# Patient Record
Sex: FEMALE | Race: WHITE | NOT HISPANIC OR LATINO | Employment: OTHER | URBAN - METROPOLITAN AREA
[De-identification: names, ages, dates, MRNs, and addresses within clinical notes are randomized per-mention and may not be internally consistent; named-entity substitution may affect disease eponyms.]

---

## 2019-12-30 NOTE — TELEPHONE ENCOUNTER
This pt has not established with a PCP, current listed is not within [facility], can you please forward?

## 2020-01-03 RX ORDER — DESVENLAFAXINE SUCCINATE 100 MG/1
TABLET, EXTENDED RELEASE ORAL
Qty: 30 TAB | Refills: 2 | OUTPATIENT
Start: 2020-01-03

## 2021-06-09 ENCOUNTER — PRE-ADMISSION TESTING (OUTPATIENT)
Dept: ADMISSIONS | Facility: MEDICAL CENTER | Age: 61
End: 2021-06-09
Attending: OBSTETRICS & GYNECOLOGY
Payer: MEDICAID

## 2021-06-09 DIAGNOSIS — Z01.812 PRE-OPERATIVE LABORATORY EXAMINATION: ICD-10-CM

## 2021-06-09 LAB
APPEARANCE UR: CLEAR
BACTERIA #/AREA URNS HPF: NEGATIVE /HPF
BASOPHILS # BLD AUTO: 0.4 % (ref 0–1.8)
BASOPHILS # BLD: 0.03 K/UL (ref 0–0.12)
BILIRUB UR QL STRIP.AUTO: NEGATIVE
COLOR UR: YELLOW
EOSINOPHIL # BLD AUTO: 0.32 K/UL (ref 0–0.51)
EOSINOPHIL NFR BLD: 3.9 % (ref 0–6.9)
EPI CELLS #/AREA URNS HPF: NORMAL /HPF
ERYTHROCYTE [DISTWIDTH] IN BLOOD BY AUTOMATED COUNT: 42.8 FL (ref 35.9–50)
GLUCOSE UR STRIP.AUTO-MCNC: NEGATIVE MG/DL
HCT VFR BLD AUTO: 49 % (ref 37–47)
HGB BLD-MCNC: 16 G/DL (ref 12–16)
HYALINE CASTS #/AREA URNS LPF: NORMAL /LPF
IMM GRANULOCYTES # BLD AUTO: 0.04 K/UL (ref 0–0.11)
IMM GRANULOCYTES NFR BLD AUTO: 0.5 % (ref 0–0.9)
KETONES UR STRIP.AUTO-MCNC: NEGATIVE MG/DL
LEUKOCYTE ESTERASE UR QL STRIP.AUTO: ABNORMAL
LYMPHOCYTES # BLD AUTO: 3.12 K/UL (ref 1–4.8)
LYMPHOCYTES NFR BLD: 38.5 % (ref 22–41)
MCH RBC QN AUTO: 28.8 PG (ref 27–33)
MCHC RBC AUTO-ENTMCNC: 32.7 G/DL (ref 33.6–35)
MCV RBC AUTO: 88.3 FL (ref 81.4–97.8)
MICRO URNS: ABNORMAL
MONOCYTES # BLD AUTO: 0.68 K/UL (ref 0–0.85)
MONOCYTES NFR BLD AUTO: 8.4 % (ref 0–13.4)
NEUTROPHILS # BLD AUTO: 3.92 K/UL (ref 2–7.15)
NEUTROPHILS NFR BLD: 48.3 % (ref 44–72)
NITRITE UR QL STRIP.AUTO: NEGATIVE
NRBC # BLD AUTO: 0 K/UL
NRBC BLD-RTO: 0 /100 WBC
PH UR STRIP.AUTO: 6 [PH] (ref 5–8)
PLATELET # BLD AUTO: 298 K/UL (ref 164–446)
PMV BLD AUTO: 9.8 FL (ref 9–12.9)
PROT UR QL STRIP: NEGATIVE MG/DL
RBC # BLD AUTO: 5.55 M/UL (ref 4.2–5.4)
RBC # URNS HPF: NORMAL /HPF
RBC UR QL AUTO: NEGATIVE
SP GR UR STRIP.AUTO: 1.02
UROBILINOGEN UR STRIP.AUTO-MCNC: 0.2 MG/DL
WBC # BLD AUTO: 8.1 K/UL (ref 4.8–10.8)
WBC #/AREA URNS HPF: NORMAL /HPF

## 2021-06-09 PROCEDURE — 85025 COMPLETE CBC W/AUTO DIFF WBC: CPT

## 2021-06-09 PROCEDURE — 81001 URINALYSIS AUTO W/SCOPE: CPT

## 2021-06-09 PROCEDURE — 36415 COLL VENOUS BLD VENIPUNCTURE: CPT

## 2021-06-09 RX ORDER — M-VIT,TX,IRON,MINS/CALC/FOLIC 27MG-0.4MG
1 TABLET ORAL DAILY
Status: ON HOLD | COMMUNITY
End: 2022-01-06

## 2021-06-09 RX ORDER — SERTRALINE HYDROCHLORIDE 100 MG/1
150 TABLET, FILM COATED ORAL EVERY EVENING
COMMUNITY

## 2021-06-28 NOTE — OR NURSING
COVID-19 Pre-surgery screenin. Do you have an undiagnosed respiratory illness or symptoms such as coughing or sneezing? No (Yes/No)  a. Onset of Sx NA  b. Acute vs. chronic respiratory illness Balta    2. Do you have an unexplained fever greater than 100.4 degrees Fahrenheit or 38 degrees Celsius?     No (Yes/No)    3. Have you had direct exposure to a patient who tested positive for Covid-19?    No (Yes/No)    4. Have you had any loss of your sense of taste or smell? Have you had N/V or sore throat? No    Patient has been informed of visitor policy and asked to wear a mask upon entering the hospital   Yes (Yes/No)

## 2021-06-29 ENCOUNTER — ANESTHESIA (OUTPATIENT)
Dept: SURGERY | Facility: MEDICAL CENTER | Age: 61
End: 2021-06-29
Payer: MEDICAID

## 2021-06-29 ENCOUNTER — HOSPITAL ENCOUNTER (OUTPATIENT)
Facility: MEDICAL CENTER | Age: 61
End: 2021-06-29
Attending: OBSTETRICS & GYNECOLOGY | Admitting: OBSTETRICS & GYNECOLOGY
Payer: MEDICAID

## 2021-06-29 ENCOUNTER — ANESTHESIA EVENT (OUTPATIENT)
Dept: SURGERY | Facility: MEDICAL CENTER | Age: 61
End: 2021-06-29
Payer: MEDICAID

## 2021-06-29 VITALS
HEIGHT: 60 IN | DIASTOLIC BLOOD PRESSURE: 65 MMHG | HEART RATE: 76 BPM | RESPIRATION RATE: 18 BRPM | OXYGEN SATURATION: 95 % | SYSTOLIC BLOOD PRESSURE: 123 MMHG | WEIGHT: 173.5 LBS | TEMPERATURE: 97.4 F | BODY MASS INDEX: 34.06 KG/M2

## 2021-06-29 DIAGNOSIS — G89.18 POSTOPERATIVE PAIN: ICD-10-CM

## 2021-06-29 PROBLEM — I10 ESSENTIAL HYPERTENSION: Status: ACTIVE | Noted: 2021-06-29

## 2021-06-29 PROBLEM — T88.59XA DELAYED EMERGENCE FROM ANESTHESIA: Status: ACTIVE | Noted: 2021-06-29

## 2021-06-29 PROBLEM — R11.2 PONV (POSTOPERATIVE NAUSEA AND VOMITING): Status: ACTIVE | Noted: 2021-06-29

## 2021-06-29 PROBLEM — Z98.890 PONV (POSTOPERATIVE NAUSEA AND VOMITING): Status: ACTIVE | Noted: 2021-06-29

## 2021-06-29 PROBLEM — G47.33 OBSTRUCTIVE SLEEP APNEA SYNDROME: Status: ACTIVE | Noted: 2021-06-29

## 2021-06-29 LAB — GLUCOSE BLD-MCNC: 94 MG/DL (ref 65–99)

## 2021-06-29 PROCEDURE — 160036 HCHG PACU - EA ADDL 30 MINS PHASE I: Performed by: OBSTETRICS & GYNECOLOGY

## 2021-06-29 PROCEDURE — 700101 HCHG RX REV CODE 250: Performed by: OBSTETRICS & GYNECOLOGY

## 2021-06-29 PROCEDURE — A9270 NON-COVERED ITEM OR SERVICE: HCPCS | Performed by: ANESTHESIOLOGY

## 2021-06-29 PROCEDURE — 160029 HCHG SURGERY MINUTES - 1ST 30 MINS LEVEL 4: Performed by: OBSTETRICS & GYNECOLOGY

## 2021-06-29 PROCEDURE — 700111 HCHG RX REV CODE 636 W/ 250 OVERRIDE (IP): Performed by: OBSTETRICS & GYNECOLOGY

## 2021-06-29 PROCEDURE — 700102 HCHG RX REV CODE 250 W/ 637 OVERRIDE(OP): Performed by: ANESTHESIOLOGY

## 2021-06-29 PROCEDURE — 501838 HCHG SUTURE GENERAL: Performed by: OBSTETRICS & GYNECOLOGY

## 2021-06-29 PROCEDURE — 160041 HCHG SURGERY MINUTES - EA ADDL 1 MIN LEVEL 4: Performed by: OBSTETRICS & GYNECOLOGY

## 2021-06-29 PROCEDURE — 160046 HCHG PACU - 1ST 60 MINS PHASE II: Performed by: OBSTETRICS & GYNECOLOGY

## 2021-06-29 PROCEDURE — 160009 HCHG ANES TIME/MIN: Performed by: OBSTETRICS & GYNECOLOGY

## 2021-06-29 PROCEDURE — 700101 HCHG RX REV CODE 250

## 2021-06-29 PROCEDURE — 160048 HCHG OR STATISTICAL LEVEL 1-5: Performed by: OBSTETRICS & GYNECOLOGY

## 2021-06-29 PROCEDURE — 160002 HCHG RECOVERY MINUTES (STAT): Performed by: OBSTETRICS & GYNECOLOGY

## 2021-06-29 PROCEDURE — 700101 HCHG RX REV CODE 250: Performed by: ANESTHESIOLOGY

## 2021-06-29 PROCEDURE — 502000 HCHG MISC OR IMPLANTS RC 0278: Performed by: OBSTETRICS & GYNECOLOGY

## 2021-06-29 PROCEDURE — 110454 HCHG SHELL REV 250: Performed by: OBSTETRICS & GYNECOLOGY

## 2021-06-29 PROCEDURE — 700105 HCHG RX REV CODE 258: Performed by: OBSTETRICS & GYNECOLOGY

## 2021-06-29 PROCEDURE — A9270 NON-COVERED ITEM OR SERVICE: HCPCS | Performed by: OBSTETRICS & GYNECOLOGY

## 2021-06-29 PROCEDURE — C1771 REP DEV, URINARY, W/SLING: HCPCS | Performed by: OBSTETRICS & GYNECOLOGY

## 2021-06-29 PROCEDURE — 700111 HCHG RX REV CODE 636 W/ 250 OVERRIDE (IP): Performed by: ANESTHESIOLOGY

## 2021-06-29 PROCEDURE — 160035 HCHG PACU - 1ST 60 MINS PHASE I: Performed by: OBSTETRICS & GYNECOLOGY

## 2021-06-29 PROCEDURE — 82962 GLUCOSE BLOOD TEST: CPT

## 2021-06-29 PROCEDURE — 160025 RECOVERY II MINUTES (STATS): Performed by: OBSTETRICS & GYNECOLOGY

## 2021-06-29 PROCEDURE — 500892 HCHG PACK, PERI-GYN: Performed by: OBSTETRICS & GYNECOLOGY

## 2021-06-29 PROCEDURE — 501330 HCHG SET, CYSTO IRRIG TUBING: Performed by: OBSTETRICS & GYNECOLOGY

## 2021-06-29 DEVICE — SYSTEM SLING SOLYX SIS BLUE (1/EA): Type: IMPLANTABLE DEVICE | Site: URETHRA | Status: FUNCTIONAL

## 2021-06-29 RX ORDER — SODIUM CHLORIDE, SODIUM LACTATE, POTASSIUM CHLORIDE, CALCIUM CHLORIDE 600; 310; 30; 20 MG/100ML; MG/100ML; MG/100ML; MG/100ML
INJECTION, SOLUTION INTRAVENOUS CONTINUOUS
Status: ACTIVE | OUTPATIENT
Start: 2021-06-29 | End: 2021-06-29

## 2021-06-29 RX ORDER — ONDANSETRON 2 MG/ML
4 INJECTION INTRAMUSCULAR; INTRAVENOUS
Status: DISCONTINUED | OUTPATIENT
Start: 2021-06-29 | End: 2021-06-29 | Stop reason: HOSPADM

## 2021-06-29 RX ORDER — IBUPROFEN 800 MG/1
800 TABLET ORAL EVERY 8 HOURS PRN
Qty: 30 TABLET | Refills: 1 | Status: ON HOLD | OUTPATIENT
Start: 2021-06-29 | End: 2022-01-06

## 2021-06-29 RX ORDER — OXYCODONE HCL 5 MG/5 ML
10 SOLUTION, ORAL ORAL
Status: DISCONTINUED | OUTPATIENT
Start: 2021-06-29 | End: 2021-06-29 | Stop reason: HOSPADM

## 2021-06-29 RX ORDER — PROMETHAZINE HYDROCHLORIDE 25 MG/1
25 SUPPOSITORY RECTAL EVERY 4 HOURS PRN
Status: DISCONTINUED | OUTPATIENT
Start: 2021-06-29 | End: 2021-06-29 | Stop reason: HOSPADM

## 2021-06-29 RX ORDER — LIDOCAINE HYDROCHLORIDE 20 MG/ML
INJECTION, SOLUTION EPIDURAL; INFILTRATION; INTRACAUDAL; PERINEURAL PRN
Status: DISCONTINUED | OUTPATIENT
Start: 2021-06-29 | End: 2021-06-29 | Stop reason: SURG

## 2021-06-29 RX ORDER — DEXAMETHASONE SODIUM PHOSPHATE 4 MG/ML
INJECTION, SOLUTION INTRA-ARTICULAR; INTRALESIONAL; INTRAMUSCULAR; INTRAVENOUS; SOFT TISSUE PRN
Status: DISCONTINUED | OUTPATIENT
Start: 2021-06-29 | End: 2021-06-29 | Stop reason: SURG

## 2021-06-29 RX ORDER — DOCUSATE SODIUM 100 MG/1
100 CAPSULE, LIQUID FILLED ORAL 2 TIMES DAILY
Qty: 60 CAPSULE | Refills: 1 | Status: ON HOLD | OUTPATIENT
Start: 2021-06-29 | End: 2022-01-06

## 2021-06-29 RX ORDER — ONDANSETRON 2 MG/ML
INJECTION INTRAMUSCULAR; INTRAVENOUS PRN
Status: DISCONTINUED | OUTPATIENT
Start: 2021-06-29 | End: 2021-06-29 | Stop reason: SURG

## 2021-06-29 RX ORDER — CELECOXIB 200 MG/1
400 CAPSULE ORAL ONCE
Status: COMPLETED | OUTPATIENT
Start: 2021-06-29 | End: 2021-06-29

## 2021-06-29 RX ORDER — ACETAMINOPHEN 500 MG
1000 TABLET ORAL ONCE
Status: COMPLETED | OUTPATIENT
Start: 2021-06-29 | End: 2021-06-29

## 2021-06-29 RX ORDER — SCOLOPAMINE TRANSDERMAL SYSTEM 1 MG/1
1 PATCH, EXTENDED RELEASE TRANSDERMAL
Status: DISCONTINUED | OUTPATIENT
Start: 2021-06-29 | End: 2021-06-29 | Stop reason: HOSPADM

## 2021-06-29 RX ORDER — OXYCODONE HCL 5 MG/5 ML
5 SOLUTION, ORAL ORAL
Status: DISCONTINUED | OUTPATIENT
Start: 2021-06-29 | End: 2021-06-29 | Stop reason: HOSPADM

## 2021-06-29 RX ORDER — MEPERIDINE HYDROCHLORIDE 25 MG/ML
12.5 INJECTION INTRAMUSCULAR; INTRAVENOUS; SUBCUTANEOUS
Status: DISCONTINUED | OUTPATIENT
Start: 2021-06-29 | End: 2021-06-29 | Stop reason: HOSPADM

## 2021-06-29 RX ORDER — CEFOTETAN DISODIUM 2 G/20ML
INJECTION, POWDER, FOR SOLUTION INTRAMUSCULAR; INTRAVENOUS PRN
Status: DISCONTINUED | OUTPATIENT
Start: 2021-06-29 | End: 2021-06-29 | Stop reason: SURG

## 2021-06-29 RX ORDER — METOCLOPRAMIDE HYDROCHLORIDE 5 MG/ML
INJECTION INTRAMUSCULAR; INTRAVENOUS PRN
Status: DISCONTINUED | OUTPATIENT
Start: 2021-06-29 | End: 2021-06-29 | Stop reason: SURG

## 2021-06-29 RX ORDER — SODIUM CHLORIDE, SODIUM LACTATE, POTASSIUM CHLORIDE, CALCIUM CHLORIDE 600; 310; 30; 20 MG/100ML; MG/100ML; MG/100ML; MG/100ML
INJECTION, SOLUTION INTRAVENOUS CONTINUOUS
Status: DISCONTINUED | OUTPATIENT
Start: 2021-06-29 | End: 2021-06-29 | Stop reason: HOSPADM

## 2021-06-29 RX ORDER — GLYCOPYRROLATE 0.2 MG/ML
INJECTION INTRAMUSCULAR; INTRAVENOUS PRN
Status: DISCONTINUED | OUTPATIENT
Start: 2021-06-29 | End: 2021-06-29 | Stop reason: SURG

## 2021-06-29 RX ORDER — SIMETHICONE 80 MG
80 TABLET,CHEWABLE ORAL EVERY 8 HOURS PRN
Status: DISCONTINUED | OUTPATIENT
Start: 2021-06-29 | End: 2021-06-29 | Stop reason: HOSPADM

## 2021-06-29 RX ORDER — BUPIVACAINE HYDROCHLORIDE AND EPINEPHRINE 2.5; 5 MG/ML; UG/ML
INJECTION, SOLUTION INFILTRATION; PERINEURAL
Status: DISCONTINUED | OUTPATIENT
Start: 2021-06-29 | End: 2021-06-29 | Stop reason: HOSPADM

## 2021-06-29 RX ORDER — OXYCODONE HYDROCHLORIDE AND ACETAMINOPHEN 5; 325 MG/1; MG/1
1 TABLET ORAL EVERY 6 HOURS PRN
Qty: 15 TABLET | Refills: 0 | Status: SHIPPED | OUTPATIENT
Start: 2021-06-29 | End: 2021-07-04

## 2021-06-29 RX ORDER — HALOPERIDOL 5 MG/ML
1 INJECTION INTRAMUSCULAR
Status: DISCONTINUED | OUTPATIENT
Start: 2021-06-29 | End: 2021-06-29 | Stop reason: HOSPADM

## 2021-06-29 RX ORDER — PHENYLEPHRINE HCL IN 0.9% NACL 0.5 MG/5ML
SYRINGE (ML) INTRAVENOUS PRN
Status: DISCONTINUED | OUTPATIENT
Start: 2021-06-29 | End: 2021-06-29 | Stop reason: SURG

## 2021-06-29 RX ADMIN — EPHEDRINE SULFATE 5 MG: 50 INJECTION, SOLUTION INTRAVENOUS at 11:35

## 2021-06-29 RX ADMIN — FENTANYL CITRATE 50 MCG: 50 INJECTION, SOLUTION INTRAMUSCULAR; INTRAVENOUS at 11:06

## 2021-06-29 RX ADMIN — DEXAMETHASONE SODIUM PHOSPHATE 8 MG: 4 INJECTION, SOLUTION INTRA-ARTICULAR; INTRALESIONAL; INTRAMUSCULAR; INTRAVENOUS; SOFT TISSUE at 11:15

## 2021-06-29 RX ADMIN — SCOPALAMINE 1 PATCH: 1 PATCH, EXTENDED RELEASE TRANSDERMAL at 08:55

## 2021-06-29 RX ADMIN — LIDOCAINE HYDROCHLORIDE 80 MG: 20 INJECTION, SOLUTION EPIDURAL; INFILTRATION; INTRACAUDAL at 11:06

## 2021-06-29 RX ADMIN — CELECOXIB 400 MG: 200 CAPSULE ORAL at 08:55

## 2021-06-29 RX ADMIN — EPHEDRINE SULFATE 10 MG: 50 INJECTION, SOLUTION INTRAVENOUS at 11:22

## 2021-06-29 RX ADMIN — CEFOTETAN DISODIUM 2 G: 2 INJECTION, POWDER, FOR SOLUTION INTRAMUSCULAR; INTRAVENOUS at 11:06

## 2021-06-29 RX ADMIN — Medication 200 MCG: at 11:32

## 2021-06-29 RX ADMIN — ONDANSETRON 4 MG: 2 INJECTION INTRAMUSCULAR; INTRAVENOUS at 11:33

## 2021-06-29 RX ADMIN — METOCLOPRAMIDE 10 MG: 5 INJECTION, SOLUTION INTRAMUSCULAR; INTRAVENOUS at 11:24

## 2021-06-29 RX ADMIN — POVIDONE IODINE 15 ML: 100 SOLUTION TOPICAL at 09:09

## 2021-06-29 RX ADMIN — Medication 100 MCG: at 11:30

## 2021-06-29 RX ADMIN — Medication 100 MCG: at 11:14

## 2021-06-29 RX ADMIN — Medication 100 MCG: at 11:35

## 2021-06-29 RX ADMIN — EPHEDRINE SULFATE 5 MG: 50 INJECTION, SOLUTION INTRAVENOUS at 11:32

## 2021-06-29 RX ADMIN — Medication 100 MCG: at 11:38

## 2021-06-29 RX ADMIN — Medication 200 MCG: at 11:12

## 2021-06-29 RX ADMIN — Medication 100 MCG: at 11:22

## 2021-06-29 RX ADMIN — ACETAMINOPHEN 1000 MG: 500 TABLET ORAL at 08:55

## 2021-06-29 RX ADMIN — SODIUM CHLORIDE, POTASSIUM CHLORIDE, SODIUM LACTATE AND CALCIUM CHLORIDE: 600; 310; 30; 20 INJECTION, SOLUTION INTRAVENOUS at 09:09

## 2021-06-29 RX ADMIN — EPHEDRINE SULFATE 10 MG: 50 INJECTION, SOLUTION INTRAVENOUS at 11:28

## 2021-06-29 RX ADMIN — SODIUM CHLORIDE, POTASSIUM CHLORIDE, SODIUM LACTATE AND CALCIUM CHLORIDE: 600; 310; 30; 20 INJECTION, SOLUTION INTRAVENOUS at 11:39

## 2021-06-29 RX ADMIN — MIDAZOLAM 1 MG: 1 INJECTION INTRAMUSCULAR; INTRAVENOUS at 11:00

## 2021-06-29 RX ADMIN — EPHEDRINE SULFATE 10 MG: 50 INJECTION, SOLUTION INTRAVENOUS at 11:19

## 2021-06-29 RX ADMIN — PROPOFOL 140 MG: 10 INJECTION, EMULSION INTRAVENOUS at 11:06

## 2021-06-29 RX ADMIN — GLYCOPYRROLATE 0.2 MG: 0.2 INJECTION INTRAMUSCULAR; INTRAVENOUS at 11:28

## 2021-06-29 ASSESSMENT — PAIN DESCRIPTION - PAIN TYPE
TYPE: SURGICAL PAIN
TYPE: SURGICAL PAIN

## 2021-06-29 ASSESSMENT — PAIN SCALES - GENERAL: PAIN_LEVEL: 0

## 2021-06-29 NOTE — DISCHARGE INSTRUCTIONS
ACTIVITY: Rest and take it easy for the first 24 hours.  A responsible adult is recommended to remain with you during that time.  It is normal to feel sleepy.  We encourage you to not do anything that requires balance, judgment or coordination.    MILD FLU-LIKE SYMPTOMS ARE NORMAL. YOU MAY EXPERIENCE GENERALIZED MUSCLE ACHES, THROAT IRRITATION, HEADACHE AND/OR SOME NAUSEA.    FOR 24 HOURS DO NOT:  Drive, operate machinery or run household appliances.  Drink beer or alcoholic beverages.   Make important decisions or sign legal documents.    SPECIAL INSTRUCTIONS:   Pelvic rest x 6 week  Follow Up in 2 weeks  Call office for any problems      DIET: To avoid nausea, slowly advance diet as tolerated, avoiding spicy or greasy foods for the first day.  Add more substantial food to your diet according to your physician's instructions.  Babies can be fed formula or breast milk as soon as they are hungry.  INCREASE FLUIDS AND FIBER TO AVOID CONSTIPATION.    SURGICAL DRESSING/BATHING: May shower starting tomorrow. No submerging under water, NO hot tubs, swimming pools, baths until cleared by physician     FOLLOW-UP APPOINTMENT:  A follow-up appointment should be arranged with your doctor in 2 weeks; call to schedule.    You should CALL YOUR PHYSICIAN if you develop:  Fever greater than 101 degrees F.  Pain not relieved by medication, or persistent nausea or vomiting.  Excessive bleeding (blood soaking through dressing) or unexpected drainage from the wound.  Extreme redness or swelling around the incision site, drainage of pus or foul smelling drainage.  Inability to urinate or empty your bladder within 8 hours.  Problems with breathing or chest pain.    You should call 911 if you develop problems with breathing or chest pain.  If you are unable to contact your doctor or surgical center, you should go to the nearest emergency room or urgent care center.  Physician's telephone #: [phone] Dr. Dewey    If any questions  arise, call your doctor.  If your doctor is not available, please feel free to call the Surgical Center at [phone]. The Contact Center is open Monday through Friday 7AM to 5PM and may speak to a nurse at [phone], or toll free at [phone].     A registered nurse may call you a few days after your surgery to see how you are doing after your procedure.    MEDICATIONS: Resume taking daily medication.  Take prescribed pain medication with food.  If no medication is prescribed, you may take non-aspirin pain medication if needed.  PAIN MEDICATION CAN BE VERY CONSTIPATING.  Take a stool softener or laxative such as senokot, pericolace, or milk of magnesia if needed.    Prescription given for .  Last pain medication given at 1000mg of tylenol given at 8:55am.     If your physician has prescribed pain medication that includes Acetaminophen (Tylenol), do not take additional Acetaminophen (Tylenol) while taking the prescribed medication.    Depression / Suicide Risk    As you are discharged from this [facility] facility, it is important to learn how to keep safe from harming yourself.    Recognize the warning signs:  · Abrupt changes in personality, positive or negative- including increase in energy   · Giving away possessions  · Change in eating patterns- significant weight changes-  positive or negative  · Change in sleeping patterns- unable to sleep or sleeping all the time   · Unwillingness or inability to communicate  · Depression  · Unusual sadness, discouragement and loneliness  · Talk of wanting to die  · Neglect of personal appearance   · Rebelliousness- reckless behavior  · Withdrawal from people/activities they love  · Confusion- inability to concentrate     If you or a loved one observes any of these behaviors or has concerns about self-harm, here's what you can do:  · Talk about it- your feelings and reasons for harming yourself  · Remove any means that you might use to hurt yourself (examples:  pills, rope, extension cords, firearm)  · Get professional help from the community (Mental Health, Substance Abuse, psychological counseling)  · Do not be alone:Call your Safe Contact- someone whom you trust who will be there for you.  · Call your local CRISIS HOTLINE 709-0462 or 629-489-5575  · Call your local Children's Mobile Crisis Response Team [place] (783) 410-3594 or www.[employer]  · Call the toll free National Suicide Prevention Hotlines   · National Suicide Prevention Lifeline 058-312-ZLSA (1643)  · National Hope Line Network 800-SUICIDE (366-3535)

## 2021-06-29 NOTE — OP REPORT
DATE OF OPERATION: 6/29/21    PreOp Diagnosis:   1. WILBERT    PostOp Diagnosis:   1. WILBERT    Procedure(s) (LRB):  BLADDER SLING FEMALE - Solyx  CYSTOSCOPY    Surgeon(s):  Rocío Dewey M.D.    Anesthesiologist/Type of Anesthesia:  Associated Anesthesia: Dayanara Awan MD    Specimen: None    Estimated Blood Loss: 5 CC    Findings: CYSTOSCOPY SHOWED NO INJURY, FOREIGN   MATERIAL OR DISEASE PROCESS IN THE BLADDER OR URETHRA.   URETERAL JETS SEEN BILATERALLY.    Complications: NONE     PROCEDURE IN DETAIL: After informed consent was obtained, the patient was   taken to the operating room where general endotracheal anesthesia was   administered without difficulty. She was then prepped and draped in the usual   sterile fashion in the lithotomy position with Russell stirrups. A formal time   out was called prior to the procedure and the preoperative antibiotics were   given. Examination under anesthesia was performed and a Burch catheter was   placed under sterile technique.    10 cc of lidocaine was injected into the vagina wall at the mid-urethra. A 1.0 cm incision   is then made and dissected  bilaterally to the interior portion ofthe inferior pubic ramus   at a 45? angle offthe midline creating a pathway for delivery of the device placement.  The mesh is placed on the delivery device and inserted into the dissection pathway   targeting placement of the carrier at a 45? angle of the midline towards the obturator   foramen just lateral to the inferior pubic ramus until the midline fausto on the delivery   device is approximately at the midline position under the urethra. The mesh is deployed   and the same procedure is performed on the contralateral side. The mesh placement   is examined and noted to be the correct tension and placement prior to deploying the   device. Surgiflow is placed into the dissection tracts and the vaginal tissue is   re-approximated with 3-0 vicryl.     Cystoscopy was then started to ensure there is no  injury to the bladder or urethra or   foreign material A 70-degree cystoscope was then placed into the   bladder under direct visualization. The bladder was noted to be intact with no injury,   foreign body or disease process. Ureteral jets were seen coming from both   ureteral orifices. The urethra was also noted to have no foreign material or injury   from the sling placement.The cystoscopy was completed and removed under direct   visualization. Sponge, needle, instrument, and lap counts were correct x2.   Patient tolerated the procedure well and went to recovery room in stable   condition.       ____________________________________   HANS ORR MD

## 2021-06-29 NOTE — ANESTHESIA PROCEDURE NOTES
Airway    Date/Time: 6/29/2021 11:08 AM  Performed by: Dayanara Awan M.D.  Authorized by: Dayanara Awan M.D.     Location:  OR  Urgency:  Elective  Indications for Airway Management:  Anesthesia      Spontaneous Ventilation: absent    Sedation Level:  Deep  Preoxygenated: Yes    Mask Difficulty Assessment:  0 - not attempted  Final Airway Type:  Supraglottic airway  Final Supraglottic Airway:  Standard LMA    SGA Size:  4  Number of Attempts at Approach:  1

## 2021-06-29 NOTE — OR NURSING
Patient received from OR at 1150, bedside report received from anesthesia/OR RN, 2 patient identifiers confirmed . Patient connected to monitors, assessed for general head to toe and pain/nausea. Ordered reviewed and checked. No family to be updated via telephone on patient status per patient, call friend when ready for .  At this time patient meets criteria for D/C to phase II/room. VSS, awake and appropriate, pain minimal or at a tolerable level per patient. Report called to Bienvenido PHILLIPS and patient taken to phase II.

## 2021-06-29 NOTE — ANESTHESIA TIME REPORT
Anesthesia Start and Stop Event Times     Date Time Event    6/29/2021 1026 Ready for Procedure     1100 Anesthesia Start     1148 Anesthesia Stop        Responsible Staff  06/29/21    Name Role Begin End    Dayanara Awan M.D. Anesth 1100 1148        Preop Diagnosis (Free Text):  Pre-op Diagnosis     STRESS URINARY INCONTINENCE, MIXED INCONTINENCE        Preop Diagnosis (Codes):    Post op Diagnosis  Urinary, incontinence, stress female      Premium Reason  Non-Premium    Comments:

## 2021-06-29 NOTE — ANESTHESIA PREPROCEDURE EVALUATION
59 yo w/stress urinary incontinence    Relevant Problems   ANESTHESIA   (positive) Delayed emergence from anesthesia   (positive) Obstructive sleep apnea syndrome   (positive) PONV (postoperative nausea and vomiting)      CARDIAC   (positive) Essential hypertension     (+) covid 11/2020- required oxygen for 3months  hypoglycemia    Denies CAD, CP/SOB, DM, LUNG/LIVER/KIDNEY DZ, GERD, URI    Physical Exam    Airway   Mallampati: II  TM distance: >3 FB  Neck ROM: full       Cardiovascular   Rhythm: regular  Rate: normal  (-) murmur     Dental   (+) upper dentures           Pulmonary   Breath sounds clear to auscultation     Abdominal    Neurological - normal exam                 Anesthesia Plan    ASA 2       Plan - general       Airway plan will be LMA              Postoperative Plan: Postoperative administration of opioids is intended.    Pertinent diagnostic labs and testing reviewed    Informed Consent:    Anesthetic plan and risks discussed with patient.

## 2021-06-29 NOTE — ANESTHESIA POSTPROCEDURE EVALUATION
Patient: Yolanda Reed    Procedure Summary     Date: 06/29/21 Room / Location: [place] [address] / SURGERY [facility]    Anesthesia Start: 1100 Anesthesia Stop: 1148    Procedure: BLADDER SLING, FEMALE - SOLYX. (N/A Urethra) Diagnosis: (STRESS URINARY INCONTINENCE, MIXED INCONTINENCE)    Surgeons: Rocío Dewey M.D. Responsible Provider: Dayanara Awan M.D.    Anesthesia Type: general ASA Status: 2          Final Anesthesia Type: general  Last vitals  BP   Blood Pressure: (!) 95/57    Temp   36.3 °C (97.4 °F)    Pulse   82   Resp   17    SpO2   96 %      Anesthesia Post Evaluation    Patient location during evaluation: PACU  Patient participation: complete - patient participated  Level of consciousness: awake  Pain score: 0    Airway patency: patent  Anesthetic complications: no  Cardiovascular status: adequate  Respiratory status: acceptable  Hydration status: acceptable    PONV: none          No complications documented.     Nurse Pain Score: 0 (NPRS)

## 2021-06-29 NOTE — OR SURGEON
Immediate Post OP Note    PreOp Diagnosis:   1. WILBERT    PostOp Diagnosis:   1. WILBERT    Procedure(s):  BLADDER SLING, FEMALE - SOLYX. - Wound Class: Clean Contaminated  Cystoscopy    Surgeon(s):  Rocío Dewey M.D.    Anesthesiologist/Type of Anesthesia:  Anesthesiologist: Dayanara Awan M.D./General    Surgical Staff:  Circulator: Justine Levine R.N.  Scrub Person: Rakel Acosta; Isabel Bhardwaj    Specimens removed if any:  * No specimens in log *    Estimated Blood Loss: 5 cc    Findings: Cystoscopy shows no injury to the bladder or urethra. Ureteral jets seen bilaterally.    Complications: None        6/29/2021 11:49 AM Rocío Dewey M.D.

## 2021-06-29 NOTE — OR NURSING
1302 - Received pt from Recovery. No complaints of pain or N/V at this time. VSS.    1320 - Pt ambulated to bathroom and voided adequately.     1337 - Friend at bedside. Discharge orders received. IV taken out. All belongings returned to pt. Pt changed into clothing with assistance. Pt up and ambulated to BR and voided adequately. Discharge instructions given and discussed as well as pain management handout. Pt verbalizes understandings and all questions answered at this time. Pt states they are ready to be D/C home. Prescriptions sent to pharmacy and verbalizes understanding of medications. Pt D/C via wheelchair with all belongings with RN

## 2022-01-05 ENCOUNTER — PRE-ADMISSION TESTING (OUTPATIENT)
Dept: ADMISSIONS | Facility: MEDICAL CENTER | Age: 62
DRG: 472 | End: 2022-01-05
Attending: ORTHOPAEDIC SURGERY
Payer: MEDICAID

## 2022-01-05 ENCOUNTER — HOSPITAL ENCOUNTER (OUTPATIENT)
Dept: RADIOLOGY | Facility: MEDICAL CENTER | Age: 62
DRG: 472 | End: 2022-01-05
Attending: SURGERY | Admitting: ORTHOPAEDIC SURGERY
Payer: MEDICAID

## 2022-01-05 DIAGNOSIS — Z01.811 PRE-OPERATIVE RESPIRATORY EXAMINATION: ICD-10-CM

## 2022-01-05 DIAGNOSIS — Z01.812 PRE-OPERATIVE LABORATORY EXAMINATION: ICD-10-CM

## 2022-01-05 DIAGNOSIS — Z01.810 PRE-OPERATIVE CARDIOVASCULAR EXAMINATION: ICD-10-CM

## 2022-01-05 LAB
APTT PPP: 31.6 SEC (ref 24.7–36)
EKG IMPRESSION: NORMAL
EST. AVERAGE GLUCOSE BLD GHB EST-MCNC: 126 MG/DL
HBA1C MFR BLD: 6 % (ref 4–5.6)
INR PPP: 0.87 (ref 0.87–1.13)
PROTHROMBIN TIME: 11.5 SEC (ref 12–14.6)
SARS-COV+SARS-COV-2 AG RESP QL IA.RAPID: NOTDETECTED
SPECIMEN SOURCE: NORMAL

## 2022-01-05 PROCEDURE — 83036 HEMOGLOBIN GLYCOSYLATED A1C: CPT

## 2022-01-05 PROCEDURE — 85730 THROMBOPLASTIN TIME PARTIAL: CPT

## 2022-01-05 PROCEDURE — 93005 ELECTROCARDIOGRAM TRACING: CPT

## 2022-01-05 PROCEDURE — 87426 SARSCOV CORONAVIRUS AG IA: CPT

## 2022-01-05 PROCEDURE — 93010 ELECTROCARDIOGRAM REPORT: CPT | Performed by: INTERNAL MEDICINE

## 2022-01-05 PROCEDURE — 85610 PROTHROMBIN TIME: CPT

## 2022-01-05 PROCEDURE — 71046 X-RAY EXAM CHEST 2 VIEWS: CPT

## 2022-01-05 PROCEDURE — 36415 COLL VENOUS BLD VENIPUNCTURE: CPT

## 2022-01-06 ENCOUNTER — APPOINTMENT (OUTPATIENT)
Dept: RADIOLOGY | Facility: MEDICAL CENTER | Age: 62
DRG: 472 | End: 2022-01-06
Attending: ORTHOPAEDIC SURGERY
Payer: MEDICAID

## 2022-01-06 ENCOUNTER — ANESTHESIA EVENT (OUTPATIENT)
Dept: SURGERY | Facility: MEDICAL CENTER | Age: 62
DRG: 472 | End: 2022-01-06
Payer: MEDICAID

## 2022-01-06 ENCOUNTER — ANESTHESIA (OUTPATIENT)
Dept: SURGERY | Facility: MEDICAL CENTER | Age: 62
DRG: 472 | End: 2022-01-06
Payer: MEDICAID

## 2022-01-06 ENCOUNTER — HOSPITAL ENCOUNTER (INPATIENT)
Facility: MEDICAL CENTER | Age: 62
LOS: 2 days | DRG: 472 | End: 2022-01-08
Attending: ORTHOPAEDIC SURGERY | Admitting: ORTHOPAEDIC SURGERY
Payer: MEDICAID

## 2022-01-06 DIAGNOSIS — F41.1 ANXIETY AS ACUTE REACTION TO EXCEPTIONAL STRESS: ICD-10-CM

## 2022-01-06 DIAGNOSIS — F43.0 ANXIETY AS ACUTE REACTION TO EXCEPTIONAL STRESS: ICD-10-CM

## 2022-01-06 DIAGNOSIS — G95.9 CERVICAL MYELOPATHY WITH CERVICAL RADICULOPATHY (HCC): ICD-10-CM

## 2022-01-06 DIAGNOSIS — G89.18 POSTOPERATIVE PAIN: ICD-10-CM

## 2022-01-06 DIAGNOSIS — M62.838 MUSCLE SPASM: ICD-10-CM

## 2022-01-06 DIAGNOSIS — M54.12 CERVICAL MYELOPATHY WITH CERVICAL RADICULOPATHY (HCC): ICD-10-CM

## 2022-01-06 LAB
ANION GAP SERPL CALC-SCNC: 13 MMOL/L (ref 7–16)
BUN SERPL-MCNC: 13 MG/DL (ref 8–22)
CALCIUM SERPL-MCNC: 9.2 MG/DL (ref 8.5–10.5)
CHLORIDE SERPL-SCNC: 104 MMOL/L (ref 96–112)
CO2 SERPL-SCNC: 20 MMOL/L (ref 20–33)
CREAT SERPL-MCNC: 0.54 MG/DL (ref 0.5–1.4)
ERYTHROCYTE [DISTWIDTH] IN BLOOD BY AUTOMATED COUNT: 40.4 FL (ref 35.9–50)
GLUCOSE BLD-MCNC: 111 MG/DL (ref 65–99)
GLUCOSE SERPL-MCNC: 141 MG/DL (ref 65–99)
HCT VFR BLD AUTO: 43.4 % (ref 37–47)
HGB BLD-MCNC: 14.4 G/DL (ref 12–16)
MCH RBC QN AUTO: 28.5 PG (ref 27–33)
MCHC RBC AUTO-ENTMCNC: 33.2 G/DL (ref 33.6–35)
MCV RBC AUTO: 85.8 FL (ref 81.4–97.8)
PLATELET # BLD AUTO: 279 K/UL (ref 164–446)
PMV BLD AUTO: 9.5 FL (ref 9–12.9)
POTASSIUM SERPL-SCNC: 4.5 MMOL/L (ref 3.6–5.5)
RBC # BLD AUTO: 5.06 M/UL (ref 4.2–5.4)
SODIUM SERPL-SCNC: 137 MMOL/L (ref 135–145)
WBC # BLD AUTO: 11.4 K/UL (ref 4.8–10.8)

## 2022-01-06 PROCEDURE — 160041 HCHG SURGERY MINUTES - EA ADDL 1 MIN LEVEL 4: Performed by: ORTHOPAEDIC SURGERY

## 2022-01-06 PROCEDURE — 95955 EEG DURING SURGERY: CPT | Performed by: ORTHOPAEDIC SURGERY

## 2022-01-06 PROCEDURE — 700111 HCHG RX REV CODE 636 W/ 250 OVERRIDE (IP): Performed by: ANESTHESIOLOGY

## 2022-01-06 PROCEDURE — 95938 SOMATOSENSORY TESTING: CPT | Performed by: ORTHOPAEDIC SURGERY

## 2022-01-06 PROCEDURE — A9270 NON-COVERED ITEM OR SERVICE: HCPCS | Performed by: ANESTHESIOLOGY

## 2022-01-06 PROCEDURE — 500367 HCHG DRAIN KIT, HEMOVAC: Performed by: ORTHOPAEDIC SURGERY

## 2022-01-06 PROCEDURE — 95937 NEUROMUSCULAR JUNCTION TEST: CPT | Performed by: ORTHOPAEDIC SURGERY

## 2022-01-06 PROCEDURE — 95939 C MOTOR EVOKED UPR&LWR LIMBS: CPT | Performed by: ORTHOPAEDIC SURGERY

## 2022-01-06 PROCEDURE — 95868 NDL EMG CRANIAL NRV MUSC BI: CPT | Performed by: ORTHOPAEDIC SURGERY

## 2022-01-06 PROCEDURE — C1713 ANCHOR/SCREW BN/BN,TIS/BN: HCPCS | Performed by: ORTHOPAEDIC SURGERY

## 2022-01-06 PROCEDURE — 500864 HCHG NEEDLE, SPINAL 18G: Performed by: ORTHOPAEDIC SURGERY

## 2022-01-06 PROCEDURE — A9270 NON-COVERED ITEM OR SERVICE: HCPCS | Performed by: PHYSICIAN ASSISTANT

## 2022-01-06 PROCEDURE — 95940 IONM IN OPERATNG ROOM 15 MIN: CPT | Performed by: ORTHOPAEDIC SURGERY

## 2022-01-06 PROCEDURE — 700111 HCHG RX REV CODE 636 W/ 250 OVERRIDE (IP): Performed by: ORTHOPAEDIC SURGERY

## 2022-01-06 PROCEDURE — 85027 COMPLETE CBC AUTOMATED: CPT

## 2022-01-06 PROCEDURE — 700101 HCHG RX REV CODE 250: Performed by: ANESTHESIOLOGY

## 2022-01-06 PROCEDURE — 110454 HCHG SHELL REV 250: Performed by: ORTHOPAEDIC SURGERY

## 2022-01-06 PROCEDURE — 700101 HCHG RX REV CODE 250: Performed by: ORTHOPAEDIC SURGERY

## 2022-01-06 PROCEDURE — 0RT30ZZ RESECTION OF CERVICAL VERTEBRAL DISC, OPEN APPROACH: ICD-10-PCS | Performed by: ORTHOPAEDIC SURGERY

## 2022-01-06 PROCEDURE — 160009 HCHG ANES TIME/MIN: Performed by: ORTHOPAEDIC SURGERY

## 2022-01-06 PROCEDURE — 700105 HCHG RX REV CODE 258: Performed by: ORTHOPAEDIC SURGERY

## 2022-01-06 PROCEDURE — 502000 HCHG MISC OR IMPLANTS RC 0278: Performed by: ORTHOPAEDIC SURGERY

## 2022-01-06 PROCEDURE — 80048 BASIC METABOLIC PNL TOTAL CA: CPT

## 2022-01-06 PROCEDURE — 160002 HCHG RECOVERY MINUTES (STAT): Performed by: ORTHOPAEDIC SURGERY

## 2022-01-06 PROCEDURE — 160036 HCHG PACU - EA ADDL 30 MINS PHASE I: Performed by: ORTHOPAEDIC SURGERY

## 2022-01-06 PROCEDURE — 160029 HCHG SURGERY MINUTES - 1ST 30 MINS LEVEL 4: Performed by: ORTHOPAEDIC SURGERY

## 2022-01-06 PROCEDURE — 770001 HCHG ROOM/CARE - MED/SURG/GYN PRIV*

## 2022-01-06 PROCEDURE — 82962 GLUCOSE BLOOD TEST: CPT

## 2022-01-06 PROCEDURE — 160048 HCHG OR STATISTICAL LEVEL 1-5: Performed by: ORTHOPAEDIC SURGERY

## 2022-01-06 PROCEDURE — 700102 HCHG RX REV CODE 250 W/ 637 OVERRIDE(OP): Performed by: ANESTHESIOLOGY

## 2022-01-06 PROCEDURE — 0RG40A0 FUSION OF CERVICOTHORACIC VERTEBRAL JOINT WITH INTERBODY FUSION DEVICE, ANTERIOR APPROACH, ANTERIOR COLUMN, OPEN APPROACH: ICD-10-PCS | Performed by: ORTHOPAEDIC SURGERY

## 2022-01-06 PROCEDURE — 4A11X4G MONITORING OF PERIPHERAL NERVOUS ELECTRICAL ACTIVITY, INTRAOPERATIVE, EXTERNAL APPROACH: ICD-10-PCS | Performed by: ORTHOPAEDIC SURGERY

## 2022-01-06 PROCEDURE — 36415 COLL VENOUS BLD VENIPUNCTURE: CPT

## 2022-01-06 PROCEDURE — 501838 HCHG SUTURE GENERAL: Performed by: ORTHOPAEDIC SURGERY

## 2022-01-06 PROCEDURE — 72040 X-RAY EXAM NECK SPINE 2-3 VW: CPT

## 2022-01-06 PROCEDURE — 503051 HCHG CLOSURE PRINEO SKIN: Performed by: ORTHOPAEDIC SURGERY

## 2022-01-06 PROCEDURE — 0RG20A0 FUSION OF 2 OR MORE CERVICAL VERTEBRAL JOINTS WITH INTERBODY FUSION DEVICE, ANTERIOR APPROACH, ANTERIOR COLUMN, OPEN APPROACH: ICD-10-PCS | Performed by: ORTHOPAEDIC SURGERY

## 2022-01-06 PROCEDURE — 700102 HCHG RX REV CODE 250 W/ 637 OVERRIDE(OP): Performed by: PHYSICIAN ASSISTANT

## 2022-01-06 PROCEDURE — 700105 HCHG RX REV CODE 258: Performed by: ANESTHESIOLOGY

## 2022-01-06 PROCEDURE — 95865 NEEDLE EMG LARYNX: CPT | Performed by: ORTHOPAEDIC SURGERY

## 2022-01-06 PROCEDURE — 160035 HCHG PACU - 1ST 60 MINS PHASE I: Performed by: ORTHOPAEDIC SURGERY

## 2022-01-06 PROCEDURE — 95861 NEEDLE EMG 2 EXTREMITIES: CPT | Performed by: ORTHOPAEDIC SURGERY

## 2022-01-06 DEVICE — IMPLANTABLE DEVICE: Type: IMPLANTABLE DEVICE | Status: FUNCTIONAL

## 2022-01-06 RX ORDER — PROCHLORPERAZINE EDISYLATE 5 MG/ML
5-10 INJECTION INTRAMUSCULAR; INTRAVENOUS EVERY 4 HOURS PRN
Status: DISCONTINUED | OUTPATIENT
Start: 2022-01-06 | End: 2022-01-08 | Stop reason: HOSPADM

## 2022-01-06 RX ORDER — MIDAZOLAM HYDROCHLORIDE 1 MG/ML
1 INJECTION INTRAMUSCULAR; INTRAVENOUS
Status: DISCONTINUED | OUTPATIENT
Start: 2022-01-06 | End: 2022-01-06 | Stop reason: HOSPADM

## 2022-01-06 RX ORDER — PROPRANOLOL HYDROCHLORIDE 40 MG/1
80 TABLET ORAL
Status: DISCONTINUED | OUTPATIENT
Start: 2022-01-06 | End: 2022-01-08 | Stop reason: HOSPADM

## 2022-01-06 RX ORDER — HYDROMORPHONE HYDROCHLORIDE 2 MG/ML
INJECTION, SOLUTION INTRAMUSCULAR; INTRAVENOUS; SUBCUTANEOUS PRN
Status: DISCONTINUED | OUTPATIENT
Start: 2022-01-06 | End: 2022-01-06 | Stop reason: SURG

## 2022-01-06 RX ORDER — HYDRALAZINE HYDROCHLORIDE 20 MG/ML
10 INJECTION INTRAMUSCULAR; INTRAVENOUS
Status: DISCONTINUED | OUTPATIENT
Start: 2022-01-06 | End: 2022-01-08 | Stop reason: HOSPADM

## 2022-01-06 RX ORDER — QUETIAPINE FUMARATE 50 MG/1
50 TABLET, FILM COATED ORAL EVERY EVENING
Status: DISCONTINUED | OUTPATIENT
Start: 2022-01-06 | End: 2022-01-08 | Stop reason: HOSPADM

## 2022-01-06 RX ORDER — HYDROMORPHONE HYDROCHLORIDE 1 MG/ML
0.5 INJECTION, SOLUTION INTRAMUSCULAR; INTRAVENOUS; SUBCUTANEOUS
Status: DISCONTINUED | OUTPATIENT
Start: 2022-01-06 | End: 2022-01-08 | Stop reason: HOSPADM

## 2022-01-06 RX ORDER — OXYCODONE HYDROCHLORIDE 5 MG/1
5 TABLET ORAL
Status: DISCONTINUED | OUTPATIENT
Start: 2022-01-06 | End: 2022-01-07

## 2022-01-06 RX ORDER — HALOPERIDOL 5 MG/ML
1 INJECTION INTRAMUSCULAR
Status: DISCONTINUED | OUTPATIENT
Start: 2022-01-06 | End: 2022-01-06 | Stop reason: HOSPADM

## 2022-01-06 RX ORDER — ROCURONIUM BROMIDE 10 MG/ML
INJECTION, SOLUTION INTRAVENOUS PRN
Status: DISCONTINUED | OUTPATIENT
Start: 2022-01-06 | End: 2022-01-06 | Stop reason: SURG

## 2022-01-06 RX ORDER — LORAZEPAM 0.5 MG/1
0.5 TABLET ORAL EVERY 6 HOURS PRN
Status: DISCONTINUED | OUTPATIENT
Start: 2022-01-06 | End: 2022-01-08 | Stop reason: HOSPADM

## 2022-01-06 RX ORDER — ONDANSETRON 4 MG/1
4 TABLET, ORALLY DISINTEGRATING ORAL EVERY 4 HOURS PRN
Status: DISCONTINUED | OUTPATIENT
Start: 2022-01-06 | End: 2022-01-08 | Stop reason: HOSPADM

## 2022-01-06 RX ORDER — GABAPENTIN 300 MG/1
300 CAPSULE ORAL ONCE
Status: COMPLETED | OUTPATIENT
Start: 2022-01-06 | End: 2022-01-06

## 2022-01-06 RX ORDER — ACETAMINOPHEN 500 MG
1000 TABLET ORAL ONCE
Status: COMPLETED | OUTPATIENT
Start: 2022-01-06 | End: 2022-01-06

## 2022-01-06 RX ORDER — CEFAZOLIN SODIUM 2 G/100ML
2 INJECTION, SOLUTION INTRAVENOUS ONCE
Status: DISCONTINUED | OUTPATIENT
Start: 2022-01-06 | End: 2022-01-06 | Stop reason: HOSPADM

## 2022-01-06 RX ORDER — ONDANSETRON 2 MG/ML
INJECTION INTRAMUSCULAR; INTRAVENOUS PRN
Status: DISCONTINUED | OUTPATIENT
Start: 2022-01-06 | End: 2022-01-06 | Stop reason: HOSPADM

## 2022-01-06 RX ORDER — OXYCODONE HYDROCHLORIDE 10 MG/1
10 TABLET ORAL
Status: DISCONTINUED | OUTPATIENT
Start: 2022-01-06 | End: 2022-01-07

## 2022-01-06 RX ORDER — OXYCODONE HCL 5 MG/5 ML
5 SOLUTION, ORAL ORAL
Status: COMPLETED | OUTPATIENT
Start: 2022-01-06 | End: 2022-01-06

## 2022-01-06 RX ORDER — KETOROLAC TROMETHAMINE 30 MG/ML
30 INJECTION, SOLUTION INTRAMUSCULAR; INTRAVENOUS EVERY 6 HOURS
Status: DISCONTINUED | OUTPATIENT
Start: 2022-01-06 | End: 2022-01-08 | Stop reason: HOSPADM

## 2022-01-06 RX ORDER — MIDAZOLAM HYDROCHLORIDE 1 MG/ML
INJECTION INTRAMUSCULAR; INTRAVENOUS PRN
Status: DISCONTINUED | OUTPATIENT
Start: 2022-01-06 | End: 2022-01-06 | Stop reason: SURG

## 2022-01-06 RX ORDER — SODIUM CHLORIDE, SODIUM LACTATE, POTASSIUM CHLORIDE, CALCIUM CHLORIDE 600; 310; 30; 20 MG/100ML; MG/100ML; MG/100ML; MG/100ML
INJECTION, SOLUTION INTRAVENOUS CONTINUOUS
Status: ACTIVE | OUTPATIENT
Start: 2022-01-06 | End: 2022-01-06

## 2022-01-06 RX ORDER — SODIUM CHLORIDE, SODIUM LACTATE, POTASSIUM CHLORIDE, CALCIUM CHLORIDE 600; 310; 30; 20 MG/100ML; MG/100ML; MG/100ML; MG/100ML
INJECTION, SOLUTION INTRAVENOUS CONTINUOUS
Status: DISCONTINUED | OUTPATIENT
Start: 2022-01-06 | End: 2022-01-06 | Stop reason: HOSPADM

## 2022-01-06 RX ORDER — ATORVASTATIN CALCIUM 10 MG/1
10 TABLET, FILM COATED ORAL NIGHTLY
Status: DISCONTINUED | OUTPATIENT
Start: 2022-01-06 | End: 2022-01-08 | Stop reason: HOSPADM

## 2022-01-06 RX ORDER — ACETAMINOPHEN 500 MG
1000 TABLET ORAL EVERY 6 HOURS
Status: DISCONTINUED | OUTPATIENT
Start: 2022-01-06 | End: 2022-01-07

## 2022-01-06 RX ORDER — DOCUSATE SODIUM 100 MG/1
100 CAPSULE, LIQUID FILLED ORAL 2 TIMES DAILY
Status: DISCONTINUED | OUTPATIENT
Start: 2022-01-06 | End: 2022-01-08 | Stop reason: HOSPADM

## 2022-01-06 RX ORDER — ENEMA 19; 7 G/133ML; G/133ML
1 ENEMA RECTAL
Status: DISCONTINUED | OUTPATIENT
Start: 2022-01-06 | End: 2022-01-08 | Stop reason: HOSPADM

## 2022-01-06 RX ORDER — TRANEXAMIC ACID 100 MG/ML
INJECTION, SOLUTION INTRAVENOUS PRN
Status: DISCONTINUED | OUTPATIENT
Start: 2022-01-06 | End: 2022-01-06 | Stop reason: SURG

## 2022-01-06 RX ORDER — LABETALOL HYDROCHLORIDE 5 MG/ML
10 INJECTION, SOLUTION INTRAVENOUS
Status: DISCONTINUED | OUTPATIENT
Start: 2022-01-06 | End: 2022-01-08 | Stop reason: HOSPADM

## 2022-01-06 RX ORDER — DEXAMETHASONE SODIUM PHOSPHATE 4 MG/ML
INJECTION, SOLUTION INTRA-ARTICULAR; INTRALESIONAL; INTRAMUSCULAR; INTRAVENOUS; SOFT TISSUE PRN
Status: DISCONTINUED | OUTPATIENT
Start: 2022-01-06 | End: 2022-01-06 | Stop reason: SURG

## 2022-01-06 RX ORDER — BUPIVACAINE HYDROCHLORIDE AND EPINEPHRINE 5; 5 MG/ML; UG/ML
INJECTION, SOLUTION EPIDURAL; INTRACAUDAL; PERINEURAL
Status: DISCONTINUED | OUTPATIENT
Start: 2022-01-06 | End: 2022-01-06 | Stop reason: HOSPADM

## 2022-01-06 RX ORDER — PROMETHAZINE HYDROCHLORIDE 25 MG/1
12.5-25 TABLET ORAL EVERY 4 HOURS PRN
Status: DISCONTINUED | OUTPATIENT
Start: 2022-01-06 | End: 2022-01-08 | Stop reason: HOSPADM

## 2022-01-06 RX ORDER — LIDOCAINE HYDROCHLORIDE 20 MG/ML
INJECTION, SOLUTION EPIDURAL; INFILTRATION; INTRACAUDAL; PERINEURAL PRN
Status: DISCONTINUED | OUTPATIENT
Start: 2022-01-06 | End: 2022-01-06 | Stop reason: SURG

## 2022-01-06 RX ORDER — DIPHENHYDRAMINE HYDROCHLORIDE 50 MG/ML
12.5 INJECTION INTRAMUSCULAR; INTRAVENOUS
Status: DISCONTINUED | OUTPATIENT
Start: 2022-01-06 | End: 2022-01-06 | Stop reason: HOSPADM

## 2022-01-06 RX ORDER — ACETAMINOPHEN 500 MG
1000 TABLET ORAL EVERY 6 HOURS PRN
Status: DISCONTINUED | OUTPATIENT
Start: 2022-01-11 | End: 2022-01-07

## 2022-01-06 RX ORDER — IBUPROFEN 800 MG/1
800 TABLET ORAL 3 TIMES DAILY PRN
Status: DISCONTINUED | OUTPATIENT
Start: 2022-01-09 | End: 2022-01-08 | Stop reason: HOSPADM

## 2022-01-06 RX ORDER — CEFAZOLIN SODIUM 1 G/3ML
INJECTION, POWDER, FOR SOLUTION INTRAMUSCULAR; INTRAVENOUS
Status: DISCONTINUED | OUTPATIENT
Start: 2022-01-06 | End: 2022-01-06 | Stop reason: HOSPADM

## 2022-01-06 RX ORDER — CLONAZEPAM 0.5 MG/1
0.5 TABLET ORAL
Status: DISCONTINUED | OUTPATIENT
Start: 2022-01-06 | End: 2022-01-08 | Stop reason: HOSPADM

## 2022-01-06 RX ORDER — SCOLOPAMINE TRANSDERMAL SYSTEM 1 MG/1
1 PATCH, EXTENDED RELEASE TRANSDERMAL
Status: DISCONTINUED | OUTPATIENT
Start: 2022-01-06 | End: 2022-01-06 | Stop reason: HOSPADM

## 2022-01-06 RX ORDER — SODIUM CHLORIDE 9 MG/ML
INJECTION, SOLUTION INTRAVENOUS CONTINUOUS
Status: DISCONTINUED | OUTPATIENT
Start: 2022-01-06 | End: 2022-01-08 | Stop reason: HOSPADM

## 2022-01-06 RX ORDER — METHYLPREDNISOLONE ACETATE 40 MG/ML
INJECTION, SUSPENSION INTRA-ARTICULAR; INTRALESIONAL; INTRAMUSCULAR; SOFT TISSUE
Status: DISCONTINUED | OUTPATIENT
Start: 2022-01-06 | End: 2022-01-06 | Stop reason: HOSPADM

## 2022-01-06 RX ORDER — REMIFENTANIL HYDROCHLORIDE 1 MG/ML
INJECTION, POWDER, LYOPHILIZED, FOR SOLUTION INTRAVENOUS
Status: DISCONTINUED | OUTPATIENT
Start: 2022-01-06 | End: 2022-01-06 | Stop reason: SURG

## 2022-01-06 RX ORDER — ONDANSETRON 2 MG/ML
4 INJECTION INTRAMUSCULAR; INTRAVENOUS EVERY 4 HOURS PRN
Status: DISCONTINUED | OUTPATIENT
Start: 2022-01-06 | End: 2022-01-08 | Stop reason: HOSPADM

## 2022-01-06 RX ORDER — CALCIUM CARBONATE 500 MG/1
500 TABLET, CHEWABLE ORAL 2 TIMES DAILY
Status: DISCONTINUED | OUTPATIENT
Start: 2022-01-06 | End: 2022-01-08 | Stop reason: HOSPADM

## 2022-01-06 RX ORDER — AMOXICILLIN 250 MG
1 CAPSULE ORAL
Status: DISCONTINUED | OUTPATIENT
Start: 2022-01-06 | End: 2022-01-08 | Stop reason: HOSPADM

## 2022-01-06 RX ORDER — DIPHENHYDRAMINE HCL 25 MG
25 TABLET ORAL EVERY 6 HOURS PRN
Status: DISCONTINUED | OUTPATIENT
Start: 2022-01-06 | End: 2022-01-08 | Stop reason: HOSPADM

## 2022-01-06 RX ORDER — LORAZEPAM 2 MG/ML
0.5 INJECTION INTRAMUSCULAR EVERY 6 HOURS PRN
Status: DISCONTINUED | OUTPATIENT
Start: 2022-01-06 | End: 2022-01-08 | Stop reason: HOSPADM

## 2022-01-06 RX ORDER — POLYETHYLENE GLYCOL 3350 17 G/17G
1 POWDER, FOR SOLUTION ORAL 2 TIMES DAILY PRN
Status: DISCONTINUED | OUTPATIENT
Start: 2022-01-06 | End: 2022-01-08 | Stop reason: HOSPADM

## 2022-01-06 RX ORDER — CEFAZOLIN SODIUM 2 G/100ML
2 INJECTION, SOLUTION INTRAVENOUS EVERY 8 HOURS
Status: COMPLETED | OUTPATIENT
Start: 2022-01-06 | End: 2022-01-07

## 2022-01-06 RX ORDER — PROMETHAZINE HYDROCHLORIDE 25 MG/1
12.5-25 SUPPOSITORY RECTAL EVERY 4 HOURS PRN
Status: DISCONTINUED | OUTPATIENT
Start: 2022-01-06 | End: 2022-01-08 | Stop reason: HOSPADM

## 2022-01-06 RX ORDER — CELECOXIB 200 MG/1
400 CAPSULE ORAL ONCE
Status: COMPLETED | OUTPATIENT
Start: 2022-01-06 | End: 2022-01-06

## 2022-01-06 RX ORDER — OXYCODONE HCL 5 MG/5 ML
10 SOLUTION, ORAL ORAL
Status: COMPLETED | OUTPATIENT
Start: 2022-01-06 | End: 2022-01-06

## 2022-01-06 RX ORDER — DEXAMETHASONE SODIUM PHOSPHATE 4 MG/ML
6 INJECTION, SOLUTION INTRA-ARTICULAR; INTRALESIONAL; INTRAMUSCULAR; INTRAVENOUS; SOFT TISSUE EVERY 6 HOURS PRN
Status: DISCONTINUED | OUTPATIENT
Start: 2022-01-06 | End: 2022-01-07

## 2022-01-06 RX ORDER — KETAMINE HYDROCHLORIDE 50 MG/ML
INJECTION, SOLUTION INTRAMUSCULAR; INTRAVENOUS PRN
Status: DISCONTINUED | OUTPATIENT
Start: 2022-01-06 | End: 2022-01-06 | Stop reason: SURG

## 2022-01-06 RX ORDER — BISACODYL 10 MG
10 SUPPOSITORY, RECTAL RECTAL
Status: DISCONTINUED | OUTPATIENT
Start: 2022-01-06 | End: 2022-01-08 | Stop reason: HOSPADM

## 2022-01-06 RX ORDER — DIPHENHYDRAMINE HYDROCHLORIDE 50 MG/ML
25 INJECTION INTRAMUSCULAR; INTRAVENOUS EVERY 6 HOURS PRN
Status: DISCONTINUED | OUTPATIENT
Start: 2022-01-06 | End: 2022-01-08 | Stop reason: HOSPADM

## 2022-01-06 RX ORDER — OXYCODONE HCL 10 MG/1
10 TABLET, FILM COATED, EXTENDED RELEASE ORAL ONCE
Status: COMPLETED | OUTPATIENT
Start: 2022-01-06 | End: 2022-01-06

## 2022-01-06 RX ORDER — VITAMIN B COMPLEX
1000 TABLET ORAL EVERY EVENING
Status: DISCONTINUED | OUTPATIENT
Start: 2022-01-06 | End: 2022-01-08 | Stop reason: HOSPADM

## 2022-01-06 RX ORDER — AMOXICILLIN 250 MG
1 CAPSULE ORAL NIGHTLY
Status: DISCONTINUED | OUTPATIENT
Start: 2022-01-06 | End: 2022-01-08 | Stop reason: HOSPADM

## 2022-01-06 RX ORDER — CEFAZOLIN SODIUM 1 G/3ML
INJECTION, POWDER, FOR SOLUTION INTRAMUSCULAR; INTRAVENOUS PRN
Status: DISCONTINUED | OUTPATIENT
Start: 2022-01-06 | End: 2022-01-06 | Stop reason: SURG

## 2022-01-06 RX ORDER — SERTRALINE HYDROCHLORIDE 100 MG/1
100 TABLET, FILM COATED ORAL EVERY EVENING
Status: DISCONTINUED | OUTPATIENT
Start: 2022-01-06 | End: 2022-01-08 | Stop reason: HOSPADM

## 2022-01-06 RX ORDER — DIAZEPAM 5 MG/1
5 TABLET ORAL EVERY 6 HOURS PRN
Status: DISCONTINUED | OUTPATIENT
Start: 2022-01-06 | End: 2022-01-07

## 2022-01-06 RX ADMIN — HYDROMORPHONE HYDROCHLORIDE 0.5 MG: 2 INJECTION, SOLUTION INTRAMUSCULAR; INTRAVENOUS; SUBCUTANEOUS at 17:23

## 2022-01-06 RX ADMIN — QUETIAPINE FUMARATE 50 MG: 50 TABLET ORAL at 20:47

## 2022-01-06 RX ADMIN — ONDANSETRON 4 MG: 2 INJECTION INTRAMUSCULAR; INTRAVENOUS at 13:34

## 2022-01-06 RX ADMIN — ACETAMINOPHEN 1000 MG: 500 TABLET ORAL at 17:45

## 2022-01-06 RX ADMIN — REMIFENTANIL HYDROCHLORIDE 0.1 MCG/KG/MIN: 1 INJECTION, POWDER, LYOPHILIZED, FOR SOLUTION INTRAVENOUS at 13:34

## 2022-01-06 RX ADMIN — TRANEXAMIC ACID 1000 MG: 100 INJECTION, SOLUTION INTRAVENOUS at 16:09

## 2022-01-06 RX ADMIN — KETAMINE HYDROCHLORIDE 15 MG: 50 INJECTION INTRAMUSCULAR; INTRAVENOUS at 15:44

## 2022-01-06 RX ADMIN — TRANEXAMIC ACID 1000 MG: 100 INJECTION, SOLUTION INTRAVENOUS at 14:10

## 2022-01-06 RX ADMIN — KETAMINE HYDROCHLORIDE 15 MG: 50 INJECTION INTRAMUSCULAR; INTRAVENOUS at 15:00

## 2022-01-06 RX ADMIN — CALCIUM CARBONATE 500 MG: 500 TABLET, CHEWABLE ORAL at 22:36

## 2022-01-06 RX ADMIN — HYDROMORPHONE HYDROCHLORIDE 0.5 MG: 2 INJECTION, SOLUTION INTRAMUSCULAR; INTRAVENOUS; SUBCUTANEOUS at 17:07

## 2022-01-06 RX ADMIN — GABAPENTIN 300 MG: 300 CAPSULE ORAL at 10:35

## 2022-01-06 RX ADMIN — SCOPALAMINE 1 PATCH: 1 PATCH, EXTENDED RELEASE TRANSDERMAL at 10:35

## 2022-01-06 RX ADMIN — SODIUM CHLORIDE, POTASSIUM CHLORIDE, SODIUM LACTATE AND CALCIUM CHLORIDE 700 ML: 600; 310; 30; 20 INJECTION, SOLUTION INTRAVENOUS at 17:42

## 2022-01-06 RX ADMIN — ACETAMINOPHEN 1000 MG: 500 TABLET ORAL at 22:30

## 2022-01-06 RX ADMIN — PROPOFOL 100 MCG/KG/MIN: 10 INJECTION, EMULSION INTRAVENOUS at 13:34

## 2022-01-06 RX ADMIN — MIDAZOLAM HYDROCHLORIDE 2 MG: 1 INJECTION, SOLUTION INTRAMUSCULAR; INTRAVENOUS at 13:34

## 2022-01-06 RX ADMIN — LIDOCAINE HYDROCHLORIDE 100 MG: 20 INJECTION, SOLUTION EPIDURAL; INFILTRATION; INTRACAUDAL at 13:34

## 2022-01-06 RX ADMIN — OXYCODONE HYDROCHLORIDE 10 MG: 10 TABLET, FILM COATED, EXTENDED RELEASE ORAL at 10:35

## 2022-01-06 RX ADMIN — VANCOMYCIN HYDROCHLORIDE 1000 MG: 1 INJECTION, POWDER, LYOPHILIZED, FOR SOLUTION INTRAVENOUS at 11:03

## 2022-01-06 RX ADMIN — FENTANYL CITRATE 50 MCG: 50 INJECTION INTRAMUSCULAR; INTRAVENOUS at 18:05

## 2022-01-06 RX ADMIN — SODIUM CHLORIDE, POTASSIUM CHLORIDE, SODIUM LACTATE AND CALCIUM CHLORIDE: 600; 310; 30; 20 INJECTION, SOLUTION INTRAVENOUS at 17:00

## 2022-01-06 RX ADMIN — ATORVASTATIN CALCIUM 10 MG: 10 TABLET, FILM COATED ORAL at 22:29

## 2022-01-06 RX ADMIN — OXYCODONE HYDROCHLORIDE 10 MG: 5 SOLUTION ORAL at 17:45

## 2022-01-06 RX ADMIN — KETAMINE HYDROCHLORIDE 15 MG: 50 INJECTION INTRAMUSCULAR; INTRAVENOUS at 14:16

## 2022-01-06 RX ADMIN — ACETAMINOPHEN 1000 MG: 500 TABLET ORAL at 10:35

## 2022-01-06 RX ADMIN — DOCUSATE SODIUM 100 MG: 100 CAPSULE ORAL at 22:30

## 2022-01-06 RX ADMIN — HYDROMORPHONE HYDROCHLORIDE 0.5 MG: 2 INJECTION, SOLUTION INTRAMUSCULAR; INTRAVENOUS; SUBCUTANEOUS at 17:18

## 2022-01-06 RX ADMIN — LIDOCAINE HYDROCHLORIDE 50 MG: 20 INJECTION, SOLUTION EPIDURAL; INFILTRATION; INTRACAUDAL at 15:49

## 2022-01-06 RX ADMIN — CEFAZOLIN 2 G: 330 INJECTION, POWDER, FOR SOLUTION INTRAMUSCULAR; INTRAVENOUS at 13:34

## 2022-01-06 RX ADMIN — FENTANYL CITRATE 50 MCG: 50 INJECTION INTRAMUSCULAR; INTRAVENOUS at 17:46

## 2022-01-06 RX ADMIN — ROCURONIUM BROMIDE 30 MG: 10 INJECTION, SOLUTION INTRAVENOUS at 13:34

## 2022-01-06 RX ADMIN — KETAMINE HYDROCHLORIDE 25 MG: 50 INJECTION INTRAMUSCULAR; INTRAVENOUS at 13:34

## 2022-01-06 RX ADMIN — CELECOXIB 400 MG: 200 CAPSULE ORAL at 10:35

## 2022-01-06 RX ADMIN — DEXAMETHASONE SODIUM PHOSPHATE 8 MG: 4 INJECTION, SOLUTION INTRA-ARTICULAR; INTRALESIONAL; INTRAMUSCULAR; INTRAVENOUS; SOFT TISSUE at 13:34

## 2022-01-06 RX ADMIN — SODIUM CHLORIDE, POTASSIUM CHLORIDE, SODIUM LACTATE AND CALCIUM CHLORIDE: 600; 310; 30; 20 INJECTION, SOLUTION INTRAVENOUS at 11:03

## 2022-01-06 ASSESSMENT — PAIN DESCRIPTION - PAIN TYPE
TYPE: CHRONIC PAIN;SURGICAL PAIN
TYPE: SURGICAL PAIN
TYPE: SURGICAL PAIN;CHRONIC PAIN
TYPE: CHRONIC PAIN;SURGICAL PAIN
TYPE: SURGICAL PAIN;CHRONIC PAIN

## 2022-01-06 NOTE — ANESTHESIA PROCEDURE NOTES
Airway    Date/Time: 1/6/2022 1:39 PM  Performed by: Jluis Polo M.D.  Authorized by: Jluis Polo M.D.     Location:  OR  Urgency:  Elective  Indications for Airway Management:  Anesthesia      Spontaneous Ventilation: absent    Sedation Level:  Deep  Preoxygenated: Yes    Patient Position:  Sniffing  Mask Difficulty Assessment:  1 - vent by mask  Final Airway Type:  Endotracheal airway  Final Endotracheal Airway:  ETT and NIM tube  Cuffed: Yes    Technique Used for Successful ETT Placement:  Direct laryngoscopy    Insertion Site:  Oral  Blade Type:  Bailey  Laryngoscope Blade/Videolaryngoscope Blade Size:  2  ETT Size (mm):  7.0  Measured from:  Lips  ETT to Lips (cm):  22  Placement Verified by: auscultation and capnometry    Cormack-Lehane Classification:  Grade I - full view of glottis  Number of Attempts at Approach:  1

## 2022-01-06 NOTE — ANESTHESIA PREPROCEDURE EVALUATION
Case: [id number] Date/Time: 01/06/22 1115    Procedures:       DISCECTOMY, SPINE, CERVICAL, ANTERIOR APPROACH, WITH FUSION - C5-T1      CORPECTOMY, SPINE - PARTIAL    Pre-op diagnosis: C5-T1 HERNIATED NUCLEUS PULPOSUS, MYELOPATHY, STENOSIS WITH RADICULOPATHY    Location: [facility] OR 06 / SURGERY [facility]    Surgeons: Jama Werner M.D.        BRIGITTE - CPAP    Relevant Problems   ANESTHESIA   (positive) Delayed emergence from anesthesia   (positive) Obstructive sleep apnea syndrome   (positive) PONV (postoperative nausea and vomiting)      CARDIAC   (positive) Essential hypertension       Physical Exam    Airway   Mallampati: II  TM distance: >3 FB  Neck ROM: full       Cardiovascular - normal exam  Rhythm: regular  Rate: normal  (-) murmur     Dental - normal exam           Pulmonary - normal exam  Breath sounds clear to auscultation     Abdominal    Neurological - normal exam                 Anesthesia Plan    ASA 2       Plan - general       Airway plan will be ETT          Induction: intravenous      Pertinent diagnostic labs and testing reviewed    Informed Consent:    Anesthetic plan and risks discussed with patient.

## 2022-01-06 NOTE — ANESTHESIA PROCEDURE NOTES
Arterial Line  Performed by: Jluis Polo M.D.  Authorized by: Jluis Polo M.D.     Localization: surface landmarks    Patient Location:  OR  Indication: continuous blood pressure monitoring        Catheter Size:  20 G  Seldinger Technique?: Yes    Laterality:  Right  Site:  Radial artery  Line Secured:  Antimicrobial disc, tape and transparent dressing  Events: patient tolerated procedure well with no complications           170.18

## 2022-01-07 LAB
ERYTHROCYTE [DISTWIDTH] IN BLOOD BY AUTOMATED COUNT: 41 FL (ref 35.9–50)
HCT VFR BLD AUTO: 41.1 % (ref 37–47)
HGB BLD-MCNC: 13.7 G/DL (ref 12–16)
MCH RBC QN AUTO: 29 PG (ref 27–33)
MCHC RBC AUTO-ENTMCNC: 33.3 G/DL (ref 33.6–35)
MCV RBC AUTO: 87.1 FL (ref 81.4–97.8)
PLATELET # BLD AUTO: 249 K/UL (ref 164–446)
PMV BLD AUTO: 9.5 FL (ref 9–12.9)
RBC # BLD AUTO: 4.72 M/UL (ref 4.2–5.4)
WBC # BLD AUTO: 9.7 K/UL (ref 4.8–10.8)

## 2022-01-07 PROCEDURE — 36415 COLL VENOUS BLD VENIPUNCTURE: CPT

## 2022-01-07 PROCEDURE — 700105 HCHG RX REV CODE 258: Performed by: PHYSICIAN ASSISTANT

## 2022-01-07 PROCEDURE — 85027 COMPLETE CBC AUTOMATED: CPT

## 2022-01-07 PROCEDURE — 700102 HCHG RX REV CODE 250 W/ 637 OVERRIDE(OP): Performed by: PHYSICIAN ASSISTANT

## 2022-01-07 PROCEDURE — 97162 PT EVAL MOD COMPLEX 30 MIN: CPT

## 2022-01-07 PROCEDURE — 700111 HCHG RX REV CODE 636 W/ 250 OVERRIDE (IP): Performed by: PHYSICIAN ASSISTANT

## 2022-01-07 PROCEDURE — A9270 NON-COVERED ITEM OR SERVICE: HCPCS | Performed by: PHYSICIAN ASSISTANT

## 2022-01-07 PROCEDURE — 770001 HCHG ROOM/CARE - MED/SURG/GYN PRIV*

## 2022-01-07 PROCEDURE — 97165 OT EVAL LOW COMPLEX 30 MIN: CPT

## 2022-01-07 PROCEDURE — 97535 SELF CARE MNGMENT TRAINING: CPT

## 2022-01-07 RX ORDER — HYDROCODONE BITARTRATE AND ACETAMINOPHEN 5; 325 MG/1; MG/1
1-2 TABLET ORAL EVERY 6 HOURS PRN
Status: DISCONTINUED | OUTPATIENT
Start: 2022-01-07 | End: 2022-01-08 | Stop reason: HOSPADM

## 2022-01-07 RX ORDER — DIAZEPAM 5 MG/1
10 TABLET ORAL EVERY 6 HOURS PRN
Status: DISCONTINUED | OUTPATIENT
Start: 2022-01-07 | End: 2022-01-08 | Stop reason: HOSPADM

## 2022-01-07 RX ADMIN — QUETIAPINE FUMARATE 50 MG: 50 TABLET ORAL at 21:31

## 2022-01-07 RX ADMIN — OXYCODONE 5 MG: 5 TABLET ORAL at 15:11

## 2022-01-07 RX ADMIN — SERTRALINE 100 MG: 100 TABLET, FILM COATED ORAL at 17:47

## 2022-01-07 RX ADMIN — KETOROLAC TROMETHAMINE 30 MG: 30 INJECTION, SOLUTION INTRAMUSCULAR at 11:16

## 2022-01-07 RX ADMIN — PROPRANOLOL HYDROCHLORIDE 80 MG: 40 TABLET ORAL at 20:13

## 2022-01-07 RX ADMIN — CALCIUM CARBONATE 500 MG: 500 TABLET, CHEWABLE ORAL at 17:47

## 2022-01-07 RX ADMIN — CHOLECALCIFEROL TAB 125 MCG (5000 UNIT) 5000 UNITS: 125 TAB at 05:56

## 2022-01-07 RX ADMIN — ACETAMINOPHEN 1000 MG: 500 TABLET ORAL at 05:56

## 2022-01-07 RX ADMIN — ACETAMINOPHEN 1000 MG: 500 TABLET ORAL at 11:16

## 2022-01-07 RX ADMIN — KETOROLAC TROMETHAMINE 30 MG: 30 INJECTION, SOLUTION INTRAMUSCULAR at 05:56

## 2022-01-07 RX ADMIN — METHOCARBAMOL 1000 MG: 1000 INJECTION, SOLUTION INTRAMUSCULAR; INTRAVENOUS at 21:39

## 2022-01-07 RX ADMIN — DOCUSATE SODIUM 100 MG: 100 CAPSULE ORAL at 05:56

## 2022-01-07 RX ADMIN — METHOCARBAMOL 1000 MG: 1000 INJECTION, SOLUTION INTRAMUSCULAR; INTRAVENOUS at 11:15

## 2022-01-07 RX ADMIN — ATORVASTATIN CALCIUM 10 MG: 10 TABLET, FILM COATED ORAL at 20:12

## 2022-01-07 RX ADMIN — CALCIUM CARBONATE 500 MG: 500 TABLET, CHEWABLE ORAL at 05:56

## 2022-01-07 RX ADMIN — CEFAZOLIN SODIUM 2 G: 2 INJECTION, SOLUTION INTRAVENOUS at 08:57

## 2022-01-07 RX ADMIN — CEFAZOLIN SODIUM 2 G: 2 INJECTION, SOLUTION INTRAVENOUS at 00:47

## 2022-01-07 RX ADMIN — METHOCARBAMOL 1000 MG: 1000 INJECTION, SOLUTION INTRAMUSCULAR; INTRAVENOUS at 15:00

## 2022-01-07 RX ADMIN — METHOCARBAMOL 1000 MG: 1000 INJECTION, SOLUTION INTRAMUSCULAR; INTRAVENOUS at 01:42

## 2022-01-07 RX ADMIN — KETOROLAC TROMETHAMINE 30 MG: 30 INJECTION, SOLUTION INTRAMUSCULAR at 17:48

## 2022-01-07 RX ADMIN — SODIUM CHLORIDE: 9 INJECTION, SOLUTION INTRAVENOUS at 00:48

## 2022-01-07 RX ADMIN — DOCUSATE SODIUM 100 MG: 100 CAPSULE ORAL at 17:47

## 2022-01-07 RX ADMIN — Medication 1000 UNITS: at 17:47

## 2022-01-07 RX ADMIN — DIAZEPAM 5 MG: 5 TABLET ORAL at 11:16

## 2022-01-07 RX ADMIN — KETOROLAC TROMETHAMINE 30 MG: 30 INJECTION, SOLUTION INTRAMUSCULAR at 01:38

## 2022-01-07 RX ADMIN — OXYCODONE HYDROCHLORIDE 10 MG: 10 TABLET ORAL at 08:57

## 2022-01-07 RX ADMIN — SENNOSIDES AND DOCUSATE SODIUM 1 TABLET: 50; 8.6 TABLET ORAL at 20:12

## 2022-01-07 RX ADMIN — LORAZEPAM 0.5 MG: 0.5 TABLET ORAL at 08:56

## 2022-01-07 RX ADMIN — LORAZEPAM 0.5 MG: 0.5 TABLET ORAL at 15:11

## 2022-01-07 ASSESSMENT — COGNITIVE AND FUNCTIONAL STATUS - GENERAL
SUGGESTED CMS G CODE MODIFIER DAILY ACTIVITY: CJ
TURNING FROM BACK TO SIDE WHILE IN FLAT BAD: A LITTLE
CLIMB 3 TO 5 STEPS WITH RAILING: A LITTLE
MOVING FROM LYING ON BACK TO SITTING ON SIDE OF FLAT BED: A LITTLE
SUGGESTED CMS G CODE MODIFIER MOBILITY: CK
TOILETING: A LITTLE
MOVING TO AND FROM BED TO CHAIR: A LITTLE
MOBILITY SCORE: 18
DAILY ACTIVITIY SCORE: 20
HELP NEEDED FOR BATHING: A LITTLE
STANDING UP FROM CHAIR USING ARMS: A LITTLE
DRESSING REGULAR UPPER BODY CLOTHING: A LITTLE
WALKING IN HOSPITAL ROOM: A LITTLE
DRESSING REGULAR LOWER BODY CLOTHING: A LITTLE

## 2022-01-07 ASSESSMENT — PAIN DESCRIPTION - PAIN TYPE
TYPE: SURGICAL PAIN
TYPE: SURGICAL PAIN

## 2022-01-07 ASSESSMENT — GAIT ASSESSMENTS
GAIT LEVEL OF ASSIST: MINIMAL ASSIST
DISTANCE (FEET): 30
DISTANCE (FEET): 100
DEVIATION: INCREASED BASE OF SUPPORT;DECREASED HEEL STRIKE;DECREASED TOE OFF
ASSISTIVE DEVICE: FRONT WHEEL WALKER

## 2022-01-07 ASSESSMENT — ACTIVITIES OF DAILY LIVING (ADL): TOILETING: INDEPENDENT

## 2022-01-07 NOTE — THERAPY
Physical Therapy   Initial Evaluation     Patient Name: Yolanda Reed  Age:  61 y.o., Sex:  female  Medical Record #: [id number]  Today's Date: 1/7/2022     Precautions  Precautions: Fall Risk;Spinal / Back Precautions ;Cervical Collar      Assessment  Patient is 61 y.o. female POD #1 C5-T1 ACDF.  Pre-op patient with herniated nucleus pulposus, cervical myelopathy, cervical stenosis, and cervical radiculopathy.  Today patient was able to perform bed mobility with SPV, no use of bed features.  Patient was able to ambulate ~100 ft x 2 in hallway with FWW and min A, taking a few standing rest breaks.  Patient with wide SHARITA, decreased control of RLE, and general instability of gait.  Patient was able to negotiate 2 steps with L rail and CGA, descending sideways.  Patient would benefit from continued acute PT and home health PT to address deficits in gait, balance, and coordination.    Plan    Recommend Physical Therapy 4 times per week until therapy goals are met for the following treatments:  Bed Mobility, Equipment, Gait Training, Neuro Re-Education / Balance, Self Care/Home Evaluation, Stair Training, Therapeutic Activities and Therapeutic Exercises    DC Equipment Recommendations: Front-Wheel Walker  Discharge Recommendations: Recommend home health for continued physical therapy services       Objective     01/07/22 1537   Prior Living Situation   Prior Services None   Housing / Facility 1 Story Apartment / Condo (converted garage apartment)   Steps Into Home 0   Steps In Home (1-4 between rooms)   Rail Right Rail (Steps into Home)   Equipment Owned Single Point Cane   Lives with - Patient's Self Care Capacity Significant Other   Comments Pt reported her significant other works graveyard shift and one of her friends will probably stay with her when he is not home   Prior Level of Functional Mobility   Bed Mobility Independent   Transfer Status Independent   Ambulation Independent   Distance Ambulation (Feet)  (limited community with difficulty)   Assistive Devices Used Single Point Cane   Stairs Independent   Comments Pt reported IND with all ADLs and IADLs with difficulty   History of Falls   History of Falls Yes   Cognition    Cognition / Consciousness X   Level of Consciousness Alert   Safety Awareness Impaired   Attention Impaired   Comments Pleasant & cooperative, became very anxious with mobility   Active ROM Lower Body    Active ROM Lower Body  WDL   Strength Lower Body   Lower Body Strength  X   Comments B LE grossly 4-5/5 except R hip flexion 3+/5   Sensation Lower Body   Lower Extremity Sensation   X   Comments Reported B thigh numbness, trunk/torso numbness   Balance Assessment   Sitting Balance (Static) Good   Sitting Balance (Dynamic) Good   Standing Balance (Static) Fair   Standing Balance (Dynamic) Fair -   Weight Shift Sitting Good   Weight Shift Standing Fair   Comments w/ FWW   Gait Analysis   Gait Level Of Assist Minimal Assist (min A -CGA)   Assistive Device Front Wheel Walker   Distance (Feet) 100   # of Times Distance was Traveled 2   Deviation Increased Base Of Support;Decreased Heel Strike;Decreased Toe Off (quick pace, R LE decreased coordination/control)   # of Stairs Climbed 2   Level of Assist with Stairs Contact Guard Assist   Weight Bearing Status No restrictions   Vision Deficits Impacting Mobility wears glasses   Comments Anxious with gait, quick pace despite cues.  Decreased coordination of R LE with gait.  Frequent adjustments of hands on FWW due to R hand numbness & weakness   Bed Mobility    Supine to Sit Supervised   Sit to Supine Supervised   Scooting Supervised (seated)   Comments HOB flat   Functional Mobility   Sit to Stand Supervised   Bed, Chair, Wheelchair Transfer Supervised   Toilet Transfers Supervised   Transfer Method Stand Step   Mobility ambulation, transfers, stairs   Activity Tolerance   Sitting in Chair 10+ min (post session)   Sitting Edge of Bed 5 min   Standing 10  min total   Comments limited by anxiety   Short Term Goals    Short Term Goal # 1 Pt will ambulate 100 ft with FWW and SPV within 6 visits in order to progress toward PLOF   Short Term Goal # 2 Pt will negotiate 4 steps with single rail and SPV within 6 visits in order to access home environment   Session Information   Date / Session Number  1/7 - 1 (1/4, 1/13)

## 2022-01-07 NOTE — OR SURGEON
Immediate Post OP Note    PreOp Diagnosis: c5-t1 hnp myelopathy radiculopathy stenosis      PostOp Diagnosis: same      Procedure(s):  DISCECTOMY, SPINE, CERVICAL, ANTERIOR APPROACH, WITH FUSION - C5-T1 - Wound Class: Clean  CORPECTOMY, SPINE - PARTIAL C5,6,7,T1 - Wound Class: Clean    Surgeon(s):  Jama Werner M.D.    Anesthesiologist/Type of Anesthesia:  Anesthesiologist: Jlusi Polo M.D./General    Surgical Staff:  Assistant: EVELIN Boss  Circulator: Uche Tsai R.N.  Relief Scrub: Aurora Jerome  Scrub Person: Steve Clayton  Radiology Technologist: Liza Clark    Specimens removed if any:  NONE    Estimated Blood Loss: 70cc    Findings: none    Complications: none        1/6/2022 4:55 PM Jama Werner M.D.   Patient denies CP, Breathing comfortably ROS (-)    ALBUTerol/ipratropium for Nebulization 3 milliLiter(s) Nebulizer every 6 hours  heparin  Injectable 5000 Unit(s) SubCutaneous every 8 hours  imipenem/cilastatin  IVPB 1000 milliGRAM(s) IV Intermittent every 8 hours  lactobacillus acidophilus 1 Tablet(s) Oral three times a day with meals  losartan 50 milliGRAM(s) Oral daily  multivitamin 1 Tablet(s) Oral daily  oxyCODONE    IR 10 milliGRAM(s) Oral every 3 hours PRN  sodium chloride 0.9%. 1000 milliLiter(s) IV Continuous <Continuous>                            10.7   5.29  )-----------( 284      ( 13 Mar 2018 07:36 )             32.6       Hemoglobin: 10.7 g/dL (03-13 @ 07:36)  Hemoglobin: 9.8 g/dL (03-12 @ 09:09)  Hemoglobin: 10.2 g/dL (03-11 @ 09:37)  Hemoglobin: 10.0 g/dL (03-10 @ 08:31)  Hemoglobin: 9.9 g/dL (03-09 @ 08:09)      03-13    136  |  98  |  17  ----------------------------<  99  4.3   |  27  |  0.77    Ca    9.2      13 Mar 2018 07:00  Phos  4.1     03-13  Mg     2.0     03-13    TPro  7.9  /  Alb  2.6<L>  /  TBili  0.3  /  DBili  x   /  AST  35  /  ALT  61<H>  /  AlkPhos  89  03-13    Creatinine Trend: 0.77<--, 0.79<--, 0.82<--, 0.79<--, 0.73<--, 0.81<--    COAGS:           T(C): 36.8 (03-13-18 @ 11:29), Max: 36.9 (03-13-18 @ 04:30)  HR: 97 (03-13-18 @ 11:29) (76 - 97)  BP: 121/83 (03-13-18 @ 11:29) (102/67 - 123/79)  RR: 18 (03-13-18 @ 11:29) (17 - 18)  SpO2: 95% (03-13-18 @ 11:29) (95% - 100%)  Wt(kg): --    I&O's Summary    12 Mar 2018 07:01  -  13 Mar 2018 07:00  --------------------------------------------------------  IN: 2010 mL / OUT: 4200 mL / NET: -2190 mL    13 Mar 2018 07:01  -  13 Mar 2018 16:40  --------------------------------------------------------  IN: 770 mL / OUT: 1950 mL / NET: -1180 mL        Gen: Appears well in NAD  HEENT:  (-)icterus (-)pallor  CV: N S1 S2 1/6 KITTY (+)2 Pulses B/l  Resp:  Clear to ausculatation B/L, normal effort  GI: (+) BS Soft, NT, ND  Lymph:  (-)Edema, (-)obvious lymphadenopathy  Skin: Warm to touch, Normal turgor  Psych: Appropriate mood and affect        TELEMETRY: SR	        ASSESSMENT/PLAN:  59yMale with etoh abuse, HTN admitted with DVT/septic emboli, necrotizing fascitis who is being seen for chest pain/sob.     - KELVIN with no vegetations  - no clinical heart failure  - No further PAT  - heme f/u regarding DVTs   - Abx per ID    Blair Benjamin MD, FACC  [facility], [facility]  [address] [place] [address]  PHONE:  [phone]  BEEPER : [phone]

## 2022-01-07 NOTE — OR NURSING
1726: received to PACU via bed. Awake. Restless. Respirations even and unlabored. Horizontal lower anterior neck incision with clear dressing CDI.. Hemovac intact and compressed with serosanguinous drainage. Hemovac doesn't stay compressed. Nimesh MANTILLA aware. With verbal instruction to keep compressing hemovac. Soft Cervical collar placed by Nimesh MANTILLA.    1745: c/o neck pain. Pain scale 8/10. Medicated. See MAR. Water given. Tolerated well.    1805: pain scale 7/10. Medicated. See MAR.    1855: Norman J collar placed by Ortho traction tech.    1900: Hemovac connected to wall suction.    1925: report called to Angie PHILLIPS.    1937: transported via bed to [facility] by transport with O2 at 4 L / Oxymask. Stable. Patient wearing face mask. Belonging bag, CPAP and cane on the bed with patient.

## 2022-01-07 NOTE — OP REPORT
DATE OF SERVICE:  01/06/2022     SERVICE:  Orthopedic Spine Surgery.     PREOPERATIVE DIAGNOSES:  C5-C6, C6-C7 and C7-T1 herniated nucleus pulposus,   cervical myelopathy, cervical stenosis, and cervical radiculopathy.     POSTOPERATIVE DIAGNOSES:  C5-C6, C6-C7 and C7-T1 herniated nucleus pulposus,   cervical myelopathy, cervical stenosis, and cervical radiculopathy.     PROCEDURES:  1.  C5-C6, C6-C7 and C7-T1 anterior cervical diskectomy and instrumented   fusion with Denali spine titanium plate and screw construct and partial   corpectomies involving approximately 33% of C5 and T1 and approximately 40% of   C6 and C7.  2.  Placement of interbody PEEK bone cages at C5-C6, C6-C7 and C7-T1.  3.  Use of local autograft bone.  4.  Use of allograft bone.  5.  Use of NMA spinal cord monitoring with running electromyography,   somatosensory evoked potentials, motor evoked potentials.  6.  Use of endotracheal tube with recurrent laryngeal nerve monitoring.  7.  Greater than 1 hour use of operating room fluoroscopy.  8.  Use of titanium plate and screw construct from [employer].  9.  Use of operating room Zeiss microscope.     SURGEON:  Jama Werner MD     ASSISTANT SURGEON:  Nimesh Lamar PA-C, [facility]     ANESTHESIA:  General.     ANESTHESIOLOGIST:  Jluis Polo MD     COMPLICATIONS:  None.     BLOOD LOSS:  Approximately 70 mL.     NMA SPINAL CORD MONITORING:  No significant deterioration during the case.     MEASURES USED TO DECREASE THE PROBABILITY OF INFECTION:  1.  Twenty four-hour Hibiclens body wash.  2.  Five-day mupirocin nasal ointment.  3.  Preincisional IV vancomycin and Ancef.  4.  I personally washed and scrubbed the patient's entire cervical spine area   4 times with isopropyl alcohol myself before the routine scrub.  5.  Use of Prineo Dermabond mesh glue closure, which has been shown to   decrease the probability of infection.  6.  We irrigated the wound with copious amounts of  pulsatile lavage at least 4   times during the course of the operation.     DESCRIPTION OF PROCEDURE:  After informed consent was obtained, the patient   was brought to the operating room and given general endotracheal anesthetic.    NMA spinal cord monitoring needles were placed along with a Burch catheter and   the patient was placed in the supine position with a bolster under her   shoulders.  Her shoulders were also taped down to allow visualization.  NMA   spinal cord monitoring beginning signals showed some decrease in the left   upper extremity before the operation started.  This did not change throughout   the course of the operation.  After the field was draped, a time-out was   called correctly identifying the patient and the procedure to be done.  All   surgical team members agreed on the parameters of the operation and eye   contact was maintained with the circulating nurse throughout the entire   timeout to ensure safety of the patient.  We then injected the area of the   incision with 10 mL of 0.5% Marcaine with epinephrine.  We then carried out a   left-sided anterior cervical Cisse-Ibrahim approach.  A curvilinear incision   was made at approximately C6-C7.  We then carefully dissected down through   subcutaneous tissue through the platysmal layer.  We then found the interval   between the sternocleidomastoid laterally and the strap muscles medially.  We   then arrived at the anterior aspect of the cervical spine, carefully cleaned   off the longus colli musculotendinous fibers.  [place] retractor pins were then   placed in C5 and C7 using fluoroscopic guidance to confirm our location.  The   [place]-Line retractors were then placed.  We then first approached   C5-C6.  The microscope was used.  We incised the disk, evacuated the disk,   removed cartilage from the endplates, but maintained the subcortical bony   structure.  The posterior longitudinal ligament was then completely taken   down.   The dura mater was visualized.  We then did microscopic foraminotomies   on both the left and the right side.  We did a partial corpectomy of C5,   removing approximately 33% of C5 and 20% of C6 from the superior aspect.    Another 20% was removed inferiorly later.  When we completed our   decompression, the entire central canal, the lateral aspects and the foramina   were widely patent.     We then did the same sequence of events at C6-C7.  Again, the disk was   incised, evacuated, the endplates decorticated, but the subcortical bony   structure was maintained.  The posterior longitudinal ligament was then   completely removed.  We did a partial corpectomy of C6.  Approximately 20% was   removed inferiorly and 20% of C7 was removed superiorly.  When we completed   our decompression, the entire central canal, lateral gutters and the foramina   were widely patent.     We then removed the [place] retractor pin from C5 down to T1.  We then did the   same sequence of events at C7-T1.  Again, the disk was incised, evacuated.    The cartilage was removed from the endplates and the subcortical structure was   maintained.  The posterior longitudinal ligament was completely taken down.    A 20% was taken off the body of C7 and approximately 33% of T1 was removed.    Corpectomy was performed.  Microscopic foraminotomies were done on both the   left and the right side.  When we completed our decompression, we had removed   a total of 33% of C5 and T1 and 40% of C6 and C7.     We then sized each disk space.  We then placed the appropriate size of cage   filled with local autograft bone and allograft bone.  A cage was placed   spanning from C5 to T1.  Two screws were placed at each level.  A 16 mm screws   were placed in T1 and 14 mm screws at the other levels.  The backout   prevention device was engaged and checked twice at each level with both   screws.  All screws achieved good purchase.  The position of the hardware was    checked using C-arm fluoroscopy and noted to be excellent.  We then irrigated   the wound with copious amounts of irrigation, which was done at least 4 times   during the course of the operation.  Every square millimeter of the wound was   then examined looking for any potential bleeders.  There was no significant   bleeding in the entire wound.  Deep Hemovac drain was placed and sewn in at   the skin.  We then placed a piece of Gelfoam, which had been soaked in 40 mg   of Depo-Medrol on the anterior aspect of the plate.  This technique has been   shown to decrease postoperative dysphagia and swallowing difficulties.  The   wound was then closed in layers.  The platysmal layer was closed with 2-0   Vicryl and skin closure was completed with a 4-0 Monocryl, then Prineo   Dermabond mesh glue closure.  We injected approximately 15 mL of 0.5% Marcaine   with epinephrine into the wound, which was then dressed.  The procedure was   terminated.  No complications were experienced.  Blood loss was approximately   70 mL and NMA spinal cord monitoring signals were stable throughout the entire   operation.        ______________________________  MD DREA SHAH/ELISE    DD:  01/06/2022 17:05  DT:  01/06/2022 17:39    Job#:  [id number]

## 2022-01-07 NOTE — ANESTHESIA POSTPROCEDURE EVALUATION
Patient: Yolanda Reed    Procedure Summary     Date: 01/06/22 Room / Location: [facility] 06 / SURGERY [facility]    Anesthesia Start: 1330 Anesthesia Stop: 1730    Procedures:       DISCECTOMY, SPINE, CERVICAL, ANTERIOR APPROACH, WITH FUSION - C5-T1 (N/A Spine Cervical)      CORPECTOMY, SPINE - PARTIAL (N/A Spine Cervical) Diagnosis: (C5-T1 HERNIATED NUCLEUS PULPOSUS, MYELOPATHY, STENOSIS WITH RADICULOPATHY)    Surgeons: Jama Werner M.D. Responsible Provider: Jluis Polo M.D.    Anesthesia Type: general ASA Status: 2          Final Anesthesia Type: general  Last vitals  BP   Blood Pressure: 143/62, Arterial BP: 141/63    Temp   36.2 °C (97.2 °F)    Pulse   73   Resp   17    SpO2   99 %      Anesthesia Post Evaluation    Patient location during evaluation: PACU  Patient participation: complete - patient participated  Level of consciousness: awake and alert    Airway patency: patent  Anesthetic complications: no  Cardiovascular status: hemodynamically stable  Respiratory status: acceptable  Hydration status: euvolemic    PONV: none          No complications documented.     Nurse Pain Score: 4 (NPRS)

## 2022-01-07 NOTE — CARE PLAN
Problem: Pain - Standard  Goal: Alleviation of pain or a reduction in pain to the patient’s comfort goal  Outcome: Progressing     Problem: Fall Risk  Goal: Patient will remain free from falls  Outcome: Progressing     Problem: Knowledge Deficit - Standard  Goal: Patient and family/care givers will demonstrate understanding of plan of care, disease process/condition, diagnostic tests and medications  Outcome: Progressing   The patient is Stable - Low risk of patient condition declining or worsening    Shift Goals  Clinical Goals: safety; Airway; pain management  Patient Goals: rest; comfort    Progress made toward(s) clinical / shift goals:  Pt airway intact, no complaints of difficulty swallowing or breathing.  Pt calls appropriately.  Pt pain controlled with scheduled and PRN medications.  Pt educated on mobility and necessity to call when need to be out of bed.      Patient is not progressing towards the following goals:

## 2022-01-07 NOTE — PROGRESS NOTES
4 Eyes Skin Assessment Completed by ALAN Spring and ALAN Sánchez.    Head WDL  Ears WDL  Nose WDL  Mouth WDL  Neck Incision  Breast/Chest Redness  Shoulder Blades WDL  Spine WDL  (R) Arm/Elbow/Hand Bruising  (L) Arm/Elbow/Hand WDL  Abdomen WDL  Groin WDL  Scrotum/Coccyx/Buttocks WDL  (R) Leg Abrasion  (L) Leg WDL  (R) Heel/Foot/Toe WDL  (L) Heel/Foot/Toe WDL          Devices In Places Pulse Ox, Burch and Nasal Cannula      Interventions In Place Pressure Redistribution Mattress    Possible Skin Injury No    Pictures Uploaded Into Epic N/A  Wound Consult Placed N/A  RN Wound Prevention Protocol Ordered No

## 2022-01-07 NOTE — PROGRESS NOTES
Pt is extremely, aggressive and agitated  Cursing and yelling at staff  Complaining about the staff cleaning her room,  I stated with covid regulation means we will be cleaning  Rooms constantly. I have never had patient complain about  Rooms being cleaned. If anything, patients will complain about   Staff not cleaning there rooms.  Pt complains about her symptoms in right hand are worst than   Before the operation. Her operation was extremely difficult and we  Luckily successful at all levels we stated in the consent form  I may do psych consult.   I will spend time discussing pt swelling, inflammation and post op  PT/OT in the hospital and outpatient PT.  It will be extremely difficult to get a positive result if the patient's attitude   Towards medical staff doesn't change.  PE:  cecilia LE motors sensory reflexes intact  Wound dressing cdi  Will remove drain, cover with 2x2 and tegaderm  Pt hasn't seen PT/OT yet  Pt has been yelling and cursing at staff all day  Pt may be aggressive and agitated due to high corticosteroids and   Narcotics, will dc decadron and advised nursing to keep narcotics at a minimal   And dose more benzodiazapine because of hx of anxiety   meaures

## 2022-01-07 NOTE — ANESTHESIA TIME REPORT
Anesthesia Start and Stop Event Times     Date Time Event    1/6/2022 1242 Ready for Procedure     1330 Anesthesia Start     1730 Anesthesia Stop        Responsible Staff  01/06/22    Name Role Begin End    Jluis Polo M.D. Anesth 1330 1730        Preop Diagnosis (Free Text):  Pre-op Diagnosis     C5-T1 HERNIATED NUCLEUS PULPOSUS, MYELOPATHY, STENOSIS WITH RADICULOPATHY        Preop Diagnosis (Codes):    Premium Reason  A. 3PM - 7AM    Comments:

## 2022-01-08 ENCOUNTER — PHARMACY VISIT (OUTPATIENT)
Dept: PHARMACY | Facility: MEDICAL CENTER | Age: 62
End: 2022-01-08
Payer: COMMERCIAL

## 2022-01-08 VITALS
WEIGHT: 165.57 LBS | SYSTOLIC BLOOD PRESSURE: 128 MMHG | TEMPERATURE: 98.1 F | HEART RATE: 69 BPM | OXYGEN SATURATION: 91 % | RESPIRATION RATE: 19 BRPM | HEIGHT: 60 IN | BODY MASS INDEX: 32.51 KG/M2 | DIASTOLIC BLOOD PRESSURE: 69 MMHG

## 2022-01-08 LAB
ERYTHROCYTE [DISTWIDTH] IN BLOOD BY AUTOMATED COUNT: 43.1 FL (ref 35.9–50)
HCT VFR BLD AUTO: 42.5 % (ref 37–47)
HGB BLD-MCNC: 13.6 G/DL (ref 12–16)
MCH RBC QN AUTO: 28.7 PG (ref 27–33)
MCHC RBC AUTO-ENTMCNC: 32 G/DL (ref 33.6–35)
MCV RBC AUTO: 89.7 FL (ref 81.4–97.8)
PLATELET # BLD AUTO: 290 K/UL (ref 164–446)
PMV BLD AUTO: 9.9 FL (ref 9–12.9)
RBC # BLD AUTO: 4.74 M/UL (ref 4.2–5.4)
WBC # BLD AUTO: 9.9 K/UL (ref 4.8–10.8)

## 2022-01-08 PROCEDURE — 700111 HCHG RX REV CODE 636 W/ 250 OVERRIDE (IP): Performed by: PHYSICIAN ASSISTANT

## 2022-01-08 PROCEDURE — RXMED WILLOW AMBULATORY MEDICATION CHARGE: Performed by: ORTHOPAEDIC SURGERY

## 2022-01-08 PROCEDURE — 36415 COLL VENOUS BLD VENIPUNCTURE: CPT

## 2022-01-08 PROCEDURE — 85027 COMPLETE CBC AUTOMATED: CPT

## 2022-01-08 PROCEDURE — 97535 SELF CARE MNGMENT TRAINING: CPT

## 2022-01-08 PROCEDURE — 97116 GAIT TRAINING THERAPY: CPT

## 2022-01-08 PROCEDURE — 700102 HCHG RX REV CODE 250 W/ 637 OVERRIDE(OP): Performed by: PHYSICIAN ASSISTANT

## 2022-01-08 PROCEDURE — A9270 NON-COVERED ITEM OR SERVICE: HCPCS | Performed by: PHYSICIAN ASSISTANT

## 2022-01-08 RX ORDER — DIAZEPAM 10 MG/1
10 TABLET ORAL EVERY 6 HOURS PRN
Qty: 60 TABLET | Refills: 0 | Status: SHIPPED | OUTPATIENT
Start: 2022-01-08 | End: 2022-01-28

## 2022-01-08 RX ORDER — IBUPROFEN 800 MG/1
800 TABLET ORAL 3 TIMES DAILY PRN
Qty: 90 TABLET | Refills: 1 | Status: SHIPPED | OUTPATIENT
Start: 2022-01-09 | End: 2022-01-08

## 2022-01-08 RX ORDER — DIAZEPAM 10 MG/1
10 TABLET ORAL EVERY 8 HOURS PRN
Qty: 60 TABLET | Refills: 0 | Status: SHIPPED | OUTPATIENT
Start: 2022-01-08 | End: 2022-01-08

## 2022-01-08 RX ORDER — IBUPROFEN 800 MG/1
800 TABLET ORAL 3 TIMES DAILY PRN
Qty: 90 TABLET | Refills: 1 | Status: ON HOLD | OUTPATIENT
Start: 2022-01-09 | End: 2022-02-22

## 2022-01-08 RX ORDER — OXYCODONE HYDROCHLORIDE 5 MG/1
10 TABLET ORAL EVERY 6 HOURS PRN
Qty: 56 TABLET | Refills: 0 | Status: SHIPPED | OUTPATIENT
Start: 2022-01-08 | End: 2022-01-15

## 2022-01-08 RX ORDER — OXYCODONE HYDROCHLORIDE 5 MG/1
10 TABLET ORAL EVERY 6 HOURS PRN
Qty: 56 TABLET | Refills: 0 | Status: SHIPPED | OUTPATIENT
Start: 2022-01-08 | End: 2022-01-08

## 2022-01-08 RX ADMIN — DOCUSATE SODIUM 100 MG: 100 CAPSULE ORAL at 04:33

## 2022-01-08 RX ADMIN — LORAZEPAM 0.5 MG: 0.5 TABLET ORAL at 04:33

## 2022-01-08 RX ADMIN — CALCIUM CARBONATE 500 MG: 500 TABLET, CHEWABLE ORAL at 04:33

## 2022-01-08 RX ADMIN — CHOLECALCIFEROL TAB 125 MCG (5000 UNIT) 5000 UNITS: 125 TAB at 04:33

## 2022-01-08 RX ADMIN — KETOROLAC TROMETHAMINE 30 MG: 30 INJECTION, SOLUTION INTRAMUSCULAR at 01:02

## 2022-01-08 ASSESSMENT — COGNITIVE AND FUNCTIONAL STATUS - GENERAL
WALKING IN HOSPITAL ROOM: A LITTLE
TURNING FROM BACK TO SIDE WHILE IN FLAT BAD: A LITTLE
MOVING TO AND FROM BED TO CHAIR: A LITTLE
STANDING UP FROM CHAIR USING ARMS: A LITTLE
MOVING FROM LYING ON BACK TO SITTING ON SIDE OF FLAT BED: A LITTLE
MOBILITY SCORE: 18
CLIMB 3 TO 5 STEPS WITH RAILING: A LITTLE
SUGGESTED CMS G CODE MODIFIER MOBILITY: CK

## 2022-01-08 ASSESSMENT — GAIT ASSESSMENTS
DEVIATION: ATAXIC;INCREASED BASE OF SUPPORT;DECREASED HEEL STRIKE;DECREASED TOE OFF
GAIT LEVEL OF ASSIST: MINIMAL ASSIST
DISTANCE (FEET): 300
ASSISTIVE DEVICE: FRONT WHEEL WALKER

## 2022-01-08 NOTE — PROGRESS NOTES
Progress Note               Author: Jama Werner M.D. Date & Time created: 2022  10:13 AM     Interval History:  Pt feels better today. Wants to go home    Review of Systems:  ROS    Physical Exam:  Physical Exam  Neurological:      Comments: Dressing dry  Sensation at baseline with preop function  Decreased sensation b c6,7,8  Motor decreased  3plus triceps 4         Labs:          Recent Labs     22   SODIUM 137   POTASSIUM 4.5   CHLORIDE 104   CO2 20   BUN 13   CREATININE 0.54   CALCIUM 9.2     Recent Labs     22   GLUCOSE 141*     Recent Labs     22  1422 22  0638 22  0625   RBC  --  5.06 4.72 4.74   HEMOGLOBIN  --  14.4 13.7 13.6   HEMATOCRIT  --  43.4 41.1 42.5   PLATELETCT  --  279 249 290   PROTHROMBTM 11.5*  --   --   --    APTT 31.6  --   --   --    INR 0.87  --   --   --      Recent Labs     22  0638 22  0625   WBC 11.4* 9.7 9.9     Hemodynamics:  Temp (24hrs), Av.6 °C (97.8 °F), Min:36.3 °C (97.4 °F), Max:36.9 °C (98.4 °F)  Temperature: 36.7 °C (98.1 °F)  Pulse  Av.9  Min: 62  Max: 118   Blood Pressure: 128/69     Respiratory:    Respiration: 19, Pulse Oximetry: 91 %           Fluids:    Intake/Output Summary (Last 24 hours) at 2022 1013  Last data filed at 2022  Gross per 24 hour   Intake 100 ml   Output 5 ml   Net 95 ml        GI/Nutrition:  Orders Placed This Encounter   Procedures   • Diet Order Diet: Regular     Standing Status:   Standing     Number of Occurrences:   1     Order Specific Question:   Diet:     Answer:   Regular [1]     Medical Decision Making, by Problem:  Active Hospital Problems    Diagnosis    • *Cervical myelopathy with cervical radiculopathy (HCC) [G95.9, M54.12]        Plan:  Pt doing much better than yesterday  Wants to go home  Will dc home on oxycodone 10 and po robaxin  Pt to wear collar at all times. Hard collar was not available so she is in soft  collar  Labs and vitals are stable  Discussed with her nurse who thinks pt in ready to go home    Quality-Core Measures

## 2022-01-08 NOTE — PROGRESS NOTES
AA&Ox4.    SpO2 90% on RA. Denies SOB.    Reporting 6/10 pain. Medicated per MAR.    SKIN Lap anterior neck incision, approximated with dermabond. CDI, drainage from Hemovac site, dressing changed, PRISCILLA.     Tolerating Regular diet. Denies N/V.    + void.    + BM / LBM PTA.    Pt ambulates/up with 1 person assist and FFW.    All needs met at this time. Call light within reach. Pt calls appropriately.

## 2022-01-08 NOTE — THERAPY
"Physical Therapy   Daily Treatment     Patient Name: Yolanda Reed  Age:  61 y.o., Sex:  female  Medical Record #: [id number]  Today's Date: 1/8/2022     Precautions  Precautions: Fall Risk;Spinal / Back Precautions ;Cervical Collar    Comments: may remove C-collar for shower and meals    Assessment    Pt with significantly impaired safety awareness, attention, command following, sequencing requiring Max verbal/tactile cues for safety, walker navigation, maintaining cervical precautions, impulsivity and balance. Pt with multiple LOB and veering into objects despite cues with ambulation in mendoza with FWW with noted Ataxia and very rapid carine; pt repeatedly picking up walker, rotating neck/trunk during turns. Pt at very high risk for falls and re-admission given current impairments in safety awareness and balance. Pt stating her  works nights but she will have a friend over nights to assist, however, unsure if someone will be home to help during the days as  will be sleeping. Recommend home with home health w/ FWW once pt demonstrates improved safety awareness; pt likely unsafe at home prior to admission. Will continue to follow.       Plan    Continue current treatment plan.    DC Equipment Recommendations: Front-Wheel Walker  Discharge Recommendations: Recommend home health for continued physical therapy services (Once safety awareness improves - suspect 1-2 more sessions)      Subjective    \"It's because I just woke up.\" when this PT stating to pt she needs to slow down and focus on safety     Objective     01/08/22 1125   Total Time Spent   Total Time Spent (Mins) 23   Charge Group   Charges  Yes   PT Gait Training 1   PT Self Care / Home Evaluation  1   Precautions   Precautions Fall Risk;Spinal / Back Precautions ;Cervical Collar     Comments may remove C-collar for shower and meals   Vitals   O2 Delivery Device Nasal Cannula;None - Room Air   Vitals Comments Pt recevied on RA with no c/o SOB "   Pain 0 - 10 Group   Therapist Pain Assessment Post Activity Pain Same as Prior to Activity;Nurse Notified;0   Cognition    Cognition / Consciousness X   Level of Consciousness Alert   Ability To Follow Commands 1 Step  (75% of time, no follow through w/ previous commands)   Safety Awareness Impaired;Impulsive   Attention Impaired   Sequencing Impaired   Comments Pt pleasant and cooperative. However, zero safety awareness. Needed Max VC/TC for safety, command following, sequencing. Pt repeatedly bumping into walls/objects with no regard despite Max cues and redirection. Not following cervical precautions , again, despite max cues. No evidence of carryover from previous session. Pt with multiple LOB and at very high risk for falls given ataxia and no safety awareness. Very impulsive.   Sensation Lower Body   Lower Extremity Sensation   X   Comments c/o RUE numbness, however, improved since surgery   Neuro-Muscular Treatments   Neuro-Muscular Treatments Weight Shift Left;Weight Shift Right;Verbal Cuing;Tactile Cuing;Sequencing   Vision   Vision Comments Required glasses for mobility   Neurological Concerns   Neurological Concerns Yes   Comments Ataxia with gait   Balance   Sitting Balance (Static) Fair   Sitting Balance (Dynamic) Poor +   Standing Balance (Static) Poor -   Standing Balance (Dynamic) Poor -   Weight Shift Sitting Poor   Weight Shift Standing Fair   Skilled Intervention Verbal Cuing;Tactile Cuing;Sequencing;Postural Facilitation;Facilitation;Compensatory Strategies   Comments w/ FWW. Multiple LOB in sitting and standing. Repeatedly bumping into objects despite max cues. Ataxic gait. Zero safety awareness.    Gait Analysis   Gait Level Of Assist Minimal Assist  (to CGA)   Assistive Device Front Wheel Walker  (requires pediatric)   Distance (Feet) 300   # of Times Distance was Traveled 1   Deviation Ataxic;Increased Base Of Support;Decreased Heel Strike;Decreased Toe Off  (inc carine, dec BLE  coordination/control, ant trunk lean)   # of Stairs Climbed 4   Level of Assist with Stairs Contact Guard Assist   Weight Bearing Status No restrictions   Vision Deficits Impacting Mobility wears glasses   Skilled Intervention Verbal Cuing;Tactile Cuing;Sequencing;Facilitation;Compensatory Strategies   Comments Max cues for walker navigation/safety. Repeatedly lifting FWW despite cues. Cues to maintin cerv precautions, difficulty following   Bed Mobility    Supine to Sit Supervised   Sit to Supine Supervised   Scooting Supervised   Rolling Supervised   Skilled Intervention Verbal Cuing;Sequencing;Compensatory Strategies   Comments HOB raised. Pt not following log roll. Repeatedly rotating neck and trunk to grab for things despite cues   Functional Mobility   Sit to Stand Minimal Assist   Bed, Chair, Wheelchair Transfer Minimal Assist   Toilet Transfers Minimal Assist   Transfer Method Stand Step   Mobility w/ FWW   Skilled Intervention Verbal Cuing;Tactile Cuing;Sequencing;Facilitation;Compensatory Strategies   Comments Ofelia for safety. Multiple LOB   How much difficulty does the patient currently have...   Turning over in bed (including adjusting bedclothes, sheets and blankets)? 3   Sitting down on and standing up from a chair with arms (e.g., wheelchair, bedside commode, etc.) 3   Moving from lying on back to sitting on the side of the bed? 3   How much help from another person does the patient currently need...   Moving to and from a bed to a chair (including a wheelchair)? 3   Need to walk in a hospital room? 3   Climbing 3-5 steps with a railing? 3   6 clicks Mobility Score 18   Activity Tolerance   Sitting in Chair pt deferred   Sitting Edge of Bed 2 min   Standing 12 min   Comments limited by fatigue upon return to room   Short Term Goals    Short Term Goal # 1 Pt will ambulate 100 ft with FWW and SPV within 6 visits in order to progress toward PLOF   Goal Outcome # 1 goal not met   Short Term Goal # 2 Pt  will negotiate 4 steps with single rail and SPV within 6 visits in order to access home environment   Goal Outcome # 2 Goal not met   Education Group   Education Provided Role of Physical Therapist;Cervical Precautions;Brace Wear and Care;Use of Assistive Device;Gait Training;Stair Training   Spine Precautions Patient Response Patient;Acceptance;Explanation;Verbal Demonstration;Reinforcement Needed   Cervical Precautions Patient Response Patient;Acceptance;Explanation;Reinforcement Needed   Role of Physical Therapist Patient Response Patient;Acceptance;Explanation;Verbal Demonstration   Gait Training Patient Response Patient;Acceptance;Explanation;Reinforcement Needed   Stair Training Patient Response Patient;Acceptance;Explanation;Action Demonstration   Use of Assistive Device Patient Response Patient;Acceptance;Explanation;Reinforcement Needed   Brace Wear & Care Patient Response Patient;Acceptance;Explanation;Reinforcement Needed   Additional Comments Pt receptive to edu, however, with little evidence of learning/carryover and requires Max Reinforcement   Anticipated Discharge Equipment and Recommendations   DC Equipment Recommendations Front-Wheel Walker   Discharge Recommendations Recommend home health for continued physical therapy services  (Once safety awareness improves - suspect 1-2 more sessions)   Interdisciplinary Plan of Care Collaboration   IDT Collaboration with  Nursing   Patient Position at End of Therapy In Bed;Call Light within Reach;Tray Table within Reach;Phone within Reach   Collaboration Comments RN updated   Session Information   Date / Session Number  1/8 2 (2/4, 1/13)   Priority 4

## 2022-01-08 NOTE — DISCHARGE SUMMARY
DATE OF ADMISSION:  01/06/2022   DATE OF DISCHARGE:  01/08/2022     DISCHARGE DIAGNOSES:  Status post C5-C6, C6-C7 and C7-T1 anterior cervical   diskectomy and partial corpectomies and instrumented fusion for cervical   herniated nucleus pulposus C5-T1 radiculopathy, myelopathy and stenosis.     DISCHARGE MEDICATIONS:  Include oxycodone 10 one to two p.o. q.6-8 hours   p.r.n. pain and Valium 10 mg 1 p.o. q.8 hours p.r.n. anxiety, muscle spasm.     FOLLOWUP:  She is to follow up with me, Dr. Jama Werner, [phone], area code   775 in approximately 2 weeks.     HISTORY OF PRESENT ILLNESS AND HOSPITAL COURSE:  The patient is an   approximately 61-year-old female with chief complaint of gait imbalance,   difficulty walking and severe neck pain with radiation of symptoms to   bilateral upper extremities.  The patient was evaluated in the clinic and   noted to have a very severe myelopathy, which was progressive in nature given   her history.  Because of her alarming findings, surgery was done urgently as   soon as possible.  The surgery went well without any significant complications   without any changes in spinal cord monitoring function.  Postoperatively, she   did experience extreme anxiety and aggressiveness, which may have been due to   the steroids administered to her.  It also may be related to her previous   psychiatric history as well.  We were able to calm her down and her symptoms   improved.  Given the length and severity of her spinal stenosis and   myelopathy, her symptoms will likely take several months to resolve and   improve.     She has had no postoperative complications.  We will see her back in our   clinic in 2 weeks.        ______________________________  MD DREA SHAH/NBA/JAYLAN    DD:  01/08/2022 10:36  DT:  01/08/2022 11:13    Job#:  [id number]

## 2022-01-08 NOTE — THERAPY
"Occupational Therapy   Initial Evaluation     Patient Name: Yolanda Reed  Age:  61 y.o., Sex:  female  Medical Record #: [id number]  Today's Date: 1/7/2022       Precautions: Spinal / Back Precautions ,Fall Risk,Cervical Collar    Comments: May remove C-collar for showers and meals.    Assessment    Patient is 61 y.o. female C5-T1 discectomy, fusion, partial corpectomy. Seen now for OT eval. Pt pleasant & cooperative this session, very receptive to education, but expressed anxiety and moved with some impulsivity. Pt educated on: neutral spine, safe body mechanics, lifting restriction, compensatory ADL. Also educated on donning/doffing c-collar, importance of not moving head when collar off (i.e.: showering). Recommended shower chair use with supv for transfer. Pt agreed to obtain shower chair from [facility]. Also recommended reacher for object retrieval. Pt able to mobilize in room without AD; cues to move cautiously. Pt demos RUE mild weakness & sensory changes as well as mild-moderate dexterity impairment. Provided HEP for hand coordination and Slo-Foam for hand strengthening. Pt reports good support from SO and friend. No further acute OT needs, but would benefit from HH on DC.     Plan    Recommend Occupational Therapy for Evaluation only    DC Equipment Recommendations: Front-Wheel Walker,Tub / Shower Seat (Pt agrees to obtain shower chair from [facility].)  Discharge Recommendations: Recommend home health for continued occupational therapy services (OP hand therapy if indicated following HH.)     Subjective     \"My hands didn't work very well before surgery, but it's worse now.\"      Objective       01/07/22 1618   Prior Living Situation   Housing / Facility 1 Story House   Steps In Home 4  (Down to kitchen & living room)   Rail Right Rail  (Steps in Home)  (Going down)   Bathroom Set up Bathtub / Shower Combination   Equipment Owned Single Point Cane   Lives with - Patient's Self Care Capacity Significant " Other   Comments SO works [employer]; plans to have friend stay with her to provide assistance.   Prior Level of ADL Function   Bathing Independent  (Preferred baths due to concern for standing in shower. )   Comments Pt IND with BADLs PTA.   Prior Level of IADL Function   Laundry Requires Assist   Kitchen Mobility Independent   Home Management Independent   Shopping Requires Assist   Prior Level Of Mobility Independent With Device in Community;Independent With Device in Home  (Single point cane)   Driving / Transportation Driving Independent   Occupation (Pre-Hospital Vocational) Retired Due To Disability   Cognition    Level of Consciousness Alert   Safety Awareness Impaired;Impulsive   Attention Impaired   Comments Pleasant, cooperative, and oriented; reports baseline anxiety.    Active ROM Upper Body   Dominant Hand Right   Comments R shoulder flex 160, pt reports londstanding limitation; R hand unable to oppose to 5th digit.   Strength Upper Body   Comments LUE WNL; RUE mild weakness (4-/5 to 4/5)   Sensation Upper Body   Comments L hand slight numbness; R hand mod tingling; difficulty localizing median distribution of right hand.   Coordination Upper Body   Fine Motor Coordination Mild to mod dexterity impairment R hand.   Balance Assessment   Sitting Balance (Static) Good   Sitting Balance (Dynamic) Good   Standing Balance (Static) Fair   Standing Balance (Dynamic) Fair -   Weight Shift Sitting Good   Weight Shift Standing Fair   Comments FWW   Bed Mobility    Supine to Sit Supervised   Sit to Supine Supervised   Scooting Supervised   Rolling Supervised   ADL Assessment   Grooming Supervision  (Oral care)   Lower Body Dressing Supervision  (Don underwear & socks)   Toileting Supervision  (Urination on toilet )   Functional Mobility   Sit to Stand Supervised   Bed, Chair, Wheelchair Transfer Supervised   Toilet Transfers Supervised   Transfer Method Stand Step   Mobility supine <>EOB, short gait to bathroom,  transfers

## 2022-01-08 NOTE — CARE PLAN
Problem: Pain - Standard  Goal: Alleviation of pain or a reduction in pain to the patient’s comfort goal  Outcome: Progressing     Problem: Fall Risk  Goal: Patient will remain free from falls  Outcome: Progressing   The patient is Stable - Low risk of patient condition declining or worsening    Shift Goals  Clinical Goals: pain control, mobility, safety,   Patient Goals: pain control, rest, R hand numbness     Progress made toward(s) clinical / shift goals:  administer pain medications as needed, assist with mobility, bed alarm for safety, consolidate cares to allow for periods of rest.     Patient is not progressing towards the following goals:

## 2022-01-08 NOTE — DISCHARGE PLANNING
Meds-to-Beds: Discharge prescription orders listed below delivered to patient's bedside. RN notified. Patient declined counseling.      Medications  diazepam (VALIUM) 10 MG tablet  Take 1 Tablet by mouth every 6 hours as needed for Anxiety (muscle spasm) for up to 20 days., Disp-60 Tablet, R-0, Normal    ibuprofen (MOTRIN) 800 MG Tab  Take 1 Tablet by mouth 3 times a day as needed with food and water. Max 3 tablets in 24 hours., Disp-90 Tablet, R-1, Normal    oxyCODONE immediate-release (ROXICODONE) 5 MG Tab  Take 2 Tablets by mouth every 6 hours as needed for Severe Pain for up to 7 days., Disp-56 Tablet, R-0, Normal      Jordana Gerardo, Pharmacy Intern

## 2022-01-08 NOTE — DISCHARGE INSTRUCTIONS
Discharge Instructions    Discharged to home by car with relative. Discharged via wheelchair, hospital escort: Yes.  Special equipment needed: Not Applicable    Be sure to schedule a follow-up appointment with your primary care doctor or any specialists as instructed.     Discharge Plan:   Diet Plan: Discussed  Activity Level: Discussed  Confirmed Follow up Appointment: Patient to Call and Schedule Appointment  Confirmed Symptoms Management: Discussed  Medication Reconciliation Updated: Yes  Influenza Vaccine Indication: Not indicated: Previously immunized this influenza season and > 8 years of age    I understand that a diet low in cholesterol, fat, and sodium is recommended for good health. Unless I have been given specific instructions below for another diet, I accept this instruction as my diet prescription.   Other diet: Regular    Special Instructions: None    · Is patient discharged on Warfarin / Coumadin?   No     Depression / Suicide Risk    As you are discharged from this [facility] facility, it is important to learn how to keep safe from harming yourself.    Recognize the warning signs:  · Abrupt changes in personality, positive or negative- including increase in energy   · Giving away possessions  · Change in eating patterns- significant weight changes-  positive or negative  · Change in sleeping patterns- unable to sleep or sleeping all the time   · Unwillingness or inability to communicate  · Depression  · Unusual sadness, discouragement and loneliness  · Talk of wanting to die  · Neglect of personal appearance   · Rebelliousness- reckless behavior  · Withdrawal from people/activities they love  · Confusion- inability to concentrate     If you or a loved one observes any of these behaviors or has concerns about self-harm, here's what you can do:  · Talk about it- your feelings and reasons for harming yourself  · Remove any means that you might use to hurt yourself (examples: pills, rope, extension  cords, firearm)  · Get professional help from the community (Mental Health, Substance Abuse, psychological counseling)  · Do not be alone:Call your Safe Contact- someone whom you trust who will be there for you.  · Call your local CRISIS HOTLINE 062-1308 or 872-233-7751  · Call your local Children's Mobile Crisis Response Team [place] (319) 197-0065 or www.[employer].com  · Call the toll free National Suicide Prevention Hotlines   · National Suicide Prevention Lifeline 381-112-NVGW (0086)  · [employer] Hope Line Network 800-SUICIDE (143-4850)            Anterior Cervical Diskectomy and Fusion, Care After  This sheet gives you information about how to care for yourself after your procedure. Your health care provider may also give you more specific instructions. If you have problems or questions, contact your health care provider.  What can I expect after the procedure?  After the procedure, it is common to have:  · Neck pain.  · Discomfort when swallowing.  · Slight hoarseness.  Follow these instructions at home:  If you have a neck brace:  · Wear it as told by your health care provider. Remove it only as told by your health care provider.  · Keep the brace clean and dry.  · Ask your health care provider if you should remove the brace to bathe or shower.  Incision care    · Follow instructions from your health care provider about how to take care of your incision. Make sure you:  ? Wash your hands with soap and water before and after you change your bandage (dressing). If soap and water are not available, use hand .  ? Change your dressing as told by your health care provider.  ? Leave stitches (sutures), skin glue, or adhesive strips in place. These skin closures may need to stay in place for 2 weeks or longer. If adhesive strip edges start to loosen and curl up, you may trim the loose edges. Do not remove adhesive strips completely unless your health care provider tells you to do that.  · Check your  incision area every day for signs of infection. Check for:  ? Redness, swelling, or pain.  ? Fluid or blood.  ? Warmth.  ? Pus or a bad smell.  Managing pain, stiffness, and swelling    · Take over-the-counter and prescription medicines only as told by your health care provider.  · If directed, put ice on the injured area.  ? If you have a removable brace, remove it as told by your health care provider.  ? Put ice in a plastic bag.  ? Place a towel between your skin and the bag.  ? Leave the ice on for 20 minutes, 2-3 times a day.  Activity    · Return to your normal activities as told by your health care provider. Ask your health care provider what activities are safe for you.  · Do exercises as told by your health care provider.  · Do not take baths, swim, or use a hot tub until your health care provider approves.  · Do not lift anything that is heavier than 10 lb (4.5 kg), or the limit that you are told, until your health care provider says that it is safe.  General instructions  · Ask your health care provider if the medicine prescribed to you:  ? Requires you to avoid driving or using heavy machinery.  ? Can cause constipation. You may need to take actions to prevent or treat constipation, such as:  § Drink enough fluid to keep your urine pale yellow.  § Take over-the-counter or prescription medicines.  § Eat foods that are high in fiber, such as beans, whole grains, and fresh fruits and vegetables.  § Limit foods that are high in fat and processed sugars, such as fried and sweet foods.  · Do not use any products that contain nicotine or tobacco, such as cigarettes, e-cigarettes, and chewing tobacco. These can delay healing. If you need help quitting, ask your health care provider.  · Keep all follow-up visits and physical therapy appointments as told by your health care provider. This is important.  Contact a health care provider if you have:  · A fever.  · Redness, swelling, or pain around your  incision.  · Fluid or blood coming from your incision.  · Pus or a bad smell coming from your incision.  · Pain that is not controlled by your pain medicine.  · Increasing hoarseness or trouble swallowing.  Get help right away if you have:  · Severe pain.  · Sudden numbness or weakness in your arms.  · Warmth, tenderness, or swelling in your calf.  · Chest pain.  · Difficulty breathing.  Summary  · After the procedure, it is common to have neck pain, discomfort when swallowing, and slight hoarseness.  · Follow instructions from your health care provider about how to take care of your incision.  · Check your incision area every day for signs of infection.  · Return to your normal activities as told by your health care provider. Ask your health care provider what activities are safe for you.  · Contact a health care provider if you have signs of infection at your incision.  This information is not intended to replace advice given to you by your health care provider. Make sure you discuss any questions you have with your health care provider.  Document Released: 01/13/2017 Document Revised: 09/12/2019 Document Reviewed: 09/12/2019  [employer] Patient Education © 2020 [employer]

## 2022-01-08 NOTE — CARE PLAN
The patient is Stable - Low risk of patient condition declining or worsening    Shift Goals  Clinical Goals: pain management  Patient Goals: rest    Progress made toward(s) clinical / shift goals:      Problem: Fall Risk  Goal: Patient will remain free from falls  Outcome: Progressing   Pt demonstrates appropriate use of call light to alert staff to needs.      Problem: Knowledge Deficit - Standard  Goal: Patient and family/care givers will demonstrate understanding of plan of care, disease process/condition, diagnostic tests and medications  Outcome: Progressing   Pt was educated on POC, MAR, shift routine and nursing education R/T Dx. Questions were encouraged and answered. Pt verbalized understanding of all teaching.        Patient is not progressing towards the following goals:

## 2022-01-08 NOTE — CARE PLAN
Problem: Pain - Standard  Goal: Alleviation of pain or a reduction in pain to the patient’s comfort goal  Outcome: Progressing     Problem: Fall Risk  Goal: Patient will remain free from falls  Outcome: Progressing     Problem: Knowledge Deficit - Standard  Goal: Patient and family/care givers will demonstrate understanding of plan of care, disease process/condition, diagnostic tests and medications  Outcome: Progressing   The patient is Stable - Low risk of patient condition declining or worsening    Shift Goals  Clinical Goals: safety; mobility; pain control  Patient Goals: rest; comfort    Progress made toward(s) clinical / shift goals:  Pt pain well controlled with PRN and scheduled medications. Pt calls appropriately. Pt mobilizes frequently.

## 2022-01-15 ENCOUNTER — HOSPITAL ENCOUNTER (EMERGENCY)
Facility: MEDICAL CENTER | Age: 62
End: 2022-01-16
Attending: EMERGENCY MEDICINE
Payer: MEDICAID

## 2022-01-15 DIAGNOSIS — Z98.1 S/P CERVICAL SPINAL FUSION: ICD-10-CM

## 2022-01-15 DIAGNOSIS — M54.2 NECK PAIN: ICD-10-CM

## 2022-01-15 PROCEDURE — 99284 EMERGENCY DEPT VISIT MOD MDM: CPT

## 2022-01-15 PROCEDURE — 700111 HCHG RX REV CODE 636 W/ 250 OVERRIDE (IP): Performed by: EMERGENCY MEDICINE

## 2022-01-15 PROCEDURE — 96372 THER/PROPH/DIAG INJ SC/IM: CPT

## 2022-01-15 RX ORDER — HYDROMORPHONE HYDROCHLORIDE 1 MG/ML
1 INJECTION, SOLUTION INTRAMUSCULAR; INTRAVENOUS; SUBCUTANEOUS ONCE
Status: COMPLETED | OUTPATIENT
Start: 2022-01-15 | End: 2022-01-15

## 2022-01-15 RX ADMIN — HYDROMORPHONE HYDROCHLORIDE 1 MG: 1 INJECTION, SOLUTION INTRAMUSCULAR; INTRAVENOUS; SUBCUTANEOUS at 23:00

## 2022-01-16 VITALS
HEIGHT: 60 IN | HEART RATE: 104 BPM | BODY MASS INDEX: 33.18 KG/M2 | SYSTOLIC BLOOD PRESSURE: 115 MMHG | WEIGHT: 169 LBS | OXYGEN SATURATION: 97 % | RESPIRATION RATE: 18 BRPM | DIASTOLIC BLOOD PRESSURE: 65 MMHG | TEMPERATURE: 96.8 F

## 2022-01-16 NOTE — ED NOTES
Rounded on Pt.    Pt states her pain is a 6 of 10 after intervention.    Updated Pt on plan of care. Pt verbalized understanding.  No acute distress at this time.  Will continue to monitor.

## 2022-01-16 NOTE — ED TRIAGE NOTES
Chief Complaint   Patient presents with   • Neck Pain     Spinal fusion four days ago at this facility; pain uncontrolled with perscribed medications.   Pt having neck pain which Pt describes as severe, stabbing; onset with surgery; exacerbation this afternoon. Movement provokes is; not relieved with home pain medications.

## 2022-01-16 NOTE — ED PROVIDER NOTES
ED Provider Note    CHIEF COMPLAINT  Chief Complaint   Patient presents with   • Neck Pain     Spinal fusion four days ago at this facility; pain uncontrolled with perscribed medications.        HPI    Primary care provider: MAGDY Galicia   History obtained from: Patient  History limited by: None     Yolanda Reed is a 61 y.o. female who presents to the ED by EMS complaining of increasing posterior neck pain after waking up this morning.  Patient had cervical discectomy with fusion using anterior approach with Dr. Werner on January 6, 2022 and was discharged in January 8, 2022.  She reports that she was doing okay with the pain medicine until this morning when the pain became worse.  She denies injury or trauma.  Pain is worse with movement.  She continues to have weakness in her arms worse on the right.  She denies fever/chills/difficulty swallowing/difficulty breathing/shortness of breath/cough/nausea/vomiting/diarrhea/dysuria/rash.    REVIEW OF SYSTEMS  Please see HPI for pertinent positives/negatives.  All other systems reviewed and are negative.     PAST MEDICAL HISTORY  Past Medical History:   Diagnosis Date   • Hypertension 01/05/2022    pt states well controlled on meds   • Anesthesia 2006    slow to wake up and PONV - no problems since per pt   • Anxiety    • Arthritis     shoulders   • Dental disorder     upper dentures   • High cholesterol    • Infectious disease     COVID Nov 2020 and hx of MRSA   • Low blood sugar    • Pneumonia     Nov 2020 + COVID   • PONV (postoperative nausea and vomiting)    • Psychiatric problem     depression   • PTSD (post-traumatic stress disorder)    • Sleep apnea     cpap   • Snoring    • Urinary bladder disorder     bladder sling put in 6/29/21        SURGICAL HISTORY  Past Surgical History:   Procedure Laterality Date   • CORPECTOMY N/A 1/6/2022    Procedure: CORPECTOMY, SPINE - PARTIAL;  Surgeon: Jama Werner M.D.;  Location: SURGERY [facility];  Service:  "Orthopedics   • CERVICAL DISK AND FUSION ANTERIOR N/A 2022    Procedure: DISCECTOMY, SPINE, CERVICAL, ANTERIOR APPROACH, WITH FUSION - C5-T1;  Surgeon: Jama Werner M.D.;  Location: SURGERY [facility];  Service: Orthopedics   • BLADDER SLING FEMALE N/A 2021    Procedure: BLADDER SLING, FEMALE - SOLYX.;  Surgeon: Rocío Dewey M.D.;  Location: SURGERY [facility];  Service: Gynecology   • PB KNEE SCOPE,AID ANT CRUCIATE REPAIR Right 2019    Procedure: RT KNEE ARTHROSCOPY WITH ALLOGRAFT ACL RECONSTRUCTION AND PARTIAL MENISCECTOMY;  Surgeon: Onur Arriaza M.D.;  Location: SURGERY [facility];  Service: Orthopedics   • BREAST BIOPSY  2010    Performed by ROCÍO BARON at SURGERY SAME DAY [facility]   • GYN SURGERY         • OTHER ORTHOPEDIC SURGERY      acl left knee   • OTHER ORTHOPEDIC SURGERY      right shoulder   • TONSILLECTOMY          SOCIAL HISTORY  Social History     Tobacco Use   • Smoking status: Former Smoker     Packs/day: 0.50     Years: 30.00     Pack years: 15.00     Types: Cigarettes     Quit date: 2013     Years since quittin.1   • Smokeless tobacco: Never Used   • Tobacco comment: .5 ppd, 30 yrs   Vaping Use   • Vaping Use: Every day   • Substances: Nicotine, THC   • Devices: Pre-filled or refillable cartridge   Substance and Sexual Activity   • Alcohol use: No   • Drug use: Yes     Types: Inhaled     Comment: \"clean since \",  THC last used 1/3/22   • Sexual activity: Not on file        FAMILY HISTORY  Family History   Problem Relation Age of Onset   • Alcohol/Drug Mother    • Anesthesia Mother    • Anxiety disorder Mother    • Bipolar disorder Mother    • Cancer Mother    • Lung Cancer Mother    • Osteoporosis Mother    • Allergies Father    • Kidney stones Father    • Lung Cancer Father    • DVT Sister    • Lung Cancer Sister         CURRENT MEDICATIONS  Home Medications    **Home medications have not yet been reviewed for this " encounter**          ALLERGIES  Allergies   Allergen Reactions   • Morphine Anaphylaxis     Throat swelling        PHYSICAL EXAM  VITAL SIGNS: /65   Pulse (!) 104   Temp 36 °C (96.8 °F)   Resp 18   Ht 1.524 m (5')   Wt 76.7 kg (169 lb)   LMP 08/01/2010   SpO2 97%   BMI 33.01 kg/m²  @CARMEL[[id number]::@     Pulse ox interpretation: 94% I interpret this pulse ox as normal     Constitutional: Well developed, well nourished, alert in no apparent distress, nontoxic appearance    HENT: No external signs of trauma, normocephalic, oropharynx moist and clear, nose normal    Eyes: PERRL, conjunctiva without erythema, no discharge, no icterus    Neck: Soft and supple, anterior neck surgical site clean/dry/intact without apparent signs of infection, trachea midline, no stridor, diffuse tenderness posteriorly, no LAD, no JVD, limited range of motion due to pain  Cardiovascular: Regular rate and tachycardic, no murmurs/rubs/gallops, strong distal pulses and good perfusion    Thorax & Lungs: No respiratory distress, CTAB   Abdomen: Soft, nontender, nondistended, no guarding, no rebound, normal BS    Back: No CVAT    Extremities: No cyanosis, no edema, no gross deformity, good ROM, no tenderness, intact distal pulses with brisk cap refill    Skin: Warm, dry, no pallor/cyanosis, no rash noted    Lymphatic: No lymphadenopathy noted    Neuro: A/O times 3, 5/5 strength bilateral upper extremity slightly weaker on the right compared to the left, equal strength bilateral lower extremities, sensation intact to touch throughout  Psychiatric: Cooperative, anxious patient    DIAGNOSTIC STUDIES / PROCEDURES        LABS  All labs reviewed by me.     Results for orders placed or performed during the hospital encounter of 01/06/22   Basic Metabolic Panel   Result Value Ref Range    Sodium 137 135 - 145 mmol/L    Potassium 4.5 3.6 - 5.5 mmol/L    Chloride 104 96 - 112 mmol/L    Co2 20 20 - 33 mmol/L    Glucose 141 (H) 65 - 99 mg/dL    Bun  13 8 - 22 mg/dL    Creatinine 0.54 0.50 - 1.40 mg/dL    Calcium 9.2 8.5 - 10.5 mg/dL    Anion Gap 13.0 7.0 - 16.0   CBC Without Differential   Result Value Ref Range    WBC 11.4 (H) 4.8 - 10.8 K/uL    RBC 5.06 4.20 - 5.40 M/uL    Hemoglobin 14.4 12.0 - 16.0 g/dL    Hematocrit 43.4 37.0 - 47.0 %    MCV 85.8 81.4 - 97.8 fL    MCH 28.5 27.0 - 33.0 pg    MCHC 33.2 (L) 33.6 - 35.0 g/dL    RDW 40.4 35.9 - 50.0 fL    Platelet Count 279 164 - 446 K/uL    MPV 9.5 9.0 - 12.9 fL   ESTIMATED GFR   Result Value Ref Range    GFR If African American >60 >60 mL/min/1.73 m 2    GFR If Non African American >60 >60 mL/min/1.73 m 2   CBC without Differential   Result Value Ref Range    WBC 9.7 4.8 - 10.8 K/uL    RBC 4.72 4.20 - 5.40 M/uL    Hemoglobin 13.7 12.0 - 16.0 g/dL    Hematocrit 41.1 37.0 - 47.0 %    MCV 87.1 81.4 - 97.8 fL    MCH 29.0 27.0 - 33.0 pg    MCHC 33.3 (L) 33.6 - 35.0 g/dL    RDW 41.0 35.9 - 50.0 fL    Platelet Count 249 164 - 446 K/uL    MPV 9.5 9.0 - 12.9 fL   CBC without Differential   Result Value Ref Range    WBC 9.9 4.8 - 10.8 K/uL    RBC 4.74 4.20 - 5.40 M/uL    Hemoglobin 13.6 12.0 - 16.0 g/dL    Hematocrit 42.5 37.0 - 47.0 %    MCV 89.7 81.4 - 97.8 fL    MCH 28.7 27.0 - 33.0 pg    MCHC 32.0 (L) 33.6 - 35.0 g/dL    RDW 43.1 35.9 - 50.0 fL    Platelet Count 290 164 - 446 K/uL    MPV 9.9 9.0 - 12.9 fL   POCT glucose device results   Result Value Ref Range    Glucose - Accu-Ck 111 (H) 65 - 99 mg/dL        RADIOLOGY  The radiologist's interpretation of all radiological studies have been reviewed by me.     No orders to display          COURSE & MEDICAL DECISION MAKING  Nursing notes, VS, PMSFHx reviewed in chart.     Review of past medical records shows the patient was admitted to this hospital January 6, 2022 for C5-6, C6-7 and C7-T1 cervical discectomy and partial corpectomies and fusion using anterior approach and discharged on January 8, 2022.      Differential diagnoses considered include but are not limited  to: Postop pain, strain/sprain, DDD, herniated disc, cellulitis, abscess, osteomyelitis, spinal hematoma      History and physical exam as above.  Patient presents complaining of increasing neck pain today.  She had cervical discectomy and fusion on January 6.  The surgical site appears to be healing well without apparent signs of infection and patient without fevers or chills or other significant findings to suggest infection.  Patient without new weakness or sensory change to suggest spinal cord compromise.  She was given 1 mg of Dilaudid IM in the ED.  On recheck, she is sleeping comfortably and easily arousable and reports that her pain is improved.  She is noted to be in no acute distress and nontoxic in appearance.  She was advised on close follow-up with Dr. Werner and given return to ED precautions.  She verbalized understanding and agreed to plan of care with no further questions or concerns.      The patient is referred to a primary physician for blood pressure management, diabetic screening, and for all other preventative health concerns.       FINAL IMPRESSION  1. Neck pain Acute   2. S/P cervical spinal fusion Active          DISPOSITION  Patient will be discharged home in stable condition.       FOLLOW UP  Jama Werner M.D.  [address] [address]  [phone]    Call in 1 day      [facility], Emergency Dept  [address] [facility] [address] [phone]    If symptoms worsen         OUTPATIENT MEDICATIONS  Discharge Medication List as of 1/16/2022  1:00 AM             Electronically signed by: Jf Flores D.O., 1/15/2022 10:45 PM      Portions of this record were made with voice recognition software.  Despite my review, spelling/grammar/context errors may still remain.  Interpretation of this chart should be taken in this context.

## 2022-01-16 NOTE — ED NOTES
"Patient screaming in room. \"I need some fucking help! I can't believe you're not helping me!\" Empathetic listening, educated patient she is being discharged and will need to follow up with ortho. Patient verbalized understanding but continued to scream at staff. \"Fuck you! I can't believe you're not admitting me! You're all useless! You bitch!\" Patient refused to sign or take her copy of discharge paperwork. Patient ambulated independently with a steady gait to lobby with           "

## 2022-01-16 NOTE — ED NOTES
Medicated Pt according to MAR.  Rounded on Pt.    Pt rates her pain at a 10 of 10.    Updated Pt on plan of care. Pt verbalized understanding.  No acute distress at this time.  Will continue to monitor.

## 2022-01-17 ENCOUNTER — HOSPITAL ENCOUNTER (OUTPATIENT)
Facility: MEDICAL CENTER | Age: 62
End: 2022-01-19
Attending: EMERGENCY MEDICINE | Admitting: STUDENT IN AN ORGANIZED HEALTH CARE EDUCATION/TRAINING PROGRAM
Payer: MEDICAID

## 2022-01-17 ENCOUNTER — APPOINTMENT (OUTPATIENT)
Dept: RADIOLOGY | Facility: MEDICAL CENTER | Age: 62
End: 2022-01-17
Attending: STUDENT IN AN ORGANIZED HEALTH CARE EDUCATION/TRAINING PROGRAM
Payer: MEDICAID

## 2022-01-17 ENCOUNTER — APPOINTMENT (OUTPATIENT)
Dept: RADIOLOGY | Facility: MEDICAL CENTER | Age: 62
End: 2022-01-17
Attending: EMERGENCY MEDICINE
Payer: MEDICAID

## 2022-01-17 DIAGNOSIS — M54.2 NECK PAIN: ICD-10-CM

## 2022-01-17 DIAGNOSIS — D72.829 LEUKOCYTOSIS, UNSPECIFIED TYPE: ICD-10-CM

## 2022-01-17 DIAGNOSIS — R07.9 CHEST PAIN, UNSPECIFIED TYPE: ICD-10-CM

## 2022-01-17 DIAGNOSIS — R53.81 DEBILITY: ICD-10-CM

## 2022-01-17 PROBLEM — F41.9 ANXIETY: Status: ACTIVE | Noted: 2022-01-17

## 2022-01-17 PROBLEM — E66.01 MORBID OBESITY (HCC): Status: ACTIVE | Noted: 2022-01-17

## 2022-01-17 PROBLEM — E78.5 HYPERLIPIDEMIA: Status: ACTIVE | Noted: 2022-01-17

## 2022-01-17 LAB
ALBUMIN SERPL BCP-MCNC: 3.3 G/DL (ref 3.2–4.9)
ALBUMIN/GLOB SERPL: 1.1 G/DL
ALP SERPL-CCNC: 87 U/L (ref 30–99)
ALT SERPL-CCNC: 13 U/L (ref 2–50)
ANION GAP SERPL CALC-SCNC: 13 MMOL/L (ref 7–16)
AST SERPL-CCNC: 15 U/L (ref 12–45)
BASOPHILS # BLD AUTO: 0.3 % (ref 0–1.8)
BASOPHILS # BLD: 0.04 K/UL (ref 0–0.12)
BILIRUB SERPL-MCNC: 0.4 MG/DL (ref 0.1–1.5)
BUN SERPL-MCNC: 6 MG/DL (ref 8–22)
CALCIUM SERPL-MCNC: 9.3 MG/DL (ref 8.5–10.5)
CHLORIDE SERPL-SCNC: 94 MMOL/L (ref 96–112)
CO2 SERPL-SCNC: 24 MMOL/L (ref 20–33)
CREAT SERPL-MCNC: 0.37 MG/DL (ref 0.5–1.4)
D DIMER PPP IA.FEU-MCNC: 3.49 UG/ML (FEU) (ref 0–0.5)
EKG IMPRESSION: NORMAL
EOSINOPHIL # BLD AUTO: 0.03 K/UL (ref 0–0.51)
EOSINOPHIL NFR BLD: 0.2 % (ref 0–6.9)
ERYTHROCYTE [DISTWIDTH] IN BLOOD BY AUTOMATED COUNT: 40.8 FL (ref 35.9–50)
GLOBULIN SER CALC-MCNC: 3 G/DL (ref 1.9–3.5)
GLUCOSE SERPL-MCNC: 111 MG/DL (ref 65–99)
HCT VFR BLD AUTO: 42 % (ref 37–47)
HGB BLD-MCNC: 14 G/DL (ref 12–16)
IMM GRANULOCYTES # BLD AUTO: 0.12 K/UL (ref 0–0.11)
IMM GRANULOCYTES NFR BLD AUTO: 0.8 % (ref 0–0.9)
LACTATE BLD-SCNC: 1.6 MMOL/L (ref 0.5–2)
LYMPHOCYTES # BLD AUTO: 1.23 K/UL (ref 1–4.8)
LYMPHOCYTES NFR BLD: 8.6 % (ref 22–41)
MCH RBC QN AUTO: 28.7 PG (ref 27–33)
MCHC RBC AUTO-ENTMCNC: 33.3 G/DL (ref 33.6–35)
MCV RBC AUTO: 86.1 FL (ref 81.4–97.8)
MONOCYTES # BLD AUTO: 0.98 K/UL (ref 0–0.85)
MONOCYTES NFR BLD AUTO: 6.9 % (ref 0–13.4)
NEUTROPHILS # BLD AUTO: 11.89 K/UL (ref 2–7.15)
NEUTROPHILS NFR BLD: 83.2 % (ref 44–72)
NRBC # BLD AUTO: 0 K/UL
NRBC BLD-RTO: 0 /100 WBC
PLATELET # BLD AUTO: 228 K/UL (ref 164–446)
PMV BLD AUTO: 9.3 FL (ref 9–12.9)
POTASSIUM SERPL-SCNC: 3.6 MMOL/L (ref 3.6–5.5)
PROCALCITONIN SERPL-MCNC: 0.19 NG/ML
PROT SERPL-MCNC: 6.3 G/DL (ref 6–8.2)
RBC # BLD AUTO: 4.88 M/UL (ref 4.2–5.4)
SODIUM SERPL-SCNC: 131 MMOL/L (ref 135–145)
TROPONIN T SERPL-MCNC: <6 NG/L (ref 6–19)
TROPONIN T SERPL-MCNC: <6 NG/L (ref 6–19)
WBC # BLD AUTO: 14.3 K/UL (ref 4.8–10.8)

## 2022-01-17 PROCEDURE — 96372 THER/PROPH/DIAG INJ SC/IM: CPT | Mod: XU

## 2022-01-17 PROCEDURE — 83605 ASSAY OF LACTIC ACID: CPT

## 2022-01-17 PROCEDURE — A9270 NON-COVERED ITEM OR SERVICE: HCPCS | Performed by: EMERGENCY MEDICINE

## 2022-01-17 PROCEDURE — 71045 X-RAY EXAM CHEST 1 VIEW: CPT

## 2022-01-17 PROCEDURE — 84145 PROCALCITONIN (PCT): CPT

## 2022-01-17 PROCEDURE — G0378 HOSPITAL OBSERVATION PER HR: HCPCS

## 2022-01-17 PROCEDURE — 84484 ASSAY OF TROPONIN QUANT: CPT

## 2022-01-17 PROCEDURE — 700117 HCHG RX CONTRAST REV CODE 255: Performed by: STUDENT IN AN ORGANIZED HEALTH CARE EDUCATION/TRAINING PROGRAM

## 2022-01-17 PROCEDURE — 700102 HCHG RX REV CODE 250 W/ 637 OVERRIDE(OP): Performed by: STUDENT IN AN ORGANIZED HEALTH CARE EDUCATION/TRAINING PROGRAM

## 2022-01-17 PROCEDURE — 99220 PR INITIAL OBSERVATION CARE,LEVL III: CPT | Performed by: STUDENT IN AN ORGANIZED HEALTH CARE EDUCATION/TRAINING PROGRAM

## 2022-01-17 PROCEDURE — 700111 HCHG RX REV CODE 636 W/ 250 OVERRIDE (IP): Performed by: STUDENT IN AN ORGANIZED HEALTH CARE EDUCATION/TRAINING PROGRAM

## 2022-01-17 PROCEDURE — A9270 NON-COVERED ITEM OR SERVICE: HCPCS | Performed by: STUDENT IN AN ORGANIZED HEALTH CARE EDUCATION/TRAINING PROGRAM

## 2022-01-17 PROCEDURE — 93005 ELECTROCARDIOGRAM TRACING: CPT | Performed by: EMERGENCY MEDICINE

## 2022-01-17 PROCEDURE — 71275 CT ANGIOGRAPHY CHEST: CPT

## 2022-01-17 PROCEDURE — 85379 FIBRIN DEGRADATION QUANT: CPT

## 2022-01-17 PROCEDURE — 85025 COMPLETE CBC W/AUTO DIFF WBC: CPT

## 2022-01-17 PROCEDURE — 700102 HCHG RX REV CODE 250 W/ 637 OVERRIDE(OP): Performed by: EMERGENCY MEDICINE

## 2022-01-17 PROCEDURE — 87040 BLOOD CULTURE FOR BACTERIA: CPT | Mod: 91

## 2022-01-17 PROCEDURE — 80053 COMPREHEN METABOLIC PANEL: CPT

## 2022-01-17 PROCEDURE — 99285 EMERGENCY DEPT VISIT HI MDM: CPT

## 2022-01-17 RX ORDER — SERTRALINE HYDROCHLORIDE 100 MG/1
100 TABLET, FILM COATED ORAL EVERY EVENING
Status: DISCONTINUED | OUTPATIENT
Start: 2022-01-17 | End: 2022-01-19 | Stop reason: HOSPADM

## 2022-01-17 RX ORDER — HYDROMORPHONE HYDROCHLORIDE 1 MG/ML
0.5 INJECTION, SOLUTION INTRAMUSCULAR; INTRAVENOUS; SUBCUTANEOUS ONCE
Status: ACTIVE | OUTPATIENT
Start: 2022-01-17 | End: 2022-01-18

## 2022-01-17 RX ORDER — ASPIRIN 325 MG
325 TABLET ORAL ONCE
Status: COMPLETED | OUTPATIENT
Start: 2022-01-17 | End: 2022-01-17

## 2022-01-17 RX ORDER — ATORVASTATIN CALCIUM 10 MG/1
10 TABLET, FILM COATED ORAL NIGHTLY
Status: DISCONTINUED | OUTPATIENT
Start: 2022-01-17 | End: 2022-01-19 | Stop reason: HOSPADM

## 2022-01-17 RX ORDER — PROPRANOLOL HYDROCHLORIDE 20 MG/1
80 TABLET ORAL
Status: DISCONTINUED | OUTPATIENT
Start: 2022-01-17 | End: 2022-01-19 | Stop reason: HOSPADM

## 2022-01-17 RX ORDER — OXYCODONE HYDROCHLORIDE 5 MG/1
5-10 TABLET ORAL EVERY 6 HOURS PRN
COMMUNITY
Start: 2022-01-08 | End: 2022-02-22

## 2022-01-17 RX ORDER — DIAZEPAM 5 MG/1
10 TABLET ORAL EVERY 6 HOURS PRN
Status: DISCONTINUED | OUTPATIENT
Start: 2022-01-17 | End: 2022-01-19 | Stop reason: HOSPADM

## 2022-01-17 RX ORDER — OXYCODONE HYDROCHLORIDE 5 MG/1
5-10 TABLET ORAL EVERY 6 HOURS PRN
Status: DISCONTINUED | OUTPATIENT
Start: 2022-01-17 | End: 2022-01-19 | Stop reason: HOSPADM

## 2022-01-17 RX ORDER — HEPARIN SODIUM 5000 [USP'U]/ML
5000 INJECTION, SOLUTION INTRAVENOUS; SUBCUTANEOUS EVERY 8 HOURS
Status: DISCONTINUED | OUTPATIENT
Start: 2022-01-17 | End: 2022-01-19 | Stop reason: HOSPADM

## 2022-01-17 RX ORDER — ONDANSETRON 2 MG/ML
4 INJECTION INTRAMUSCULAR; INTRAVENOUS ONCE
Status: ACTIVE | OUTPATIENT
Start: 2022-01-17 | End: 2022-01-18

## 2022-01-17 RX ORDER — QUETIAPINE FUMARATE 25 MG/1
50 TABLET, FILM COATED ORAL EVERY EVENING
Status: DISCONTINUED | OUTPATIENT
Start: 2022-01-17 | End: 2022-01-19 | Stop reason: HOSPADM

## 2022-01-17 RX ORDER — OXYCODONE HYDROCHLORIDE AND ACETAMINOPHEN 5; 325 MG/1; MG/1
1 TABLET ORAL ONCE
Status: COMPLETED | OUTPATIENT
Start: 2022-01-17 | End: 2022-01-17

## 2022-01-17 RX ADMIN — QUETIAPINE FUMARATE 50 MG: 25 TABLET ORAL at 18:57

## 2022-01-17 RX ADMIN — HEPARIN SODIUM 5000 UNITS: 5000 INJECTION, SOLUTION INTRAVENOUS; SUBCUTANEOUS at 21:29

## 2022-01-17 RX ADMIN — ATORVASTATIN CALCIUM 10 MG: 10 TABLET, FILM COATED ORAL at 21:29

## 2022-01-17 RX ADMIN — ASPIRIN 325 MG ORAL TABLET 325 MG: 325 PILL ORAL at 18:47

## 2022-01-17 RX ADMIN — OXYCODONE HYDROCHLORIDE AND ACETAMINOPHEN 1 TABLET: 5; 325 TABLET ORAL at 18:47

## 2022-01-17 RX ADMIN — SERTRALINE HYDROCHLORIDE 100 MG: 100 TABLET ORAL at 18:47

## 2022-01-17 RX ADMIN — IOHEXOL 40 ML: 350 INJECTION, SOLUTION INTRAVENOUS at 11:45

## 2022-01-17 ASSESSMENT — PAIN DESCRIPTION - PAIN TYPE
TYPE: CHRONIC PAIN
TYPE: CHRONIC PAIN

## 2022-01-17 ASSESSMENT — ENCOUNTER SYMPTOMS
CARDIOVASCULAR NEGATIVE: 1
PSYCHIATRIC NEGATIVE: 1
RESPIRATORY NEGATIVE: 1
GASTROINTESTINAL NEGATIVE: 1
EYES NEGATIVE: 1
MUSCULOSKELETAL NEGATIVE: 1
NEUROLOGICAL NEGATIVE: 1

## 2022-01-17 ASSESSMENT — PATIENT HEALTH QUESTIONNAIRE - PHQ9
SUM OF ALL RESPONSES TO PHQ9 QUESTIONS 1 AND 2: 0
1. LITTLE INTEREST OR PLEASURE IN DOING THINGS: NOT AT ALL
2. FEELING DOWN, DEPRESSED, IRRITABLE, OR HOPELESS: NOT AT ALL

## 2022-01-17 ASSESSMENT — LIFESTYLE VARIABLES: DO YOU DRINK ALCOHOL: NO

## 2022-01-17 ASSESSMENT — FIBROSIS 4 INDEX: FIB4 SCORE: 1.09

## 2022-01-17 NOTE — ED TRIAGE NOTES
Chief Complaint   Patient presents with   • Neck Pain     post surgery   • Other     unable to care for self at home     Pt bib ems from home, pt had neck surgery a week ago, she said she was doing ok until 3days ago. She was seen here 2days ago, pt reports unable to care for herself due to severe neck pain.

## 2022-01-17 NOTE — ED PROVIDER NOTES
"ED Provider Note    Scribed for Drea Bveerly M.D. by Wilver López. 1/17/2022  1:24 PM    Primary care provider: MAGDY Galicia  Means of arrival: EMS  History obtained from: Patient  History limited by: None    CHIEF COMPLAINT  Chief Complaint   Patient presents with   • Neck Pain     post surgery   • Other     unable to care for self at home     HPI  Yolanda Reed is a 61 y.o. female who presents via EMS for acute, moderate neck pain onset three days ago. Per patient, she had neck surgery a week ago and has been taking ibuprofen and oxycodone. She felt normal for some time but three days ago she woke up with \"exruciating\" pain in her neck and chest. Patient notes her neck pain is \"way more severe\" than what she experienced before her surgery. Her last episode of chest pain was earlier today which she says only happens when she cries. Denies shortness of breath, dizziness, lightheadedness, sweats, vomiting, arm pain, or leg pain. She says that she is unable to walk because she is generally weak but is not experiencing any pain. No alleviating or exacerbating factors reported. She does not live at home alone but says that she is unable to care for herself.    REVIEW OF SYSTEMS  Pertinent positives include neck pain and chest pain.   Pertinent negatives include no shortness of breath, dizziness, lightheadedness, sweats, vomiting, arm pain, or leg pain.   See HPI for further details. All other systems are negative.    PAST MEDICAL HISTORY  Past Medical History:   Diagnosis Date   • Anesthesia 2006    slow to wake up and PONV - no problems since per pt   • Anxiety    • Arthritis     shoulders   • Dental disorder     upper dentures   • High cholesterol    • Hypertension 01/05/2022    pt states well controlled on meds   • Infectious disease     COVID Nov 2020 and hx of MRSA   • Low blood sugar    • Pneumonia     Nov 2020 + COVID   • PONV (postoperative nausea and vomiting)    • Psychiatric problem     " depression   • PTSD (post-traumatic stress disorder)    • Sleep apnea     cpap   • Snoring    • Urinary bladder disorder     bladder sling put in 22       FAMILY HISTORY  Family History   Problem Relation Age of Onset   • Alcohol/Drug Mother    • Anesthesia Mother    • Anxiety disorder Mother    • Bipolar disorder Mother    • Cancer Mother    • Lung Cancer Mother    • Osteoporosis Mother    • Allergies Father    • Kidney stones Father    • Lung Cancer Father    • DVT Sister    • Lung Cancer Sister        SOCIAL HISTORY  Social History     Tobacco Use   • Smoking status: Former Smoker     Packs/day: 0.50     Years: 30.00     Pack years: 15.00     Types: Cigarettes     Quit date: 2013     Years since quittin.1   • Smokeless tobacco: Never Used   • Tobacco comment: .5 ppd, 30 yrs   Substance Use Topics   • Alcohol use: No     SURGICAL HISTORY  Past Surgical History:   Procedure Laterality Date   • CORPECTOMY N/A 2022    Procedure: CORPECTOMY, SPINE - PARTIAL;  Surgeon: Jama Werner M.D.;  Location: [facility];  Service: Orthopedics   • CERVICAL DISK AND FUSION ANTERIOR N/A 2022    Procedure: DISCECTOMY, SPINE, CERVICAL, ANTERIOR APPROACH, WITH FUSION - C5-T1;  Surgeon: Jama Werner M.D.;  Location: [facility];  Service: Orthopedics   • BLADDER SLING FEMALE N/A 2021    Procedure: BLADDER SLING, FEMALE - SOLYX.;  Surgeon: Rocío Dewey M.D.;  Location: [facility];  Service: Gynecology   • PB KNEE SCOPE,AID ANT CRUCIATE REPAIR Right 2019    Procedure: RT KNEE ARTHROSCOPY WITH ALLOGRAFT ACL RECONSTRUCTION AND PARTIAL MENISCECTOMY;  Surgeon: Onur Arriaza M.D.;  Location: [facility];  Service: Orthopedics   • BREAST BIOPSY  2010    Performed by ROCÍO BARON at SURGERY SAME DAY [facility]   • GYN SURGERY         • OTHER ORTHOPEDIC SURGERY      acl left knee   • OTHER ORTHOPEDIC SURGERY      right shoulder   •  "TONSILLECTOMY         CURRENT MEDICATIONS  Current Outpatient Medications   Medication Instructions   • atorvastatin (LIPITOR) 10 mg, Oral, NIGHTLY   • Clonazepam 0.5 mg, Oral, 1 TIME DAILY PRN   • diazepam (VALIUM) 10 mg, Oral, EVERY 6 HOURS PRN   • ibuprofen (MOTRIN) 800 MG Tab Take 1 Tablet by mouth 3 times a day as needed with food and water. Max 3 tablets in 24 hours.   • propranolol (INDERAL) 80 mg, Oral, EVERY BEDTIME   • QUEtiapine (SEROQUEL) 50 mg, Oral, EVERY EVENING   • sertraline (ZOLOFT) 100 mg, Oral, EVERY EVENING   • vitamin D (CHOLECALCIFEROL) 1,000 Units, Oral, EVERY EVENING     ALLERGIES  Allergies   Allergen Reactions   • Morphine Anaphylaxis     Throat swelling       PHYSICAL EXAM  VITAL SIGNS: /91   Pulse 98   Temp 36.6 °C (97.8 °F) (Temporal)   Resp 18   Ht 1.575 m (5' 2\")   Wt 76.7 kg (169 lb)   LMP 08/01/2010   SpO2 95%   BMI 30.91 kg/m²      Constitutional: Well developed, well nourished; No acute distress; Non-toxic appearance.   HENT: Normocephalic, atraumatic; Bilateral external ears normal; Oropharynx with moist mucous membranes; No erythema or exudates in the posterior oropharynx.   Eyes: PERRL, EOMI, Conjunctiva normal. No discharge.   Neck:  Supple, nontender midline; No stridor; No nuchal rigidity.   Lymphatic: No cervical lymphadenopathy noted.   Cardiovascular: Regular rate and rhythm without murmurs, rubs, or gallop.   Thorax & Lungs: No respiratory distress, breath sounds clear to auscultation bilaterally without wheezing, rales or rhonchi. Nontender chest wall. No crepitus or subcutaneous air  Abdomen: Soft, nontender, bowel sounds normal. No obvious masses; No pulsatile masses; no rebound, guarding, or peritoneal signs.   Skin: Good color; warm and dry without rash or petechia.  Back: Nontender, No CVA tenderness.   Extremities: Distal radial, dorsalis pedis, posterior tibial pulses are equal bilaterally; No edema; Nontender calves or saphenous, No cyanosis, No " clubbing.   Musculoskeletal: Good range of motion in all major joints. No tenderness to palpation or major deformities noted.   Neurologic: Alert & oriented x 4, clear speech    EKG  12 Lead EKG interpreted by me as shown below.    LABS/RADIOLOGY/PROCEDURES  Results for orders placed or performed during the hospital encounter of 01/17/22   CBC WITH DIFFERENTIAL   Result Value Ref Range    WBC 14.3 (H) 4.8 - 10.8 K/uL    RBC 4.88 4.20 - 5.40 M/uL    Hemoglobin 14.0 12.0 - 16.0 g/dL    Hematocrit 42.0 37.0 - 47.0 %    MCV 86.1 81.4 - 97.8 fL    MCH 28.7 27.0 - 33.0 pg    MCHC 33.3 (L) 33.6 - 35.0 g/dL    RDW 40.8 35.9 - 50.0 fL    Platelet Count 228 164 - 446 K/uL    MPV 9.3 9.0 - 12.9 fL    Neutrophils-Polys 83.20 (H) 44.00 - 72.00 %    Lymphocytes 8.60 (L) 22.00 - 41.00 %    Monocytes 6.90 0.00 - 13.40 %    Eosinophils 0.20 0.00 - 6.90 %    Basophils 0.30 0.00 - 1.80 %    Immature Granulocytes 0.80 0.00 - 0.90 %    Nucleated RBC 0.00 /100 WBC    Neutrophils (Absolute) 11.89 (H) 2.00 - 7.15 K/uL    Lymphs (Absolute) 1.23 1.00 - 4.80 K/uL    Monos (Absolute) 0.98 (H) 0.00 - 0.85 K/uL    Eos (Absolute) 0.03 0.00 - 0.51 K/uL    Baso (Absolute) 0.04 0.00 - 0.12 K/uL    Immature Granulocytes (abs) 0.12 (H) 0.00 - 0.11 K/uL    NRBC (Absolute) 0.00 K/uL   LACTIC ACID   Result Value Ref Range    Lactic Acid 1.6 0.5 - 2.0 mmol/L   Comp Metabolic Panel   Result Value Ref Range    Sodium 131 (L) 135 - 145 mmol/L    Potassium 3.6 3.6 - 5.5 mmol/L    Chloride 94 (L) 96 - 112 mmol/L    Co2 24 20 - 33 mmol/L    Anion Gap 13.0 7.0 - 16.0    Glucose 111 (H) 65 - 99 mg/dL    Bun 6 (L) 8 - 22 mg/dL    Creatinine 0.37 (L) 0.50 - 1.40 mg/dL    Calcium 9.3 8.5 - 10.5 mg/dL    AST(SGOT) 15 12 - 45 U/L    ALT(SGPT) 13 2 - 50 U/L    Alkaline Phosphatase 87 30 - 99 U/L    Total Bilirubin 0.4 0.1 - 1.5 mg/dL    Albumin 3.3 3.2 - 4.9 g/dL    Total Protein 6.3 6.0 - 8.2 g/dL    Globulin 3.0 1.9 - 3.5 g/dL    A-G Ratio 1.1 g/dL   TROPONIN    Result Value Ref Range    Troponin T <6 6 - 19 ng/L   ESTIMATED GFR   Result Value Ref Range    GFR If African American >60 >60 mL/min/1.73 m 2    GFR If Non African American >60 >60 mL/min/1.73 m 2   PROCALCITONIN   Result Value Ref Range    Procalcitonin 0.19 <0.25 ng/mL   D-DIMER   Result Value Ref Range    D-Dimer Screen 3.49 (H) 0.00 - 0.50 ug/mL (FEU)   EKG (NOW)   Result Value Ref Range    Report       [facility]    Test Date:  2022  Pt Name:    CIPRIANO ARCE                  Department: ER  MRN:        [id number]                      Room:        13  Gender:     Female                       Technician: [id number]  :        1960                   Requested By:SUKHJINDER BEVERLY  Order #:    [id number]                    Reading MD: Sukhjinder Beverly    Measurements  Intervals                                Axis  Rate:       91                           P:          16  UT:         160                          QRS:        62  QRSD:       78                           T:          43  QT:         340  QTc:        419    Interpretive Statements  SINUS RHYTHM rate 91  Normal axis  Normal intervals  T waves inverted V2  No ST elevation or depression  Compared to ECG 2022 14:45:01  No significant changes  Electronically Signed On 2022 17:04:23 PST by Sukhjinder Beverly         DX-CHEST-PORTABLE (1 VIEW)   Final Result      No acute cardiopulmonary process is seen.      EC-ECHOCARDIOGRAM COMPLETE W/O CONT    (Results Pending)   NM-CARDIAC STRESS TEST    (Results Pending)   CT-CTA CHEST PULMONARY ARTERY W/ RECONS    (Results Pending)     COURSE & MEDICAL DECISION MAKING  Pertinent Labs & Imaging studies reviewed. (See chart for details)    Reviewed patient's old medical records which showed she had neck surgery done by Dr. Baxter. She had a C5-C6, C6-C7, and C7-T1 diskectomy and partial corpectomy involving C5 and T1. Patient was admitted on the  and discharged on the . She was  back on the 15th complaining of neck pain and got labs which were unremarkable. She was given pain medications and discharged with post-operative pain.    1:24 PM - Patient seen and examined at bedside. Discussed plan of care, including labs and radiology due to her esymptoms. Patient agrees to the plan of care. The patient will be medicated with Dilaudid 0.5 mg injection and Zofran 4 mg injection. Ordered for DX-Chest, CBC w/ Diff, CMP, Troponin, Lactic Acid, Blood Culture, UA, and EKG to evaluate her symptoms.    4:22 PM - Paged Dr. Werner    1800: Dr. Werner came down to the ER.  He is evaluated the patient's surgical site.  He does not think there is any evidence of wound infection.  He does not think the patient needs any imaging at this time of her neck.  He agrees that she probably will need rehab.  He says that she had unsteady and weak gait before the surgery.  She also had quite a lot of upper extremity weakness before the surgery.  Although the patient says that her right arm got more weak after surgery, he said that it was not the case that she had that right upper extremity weakness before the surgery.  He is aware that her white count is 14.6.  Since it is not too much higher than it was on the day of surgery, he is not too concerned at this time.  He agrees with admission to the hospital service for rehab and for evaluation of her complaints of intermittent chest pain over the last few days.     I spoke with Dr. Martinez, hospitalist on-call, he will kindly evaluate the patient for hospitalization.      Patient presents to the ER complaining of worsening neck pain over the last 3 days.  She says she was doing well after her neck surgery up until 3 days ago when the pain started getting much worse despite being on her pain medicine.  Her surgery was on January 6.  Her surgical wound is clean dry and intact.  There is no significant erythema.  It does have a slight serous ooze coming from it but it is not  purulent.  She has not had fevers.  Her white count is a little high at 14.3.  I spoke with Dr. Werner, patient's surgeon.  He came down to the ER to evaluate the patient.  He does not think she has any evidence of wound infection.  He does not think she needs any imaging of her neck at this time.  He does not think she has neck infection or anything concerning from his standpoint at this time.  Patient complains of intermittent chest pain over the last couple days.  She describes it as a pressure.  No associated shortness of breath, dizziness, diaphoresis, nausea or vomiting.  She has been without chest pain throughout her entire ED stay.  EKG is nonacute and unchanged from previous.  Troponin is normal.  She has a slight resting tachycardia with heart rates in the high 90s to 100 range.  No hemoptysis.  No pain or swelling in her legs.  She is chest pain-free here in the ER.  The pain is not pleuritic.  I did not think that she had PE, but after discussion with the hospitalist, we decided to get a D-dimer just to rule it out since she is recently postop.  Hospitalist will follow-up on the D-dimer and order CT scan of the chest if necessary.  Patient says that she is not able to care for herself at home.  She says that she is toogenerally weak to get around on her own.  Her significant other does not want to care for her anymore.  The patient also has some mental health issues which likely is contributing to her inability to care for self.  She will need PT/OT evaluation.  Case management has kindly offered to put the order in for PT OT evaluation in the morning.  She will need to be hospitalized.  I spoke with Dr. Martinez, hospitalist on-call, and he will kindly evaluate her for hospitalization for cardiac rule out/chest pain evaluation, and for possible placement into rehab facility.    DISPOSITION:  Patient will be hospitalized by Dr. Martinez in guarded condition.    FINAL IMPRESSION  1. Debility    2. Neck pain  Acute   3. Chest pain, unspecified type Acute   4. Leukocytosis, unspecified type Acute        Wilver AGUILA (Scribe), am scribing for, and in the presence of, Drea Beverly M.D..    Electronically signed by: Wilver López (Scribe), 1/17/2022    Drea AGUILA M.D. personally performed the services described in this documentation, as scribed by Wilver López in my presence, and it is both accurate and complete.    This dictation has been created using voice recognition software. The accuracy of the dictation is limited by the abilities of the software. I expect there may be some errors of grammar and possibly content. I made every attempt to manually correct the errors within my dictation. However, errors related to voice recognition software may still exist and should be interpreted within the appropriate context.    The note accurately reflects work and decisions made by me.  Drea Beverly M.D.  1/17/2022  7:37 PM

## 2022-01-18 ENCOUNTER — APPOINTMENT (OUTPATIENT)
Dept: CARDIOLOGY | Facility: MEDICAL CENTER | Age: 62
End: 2022-01-18
Attending: STUDENT IN AN ORGANIZED HEALTH CARE EDUCATION/TRAINING PROGRAM
Payer: MEDICAID

## 2022-01-18 ENCOUNTER — HOSPITAL ENCOUNTER (INPATIENT)
Facility: REHABILITATION | Age: 62
End: 2022-01-18
Attending: PHYSICAL MEDICINE & REHABILITATION | Admitting: PHYSICAL MEDICINE & REHABILITATION
Payer: MEDICAID

## 2022-01-18 ENCOUNTER — APPOINTMENT (OUTPATIENT)
Dept: RADIOLOGY | Facility: MEDICAL CENTER | Age: 62
End: 2022-01-18
Attending: STUDENT IN AN ORGANIZED HEALTH CARE EDUCATION/TRAINING PROGRAM
Payer: MEDICAID

## 2022-01-18 LAB
LV EJECT FRACT  99904: 60
LV EJECT FRACT MOD 2C 99903: 58.55
LV EJECT FRACT MOD 4C 99902: 69.95
LV EJECT FRACT MOD BP 99901: 64.84

## 2022-01-18 PROCEDURE — A9270 NON-COVERED ITEM OR SERVICE: HCPCS | Performed by: STUDENT IN AN ORGANIZED HEALTH CARE EDUCATION/TRAINING PROGRAM

## 2022-01-18 PROCEDURE — 93306 TTE W/DOPPLER COMPLETE: CPT | Mod: 26 | Performed by: INTERNAL MEDICINE

## 2022-01-18 PROCEDURE — 93306 TTE W/DOPPLER COMPLETE: CPT

## 2022-01-18 PROCEDURE — G0378 HOSPITAL OBSERVATION PER HR: HCPCS

## 2022-01-18 PROCEDURE — 700111 HCHG RX REV CODE 636 W/ 250 OVERRIDE (IP): Performed by: STUDENT IN AN ORGANIZED HEALTH CARE EDUCATION/TRAINING PROGRAM

## 2022-01-18 PROCEDURE — 96372 THER/PROPH/DIAG INJ SC/IM: CPT

## 2022-01-18 PROCEDURE — 700102 HCHG RX REV CODE 250 W/ 637 OVERRIDE(OP): Performed by: STUDENT IN AN ORGANIZED HEALTH CARE EDUCATION/TRAINING PROGRAM

## 2022-01-18 RX ADMIN — PROPRANOLOL HYDROCHLORIDE 80 MG: 20 TABLET ORAL at 21:12

## 2022-01-18 RX ADMIN — HEPARIN SODIUM 5000 UNITS: 5000 INJECTION, SOLUTION INTRAVENOUS; SUBCUTANEOUS at 14:48

## 2022-01-18 RX ADMIN — HEPARIN SODIUM 5000 UNITS: 5000 INJECTION, SOLUTION INTRAVENOUS; SUBCUTANEOUS at 04:31

## 2022-01-18 RX ADMIN — OXYCODONE 10 MG: 5 TABLET ORAL at 10:35

## 2022-01-18 RX ADMIN — QUETIAPINE FUMARATE 50 MG: 25 TABLET ORAL at 17:33

## 2022-01-18 RX ADMIN — HEPARIN SODIUM 5000 UNITS: 5000 INJECTION, SOLUTION INTRAVENOUS; SUBCUTANEOUS at 21:12

## 2022-01-18 RX ADMIN — SERTRALINE HYDROCHLORIDE 100 MG: 100 TABLET ORAL at 17:33

## 2022-01-18 RX ADMIN — OXYCODONE 10 MG: 5 TABLET ORAL at 16:32

## 2022-01-18 RX ADMIN — ATORVASTATIN CALCIUM 10 MG: 10 TABLET, FILM COATED ORAL at 21:12

## 2022-01-18 RX ADMIN — OXYCODONE 10 MG: 5 TABLET ORAL at 04:31

## 2022-01-18 ASSESSMENT — PAIN DESCRIPTION - PAIN TYPE
TYPE: ACUTE PAIN;SURGICAL PAIN
TYPE: CHRONIC PAIN
TYPE: ACUTE PAIN
TYPE: ACUTE PAIN

## 2022-01-18 ASSESSMENT — LIFESTYLE VARIABLES
EVER HAD A DRINK FIRST THING IN THE MORNING TO STEADY YOUR NERVES TO GET RID OF A HANGOVER: NO
CONSUMPTION TOTAL: NEGATIVE
AVERAGE NUMBER OF DAYS PER WEEK YOU HAVE A DRINK CONTAINING ALCOHOL: 0
HOW MANY TIMES IN THE PAST YEAR HAVE YOU HAD 5 OR MORE DRINKS IN A DAY: 0
EVER FELT BAD OR GUILTY ABOUT YOUR DRINKING: NO
ALCOHOL_USE: NO
TOTAL SCORE: 0
TOTAL SCORE: 0
ON A TYPICAL DAY WHEN YOU DRINK ALCOHOL HOW MANY DRINKS DO YOU HAVE: 0
TOTAL SCORE: 0
DOES PATIENT WANT TO STOP DRINKING: NO
HAVE YOU EVER FELT YOU SHOULD CUT DOWN ON YOUR DRINKING: NO
HAVE PEOPLE ANNOYED YOU BY CRITICIZING YOUR DRINKING: NO

## 2022-01-18 ASSESSMENT — COGNITIVE AND FUNCTIONAL STATUS - GENERAL
SUGGESTED CMS G CODE MODIFIER DAILY ACTIVITY: CK
MOVING TO AND FROM BED TO CHAIR: A LITTLE
CLIMB 3 TO 5 STEPS WITH RAILING: A LITTLE
MOBILITY SCORE: 19
HELP NEEDED FOR BATHING: A LITTLE
TOILETING: A LITTLE
MOVING FROM LYING ON BACK TO SITTING ON SIDE OF FLAT BED: A LITTLE
SUGGESTED CMS G CODE MODIFIER MOBILITY: CK
EATING MEALS: A LITTLE
STANDING UP FROM CHAIR USING ARMS: A LITTLE
WALKING IN HOSPITAL ROOM: A LITTLE
DRESSING REGULAR UPPER BODY CLOTHING: A LITTLE
PERSONAL GROOMING: A LITTLE
DRESSING REGULAR LOWER BODY CLOTHING: A LITTLE
DAILY ACTIVITIY SCORE: 18

## 2022-01-18 ASSESSMENT — FIBROSIS 4 INDEX: FIB4 SCORE: 1.09

## 2022-01-18 NOTE — CARE PLAN
Problem: Knowledge Deficit - Standard  Goal: Patient and family/care givers will demonstrate understanding of plan of care, disease process/condition, diagnostic tests and medications  Outcome: Progressing   Pt updated on POC. Pt verbalizes understanding and has no further questions at this time. Pt educated on calling for any more questions.      Problem: Fall Risk  Goal: Patient will remain free from falls  Outcome: Progressing  Fall precautions in place according to fall risk assessment. Ambulated with patient patient is a x1 with hand held assistance       The patient is Stable - Low risk of patient condition declining or worsening    Shift Goals  Clinical Goals: stress test, discharge  Patient Goals: stress test, eat  Family Goals: GA. No family present.     Progress made toward(s) clinical / shift goals:  stress test at 0930, discharge possibly today after test and PT/OT eval as well    Patient is not progressing towards the following goals:

## 2022-01-18 NOTE — ED NOTES
MD Werner at bedside to assess. MD Baxter peaking with MD Beverly states gait is normally unsteady and pt can get aggressive. Had profound upper extremity weakness before surgery.

## 2022-01-18 NOTE — H&P
Hospital Medicine History & Physical Note    Date of Service  1/17/2022    Primary Care Physician  MAGDY Galicia    Consultants   general surgery    Code Status  Full Code    Chief Complaint  Chief Complaint   Patient presents with   • Neck Pain     post surgery   • Other     unable to care for self at home       History of Presenting Illness  Yolanda Reed is a 61 y.o. female who presented 1/17/2022 with difficulties ambulating and chest pain.    On January 8, patient had anterior cervical discectomy and partial corpectomies and instrumented fusion for cervical herniated nucleus pulposus from C5-T1 radiculopathy.  She had tolerated the surgery well without any immediate complications.  Since patient was discharged and sent home, for the first few days she was initially well,, however for the last few days, she is reported generalized weakness and difficulties ambulating.  She denies fever chills , pain at the surgical site nausea vomiting.  This morning, she was walking and she reported difficulties with ambulation and had a mechanical fall.  Denies loss of consciousness or head strike.  In addition, she states that she has been crying due to the dizziness, and reports sharp-like chest pain when she is crying.  Due to these symptoms, decision was made to come to the ED for evaluation.    In the ED, patient found to have tachycardia, other vital signs within normal limits.  Remarkable labs include neutrophil leukocytosis, hyponatremia, hypochloremia.  Chest x-ray shows no acute cardiopulmonary abnormality.  EKG showed normal sinus rhythm without any T wave changes to suggest ischemia.  Surgery was contacted by ERP, recommended medical management for now.    Patient seen at bedside, currently asymptomatic and asking for water.  Is currently comfortable.    I discussed the plan of care with patient.    Review of Systems  Review of Systems   Constitutional: Positive for malaise/fatigue.   HENT: Negative.     Eyes: Negative.    Respiratory: Negative.    Cardiovascular: Negative.    Gastrointestinal: Negative.    Genitourinary: Negative.    Musculoskeletal: Negative.    Skin: Negative.    Neurological: Negative.    Endo/Heme/Allergies: Negative.    Psychiatric/Behavioral: Negative.        Past Medical History   has a past medical history of Anesthesia (2006), Anxiety, Arthritis, Dental disorder, High cholesterol, Hypertension (01/05/2022), Infectious disease, Low blood sugar, Pneumonia, PONV (postoperative nausea and vomiting), Psychiatric problem, PTSD (post-traumatic stress disorder), Sleep apnea, Snoring, and Urinary bladder disorder.    Surgical History   has a past surgical history that includes tonsillectomy; breast biopsy (8/20/2010); pr knee scope,aid ant cruciate repair (Right, 11/25/2019); bladder sling female (N/A, 6/29/2021); gyn surgery (1981,1988); other orthopedic surgery; other orthopedic surgery; corpectomy (N/A, 1/6/2022); and cervical disk and fusion anterior (N/A, 1/6/2022).     Family History  family history includes Alcohol/Drug in her mother; Allergies in her father; Anesthesia in her mother; Anxiety disorder in her mother; Bipolar disorder in her mother; Cancer in her mother; DVT in her sister; Kidney stones in her father; Lung Cancer in her father, mother, and sister; Osteoporosis in her mother.   Family history reviewed with patient. There is no family history that is pertinent to the chief complaint.     Social History   reports that she quit smoking about 8 years ago. Her smoking use included cigarettes. She has a 15.00 pack-year smoking history. She has never used smokeless tobacco. She reports current drug use. Drug: Inhaled. She reports that she does not drink alcohol.    Allergies  Allergies   Allergen Reactions   • Morphine Anaphylaxis     Throat swelling       Medications  Prior to Admission Medications   Prescriptions Last Dose Informant Patient Reported? Taking?   atorvastatin  (LIPITOR) 10 MG Tab 1/15/2022 at PM Patient Yes No   Sig: Take 10 mg by mouth every evening.   diazepam (VALIUM) 10 MG tablet PRN at PRN Patient No No   Sig: Take 1 Tablet by mouth every 6 hours as needed for Anxiety (muscle spasm) for up to 20 days.   ibuprofen (MOTRIN) 800 MG Tab 1/17/2022 at AM Patient No No   Sig: Take 1 Tablet by mouth 3 times a day as needed with food and water. Max 3 tablets in 24 hours.   oxyCODONE immediate-release (ROXICODONE) 5 MG Tab 1/17/2022 at AM Patient Yes Yes   Sig: Take 5-10 mg by mouth every 6 hours as needed for Severe Pain. 7 day supply   propranolol (INDERAL) 40 MG Tab 1/15/2022 at PM Patient Yes No   Sig: Take 80 mg by mouth at bedtime.   quetiapine (SEROQUEL) 50 MG tablet 1/16/2022 at PM Patient Yes No   Sig: Take 50 mg by mouth every evening.   sertraline (ZOLOFT) 100 MG Tab 1/15/2022 at PM Patient Yes No   Sig: Take 100 mg by mouth every evening.   vitamin D (CHOLECALCIFEROL) 1000 UNIT Tab 1/15/2022 at PM Patient Yes No   Sig: Take 1,000 Units by mouth every evening.      Facility-Administered Medications: None       Physical Exam  Temp:  [36.6 °C (97.8 °F)] 36.6 °C (97.8 °F)  Pulse:  [] 100  Resp:  [14-20] 14  BP: (116-138)/(62-91) 116/62  SpO2:  [95 %-97 %] 96 %  Blood Pressure: 116/62   Temperature: 36.6 °C (97.8 °F)   Pulse: 100   Respiration: 14   Pulse Oximetry: 96 %       Physical Exam  Constitutional:       Appearance: Normal appearance. She is obese.   HENT:      Head: Normocephalic.      Nose: Nose normal.      Mouth/Throat:      Mouth: Mucous membranes are moist.   Eyes:      Pupils: Pupils are equal, round, and reactive to light.   Neck:      Comments: Horizontal incision on anterior neck, appears to be healed well  Cardiovascular:      Rate and Rhythm: Regular rhythm. Tachycardia present.   Pulmonary:      Effort: Pulmonary effort is normal.      Breath sounds: Normal breath sounds.   Abdominal:      General: Abdomen is flat. Bowel sounds are normal.       Palpations: Abdomen is soft.   Musculoskeletal:         General: Normal range of motion.   Skin:     General: Skin is warm.   Neurological:      General: No focal deficit present.      Mental Status: She is alert and oriented to person, place, and time. Mental status is at baseline.   Psychiatric:         Mood and Affect: Mood normal.         Behavior: Behavior normal.         Thought Content: Thought content normal.         Judgment: Judgment normal.         Laboratory:  Recent Labs     01/17/22  1405   WBC 14.3*   RBC 4.88   HEMOGLOBIN 14.0   HEMATOCRIT 42.0   MCV 86.1   MCH 28.7   MCHC 33.3*   RDW 40.8   PLATELETCT 228   MPV 9.3     Recent Labs     01/17/22  1548   SODIUM 131*   POTASSIUM 3.6   CHLORIDE 94*   CO2 24   GLUCOSE 111*   BUN 6*   CREATININE 0.37*   CALCIUM 9.3     Recent Labs     01/17/22  1548   ALTSGPT 13   ASTSGOT 15   ALKPHOSPHAT 87   TBILIRUBIN 0.4   GLUCOSE 111*         No results for input(s): NTPROBNP in the last 72 hours.      Recent Labs     01/17/22  1548   TROPONINT <6       Imaging:  DX-CHEST-PORTABLE (1 VIEW)   Final Result      No acute cardiopulmonary process is seen.          EKG:  I have personally reviewed the images and compared with prior images.    Assessment/Plan:  I anticipate this patient is appropriate for observation status at this time.    * Pain in the chest  Assessment & Plan  Admit patient to telemetry  Loaded with aspirin in ED  Initial troponin without any EKG changes, trend troponin  TTE  Nuclear stress test  Sent D-dimer, CTPA if positive      Anxiety  Assessment & Plan  Continue propanolol    Morbid obesity (HCC)  Assessment & Plan  15 minutes spent counseling patient on weight loss, nutrition, and healthy lifestyle      Hyperlipidemia  Assessment & Plan  Continue lipitor     Cervical myelopathy with cervical radiculopathy (HCC)- (present on admission)  Assessment & Plan  Continue Valium and Roxicodone  Physical therapy Occupational Therapy      VTE prophylaxis:  heparin ppx

## 2022-01-18 NOTE — DISCHARGE PLANNING
[facility] Transitional [facility] Coordination    Referral from:  Dr. Drea Beverly  Insurance Provider on Facesheet: Medicaid FFS  Potential Rehab Diagnosis: Other Ortho/Other Neuro/Cardiac    Chart review indicates patient may have on going medical management and may have therapy needs to possibly meet inpatient rehab facility criteria with the goal of returning to community.    D/C support: To be determined     Physiatry to consult.  Would welcome PT/OT as clinically appropriate.      Last Covid test:  1/05/2022 not detected     Thank you for the referral.

## 2022-01-18 NOTE — DISCHARGE INSTRUCTIONS
Discharge Instructions    Discharged to home by car with self. Discharged via wheelchair, hospital escort: Yes.  Special equipment needed: C-Collar     Be sure to schedule a follow-up appointment with your primary care doctor or any specialists as instructed.     Discharge Plan:   Influenza Vaccine Indication: Indicated: 9 to 64 years of age    I understand that a diet low in cholesterol, fat, and sodium is recommended for good health. Unless I have been given specific instructions below for another diet, I accept this instruction as my diet prescription.       Special Instructions: None    · Is patient discharged on Warfarin / Coumadin?   No     Depression / Suicide Risk    As you are discharged from this [facility] facility, it is important to learn how to keep safe from harming yourself.    Recognize the warning signs:  · Abrupt changes in personality, positive or negative- including increase in energy   · Giving away possessions  · Change in eating patterns- significant weight changes-  positive or negative  · Change in sleeping patterns- unable to sleep or sleeping all the time   · Unwillingness or inability to communicate  · Depression  · Unusual sadness, discouragement and loneliness  · Talk of wanting to die  · Neglect of personal appearance   · Rebelliousness- reckless behavior  · Withdrawal from people/activities they love  · Confusion- inability to concentrate     If you or a loved one observes any of these behaviors or has concerns about self-harm, here's what you can do:  · Talk about it- your feelings and reasons for harming yourself  · Remove any means that you might use to hurt yourself (examples: pills, rope, extension cords, firearm)  · Get professional help from the community (Mental Health, Substance Abuse, psychological counseling)  · Do not be alone:Call your Safe Contact- someone whom you trust who will be there for you.  · Call your local CRISIS HOTLINE 387-9004 or 119-564-6679  · Call  your local Children's Mobile Crisis Response Team [place] (658) 246-8604 or www.[employer]  · Call the toll free National Suicide Prevention Hotlines   · National Suicide Prevention Lifeline 007-528-JQRY (2911)  · National Hope Line Network 800-SUICIDE (307-5409)    Anterior Cervical Diskectomy and Fusion, Care After  This sheet gives you information about how to care for yourself after your procedure. Your health care provider may also give you more specific instructions. If you have problems or questions, contact your health care provider.  What can I expect after the procedure?  After the procedure, it is common to have:  · Neck pain.  · Discomfort when swallowing.  · Slight hoarseness.  Follow these instructions at home:  If you have a neck brace:  · Wear it as told by your health care provider. Remove it only as told by your health care provider.  · Keep the brace clean and dry.  · Ask your health care provider if you should remove the brace to bathe or shower.  Incision care    · Follow instructions from your health care provider about how to take care of your incision. Make sure you:  ? Wash your hands with soap and water before and after you change your bandage (dressing). If soap and water are not available, use hand .  ? Change your dressing as told by your health care provider.  ? Leave stitches (sutures), skin glue, or adhesive strips in place. These skin closures may need to stay in place for 2 weeks or longer. If adhesive strip edges start to loosen and curl up, you may trim the loose edges. Do not remove adhesive strips completely unless your health care provider tells you to do that.  · Check your incision area every day for signs of infection. Check for:  ? Redness, swelling, or pain.  ? Fluid or blood.  ? Warmth.  ? Pus or a bad smell.  Managing pain, stiffness, and swelling    · Take over-the-counter and prescription medicines only as told by your health care provider.  · If  directed, put ice on the injured area.  ? If you have a removable brace, remove it as told by your health care provider.  ? Put ice in a plastic bag.  ? Place a towel between your skin and the bag.  ? Leave the ice on for 20 minutes, 2-3 times a day.  Activity    · Return to your normal activities as told by your health care provider. Ask your health care provider what activities are safe for you.  · Do exercises as told by your health care provider.  · Do not take baths, swim, or use a hot tub until your health care provider approves.  · Do not lift anything that is heavier than 10 lb (4.5 kg), or the limit that you are told, until your health care provider says that it is safe.  General instructions  · Ask your health care provider if the medicine prescribed to you:  ? Requires you to avoid driving or using heavy machinery.  ? Can cause constipation. You may need to take actions to prevent or treat constipation, such as:  § Drink enough fluid to keep your urine pale yellow.  § Take over-the-counter or prescription medicines.  § Eat foods that are high in fiber, such as beans, whole grains, and fresh fruits and vegetables.  § Limit foods that are high in fat and processed sugars, such as fried and sweet foods.  · Do not use any products that contain nicotine or tobacco, such as cigarettes, e-cigarettes, and chewing tobacco. These can delay healing. If you need help quitting, ask your health care provider.  · Keep all follow-up visits and physical therapy appointments as told by your health care provider. This is important.  Contact a health care provider if you have:  · A fever.  · Redness, swelling, or pain around your incision.  · Fluid or blood coming from your incision.  · Pus or a bad smell coming from your incision.  · Pain that is not controlled by your pain medicine.  · Increasing hoarseness or trouble swallowing.  Get help right away if you have:  · Severe pain.  · Sudden numbness or weakness in your  arms.  · Warmth, tenderness, or swelling in your calf.  · Chest pain.  · Difficulty breathing.  Summary  · After the procedure, it is common to have neck pain, discomfort when swallowing, and slight hoarseness.  · Follow instructions from your health care provider about how to take care of your incision.  · Check your incision area every day for signs of infection.  · Return to your normal activities as told by your health care provider. Ask your health care provider what activities are safe for you.  · Contact a health care provider if you have signs of infection at your incision.  This information is not intended to replace advice given to you by your health care provider. Make sure you discuss any questions you have with your health care provider.  Document Released: 01/13/2017 Document Revised: 09/12/2019 Document Reviewed: 09/12/2019  [employer] Patient Education © 2020 [employer]      Chronic Pain, Adult  Chronic pain is a type of pain that lasts or keeps coming back (recurs) for at least six months. You may have chronic headaches, abdominal pain, or body pain. Chronic pain may be related to an illness, such as fibromyalgia or complex regional pain syndrome. Sometimes the cause of chronic pain is not known.  Chronic pain can make it hard for you to do daily activities. If not treated, chronic pain can lead to other health problems, including anxiety and depression. Treatment depends on the cause and severity of your pain. You may need to work with a pain specialist to come up with a treatment plan. The plan may include medicine, counseling, and physical therapy. Many people benefit from a combination of two or more types of treatment to control their pain.  Follow these instructions at home:  Lifestyle  · Consider keeping a pain diary to share with your health care providers.  · Consider talking with a mental health care provider (psychologist) about how to cope with chronic pain.  · Consider joining a  chronic pain support group.  · Try to control or lower your stress levels. Talk to your health care provider about strategies to do this.  General instructions    · Take over-the-counter and prescription medicines only as told by your health care provider.  · Follow your treatment plan as told by your health care provider. This may include:  ? Gentle, regular exercise.  ? Eating a healthy diet that includes foods such as vegetables, fruits, fish, and lean meats.  ? Cognitive or behavioral therapy.  ? Working with a physical therapist.  ? Meditation or yoga.  ? Acupuncture or massage therapy.  ? Aroma, color, light, or sound therapy.  ? Local electrical stimulation.  ? Shots (injections) of numbing or pain-relieving medicines into the spine or the area of pain.  · Check your pain level as told by your health care provider. Ask your health care provider if you should use a pain scale.  · Learn as much as you can about how to manage your chronic pain. Ask your health care provider if an intensive pain rehabilitation program or a chronic pain specialist would be helpful.  · Keep all follow-up visits as told by your health care provider. This is important.  Contact a health care provider if:  · Your pain gets worse.  · You have new pain.  · You have trouble sleeping.  · You have trouble doing your normal activities.  · Your pain is not controlled with treatment.  · Your have side effects from pain medicine.  · You feel weak.  Get help right away if:  · You lose feeling or have numbness in your body.  · You lose control of bowel or bladder function.  · Your pain suddenly gets much worse.  · You develop shaking or chills.  · You develop confusion.  · You develop chest pain.  · You have trouble breathing or shortness of breath.  · You pass out.  · You have thoughts about hurting yourself or others.  This information is not intended to replace advice given to you by your health care provider. Make sure you discuss any  questions you have with your health care provider.  Document Released: 09/09/2003 Document Revised: 11/30/2018 Document Reviewed: 06/06/2017  [employer] Patient Education © 2020 [employer]

## 2022-01-18 NOTE — CARE PLAN
The patient is Watcher - Medium risk of patient condition declining or worsening    Shift Goals  Clinical Goals: Complete diagnostic studies, monitor labs, obtain IV access  Patient Goals: Rest  Family Goals: GA. No family present.     Progress made toward(s) clinical / shift goals:      Problem: Knowledge Deficit - Standard  Goal: Patient and family/care givers will demonstrate understanding of plan of care, disease process/condition, diagnostic tests and medications  Outcome: Progressing     Problem: Pain - Standard  Goal: Alleviation of pain or a reduction in pain to the patient’s comfort goal  Outcome: Progressing     Problem: Skin Integrity  Goal: Skin integrity is maintained or improved  Outcome: Progressing       Patient is not progressing towards the following goals: NA

## 2022-01-18 NOTE — PROGRESS NOTES
4 Eyes Skin Assessment Completed by ALAN Neal and ALAN Adams.    Head WDL  Ears WDL  Nose WDL  Mouth WDL  Neck Redness, Blanching, Scar and Discoloration. Soft foam C-collar in place.   Breast/Chest Redness and Blanching  Shoulder Blades Redness and Blanching  Spine Redness and Blanching  (R) Arm/Elbow/Hand Redness, Blanching, Bruising and Scar  (L) Arm/Elbow/Hand Redness, Blanching, Bruising and Scar  Abdomen WDL. Obese, round.   Groin WDL  Scrotum/Coccyx/Buttocks Redness and Blanching  (R) Leg Redness, Blanching, Scar and Bruising  (L) Leg Redness, Blanching, Scar and Bruising  (R) Heel/Foot/Toe WDL  (L) Heel/Foot/Toe WDL      Devices In Places Tele Box, Blood Pressure Cuff, Pulse Ox and SCD's. Soft foam C-collar in place.       Interventions In Place Pillows and Pressure Redistribution Mattress    Possible Skin Injury No    Pictures Uploaded Into Epic N/A  Wound Consult Placed N/A  RN Wound Prevention Protocol Ordered No

## 2022-01-18 NOTE — PROGRESS NOTES
Report received and patient care assumed. Pt is resting in bed, A&Ox4, with complaints of back pain 5/10, and is on RA. Tele box on. Monitor room notified, RR confirmed. Box linked. All fall precautions are in place, belongings at bedside table.  Pt was updated on POC, no questions or concerns. Pt educated on use of call light for assistance.

## 2022-01-18 NOTE — ASSESSMENT & PLAN NOTE
Admit patient to telemetry  Loaded with aspirin in ED  Initial troponin without any EKG changes, trend troponin  TTE  Nuclear stress test  Sent D-dimer, CTPA if positive

## 2022-01-18 NOTE — PROGRESS NOTES
Pt brought over from emergency before heading upstairs. Iv had draw back, started to do exam, injected contrast and pt complained of pain in arm, stopped contrast as fast as I could. Told the pt how to care for area as well as informed new nurse taking pt. Placed a hot pad inside of pillow case and wrapped arm. Also told nurse it was there and told them to watch arm. Nurse will call down when new iv is placed to complete exam.

## 2022-01-18 NOTE — DISCHARGE PLANNING
Anticipated Discharge Disposition: SNF vs IRF vs C    Action:   · Completed chart review  · Discussed pt with Dr Beverly  · Pt had cervical spinal surgery on 1/8/2022.   · Admission for chest pain.     Barriers to Discharge:   · Pending PT/OT recommendations  · Medical clearance   · Pending nuclear stress test  and TTE    Plan:   · Continue to collaborate with the pt, pt's family, and health care team to provide social and discharge support as needed.

## 2022-01-18 NOTE — DISCHARGE SUMMARY
Discharge Summary    CHIEF COMPLAINT ON ADMISSION  Chief Complaint   Patient presents with   • Neck Pain     post surgery   • Other     unable to care for self at home       Reason for Admission  EMS     Admission Date  1/17/2022    CODE STATUS  Full Code    HPI & HOSPITAL COURSE  Yolanda Reed is a 61 y.o. female who presented 1/17/2022 with difficulties ambulating and chest pain.     On January 8, patient had anterior cervical discectomy and partial corpectomies and instrumented fusion for cervical herniated nucleus pulposus from C5-T1 radiculopathy.  She had tolerated the surgery well without any immediate complications.  Since patient was discharged and sent home, for the first few days she was initially well,, however for the last few days, she is reported generalized weakness and difficulties ambulating.  She denies fever chills , pain at the surgical site nausea vomiting.  This morning, she was walking and she reported difficulties with ambulation and had a mechanical fall.  Denies loss of consciousness or head strike.  In addition, she states that she has been crying due to the dizziness, and reports sharp-like chest pain when she is crying.  Due to these symptoms, decision was made to come to the ED for evaluation.     In the ED, patient found to have tachycardia, other vital signs within normal limits.  Remarkable labs include neutrophil leukocytosis, hyponatremia, hypochloremia.  Chest x-ray shows no acute cardiopulmonary abnormality.  EKG showed normal sinus rhythm without any T wave changes to suggest ischemia.  Surgery was contacted by ERP, recommended medical management for now.    The patient was planned to have stress test today.  Later on she remove all her line and stated that she needs to go home.  I did not get a chance to see the patient this morning.  She left AGAINST MEDICAL ADVICE from the hospital.    Please call [phone] to schedule PCP appointment for patient.    Required specialty  appointments include:     As below  Therefore, she is discharged in fair and stable condition to home with close outpatient follow-up.        Discharge Date  01/18/22      FOLLOW UP ITEMS POST DISCHARGE      DISCHARGE DIAGNOSES  Principal Problem:    Pain in the chest POA: Unknown  Active Problems:    Cervical myelopathy with cervical radiculopathy (HCC) POA: Yes    Hyperlipidemia POA: Unknown    Morbid obesity (HCC) POA: Unknown    Anxiety POA: Unknown    Chest pain, rule out acute myocardial infarction POA: Yes  Resolved Problems:    * No resolved hospital problems. *      FOLLOW UP  No future appointments.  MAGDY Galicia  [address] [facility] [address]  [phone]    In 1 week        MEDICATIONS ON DISCHARGE     Medication List      CONTINUE taking these medications      Instructions   atorvastatin 10 MG Tabs  Commonly known as: LIPITOR   Take 10 mg by mouth every evening.  Dose: 10 mg     diazepam 10 MG tablet  Commonly known as: VALIUM   Take 1 Tablet by mouth every 6 hours as needed for Anxiety (muscle spasm) for up to 20 days.  Dose: 10 mg     ibuprofen 800 MG Tabs  Commonly known as: MOTRIN   Take 1 Tablet by mouth 3 times a day as needed with food and water. Max 3 tablets in 24 hours.  Dose: 800 mg     oxyCODONE immediate-release 5 MG Tabs  Commonly known as: ROXICODONE   Take 5-10 mg by mouth every 6 hours as needed for Severe Pain. 7 day supply  Dose: 5-10 mg     propranolol 40 MG Tabs  Commonly known as: INDERAL   Take 80 mg by mouth at bedtime.  Dose: 80 mg     SEROquel 50 MG tablet  Generic drug: QUEtiapine   Take 50 mg by mouth every evening.  Dose: 50 mg     sertraline 100 MG Tabs  Commonly known as: Zoloft   Take 100 mg by mouth every evening.  Dose: 100 mg     vitamin D 1000 Unit (25 mcg) Tabs  Commonly known as: cholecalciferol   Take 1,000 Units by mouth every evening.  Dose: 1,000 Units            Allergies  Allergies   Allergen Reactions   • Morphine Anaphylaxis     Throat  swelling       DIET  Orders Placed This Encounter   Procedures   • Diet NPO Restrict to: Sips with Medications     Standing Status:   Standing     Number of Occurrences:   1     Order Specific Question:   Diet NPO Restrict to:     Answer:   Sips with Medications [3]       ACTIVITY  As tolerated.  Weight bearing as tolerated    CONSULTATIONS      PROCEDURES      LABORATORY  Lab Results   Component Value Date    SODIUM 131 (L) 01/17/2022    POTASSIUM 3.6 01/17/2022    CHLORIDE 94 (L) 01/17/2022    CO2 24 01/17/2022    GLUCOSE 111 (H) 01/17/2022    BUN 6 (L) 01/17/2022    CREATININE 0.37 (L) 01/17/2022        Lab Results   Component Value Date    WBC 14.3 (H) 01/17/2022    HEMOGLOBIN 14.0 01/17/2022    HEMATOCRIT 42.0 01/17/2022    PLATELETCT 228 01/17/2022        Total time of the discharge process exceeds 38 minutes.

## 2022-01-18 NOTE — PROGRESS NOTES
Do not discharge per MD Saavedra, patient to repeat stress test tomorrow.  Patient back from nuclear med, techs notified, rhythm visualized.

## 2022-01-18 NOTE — PROGRESS NOTES
IV access initiated. 20g PIV to left upper arm. CT notified.     Awaiting call back from CT. Per CT they are currently busy w/ a trauma pt.

## 2022-01-18 NOTE — ED NOTES
"Pt A/Ox3 unaboured breathing. Declines dilaudid \"it makes me feel sick.\" Pure wick in place. On cardiac monitor and NC.   "

## 2022-01-19 VITALS
HEIGHT: 60 IN | TEMPERATURE: 97.2 F | WEIGHT: 169.53 LBS | DIASTOLIC BLOOD PRESSURE: 61 MMHG | RESPIRATION RATE: 16 BRPM | HEART RATE: 74 BPM | OXYGEN SATURATION: 93 % | BODY MASS INDEX: 33.28 KG/M2 | SYSTOLIC BLOOD PRESSURE: 109 MMHG

## 2022-01-19 PROCEDURE — 96372 THER/PROPH/DIAG INJ SC/IM: CPT

## 2022-01-19 PROCEDURE — A9270 NON-COVERED ITEM OR SERVICE: HCPCS | Performed by: STUDENT IN AN ORGANIZED HEALTH CARE EDUCATION/TRAINING PROGRAM

## 2022-01-19 PROCEDURE — 700102 HCHG RX REV CODE 250 W/ 637 OVERRIDE(OP): Performed by: STUDENT IN AN ORGANIZED HEALTH CARE EDUCATION/TRAINING PROGRAM

## 2022-01-19 PROCEDURE — 99217 PR OBSERVATION CARE DISCHARGE: CPT | Performed by: INTERNAL MEDICINE

## 2022-01-19 PROCEDURE — G0378 HOSPITAL OBSERVATION PER HR: HCPCS

## 2022-01-19 PROCEDURE — 700111 HCHG RX REV CODE 636 W/ 250 OVERRIDE (IP): Performed by: STUDENT IN AN ORGANIZED HEALTH CARE EDUCATION/TRAINING PROGRAM

## 2022-01-19 RX ADMIN — OXYCODONE 10 MG: 5 TABLET ORAL at 00:58

## 2022-01-19 RX ADMIN — DIAZEPAM 10 MG: 5 TABLET ORAL at 00:58

## 2022-01-19 RX ADMIN — HEPARIN SODIUM 5000 UNITS: 5000 INJECTION, SOLUTION INTRAVENOUS; SUBCUTANEOUS at 04:44

## 2022-01-19 RX ADMIN — OXYCODONE 10 MG: 5 TABLET ORAL at 07:34

## 2022-01-19 ASSESSMENT — PAIN DESCRIPTION - PAIN TYPE
TYPE: ACUTE PAIN

## 2022-01-19 NOTE — PROGRESS NOTES
Pt removed IV and states she wants to refuse stress test today. Pt refusing IV placement.     Charge RN called to bedside to discuss risk and benefits with pt. Pt education provided. Pt A&O x4.

## 2022-01-19 NOTE — PROGRESS NOTES
Patient stated she wanted to leave hospital, patient aa&ox4, patient educated. Tele removed, patient walked down to [place] ground floor.

## 2022-01-19 NOTE — CARE PLAN
The patient is Stable - Low risk of patient condition declining or worsening    Shift Goals  Clinical Goals: Re-attempt stress test and complete diagnostic results, POC for discharge and post hospital care  Patient Goals: Stay here. I dont want to go home.   Family Goals: GA. No family present.     Progress made toward(s) clinical / shift goals:      Problem: Knowledge Deficit - Standard  Goal: Patient and family/care givers will demonstrate understanding of plan of care, disease process/condition, diagnostic tests and medications  Outcome: Progressing     Problem: Pain - Standard  Goal: Alleviation of pain or a reduction in pain to the patient’s comfort goal  Outcome: Progressing     Problem: Skin Integrity  Goal: Skin integrity is maintained or improved  Outcome: Progressing     Problem: Fall Risk  Goal: Patient will remain free from falls  Outcome: Progressing       Patient is not progressing towards the following goals: NA

## 2022-01-19 NOTE — PROGRESS NOTES
Bedside report received and patient care assumed. Pt is resting in bed, A&Ox4, with no complaints of pain, and is on RA. Tele box on. All fall precautions are in place, belongings at bedside table.  Pt was updated on POC, no questions or concerns. Pt educated on use of call light for assistance.

## 2022-01-22 LAB
BACTERIA BLD CULT: NORMAL
BACTERIA BLD CULT: NORMAL
SIGNIFICANT IND 70042: NORMAL
SIGNIFICANT IND 70042: NORMAL
SITE SITE: NORMAL
SITE SITE: NORMAL
SOURCE SOURCE: NORMAL
SOURCE SOURCE: NORMAL

## 2022-02-08 ENCOUNTER — HOSPITAL ENCOUNTER (OUTPATIENT)
Facility: MEDICAL CENTER | Age: 62
End: 2022-02-08
Attending: NURSE PRACTITIONER
Payer: MEDICAID

## 2022-02-08 LAB
ALBUMIN SERPL BCP-MCNC: 3 G/DL (ref 3.2–4.9)
ALBUMIN/GLOB SERPL: 1 G/DL
ALP SERPL-CCNC: 98 U/L (ref 30–99)
ALT SERPL-CCNC: 45 U/L (ref 2–50)
ANION GAP SERPL CALC-SCNC: 15 MMOL/L (ref 7–16)
AST SERPL-CCNC: 31 U/L (ref 12–45)
BASOPHILS # BLD AUTO: 0.3 % (ref 0–1.8)
BASOPHILS # BLD: 0.02 K/UL (ref 0–0.12)
BILIRUB SERPL-MCNC: 0.2 MG/DL (ref 0.1–1.5)
BUN SERPL-MCNC: 9 MG/DL (ref 8–22)
CALCIUM SERPL-MCNC: 8.2 MG/DL (ref 8.5–10.5)
CHLORIDE SERPL-SCNC: 100 MMOL/L (ref 96–112)
CO2 SERPL-SCNC: 23 MMOL/L (ref 20–33)
CREAT SERPL-MCNC: 0.9 MG/DL (ref 0.5–1.4)
CRP SERPL HS-MCNC: 3.68 MG/DL (ref 0–0.75)
EOSINOPHIL # BLD AUTO: 0.14 K/UL (ref 0–0.51)
EOSINOPHIL NFR BLD: 2.1 % (ref 0–6.9)
ERYTHROCYTE [DISTWIDTH] IN BLOOD BY AUTOMATED COUNT: 45.1 FL (ref 35.9–50)
ERYTHROCYTE [SEDIMENTATION RATE] IN BLOOD BY WESTERGREN METHOD: 67 MM/HOUR (ref 0–25)
GLOBULIN SER CALC-MCNC: 3 G/DL (ref 1.9–3.5)
GLUCOSE SERPL-MCNC: 71 MG/DL (ref 65–99)
HCT VFR BLD AUTO: 28.8 % (ref 37–47)
HGB BLD-MCNC: 9.3 G/DL (ref 12–16)
IMM GRANULOCYTES # BLD AUTO: 0.06 K/UL (ref 0–0.11)
IMM GRANULOCYTES NFR BLD AUTO: 0.9 % (ref 0–0.9)
LYMPHOCYTES # BLD AUTO: 1.98 K/UL (ref 1–4.8)
LYMPHOCYTES NFR BLD: 30.3 % (ref 22–41)
MCH RBC QN AUTO: 28.6 PG (ref 27–33)
MCHC RBC AUTO-ENTMCNC: 32.3 G/DL (ref 33.6–35)
MCV RBC AUTO: 88.6 FL (ref 81.4–97.8)
MONOCYTES # BLD AUTO: 0.89 K/UL (ref 0–0.85)
MONOCYTES NFR BLD AUTO: 13.6 % (ref 0–13.4)
NEUTROPHILS # BLD AUTO: 3.45 K/UL (ref 2–7.15)
NEUTROPHILS NFR BLD: 52.8 % (ref 44–72)
NRBC # BLD AUTO: 0 K/UL
NRBC BLD-RTO: 0 /100 WBC
PLATELET # BLD AUTO: 363 K/UL (ref 164–446)
PMV BLD AUTO: 10.2 FL (ref 9–12.9)
POTASSIUM SERPL-SCNC: 3.6 MMOL/L (ref 3.6–5.5)
PROT SERPL-MCNC: 6 G/DL (ref 6–8.2)
RBC # BLD AUTO: 3.25 M/UL (ref 4.2–5.4)
SODIUM SERPL-SCNC: 138 MMOL/L (ref 135–145)
WBC # BLD AUTO: 6.5 K/UL (ref 4.8–10.8)

## 2022-02-08 PROCEDURE — 86140 C-REACTIVE PROTEIN: CPT

## 2022-02-08 PROCEDURE — 85025 COMPLETE CBC W/AUTO DIFF WBC: CPT

## 2022-02-08 PROCEDURE — 80053 COMPREHEN METABOLIC PANEL: CPT

## 2022-02-08 PROCEDURE — 85652 RBC SED RATE AUTOMATED: CPT

## 2022-02-15 ENCOUNTER — HOSPITAL ENCOUNTER (OUTPATIENT)
Facility: MEDICAL CENTER | Age: 62
End: 2022-02-15
Attending: NURSE PRACTITIONER
Payer: MEDICAID

## 2022-02-15 LAB
ALBUMIN SERPL BCP-MCNC: 3.1 G/DL (ref 3.2–4.9)
ALBUMIN/GLOB SERPL: 1 G/DL
ALP SERPL-CCNC: 115 U/L (ref 30–99)
ALT SERPL-CCNC: 14 U/L (ref 2–50)
ANION GAP SERPL CALC-SCNC: 13 MMOL/L (ref 7–16)
AST SERPL-CCNC: 18 U/L (ref 12–45)
BASOPHILS # BLD AUTO: 0.3 % (ref 0–1.8)
BASOPHILS # BLD: 0.03 K/UL (ref 0–0.12)
BILIRUB SERPL-MCNC: 0.3 MG/DL (ref 0.1–1.5)
BUN SERPL-MCNC: 4 MG/DL (ref 8–22)
CALCIUM SERPL-MCNC: 8.6 MG/DL (ref 8.4–10.2)
CHLORIDE SERPL-SCNC: 95 MMOL/L (ref 96–112)
CO2 SERPL-SCNC: 28 MMOL/L (ref 20–33)
CREAT SERPL-MCNC: 0.59 MG/DL (ref 0.5–1.4)
CRP SERPL HS-MCNC: 1.13 MG/DL (ref 0–0.75)
EOSINOPHIL # BLD AUTO: 0.15 K/UL (ref 0–0.51)
EOSINOPHIL NFR BLD: 1.7 % (ref 0–6.9)
ERYTHROCYTE [DISTWIDTH] IN BLOOD BY AUTOMATED COUNT: 45.3 FL (ref 35.9–50)
ERYTHROCYTE [SEDIMENTATION RATE] IN BLOOD BY WESTERGREN METHOD: 42 MM/HOUR (ref 0–25)
GLOBULIN SER CALC-MCNC: 3 G/DL (ref 1.9–3.5)
GLUCOSE SERPL-MCNC: 103 MG/DL (ref 65–99)
HCT VFR BLD AUTO: 34.1 % (ref 37–47)
HGB BLD-MCNC: 11.1 G/DL (ref 12–16)
IMM GRANULOCYTES # BLD AUTO: 0.13 K/UL (ref 0–0.11)
IMM GRANULOCYTES NFR BLD AUTO: 1.4 % (ref 0–0.9)
LYMPHOCYTES # BLD AUTO: 2.26 K/UL (ref 1–4.8)
LYMPHOCYTES NFR BLD: 25.1 % (ref 22–41)
MCH RBC QN AUTO: 28.8 PG (ref 27–33)
MCHC RBC AUTO-ENTMCNC: 32.6 G/DL (ref 33.6–35)
MCV RBC AUTO: 88.3 FL (ref 81.4–97.8)
MONOCYTES # BLD AUTO: 1.07 K/UL (ref 0–0.85)
MONOCYTES NFR BLD AUTO: 11.9 % (ref 0–13.4)
NEUTROPHILS # BLD AUTO: 5.37 K/UL (ref 2–7.15)
NEUTROPHILS NFR BLD: 59.6 % (ref 44–72)
NRBC # BLD AUTO: 0 K/UL
NRBC BLD-RTO: 0 /100 WBC
PLATELET # BLD AUTO: 466 K/UL (ref 164–446)
PMV BLD AUTO: 9.4 FL (ref 9–12.9)
POTASSIUM SERPL-SCNC: 3.2 MMOL/L (ref 3.6–5.5)
PROT SERPL-MCNC: 6.1 G/DL (ref 6–8.2)
RBC # BLD AUTO: 3.86 M/UL (ref 4.2–5.4)
SODIUM SERPL-SCNC: 136 MMOL/L (ref 135–145)
WBC # BLD AUTO: 9 K/UL (ref 4.8–10.8)

## 2022-02-15 PROCEDURE — 85025 COMPLETE CBC W/AUTO DIFF WBC: CPT

## 2022-02-15 PROCEDURE — 80053 COMPREHEN METABOLIC PANEL: CPT

## 2022-02-15 PROCEDURE — 86140 C-REACTIVE PROTEIN: CPT

## 2022-02-15 PROCEDURE — 85652 RBC SED RATE AUTOMATED: CPT

## 2022-02-16 ENCOUNTER — APPOINTMENT (OUTPATIENT)
Dept: RADIOLOGY | Facility: MEDICAL CENTER | Age: 62
End: 2022-02-16
Attending: EMERGENCY MEDICINE
Payer: MEDICAID

## 2022-02-16 ENCOUNTER — HOSPITAL ENCOUNTER (OUTPATIENT)
Facility: MEDICAL CENTER | Age: 62
End: 2022-02-22
Attending: EMERGENCY MEDICINE | Admitting: FAMILY MEDICINE
Payer: MEDICAID

## 2022-02-16 DIAGNOSIS — T81.49XA WOUND INFECTION AFTER SURGERY: ICD-10-CM

## 2022-02-16 DIAGNOSIS — M54.12 CERVICAL MYELOPATHY WITH CERVICAL RADICULOPATHY (HCC): ICD-10-CM

## 2022-02-16 DIAGNOSIS — G89.18 POST-OP PAIN: ICD-10-CM
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
## 2022-02-16 DIAGNOSIS — E83.42 HYPOMAGNESEMIA: ICD-10-CM

## 2022-02-16 DIAGNOSIS — G95.9 CERVICAL MYELOPATHY WITH CERVICAL RADICULOPATHY (HCC): ICD-10-CM

## 2022-02-16 DIAGNOSIS — R29.6 FALLS FREQUENTLY: ICD-10-CM

## 2022-02-16 DIAGNOSIS — E87.6 HYPOKALEMIA: ICD-10-CM

## 2022-02-16 DIAGNOSIS — R29.6 FREQUENT FALLS: ICD-10-CM

## 2022-02-16 DIAGNOSIS — R11.0 NAUSEA: ICD-10-CM

## 2022-02-16 PROBLEM — E87.8 ELECTROLYTE IMBALANCE: Status: ACTIVE | Noted: 2022-02-16

## 2022-02-16 LAB
ALBUMIN SERPL BCP-MCNC: 3.2 G/DL (ref 3.2–4.9)
ALBUMIN/GLOB SERPL: 1.1 G/DL
ALP SERPL-CCNC: 113 U/L (ref 30–99)
ALT SERPL-CCNC: 11 U/L (ref 2–50)
ANION GAP SERPL CALC-SCNC: 13 MMOL/L (ref 7–16)
APPEARANCE UR: CLEAR
AST SERPL-CCNC: 18 U/L (ref 12–45)
BACTERIA #/AREA URNS HPF: NEGATIVE /HPF
BASOPHILS # BLD AUTO: 0.3 % (ref 0–1.8)
BASOPHILS # BLD: 0.03 K/UL (ref 0–0.12)
BILIRUB SERPL-MCNC: 0.4 MG/DL (ref 0.1–1.5)
BILIRUB UR QL STRIP.AUTO: NEGATIVE
BUN SERPL-MCNC: 3 MG/DL (ref 8–22)
CALCIUM SERPL-MCNC: 8.6 MG/DL (ref 8.5–10.5)
CHLORIDE SERPL-SCNC: 97 MMOL/L (ref 96–112)
CO2 SERPL-SCNC: 28 MMOL/L (ref 20–33)
COLOR UR: YELLOW
CREAT SERPL-MCNC: 0.49 MG/DL (ref 0.5–1.4)
EKG IMPRESSION: NORMAL
EOSINOPHIL # BLD AUTO: 0.16 K/UL (ref 0–0.51)
EOSINOPHIL NFR BLD: 1.8 % (ref 0–6.9)
EPI CELLS #/AREA URNS HPF: ABNORMAL /HPF
ERYTHROCYTE [DISTWIDTH] IN BLOOD BY AUTOMATED COUNT: 45.2 FL (ref 35.9–50)
GLOBULIN SER CALC-MCNC: 2.8 G/DL (ref 1.9–3.5)
GLUCOSE SERPL-MCNC: 100 MG/DL (ref 65–99)
GLUCOSE UR STRIP.AUTO-MCNC: NEGATIVE MG/DL
HCT VFR BLD AUTO: 34.3 % (ref 37–47)
HGB BLD-MCNC: 11.5 G/DL (ref 12–16)
HYALINE CASTS #/AREA URNS LPF: ABNORMAL /LPF
IMM GRANULOCYTES # BLD AUTO: 0.14 K/UL (ref 0–0.11)
IMM GRANULOCYTES NFR BLD AUTO: 1.6 % (ref 0–0.9)
KETONES UR STRIP.AUTO-MCNC: NEGATIVE MG/DL
LEUKOCYTE ESTERASE UR QL STRIP.AUTO: ABNORMAL
LYMPHOCYTES # BLD AUTO: 1.87 K/UL (ref 1–4.8)
LYMPHOCYTES NFR BLD: 21 % (ref 22–41)
MAGNESIUM SERPL-MCNC: 1 MG/DL (ref 1.5–2.5)
MCH RBC QN AUTO: 29 PG (ref 27–33)
MCHC RBC AUTO-ENTMCNC: 33.5 G/DL (ref 33.6–35)
MCV RBC AUTO: 86.6 FL (ref 81.4–97.8)
MICRO URNS: ABNORMAL
MONOCYTES # BLD AUTO: 1.09 K/UL (ref 0–0.85)
MONOCYTES NFR BLD AUTO: 12.2 % (ref 0–13.4)
NEUTROPHILS # BLD AUTO: 5.63 K/UL (ref 2–7.15)
NEUTROPHILS NFR BLD: 63.1 % (ref 44–72)
NITRITE UR QL STRIP.AUTO: NEGATIVE
NRBC # BLD AUTO: 0 K/UL
NRBC BLD-RTO: 0 /100 WBC
PH UR STRIP.AUTO: 7.5 [PH] (ref 5–8)
PHOSPHATE SERPL-MCNC: 3.8 MG/DL (ref 2.5–4.5)
PLATELET # BLD AUTO: 389 K/UL (ref 164–446)
PMV BLD AUTO: 8.9 FL (ref 9–12.9)
POTASSIUM SERPL-SCNC: 2.9 MMOL/L (ref 3.6–5.5)
PROT SERPL-MCNC: 6 G/DL (ref 6–8.2)
PROT UR QL STRIP: NEGATIVE MG/DL
RBC # BLD AUTO: 3.96 M/UL (ref 4.2–5.4)
RBC # URNS HPF: ABNORMAL /HPF
RBC UR QL AUTO: NEGATIVE
SODIUM SERPL-SCNC: 138 MMOL/L (ref 135–145)
SP GR UR STRIP.AUTO: 1.01
TROPONIN T SERPL-MCNC: 16 NG/L (ref 6–19)
UROBILINOGEN UR STRIP.AUTO-MCNC: 0.2 MG/DL
WBC # BLD AUTO: 8.9 K/UL (ref 4.8–10.8)
WBC #/AREA URNS HPF: ABNORMAL /HPF

## 2022-02-16 PROCEDURE — 93005 ELECTROCARDIOGRAM TRACING: CPT | Performed by: EMERGENCY MEDICINE

## 2022-02-16 PROCEDURE — 96375 TX/PRO/DX INJ NEW DRUG ADDON: CPT

## 2022-02-16 PROCEDURE — 700111 HCHG RX REV CODE 636 W/ 250 OVERRIDE (IP): Performed by: FAMILY MEDICINE

## 2022-02-16 PROCEDURE — 99220 PR INITIAL OBSERVATION CARE,LEVL III: CPT | Performed by: FAMILY MEDICINE

## 2022-02-16 PROCEDURE — 700102 HCHG RX REV CODE 250 W/ 637 OVERRIDE(OP): Performed by: EMERGENCY MEDICINE

## 2022-02-16 PROCEDURE — 96366 THER/PROPH/DIAG IV INF ADDON: CPT

## 2022-02-16 PROCEDURE — G0378 HOSPITAL OBSERVATION PER HR: HCPCS

## 2022-02-16 PROCEDURE — 700101 HCHG RX REV CODE 250: Performed by: FAMILY MEDICINE

## 2022-02-16 PROCEDURE — 96365 THER/PROPH/DIAG IV INF INIT: CPT

## 2022-02-16 PROCEDURE — 84484 ASSAY OF TROPONIN QUANT: CPT

## 2022-02-16 PROCEDURE — 700111 HCHG RX REV CODE 636 W/ 250 OVERRIDE (IP)

## 2022-02-16 PROCEDURE — A9270 NON-COVERED ITEM OR SERVICE: HCPCS | Performed by: EMERGENCY MEDICINE

## 2022-02-16 PROCEDURE — A9270 NON-COVERED ITEM OR SERVICE: HCPCS | Performed by: FAMILY MEDICINE

## 2022-02-16 PROCEDURE — 99285 EMERGENCY DEPT VISIT HI MDM: CPT

## 2022-02-16 PROCEDURE — 700102 HCHG RX REV CODE 250 W/ 637 OVERRIDE(OP): Performed by: FAMILY MEDICINE

## 2022-02-16 PROCEDURE — 81001 URINALYSIS AUTO W/SCOPE: CPT

## 2022-02-16 PROCEDURE — 83735 ASSAY OF MAGNESIUM: CPT

## 2022-02-16 PROCEDURE — 96372 THER/PROPH/DIAG INJ SC/IM: CPT | Mod: XU

## 2022-02-16 PROCEDURE — 71045 X-RAY EXAM CHEST 1 VIEW: CPT

## 2022-02-16 PROCEDURE — 85025 COMPLETE CBC W/AUTO DIFF WBC: CPT

## 2022-02-16 PROCEDURE — 80053 COMPREHEN METABOLIC PANEL: CPT

## 2022-02-16 PROCEDURE — 700111 HCHG RX REV CODE 636 W/ 250 OVERRIDE (IP): Performed by: EMERGENCY MEDICINE

## 2022-02-16 PROCEDURE — 84100 ASSAY OF PHOSPHORUS: CPT

## 2022-02-16 PROCEDURE — 96368 THER/DIAG CONCURRENT INF: CPT

## 2022-02-16 RX ORDER — MAGNESIUM SULFATE HEPTAHYDRATE 40 MG/ML
2 INJECTION, SOLUTION INTRAVENOUS ONCE
Status: COMPLETED | OUTPATIENT
Start: 2022-02-16 | End: 2022-02-16

## 2022-02-16 RX ORDER — ONDANSETRON 4 MG/1
4 TABLET, ORALLY DISINTEGRATING ORAL ONCE
Status: COMPLETED | OUTPATIENT
Start: 2022-02-16 | End: 2022-02-16

## 2022-02-16 RX ORDER — ATORVASTATIN CALCIUM 10 MG/1
10 TABLET, FILM COATED ORAL NIGHTLY
Status: DISCONTINUED | OUTPATIENT
Start: 2022-02-16 | End: 2022-02-22 | Stop reason: HOSPADM

## 2022-02-16 RX ORDER — POTASSIUM CHLORIDE 7.45 MG/ML
10 INJECTION INTRAVENOUS ONCE
Status: COMPLETED | OUTPATIENT
Start: 2022-02-16 | End: 2022-02-16

## 2022-02-16 RX ORDER — SERTRALINE HYDROCHLORIDE 100 MG/1
100 TABLET, FILM COATED ORAL EVERY EVENING
Status: DISCONTINUED | OUTPATIENT
Start: 2022-02-16 | End: 2022-02-22 | Stop reason: HOSPADM

## 2022-02-16 RX ORDER — BISACODYL 10 MG
10 SUPPOSITORY, RECTAL RECTAL
Status: DISCONTINUED | OUTPATIENT
Start: 2022-02-16 | End: 2022-02-22 | Stop reason: HOSPADM

## 2022-02-16 RX ORDER — LABETALOL HYDROCHLORIDE 5 MG/ML
10 INJECTION, SOLUTION INTRAVENOUS EVERY 4 HOURS PRN
Status: DISCONTINUED | OUTPATIENT
Start: 2022-02-16 | End: 2022-02-22 | Stop reason: HOSPADM

## 2022-02-16 RX ORDER — MAGNESIUM SULFATE HEPTAHYDRATE 40 MG/ML
4 INJECTION, SOLUTION INTRAVENOUS ONCE
Status: COMPLETED | OUTPATIENT
Start: 2022-02-16 | End: 2022-02-16

## 2022-02-16 RX ORDER — SODIUM CHLORIDE AND POTASSIUM CHLORIDE 150; 900 MG/100ML; MG/100ML
INJECTION, SOLUTION INTRAVENOUS CONTINUOUS
Status: DISCONTINUED | OUTPATIENT
Start: 2022-02-16 | End: 2022-02-17

## 2022-02-16 RX ORDER — QUETIAPINE FUMARATE 25 MG/1
50 TABLET, FILM COATED ORAL EVERY EVENING
Status: DISCONTINUED | OUTPATIENT
Start: 2022-02-16 | End: 2022-02-22 | Stop reason: HOSPADM

## 2022-02-16 RX ORDER — GABAPENTIN 100 MG/1
200 CAPSULE ORAL 3 TIMES DAILY
Status: DISCONTINUED | OUTPATIENT
Start: 2022-02-16 | End: 2022-02-17

## 2022-02-16 RX ORDER — PROPRANOLOL HYDROCHLORIDE 40 MG/1
80 TABLET ORAL
Status: DISCONTINUED | OUTPATIENT
Start: 2022-02-16 | End: 2022-02-22 | Stop reason: HOSPADM

## 2022-02-16 RX ORDER — ONDANSETRON 4 MG/1
4 TABLET, ORALLY DISINTEGRATING ORAL EVERY 4 HOURS PRN
Status: DISCONTINUED | OUTPATIENT
Start: 2022-02-16 | End: 2022-02-22 | Stop reason: HOSPADM

## 2022-02-16 RX ORDER — AMOXICILLIN 250 MG
2 CAPSULE ORAL 2 TIMES DAILY
Status: DISCONTINUED | OUTPATIENT
Start: 2022-02-17 | End: 2022-02-22 | Stop reason: HOSPADM

## 2022-02-16 RX ORDER — ONDANSETRON 2 MG/ML
4 INJECTION INTRAMUSCULAR; INTRAVENOUS EVERY 4 HOURS PRN
Status: DISCONTINUED | OUTPATIENT
Start: 2022-02-16 | End: 2022-02-22 | Stop reason: HOSPADM

## 2022-02-16 RX ORDER — HEPARIN SODIUM (PORCINE) LOCK FLUSH IV SOLN 100 UNIT/ML 100 UNIT/ML
300-500 SOLUTION INTRAVENOUS PRN
Status: DISCONTINUED | OUTPATIENT
Start: 2022-02-16 | End: 2022-02-16

## 2022-02-16 RX ORDER — DOXYCYCLINE 100 MG/1
100 TABLET ORAL 2 TIMES DAILY
Status: DISCONTINUED | OUTPATIENT
Start: 2022-02-16 | End: 2022-02-20

## 2022-02-16 RX ORDER — ACETAMINOPHEN 325 MG/1
650 TABLET ORAL EVERY 6 HOURS PRN
Status: DISCONTINUED | OUTPATIENT
Start: 2022-02-16 | End: 2022-02-22 | Stop reason: HOSPADM

## 2022-02-16 RX ORDER — ONDANSETRON 2 MG/ML
4 INJECTION INTRAMUSCULAR; INTRAVENOUS ONCE
Status: COMPLETED | OUTPATIENT
Start: 2022-02-16 | End: 2022-02-16

## 2022-02-16 RX ORDER — POTASSIUM CHLORIDE 20 MEQ/1
20 TABLET, EXTENDED RELEASE ORAL ONCE
Status: COMPLETED | OUTPATIENT
Start: 2022-02-16 | End: 2022-02-16

## 2022-02-16 RX ORDER — DOXYCYCLINE 100 MG/1
100 CAPSULE ORAL 2 TIMES DAILY
Status: ON HOLD | COMMUNITY
Start: 2022-02-14 | End: 2022-02-22

## 2022-02-16 RX ORDER — PROMETHAZINE HYDROCHLORIDE 25 MG/1
12.5-25 SUPPOSITORY RECTAL EVERY 4 HOURS PRN
Status: DISCONTINUED | OUTPATIENT
Start: 2022-02-16 | End: 2022-02-22 | Stop reason: HOSPADM

## 2022-02-16 RX ORDER — PROCHLORPERAZINE EDISYLATE 5 MG/ML
5-10 INJECTION INTRAMUSCULAR; INTRAVENOUS EVERY 4 HOURS PRN
Status: DISCONTINUED | OUTPATIENT
Start: 2022-02-16 | End: 2022-02-22 | Stop reason: HOSPADM

## 2022-02-16 RX ORDER — POLYETHYLENE GLYCOL 3350 17 G/17G
1 POWDER, FOR SOLUTION ORAL
Status: DISCONTINUED | OUTPATIENT
Start: 2022-02-16 | End: 2022-02-22 | Stop reason: HOSPADM

## 2022-02-16 RX ORDER — POTASSIUM CHLORIDE 20 MEQ/1
40 TABLET, EXTENDED RELEASE ORAL 2 TIMES DAILY
Status: COMPLETED | OUTPATIENT
Start: 2022-02-16 | End: 2022-02-17

## 2022-02-16 RX ORDER — PROMETHAZINE HYDROCHLORIDE 25 MG/1
12.5-25 TABLET ORAL EVERY 4 HOURS PRN
Status: DISCONTINUED | OUTPATIENT
Start: 2022-02-16 | End: 2022-02-22 | Stop reason: HOSPADM

## 2022-02-16 RX ORDER — KETOROLAC TROMETHAMINE 30 MG/ML
15 INJECTION, SOLUTION INTRAMUSCULAR; INTRAVENOUS EVERY 6 HOURS PRN
Status: DISPENSED | OUTPATIENT
Start: 2022-02-16 | End: 2022-02-17

## 2022-02-16 RX ORDER — POTASSIUM CHLORIDE 750 MG/1
10 TABLET, FILM COATED, EXTENDED RELEASE ORAL 2 TIMES DAILY
Status: ON HOLD | COMMUNITY
Start: 2022-02-06 | End: 2022-02-22

## 2022-02-16 RX ORDER — CEFTRIAXONE 2 G/1
2 INJECTION, POWDER, FOR SOLUTION INTRAMUSCULAR; INTRAVENOUS ONCE
Status: COMPLETED | OUTPATIENT
Start: 2022-02-16 | End: 2022-02-16

## 2022-02-16 RX ADMIN — KETOROLAC TROMETHAMINE 15 MG: 30 INJECTION, SOLUTION INTRAMUSCULAR at 16:20

## 2022-02-16 RX ADMIN — DOXYCYCLINE 100 MG: 100 TABLET, FILM COATED ORAL at 17:33

## 2022-02-16 RX ADMIN — POTASSIUM CHLORIDE 10 MEQ: 7.46 INJECTION, SOLUTION INTRAVENOUS at 13:18

## 2022-02-16 RX ADMIN — QUETIAPINE FUMARATE 50 MG: 100 TABLET ORAL at 17:33

## 2022-02-16 RX ADMIN — ENOXAPARIN SODIUM 40 MG: 40 INJECTION SUBCUTANEOUS at 17:33

## 2022-02-16 RX ADMIN — MAGNESIUM SULFATE HEPTAHYDRATE 2 G: 40 INJECTION, SOLUTION INTRAVENOUS at 13:15

## 2022-02-16 RX ADMIN — ONDANSETRON 4 MG: 4 TABLET, ORALLY DISINTEGRATING ORAL at 10:40

## 2022-02-16 RX ADMIN — MAGNESIUM SULFATE HEPTAHYDRATE 4 G: 40 INJECTION, SOLUTION INTRAVENOUS at 16:20

## 2022-02-16 RX ADMIN — ONDANSETRON 4 MG: 2 INJECTION INTRAMUSCULAR; INTRAVENOUS at 13:18

## 2022-02-16 RX ADMIN — POTASSIUM CHLORIDE AND SODIUM CHLORIDE: 900; 150 INJECTION, SOLUTION INTRAVENOUS at 16:27

## 2022-02-16 RX ADMIN — POTASSIUM CHLORIDE 20 MEQ: 1500 TABLET, EXTENDED RELEASE ORAL at 13:18

## 2022-02-16 RX ADMIN — HEPARIN 400 UNITS: 100 SYRINGE at 12:50

## 2022-02-16 RX ADMIN — GABAPENTIN 200 MG: 100 CAPSULE ORAL at 22:05

## 2022-02-16 RX ADMIN — SERTRALINE HYDROCHLORIDE 100 MG: 100 TABLET ORAL at 22:05

## 2022-02-16 RX ADMIN — PROPRANOLOL HYDROCHLORIDE 80 MG: 40 TABLET ORAL at 22:05

## 2022-02-16 RX ADMIN — ATORVASTATIN CALCIUM 10 MG: 10 TABLET, FILM COATED ORAL at 22:05

## 2022-02-16 RX ADMIN — POTASSIUM CHLORIDE 40 MEQ: 1500 TABLET, EXTENDED RELEASE ORAL at 17:33

## 2022-02-16 RX ADMIN — CEFTRIAXONE SODIUM 2 G: 2 INJECTION, POWDER, FOR SOLUTION INTRAMUSCULAR; INTRAVENOUS at 12:46

## 2022-02-16 ASSESSMENT — ENCOUNTER SYMPTOMS
HEARTBURN: 0
NECK PAIN: 1
PALPITATIONS: 0
MYALGIAS: 1
NAUSEA: 0
DIZZINESS: 0
FEVER: 0
CHILLS: 0
WEAKNESS: 1
DOUBLE VISION: 0
HEMOPTYSIS: 0
HEADACHES: 0
COUGH: 0
BLURRED VISION: 0

## 2022-02-16 ASSESSMENT — FIBROSIS 4 INDEX: FIB4 SCORE: 0.62

## 2022-02-16 ASSESSMENT — PAIN DESCRIPTION - PAIN TYPE: TYPE: ACUTE PAIN

## 2022-02-16 NOTE — DISCHARGE PLANNING
Anticipated Discharge Disposition: IRF vs SNF     Action:   · Discussed pt's discharge plan with Dr. Beth Starr and completed a chart review    Barriers to Discharge:   · PT/OT eval and recommendations    Plan:   · CM to follow up with PT/OT recommendations. Continue to collaborate with the pt, pt's family, and health care team to provide social and discharge support as needed.          Addendum:    1430:     Discussed pt's discharge plan with Dr. Allen. Discussed evaluation with Krysten RODRIGUEZ PT.     RN YAHIR spoke with the patient at bedside to obtain the information used in this assessment. Patient verified the accuracy of the demographic face sheet. Pt states she lives in a ground level apartment with her SO, Reese. Pt states her apartment has on step to the bathroom, 4 steps into the kitchen, and ground level entrance into the apartment. Pt states Reese MARTINEZ, works at night and is unable to provide the level of care and rehabilitation she requires surgery. Pt had an anterior cervical discectomy and partial corpectomies and instrumented fusion for cervical herniated nucleus pulposus from C5-T1 radiculopathy on January 8.     Pt states that prior to this surgery she was independent with all ADLS/IADLS. Pt denies any DME at home. Pt states an income of $850 per month from SSI. Pt denies financial concerns. Pt states she has medical and pharmacy coverage. Pt denies history of substance abuse and states a history of PTSD.     Pt is established with EPHRAIM Fisher with [facility].     Care Transition Team Assessment    Information Source  Orientation Level: Oriented X4  Information Given By: Patient  Who is responsible for making decisions for patient? : Patient    Readmission Evaluation  Is this a readmission?: Yes - unplanned readmission  Why do you think you were readmitted?: Patient was discharged without post acute placement or physical therapy evel and recommendations.  Was an appointment arranged for you  prior to discharge?: No  Were there new prescriptions you were supposed to fill after you were discharged?: Yes, prescriptions filled  Did you understand your discharge instructions?: Yes  Did you have enough support after your last discharge?: No  Please explain: Pt was discharged without post acute placement or PT/OT eval or recommendations.    Elopement Risk  Legal Hold: No    Interdisciplinary Discharge Planning  Does Admitting Nurse Feel This Could be a Complex Discharge?: No  Primary Care Physician: Nuvia GIFFORD  Lives with - Patient's Self Care Capacity: Significant Other  Patient or legal guardian wants to designate a caregiver: No  Housing / Facility: 1 Story Apartment / Condo  Do You Take your Prescribed Medications Regularly: Yes  Able to Return to Previous ADL's: Future Time w/Therapy  Prior Services: Intermittent Physical Support for ADL Per Service  Patient Prefers to be Discharged to:: IRF vs SNF  Assistance Needed: Yes  Durable Medical Equipment: Not Applicable    Discharge Preparedness  What is your plan after discharge?: Uncertain - pending medical team collaboration  What are your discharge supports?: Spouse  Prior Functional Level: Ambulatory,Drives Self,Independent with Activities of Daily Living,Independent with Medication Management  Difficulity with ADLs: Toileting,Walking,Bathing  Difficulity with IADLs: Cooking,Driving    Functional Assesment  Prior Functional Level: Ambulatory,Drives Self,Independent with Activities of Daily Living,Independent with Medication Management    Finances  Financial Barriers to Discharge: No  Prescription Coverage: Yes                   Domestic Abuse  Have you ever been the victim of abuse or violence?: No    Psychological Assessment  History of Substance Abuse: None  History of Psychiatric Problems: Yes (PTSD)  Non-compliant with Treatment: No  Newly Diagnosed Illness: No    Discharge Risks or Barriers  Discharge risks or barriers?: Post-acute placement /  services,Complex medical needs  Patient risk factors: Readmission    Anticipated Discharge Information  Discharge Disposition: Disch to IP rehab facility or distinct part unit (62)  Discharge Address: [facility], [place] [address]  Discharge Contact Phone Number: [phone]

## 2022-02-16 NOTE — ED PROVIDER NOTES
"ED Provider Note    CHIEF COMPLAINT  Chief Complaint   Patient presents with   • Post-Op Complications     Recent neck surgery. Pt got septic and hospitalized for and just released a week ago. Patient feeling very nauseated from the pain medication, as well as can't eat at home well, weakness to the point of not being able to walk and now falling at home because of the weakness.        HPI  Yolanda Reed is a 61 y.o. female who presents complaining of nausea, pain, and frequent falls.    Patient states she has pain/soreness in her bilateral upper arms after falling onto her elbows at home.  She does not feel that she needs x-ray imaging.  She states \"it feels like a muscle.\"  She recently underwent cervical fusion with discectomy whose postoperative course was complicated by a wound infection which required 2 washouts.  She is currently receiving ceftriaxone through a PICC line.  She has not had any physical therapy. She reports nausea, mainly from the pain medications she has been taking.  She finished her narcotics this morning and requests Toradol for pain.  She states she may be fine for several days without falls but then has a string of them.  She currently lives with her boyfriend who works and is unable to care for her.  She denies any recent head trauma and denies increasing neck pain.  She also denies fevers, chills, cough, sputum production, leg swelling, calf pain.      ALLERGIES  Allergies   Allergen Reactions   • Morphine Anaphylaxis     Throat swelling       CURRENT MEDICATIONS  Home Medications     Reviewed by Sejal Woodard R.N. (Registered Nurse) on 02/16/22 at 1036  Med List Status: Complete   Medication Last Dose Status   atorvastatin (LIPITOR) 10 MG Tab  Active   ibuprofen (MOTRIN) 800 MG Tab  Active   oxyCODONE immediate-release (ROXICODONE) 5 MG Tab  Active   propranolol (INDERAL) 40 MG Tab  Active   quetiapine (SEROQUEL) 50 MG tablet  Active   sertraline (ZOLOFT) 100 MG Tab  Active " "  vitamin D (CHOLECALCIFEROL) 1000 UNIT Tab  Active                PAST MEDICAL HISTORY   has a past medical history of Anesthesia (), Anxiety, Arthritis, Dental disorder, High cholesterol, Hypertension (2022), Infectious disease, Low blood sugar, Pneumonia, PONV (postoperative nausea and vomiting), Psychiatric problem, PTSD (post-traumatic stress disorder), Sleep apnea, Snoring, and Urinary bladder disorder.    SURGICAL HISTORY   has a past surgical history that includes tonsillectomy; breast biopsy (2010); knee scope,aid ant cruciate repair (Right, 2019); bladder sling female (N/A, 2021); gyn surgery (,); other orthopedic surgery; other orthopedic surgery; corpectomy (N/A, 2022); and cervical disk and fusion anterior (N/A, 2022).    SOCIAL HISTORY  Social History     Tobacco Use   • Smoking status: Former Smoker     Packs/day: 0.50     Years: 30.00     Pack years: 15.00     Types: Cigarettes     Quit date: 2013     Years since quittin.2   • Smokeless tobacco: Never Used   • Tobacco comment: .5 ppd, 30 yrs   Vaping Use   • Vaping Use: Every day   • Substances: Nicotine, THC   • Devices: Pre-filled or refillable cartridge   Substance and Sexual Activity   • Alcohol use: No   • Drug use: Yes     Types: Inhaled     Comment: \"clean since \",  THC last used 1/3/22   • Sexual activity: Not on file     Lives at home with her SO    Family Hx:  HTN      REVIEW OF SYSTEMS  See HPI for further details.  All other systems are negative except as above in HPI.      PHYSICAL EXAM  VITAL SIGNS: /87   Pulse 84   Temp 37 °C (98.6 °F) (Temporal)   Resp 16   Ht 1.524 m (5')   Wt 70.3 kg (155 lb)   LMP 2010   SpO2 99%   BMI 30.27 kg/m²    General:  WDWN female, nontoxic appearing in NAD; A+Ox3; V/S as above; afebrile  Skin: warm and dry; good color; no rash  HEENT: NCAT; EOMs intact; PERRL; no scleral icterus   Neck: in soft collar; anterior neck wound intact " without drainage or edema  Cardiovascular: Regular heart rate and rhythm.  No murmurs, rubs, or gallops; pulses 2+ bilaterally radially and DP areas  Lungs: Clear to auscultation with good air movement bilaterally.  No wheezes, rhonchi, or rales.   Abdomen: BS present; soft; NTND; no rebound, guarding, or rigidity.  No organomegaly or pulsatile mass  Extremities: FLORES x 4; no bony point tenderness or deformities; no e/o trauma; no pedal edema; neg Ernesto's  Neurologic: CNs III-XII grossly intact; speech clear; distal sensation intact; strength 5/5 UE/LEs  Psychiatric: Appropriate affect, normal mood    LABS  Results for orders placed or performed during the hospital encounter of 02/16/22   CBC WITH DIFFERENTIAL   Result Value Ref Range    WBC 8.9 4.8 - 10.8 K/uL    RBC 3.96 (L) 4.20 - 5.40 M/uL    Hemoglobin 11.5 (L) 12.0 - 16.0 g/dL    Hematocrit 34.3 (L) 37.0 - 47.0 %    MCV 86.6 81.4 - 97.8 fL    MCH 29.0 27.0 - 33.0 pg    MCHC 33.5 (L) 33.6 - 35.0 g/dL    RDW 45.2 35.9 - 50.0 fL    Platelet Count 389 164 - 446 K/uL    MPV 8.9 (L) 9.0 - 12.9 fL    Neutrophils-Polys 63.10 44.00 - 72.00 %    Lymphocytes 21.00 (L) 22.00 - 41.00 %    Monocytes 12.20 0.00 - 13.40 %    Eosinophils 1.80 0.00 - 6.90 %    Basophils 0.30 0.00 - 1.80 %    Immature Granulocytes 1.60 (H) 0.00 - 0.90 %    Nucleated RBC 0.00 /100 WBC    Neutrophils (Absolute) 5.63 2.00 - 7.15 K/uL    Lymphs (Absolute) 1.87 1.00 - 4.80 K/uL    Monos (Absolute) 1.09 (H) 0.00 - 0.85 K/uL    Eos (Absolute) 0.16 0.00 - 0.51 K/uL    Baso (Absolute) 0.03 0.00 - 0.12 K/uL    Immature Granulocytes (abs) 0.14 (H) 0.00 - 0.11 K/uL    NRBC (Absolute) 0.00 K/uL   CMP   Result Value Ref Range    Sodium 138 135 - 145 mmol/L    Potassium 2.9 (L) 3.6 - 5.5 mmol/L    Chloride 97 96 - 112 mmol/L    Co2 28 20 - 33 mmol/L    Anion Gap 13.0 7.0 - 16.0    Glucose 100 (H) 65 - 99 mg/dL    Bun 3 (L) 8 - 22 mg/dL    Creatinine 0.49 (L) 0.50 - 1.40 mg/dL    Calcium 8.6 8.5 - 10.5 mg/dL     AST(SGOT) 18 12 - 45 U/L    ALT(SGPT) 11 2 - 50 U/L    Alkaline Phosphatase 113 (H) 30 - 99 U/L    Total Bilirubin 0.4 0.1 - 1.5 mg/dL    Albumin 3.2 3.2 - 4.9 g/dL    Total Protein 6.0 6.0 - 8.2 g/dL    Globulin 2.8 1.9 - 3.5 g/dL    A-G Ratio 1.1 g/dL   PHOSPHORUS   Result Value Ref Range    Phosphorus 3.8 2.5 - 4.5 mg/dL   TROPONIN   Result Value Ref Range    Troponin T 16 6 - 19 ng/L   MAGNESIUM   Result Value Ref Range    Magnesium 1.0 (L) 1.5 - 2.5 mg/dL   ESTIMATED GFR   Result Value Ref Range    GFR If African American >60 >60 mL/min/1.73 m 2    GFR If Non African American >60 >60 mL/min/1.73 m 2   EKG   Result Value Ref Range    Report       [facility] Emergency Dept.    Test Date:  2022  Pt Name:    CIPRIANO ARCE                  Department: ER  MRN:        [id number]                      Room:       [facility]  Gender:     Female                       Technician: [id number]  :        1960                   Requested By:DALE ROMAN  Order #:    [id number]                    Reading MD: DALE ROMAN MD    Measurements  Intervals                                Axis  Rate:       74                           P:          33  OH:         173                          QRS:        35  QRSD:       91                           T:          -1  QT:         396  QTc:        440    Interpretive Statements  Sinus rhythm  Compared to ECG 2022 14:17:27  ST (T wave) deviation no longer present  Electronically Signed On 2022 12:05:42 PST by DALE ROMAN MD         IMAGING  DX-CHEST-PORTABLE (1 VIEW)   Final Result         No acute cardiac or pulmonary abnormality is identified.            MEDICAL RECORD  I have reviewed patient's medical record and pertinent results are listed below.    ED note from 1/15/2022 states the patient's presented at that time with neck pain 4 days status post cervical discectomy with fusion/anterior approach on 2022.      ED note from 2022  reviewed.  Patient presented to the ER for neck pain and unable to care for self    Discharge summary from 1/18/2022 reviewed.  Patient was admitted for generalized weakness, difficulty ambulating, and falls.  She also reported dizziness and chest pain while crying.  Patient was tachycardic with a leukocytosis, hyponatremia, and hypochloremia.  CT PE study was negative for PE but did note a left lower lobe pneumonia.  Patient signed out AGAINST MEDICAL ADVICE on 1/18/2022.    DC Summary from 2/4/22 reviewed.  Patient developed sepsis, anterior cervical wound infection, pyelonephritis, acute liver failure, acute renal failure.  OP report from 1/26/22 noted.  Pt had irrigation and debridement of cervical wound.  Taken back to OR on 2/1/22 for washout.  ID consulted--Serratia marcescens in cxs.  Discharged with daily Ceftriaxone via PICC.    COURSE & MEDICAL DECISION MAKING  I have reviewed any medical record information, laboratory studies and radiographic results as noted.    Yolanda Reed is a 61 y.o. female who presents complaining of nausea, decreased p.o. intake, frequent falls, and generalized weakness following cervical fusion surgery complicated by wound infection now with PICC line for Ceftriaxone tx.    Appropriate PPE was worn at all times while interacting with the patient.    Case management consulted.    Labs demonstrated hypokalemia of 2.9, magnesium 1.0, mild anemia of 11.5.  Immature granulocytes are elevated today and yesterday.  However, she has no leukocytosis, neutrophilia, fever, or chills.      12:58 PM  Potassium and magnesium ordered.  PT/OT consult and PMR referral placed. [facility].     1:28 PM  Discussed with Tiffany who agrees to see patient      FINAL IMPRESSION  1. Post-op pain     2. Fall from ground level     3. Frequent falls     4. Hypokalemia     5. Hypomagnesemia     6. Nausea       Electronically signed by: Beth Starr M.D., 2/16/2022 11:20 AM

## 2022-02-16 NOTE — H&P
Hospital Medicine History & Physical Note    Date of Service  2/16/2022    Primary Care Physician  MAGDY Galicia    Consultants  none    Specialist Names: none    Code Status  Full Code    Chief Complaint  Chief Complaint   Patient presents with   • Post-Op Complications     Recent neck surgery. Pt got septic and hospitalized for and just released a week ago. Patient feeling very nauseated from the pain medication, as well as can't eat at home well, weakness to the point of not being able to walk and now falling at home because of the weakness.        History of Presenting Illness  Yolanda Reed is a 61 y.o. female who presented 2/16/2022 with nausea, vomiting, generalized weakness, and falls.  Patient recently had a surgery of the cervical spine complicated by wound infection and has been receiving outpatient IV antibiotics therapy through PICC line who has been having nausea which she attributed to her narcotic medications resulted in intractable vomiting and generalized weakness.  She also stated that she has been  falling at home.  Denies any fever chills.  Denies any loss of consciousness.  In ER noted to be hemodynamically stable.  Afebrile.  Lab work was significant for hypokalemia and hypomagnesemia.  UA negative for UTI.  Chest x-ray negative for any acute cardiopulmonary process.    I discussed the plan of care with patient and erp.    Review of Systems  Review of Systems   Constitutional: Positive for malaise/fatigue. Negative for chills and fever.   HENT: Negative for hearing loss and tinnitus.    Eyes: Negative for blurred vision and double vision.   Respiratory: Negative for cough and hemoptysis.    Cardiovascular: Negative for chest pain and palpitations.   Gastrointestinal: Negative for heartburn and nausea.   Genitourinary: Negative for dysuria and urgency.   Musculoskeletal: Positive for myalgias and neck pain.   Skin: Negative for rash.   Neurological: Positive for weakness. Negative  for dizziness and headaches.       Past Medical History   has a past medical history of Anesthesia (2006), Anxiety, Arthritis, Dental disorder, High cholesterol, Hypertension (01/05/2022), Infectious disease, Low blood sugar, Pneumonia, PONV (postoperative nausea and vomiting), Psychiatric problem, PTSD (post-traumatic stress disorder), Sleep apnea, Snoring, and Urinary bladder disorder.    Surgical History   has a past surgical history that includes tonsillectomy; breast biopsy (8/20/2010); pr knee scope,aid ant cruciate repair (Right, 11/25/2019); bladder sling female (N/A, 6/29/2021); gyn surgery (1981,1988); other orthopedic surgery; other orthopedic surgery; corpectomy (N/A, 1/6/2022); and cervical disk and fusion anterior (N/A, 1/6/2022).     Family History  family history includes Alcohol/Drug in her mother; Allergies in her father; Anesthesia in her mother; Anxiety disorder in her mother; Bipolar disorder in her mother; Cancer in her mother; DVT in her sister; Kidney stones in her father; Lung Cancer in her father, mother, and sister; Osteoporosis in her mother.   Family history reviewed with patient. There is no family history that is pertinent to the chief complaint.     Social History   reports that she quit smoking about 8 years ago. Her smoking use included cigarettes. She has a 15.00 pack-year smoking history. She has never used smokeless tobacco. She reports current drug use. Drug: Inhaled. She reports that she does not drink alcohol.    Allergies  Allergies   Allergen Reactions   • Morphine Anaphylaxis     Throat swelling       Medications  Prior to Admission Medications   Prescriptions Last Dose Informant Patient Reported? Taking?   QUEtiapine (SEROQUEL) 50 MG tablet 2/15/2022 at Unknown time Patient Yes No   Sig: Take 50 mg by mouth every evening.   atorvastatin (LIPITOR) 10 MG Tab 2/15/2022 at Unknown time Patient Yes No   Sig: Take 10 mg by mouth every evening.   doxycycline (MONODOX) 100 MG  capsule 2/15/2022 at unknown  Yes No   Sig: Take 100 mg by mouth 2 times a day. For 10 days   ibuprofen (MOTRIN) 800 MG Tab 2/15/2022 at Unknown time Patient No No   Sig: Take 1 Tablet by mouth 3 times a day as needed with food and water. Max 3 tablets in 24 hours.   oxyCODONE immediate-release (ROXICODONE) 5 MG Tab 2/15/2022 at Unknown time Patient Yes No   Sig: Take 5-10 mg by mouth every 6 hours as needed for Severe Pain. 7 day supply   potassium chloride ER (KLOR-CON) 10 MEQ tablet 2/15/2022 at Unknown time  Yes Yes   Sig: Take 10 mEq by mouth 2 times a day.   propranolol (INDERAL) 80 MG Tab 2/15/2022 at Unknown time Patient Yes No   Sig: Take 80 mg by mouth at bedtime.   sertraline (ZOLOFT) 100 MG Tab 2/15/2022 at Unknown time Patient Yes No   Sig: Take 100 mg by mouth every evening.   vitamin D (CHOLECALCIFEROL) 1000 UNIT Tab 2/15/2022 at Unknown time Patient Yes No   Sig: Take 1,000 Units by mouth every evening.      Facility-Administered Medications: None       Physical Exam  Temp:  [37 °C (98.6 °F)] 37 °C (98.6 °F)  Pulse:  [75-90] 84  Resp:  [15-18] 18  BP: (120-150)/(65-87) 130/65  SpO2:  [85 %-99 %] 97 %  Blood Pressure: 130/65   Temperature: 37 °C (98.6 °F)   Pulse: 84   Respiration: 18   Pulse Oximetry: 97 %       Physical Exam  Constitutional:       General: She is not in acute distress.     Appearance: She is obese.   HENT:      Head: Normocephalic and atraumatic.   Eyes:      Extraocular Movements: Extraocular movements intact.      Pupils: Pupils are equal, round, and reactive to light.   Neck:      Comments: Neck collar   Cardiovascular:      Rate and Rhythm: Normal rate and regular rhythm.      Heart sounds:     No friction rub. No gallop.   Pulmonary:      Effort: Pulmonary effort is normal. No respiratory distress.   Abdominal:      General: There is no distension.      Palpations: Abdomen is soft.   Musculoskeletal:      Right lower leg: No edema.      Left lower leg: No edema.   Neurological:       General: No focal deficit present.      Mental Status: She is alert and oriented to person, place, and time.   Psychiatric:         Mood and Affect: Mood normal.         Laboratory:  Recent Labs     02/15/22  1425 02/16/22  1200   WBC 9.0 8.9   RBC 3.86* 3.96*   HEMOGLOBIN 11.1* 11.5*   HEMATOCRIT 34.1* 34.3*   MCV 88.3 86.6   MCH 28.8 29.0   MCHC 32.6* 33.5*   RDW 45.3 45.2   PLATELETCT 466* 389   MPV 9.4 8.9*     Recent Labs     02/15/22  1425 02/16/22  1200   SODIUM 136 138   POTASSIUM 3.2* 2.9*   CHLORIDE 95* 97   CO2 28 28   GLUCOSE 103* 100*   BUN 4* 3*   CREATININE 0.59 0.49*   CALCIUM 8.6 8.6     Recent Labs     02/15/22  1425 02/16/22  1200   ALTSGPT 14 11   ASTSGOT 18 18   ALKPHOSPHAT 115* 113*   TBILIRUBIN 0.3 0.4   GLUCOSE 103* 100*         No results for input(s): NTPROBNP in the last 72 hours.      Recent Labs     02/16/22  1200   TROPONINT 16       Imaging:  DX-CHEST-PORTABLE (1 VIEW)   Final Result         No acute cardiac or pulmonary abnormality is identified.          no X-Ray or EKG requiring interpretation    Assessment/Plan:  I anticipate this patient is appropriate for observation status at this time.    * Falls frequently- (present on admission)  Assessment & Plan  For being feeling weak with recent neck surgery.  PT/OT eval pending  PMR eval pending    Wound infection after surgery- (present on admission)  Assessment & Plan  Recently had surgery of the cervical spine complicated with wound infection and has been on antibiotics as an outpatient per  Not clear what her regimen and pharmacy will look into it  Received a dose of Rocephin by ER physician.  We will continue doxycycline reported on her home medications.    Electrolyte imbalance- (present on admission)  Assessment & Plan  Including hypokalemia and hypomagnesemia likely due to intractable nausea and vomiting.  Start on replacement therapy per  Monitor and replete as needed.    Nausea and vomiting  Assessment & Plan  As adverse  effect of narcotics as she stated.  Patient wishes not to have any narcotics for her pain control.  Start on IV fluids and as needed antiemetic medications  Toradol as needed for pain for now.  Gabapentin.      VTE prophylaxis: enoxaparin ppx

## 2022-02-16 NOTE — ASSESSMENT & PLAN NOTE
Seems to be tolerating Toradol, adding tramadol as needed  Continue gabapentin    Trial of narcotics with antiemetics started

## 2022-02-16 NOTE — DISCHARGE PLANNING
[facility]    Referral from:  Dr Allen  Insurance Provider on Facesheet: Medicaid  Potential Rehab Diagnosis: Ortho/Debility    Chart review indicates patient may need on going medical management and may have therapy needs to possibly meet inpatient rehab facility criteria with the goal of returning to community.    D/C support: Boyfriend - will need to verify dc support.     Physiatry consultation forwarded per protocol.     Last Covid test:  N/A    S/p recent cervical fusion with discectomy complicated by infection - Physiatry to consult, TCC will follow.     Thank you for the referral.

## 2022-02-16 NOTE — ED NOTES
Pt placed on 2 liters NC.  Pt desated to 84% on RA, pt reports h/o sleep apnea, last took pain medication this morning.

## 2022-02-16 NOTE — ASSESSMENT & PLAN NOTE
Including hypokalemia and hypomagnesemia likely due to intractable nausea and vomiting.  Start on replacement therapy per  Monitor and replete as needed.

## 2022-02-16 NOTE — ED TRIAGE NOTES
Chief Complaint   Patient presents with   • Post-Op Complications     Recent neck surgery. Pt got septic and hospitalized for and just released a week ago. Patient feeling very nauseated from the pain medication, as well as can't eat at home well, weakness to the point of not being able to walk and now falling at home because of the weakness.      /83   Pulse 90   Temp 37 °C (98.6 °F) (Temporal)   Resp 16   Ht 1.524 m (5')   Wt 70.3 kg (155 lb)   LMP 08/01/2010   SpO2 96%   BMI 30.27 kg/m²   Pt placed back in lobby, educated on triage process, and told to inform staff of any change in condition.

## 2022-02-17 LAB
ALBUMIN SERPL BCP-MCNC: 2.9 G/DL (ref 3.2–4.9)
ALBUMIN/GLOB SERPL: 1.1 G/DL
ALP SERPL-CCNC: 105 U/L (ref 30–99)
ALT SERPL-CCNC: 10 U/L (ref 2–50)
ANION GAP SERPL CALC-SCNC: 9 MMOL/L (ref 7–16)
AST SERPL-CCNC: 19 U/L (ref 12–45)
BASOPHILS # BLD AUTO: 0.4 % (ref 0–1.8)
BASOPHILS # BLD: 0.02 K/UL (ref 0–0.12)
BILIRUB SERPL-MCNC: 0.2 MG/DL (ref 0.1–1.5)
BUN SERPL-MCNC: 5 MG/DL (ref 8–22)
CALCIUM SERPL-MCNC: 8.3 MG/DL (ref 8.5–10.5)
CHLORIDE SERPL-SCNC: 102 MMOL/L (ref 96–112)
CO2 SERPL-SCNC: 29 MMOL/L (ref 20–33)
CREAT SERPL-MCNC: 0.61 MG/DL (ref 0.5–1.4)
EOSINOPHIL # BLD AUTO: 0.1 K/UL (ref 0–0.51)
EOSINOPHIL NFR BLD: 2.1 % (ref 0–6.9)
ERYTHROCYTE [DISTWIDTH] IN BLOOD BY AUTOMATED COUNT: 48 FL (ref 35.9–50)
GLOBULIN SER CALC-MCNC: 2.7 G/DL (ref 1.9–3.5)
GLUCOSE SERPL-MCNC: 92 MG/DL (ref 65–99)
HCT VFR BLD AUTO: 43.5 % (ref 37–47)
HGB BLD-MCNC: 13.8 G/DL (ref 12–16)
IMM GRANULOCYTES # BLD AUTO: 0.05 K/UL (ref 0–0.11)
IMM GRANULOCYTES NFR BLD AUTO: 1 % (ref 0–0.9)
LYMPHOCYTES # BLD AUTO: 1.19 K/UL (ref 1–4.8)
LYMPHOCYTES NFR BLD: 24.7 % (ref 22–41)
MAGNESIUM SERPL-MCNC: 2.6 MG/DL (ref 1.5–2.5)
MCH RBC QN AUTO: 28 PG (ref 27–33)
MCHC RBC AUTO-ENTMCNC: 31.7 G/DL (ref 33.6–35)
MCV RBC AUTO: 88.4 FL (ref 81.4–97.8)
MONOCYTES # BLD AUTO: 0.58 K/UL (ref 0–0.85)
MONOCYTES NFR BLD AUTO: 12.1 % (ref 0–13.4)
NEUTROPHILS # BLD AUTO: 2.87 K/UL (ref 2–7.15)
NEUTROPHILS NFR BLD: 59.7 % (ref 44–72)
NRBC # BLD AUTO: 0 K/UL
NRBC BLD-RTO: 0 /100 WBC
PLATELET # BLD AUTO: 290 K/UL (ref 164–446)
PMV BLD AUTO: 9.2 FL (ref 9–12.9)
POTASSIUM SERPL-SCNC: 4.4 MMOL/L (ref 3.6–5.5)
PROT SERPL-MCNC: 5.6 G/DL (ref 6–8.2)
RBC # BLD AUTO: 4.92 M/UL (ref 4.2–5.4)
SODIUM SERPL-SCNC: 140 MMOL/L (ref 135–145)
WBC # BLD AUTO: 4.8 K/UL (ref 4.8–10.8)

## 2022-02-17 PROCEDURE — 700111 HCHG RX REV CODE 636 W/ 250 OVERRIDE (IP): Performed by: FAMILY MEDICINE

## 2022-02-17 PROCEDURE — A9270 NON-COVERED ITEM OR SERVICE: HCPCS | Performed by: HOSPITALIST

## 2022-02-17 PROCEDURE — 99244 OFF/OP CNSLTJ NEW/EST MOD 40: CPT | Performed by: PHYSICAL MEDICINE & REHABILITATION

## 2022-02-17 PROCEDURE — 700102 HCHG RX REV CODE 250 W/ 637 OVERRIDE(OP): Performed by: PHYSICAL MEDICINE & REHABILITATION

## 2022-02-17 PROCEDURE — 97162 PT EVAL MOD COMPLEX 30 MIN: CPT

## 2022-02-17 PROCEDURE — 85025 COMPLETE CBC W/AUTO DIFF WBC: CPT

## 2022-02-17 PROCEDURE — 80053 COMPREHEN METABOLIC PANEL: CPT

## 2022-02-17 PROCEDURE — 96372 THER/PROPH/DIAG INJ SC/IM: CPT

## 2022-02-17 PROCEDURE — 97166 OT EVAL MOD COMPLEX 45 MIN: CPT

## 2022-02-17 PROCEDURE — 96375 TX/PRO/DX INJ NEW DRUG ADDON: CPT

## 2022-02-17 PROCEDURE — A9270 NON-COVERED ITEM OR SERVICE: HCPCS | Performed by: PHYSICAL MEDICINE & REHABILITATION

## 2022-02-17 PROCEDURE — 99226 PR SUBSEQUENT OBSERVATION CARE,LEVEL III: CPT | Performed by: HOSPITALIST

## 2022-02-17 PROCEDURE — A9270 NON-COVERED ITEM OR SERVICE: HCPCS | Performed by: FAMILY MEDICINE

## 2022-02-17 PROCEDURE — 96376 TX/PRO/DX INJ SAME DRUG ADON: CPT

## 2022-02-17 PROCEDURE — 700102 HCHG RX REV CODE 250 W/ 637 OVERRIDE(OP): Performed by: FAMILY MEDICINE

## 2022-02-17 PROCEDURE — 97535 SELF CARE MNGMENT TRAINING: CPT

## 2022-02-17 PROCEDURE — 83735 ASSAY OF MAGNESIUM: CPT

## 2022-02-17 PROCEDURE — G0378 HOSPITAL OBSERVATION PER HR: HCPCS

## 2022-02-17 PROCEDURE — 700102 HCHG RX REV CODE 250 W/ 637 OVERRIDE(OP): Performed by: HOSPITALIST

## 2022-02-17 RX ORDER — TRAMADOL HYDROCHLORIDE 50 MG/1
50 TABLET ORAL EVERY 6 HOURS PRN
Status: DISCONTINUED | OUTPATIENT
Start: 2022-02-17 | End: 2022-02-22 | Stop reason: HOSPADM

## 2022-02-17 RX ORDER — KETOROLAC TROMETHAMINE 30 MG/ML
30 INJECTION, SOLUTION INTRAMUSCULAR; INTRAVENOUS ONCE
Status: COMPLETED | OUTPATIENT
Start: 2022-02-18 | End: 2022-02-18

## 2022-02-17 RX ORDER — GABAPENTIN 400 MG/1
400 CAPSULE ORAL 3 TIMES DAILY
Status: DISCONTINUED | OUTPATIENT
Start: 2022-02-17 | End: 2022-02-22 | Stop reason: HOSPADM

## 2022-02-17 RX ADMIN — CEFTRIAXONE SODIUM 2 G: 10 INJECTION, POWDER, FOR SOLUTION INTRAVENOUS at 05:50

## 2022-02-17 RX ADMIN — ACETAMINOPHEN 650 MG: 325 TABLET, FILM COATED ORAL at 21:20

## 2022-02-17 RX ADMIN — KETOROLAC TROMETHAMINE 15 MG: 30 INJECTION, SOLUTION INTRAMUSCULAR at 02:48

## 2022-02-17 RX ADMIN — QUETIAPINE FUMARATE 50 MG: 100 TABLET ORAL at 17:44

## 2022-02-17 RX ADMIN — ATORVASTATIN CALCIUM 10 MG: 10 TABLET, FILM COATED ORAL at 21:20

## 2022-02-17 RX ADMIN — DOXYCYCLINE 100 MG: 100 TABLET, FILM COATED ORAL at 17:44

## 2022-02-17 RX ADMIN — PROPRANOLOL HYDROCHLORIDE 80 MG: 40 TABLET ORAL at 21:20

## 2022-02-17 RX ADMIN — TRAMADOL HYDROCHLORIDE 50 MG: 50 TABLET, COATED ORAL at 17:49

## 2022-02-17 RX ADMIN — GABAPENTIN 400 MG: 400 CAPSULE ORAL at 17:44

## 2022-02-17 RX ADMIN — GABAPENTIN 200 MG: 100 CAPSULE ORAL at 05:49

## 2022-02-17 RX ADMIN — ENOXAPARIN SODIUM 40 MG: 40 INJECTION SUBCUTANEOUS at 05:49

## 2022-02-17 RX ADMIN — GABAPENTIN 400 MG: 400 CAPSULE ORAL at 11:45

## 2022-02-17 RX ADMIN — POTASSIUM CHLORIDE 40 MEQ: 1500 TABLET, EXTENDED RELEASE ORAL at 05:49

## 2022-02-17 RX ADMIN — DOCUSATE SODIUM 50 MG AND SENNOSIDES 8.6 MG 2 TABLET: 8.6; 5 TABLET, FILM COATED ORAL at 05:49

## 2022-02-17 RX ADMIN — DOXYCYCLINE 100 MG: 100 TABLET, FILM COATED ORAL at 05:49

## 2022-02-17 RX ADMIN — KETOROLAC TROMETHAMINE 15 MG: 30 INJECTION, SOLUTION INTRAMUSCULAR at 10:53

## 2022-02-17 RX ADMIN — SERTRALINE HYDROCHLORIDE 100 MG: 100 TABLET ORAL at 17:44

## 2022-02-17 ASSESSMENT — COGNITIVE AND FUNCTIONAL STATUS - GENERAL
STANDING UP FROM CHAIR USING ARMS: A LITTLE
TOILETING: A LITTLE
DRESSING REGULAR LOWER BODY CLOTHING: A LITTLE
DAILY ACTIVITIY SCORE: 18
MOVING FROM LYING ON BACK TO SITTING ON SIDE OF FLAT BED: A LITTLE
CLIMB 3 TO 5 STEPS WITH RAILING: A LITTLE
MOBILITY SCORE: 17
WALKING IN HOSPITAL ROOM: A LITTLE
HELP NEEDED FOR BATHING: A LITTLE
SUGGESTED CMS G CODE MODIFIER DAILY ACTIVITY: CJ
CLIMB 3 TO 5 STEPS WITH RAILING: A LOT
TOILETING: A LITTLE
SUGGESTED CMS G CODE MODIFIER MOBILITY: CK
EATING MEALS: A LITTLE
SUGGESTED CMS G CODE MODIFIER MOBILITY: CI
PERSONAL GROOMING: A LITTLE
DRESSING REGULAR UPPER BODY CLOTHING: A LITTLE
MOBILITY SCORE: 23
SUGGESTED CMS G CODE MODIFIER DAILY ACTIVITY: CK
DRESSING REGULAR LOWER BODY CLOTHING: A LITTLE
MOVING TO AND FROM BED TO CHAIR: A LITTLE
DAILY ACTIVITIY SCORE: 22
TURNING FROM BACK TO SIDE WHILE IN FLAT BAD: A LITTLE

## 2022-02-17 ASSESSMENT — PAIN DESCRIPTION - PAIN TYPE
TYPE: ACUTE PAIN;CHRONIC PAIN
TYPE: ACUTE PAIN
TYPE: ACUTE PAIN;CHRONIC PAIN
TYPE: ACUTE PAIN
TYPE: ACUTE PAIN;CHRONIC PAIN
TYPE: ACUTE PAIN;CHRONIC PAIN;SURGICAL PAIN
TYPE: ACUTE PAIN

## 2022-02-17 ASSESSMENT — GAIT ASSESSMENTS
ASSISTIVE DEVICE: FRONT WHEEL WALKER
DISTANCE (FEET): 25
GAIT LEVEL OF ASSIST: CONTACT GUARD ASSIST
DEVIATION: BRADYKINETIC

## 2022-02-17 ASSESSMENT — ACTIVITIES OF DAILY LIVING (ADL): TOILETING: INDEPENDENT

## 2022-02-17 NOTE — CARE PLAN
The patient is Stable - Low risk of patient condition declining or worsening    Shift Goals  Clinical Goals: safety  Patient Goals: rest, eat  Family Goals: no family present    Progress made toward(s) clinical / shift goals:  Patient at risk for falls, bed alarm on, walker out of sight, nonskid socks on, call light within reach, personal belongings within reach, toileting offered.     Problem: Knowledge Deficit - Standard  Goal: Patient and family/care givers will demonstrate understanding of plan of care, disease process/condition, diagnostic tests and medications  Outcome: Progressing     Problem: Pain - Standard  Goal: Alleviation of pain or a reduction in pain to the patient’s comfort goal  Outcome: Progressing       Patient is not progressing towards the following goals:      Problem: Fall Risk  Goal: Patient will remain free from falls  Outcome: Not Progressing

## 2022-02-17 NOTE — PROGRESS NOTES
0600: Pt had assisted fall @ 0600 with this RN. Ambulating to bathroom, knees buckled, and was assisted to the ground. Upon nursing assessment pt remains A&Ox4, no signs of injury, pt denying pain. On call Hospitalist Anotnieta HUSSEIN notified of fall.

## 2022-02-17 NOTE — THERAPY
Physical Therapy   Initial Evaluation     Patient Name: Yolanda Reed  Age:  61 y.o., Sex:  female  Medical Record #: [id number]  Today's Date: 2/17/2022     Precautions  Precautions: Fall Risk  Comments: c/s surgery in [facility], has soft collar at beside which is ill fitting    Assessment  Patient is 61 y.o. female admitted for post operative complications and weakness. Pt underwent cervical spine surgery in January which unfortunately became infected requiring IV abx treatment. Pt reports she had become progressively weak to point of falls and inability to walk and her SO hasn't been able provide adequate care.     Today, pt presents to physical therapy with impairments in functional mobility, balance, gait, and overall activity tolerance related to diminished post operative strength and pain. Pt required CGA - Min A to complete functional transfers and ambulation x25 ft with FWW. Pt will benefit from acute PT interventions to address present impairments and optimize functional independence prior to return home.      Plan    Recommend Physical Therapy 4 times per week until therapy goals are met for the following treatments:  Bed Mobility, Equipment, Gait Training, Neuro Re-Education / Balance, Self Care/Home Evaluation, Stair Training, Therapeutic Activities and Therapeutic Exercises    DC Equipment Recommendations: None  Discharge Recommendations: Recommend post-acute placement for additional physical therapy services prior to discharge home          02/17/22 1119   Precautions   Precautions Fall Risk   Comments c/s surgery in [facility], has soft collar at beside which is ill fitting   Pain 0 - 10 Group   Therapist Pain Assessment Nurse Notified  (reports increased UE pain)   Prior Living Situation   Housing / Facility 1 Story Apartment / Condo   Steps Into Home 0   Steps In Home   (1 from bedroom and 4 into kitchen)   Rail Right Rail  (Steps in Home)  (per prior PT notes)   Equipment Owned Front-Wheel  Walker;Single Point Cane   Lives with - Patient's Self Care Capacity Significant Other   Comments SO able to provide intermittent assist but care has been increasingly difficult per pt   Prior Level of Functional Mobility   Bed Mobility Independent   Transfer Status Independent   Ambulation Independent   Distance Ambulation (Feet)   (household since c/s surgery)   Assistive Devices Used Front-Wheel Walker   Comments pt reports LEs will unpredicably give out when walking. has made her fearful   Cognition    Cognition / Consciousness WDL   Level of Consciousness Alert   Comments pleasant and receptive to PT. Was tearful during evaluation as she is very frustrated by persistent impairments. She was under the impression her deficits would be fully resolved with surgical intervention   Active ROM Lower Body    Active ROM Lower Body  X   Comments adequat for functional mobility but limited by weakness   Strength Lower Body   Lower Body Strength  X   Comments grossly assessed B LE at 4-/5, endurance impaired.   Balance Assessment   Sitting Balance (Static) Fair +   Sitting Balance (Dynamic) Fair +   Standing Balance (Static) Fair   Standing Balance (Dynamic) Fair -   Weight Shift Sitting Fair   Weight Shift Standing Fair   Comments w/ FWW   Gait Analysis   Gait Level Of Assist Contact Guard Assist   Assistive Device Front Wheel Walker   Distance (Feet) 25   # of Times Distance was Traveled 1   Deviation Bradykinetic  (decreased step length, guarded gait)   # of Stairs Climbed 0   Weight Bearing Status no restrictions   Bed Mobility    Supine to Sit Minimal Assist   Sit to Supine Minimal Assist   Scooting Contact Guard Assist   Rolling Minimal Assist to Rt.   Comments required cues for log roll   Functional Mobility   Sit to Stand Contact Guard Assist   Bed, Chair, Wheelchair Transfer Contact Guard Assist   Transfer Method Stand Step   How much difficulty does the patient currently have...   Turning over in bed (including  adjusting bedclothes, sheets and blankets)? 3   Sitting down on and standing up from a chair with arms (e.g., wheelchair, bedside commode, etc.) 3   Moving from lying on back to sitting on the side of the bed? 3   How much help from another person does the patient currently need...   Moving to and from a bed to a chair (including a wheelchair)? 3   Need to walk in a hospital room? 3   Climbing 3-5 steps with a railing? 2   6 clicks Mobility Score 17   Activity Tolerance   Sitting in Chair declined due to pain   Sitting Edge of Bed 7 min   Standing 5 min   Patient / Family Goals    Patient / Family Goal #1 to get better   Short Term Goals    Short Term Goal # 1 pt will perform supine <> sit with HOB flat, no railing and SPV in 6 visits   Short Term Goal # 2 pt will perform sit <> stand and functional transfers with LRAD and SPV to improve mobility independence in 6 visits   Short Term Goal # 3 pt will ambulate > 150 ft with LRAD and SPV to access community needs (MD appointments) in 6 visits   Short Term Goal # 4 pt will negotiate 1-4 steps with LRAD/Railing with SPV to access home environment in 6 visits   Education Group   Education Provided Role of Physical Therapist;Spine Precautions   Spine Precautions Patient Response Patient;Acceptance;Explanation;Verbal Demonstration;Reinforcement Needed   Role of Physical Therapist Patient Response Patient;Acceptance;Explanation;Verbal Demonstration   Problem List    Problems Pain;Impaired Bed Mobility;Impaired Transfers;Impaired Ambulation;Functional Strength Deficit;Decreased Activity Tolerance;Impaired Balance   Anticipated Discharge Equipment and Recommendations   DC Equipment Recommendations None   Discharge Recommendations Recommend post-acute placement for additional physical therapy services prior to discharge home

## 2022-02-17 NOTE — PROGRESS NOTES
0000:  Assumed care of pt @ 0000. Bedside report completed with ALAN Marie. Pt A&Ox4, VSS, denying any pain or discomfort. Updated on POC. All questions answered at this time.

## 2022-02-17 NOTE — THERAPY
"Occupational Therapy   Initial Evaluation     Patient Name: Yolanda Reed  Age:  61 y.o., Sex:  female  Medical Record #: [id number]  Today's Date: 2/17/2022     Precautions  Precautions: Fall Risk  Comments: c/s surgery in January, has soft collar at beside which is ill fitting    Assessment  Patient is 61 y.o. female admitted for nausea, vomitting, falls pt had C5-T1 ACDF on 1/6 with Dr. Werner. Pt normally independent with all ADLs and functional mobility prior to surgery. Pt describes weakness of BUE, notable to right hand as well as numbness to fingers and intermittent falls which patient describes as \"her knees giving out.\" Pt required Zhanna for bed mobility and verbal cues for log roll technique, maxA for brace management, Zhanna for lower body ADLs. WIll continue to see for skilled therapy while admitted as well as recommend post-acute placement.    Plan    Recommend Occupational Therapy 3 times per week until therapy goals are met for the following treatments:  Adaptive Equipment, Neuro Re-Education / Balance, Self Care/Activities of Daily Living, Therapeutic Activities, and Therapeutic Exercises.      Subjective    \"I thought I would be perfectly fine after surgery\"     Objective       02/17/22 1101   Prior Living Situation   Prior Services Intermittent Physical Support for ADL Per Service   Housing / Facility 1 Story Apartment / Condo   Steps Into Home 0   Steps In Home 1   Rail Right Rail  (Steps in Home)   Bathroom Set up Bathtub / Shower Combination   Equipment Owned Front-Wheel Walker;Single Point Cane   Lives with - Patient's Self Care Capacity Significant Other   Comments SO able to provide intermittent assist   Prior Level of ADL Function   Self Feeding Independent   Grooming / Hygiene Independent   Bathing Independent   Dressing Independent   Toileting Independent   Prior Level of IADL Function   Medication Management Requires Assist   Laundry Requires Assist   Kitchen Mobility Requires Assist "   Finances Requires Assist   Home Management Requires Assist   Shopping Requires Assist   Prior Level Of Mobility Independent With Device in Home   Driving / Transportation Relatives / Others Provide Transportation   Occupation (Pre-Hospital Vocational) Retired Due To Disability   History of Falls   History of Falls Yes   Precautions   Precautions Fall Risk;Spinal / Back Precautions    Comments soft collar   Pain 0 - 10 Group   Therapist Pain Assessment Post Activity Pain Same as Prior to Activity;Nurse Notified   Cognition    Cognition / Consciousness WDL   Level of Consciousness Alert   Comments Tearful, cooperative with therapy   Active ROM Upper Body   Active ROM Upper Body  X   Dominant Hand Right   Rt Hand Minimal Impaired   Strength Upper Body   Upper Body Strength  X   Right  Impaired   Left  Impaired   Gross Strength Generalized Weakness, Equal Bilaterally.    Sensation Upper Body   Upper Extremity Sensation  X   Rt Upper Extremity Light Touch Impaired   Lt Upper Extremity Light Touch Impaired   Upper Body Muscle Tone   Upper Body Muscle Tone  X   Rt Upper Extremity Muscle Tone Hypotonic   Lt Upper Extremity Muscle Tone Hypotonic   Coordination Upper Body   Coordination X   Fine Motor Coordination RUE impaired   Balance Assessment   Sitting Balance (Static) Fair +   Sitting Balance (Dynamic) Fair +   Standing Balance (Static) Fair   Standing Balance (Dynamic) Fair -   Weight Shift Sitting Fair   Weight Shift Standing Fair   Comments w/ FWW   Bed Mobility    Supine to Sit Minimal Assist   Sit to Supine Minimal Assist   Scooting Contact Guard Assist   Rolling Minimal Assist to Rt.   Comments cues for log roll technique   ADL Assessment   Grooming Supervision;Seated   Upper Body Dressing Minimal Assist   Lower Body Dressing Minimal Assist   How much help from another person does the patient currently need...   Putting on and taking off regular lower body clothing? 3   Bathing (including washing,  rinsing, and drying)? 3   Toileting, which includes using a toilet, bedpan, or urinal? 3   Putting on and taking off regular upper body clothing? 3   Taking care of personal grooming such as brushing teeth? 3   Eating meals? 3   6 Clicks Daily Activity Score 18   Functional Mobility   Sit to Stand Contact Guard Assist   Bed, Chair, Wheelchair Transfer Contact Guard Assist   Toilet Transfers Refused   Transfer Method Stand Step   Mobility bed mobility, mobility in room, back to bed   Comments w/ FWW   Activity Tolerance   Sitting Edge of Bed 7 min   Standing 5 min   Short Term Goals   Short Term Goal # 1 Pt will complete all ADL transfers with supervision   Short Term Goal # 2 Pt will complete LB dressing with supervision   Short Term Goal # 3 Pt will complete toileting with supervision   Education Group   Education Provided Role of Occupational Therapist;Back Safety   Role of Occupational Therapist Patient Response Patient;Acceptance;Explanation   Back Safety Patient Response Patient;Acceptance;Explanation;Action Demonstration   Problem List   Problem List Decreased Active Daily Living Skills;Decreased Homemaking Skills;Decreased Upper Extremity Strength Right;Decreased Upper Extremity Strength Left;Decreased Functional Mobility;Decreased Activity Tolerance;Impaired Postural Control / Balance;Limited Knowledge of Post Op Precautions;Safety Awareness Deficits / Cognition   Interdisciplinary Plan of Care Collaboration   IDT Collaboration with  Nursing;Physical Therapist   Patient Position at End of Therapy In Bed;Bed Alarm On;Call Light within Reach;Tray Table within Reach;Phone within Reach   Collaboration Comments RN updated

## 2022-02-17 NOTE — CONSULTS
Physical Medicine and Rehabilitation Consultation              Date of initial consultation: 2/17/2022  Consulting provider: Oswaldo Allen MD  Reason for consultation: assess for acute inpatient rehab appropriateness  LOS: 0 Day(s)    Chief complaint: Failure to thrive    HPI: The patient is a 61 y.o. right hand dominant female with a past medical history of anxiety, hypertension, hyperlipidemia, COVID 2020, depression, PTSD;  who presented on 2/16/2022 10:59 AM with nausea vomiting, generalized weakness and falls.  Patient reported she had C-spine surgery complicated by postop infection requiring IV antibiotics (ceftriaxone?) Through PICC line at home, and has also been taking opioid medications which has contributed to her nausea and vomiting.  Work-up in the ED found hypokalemia hypomagnesemia.      Chart review reveals patient underwent C5-T1 ACDF with Dr. Werner January 6, 2022 for herniated nucleus pulposus, cervical myelopathy, cervical stenosis, and cervical radiculopathy.  Patient also has a discharge summary from 1/19/2022 by Dr. Quentin PHILIPPE describes a readmission for generalized weakness and difficulty ambulating, found to have leukocytosis and tachycardia, however other details are unclear in the documentation.    The patient currently reports numbness and tingling in her feet and elbows, also endorses hands.  She endorses resolution of nausea with Zofran, has some shortness of breath currently on 2 L nasal cannula patient endorses feeling unstable, wobbly.  Patient states that her boyfriend works full-time night shift and is unable to help take care of her at home.    ROS  Pertinent positives are mentioned in the HPI, all others reviewed and are negative.    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    PROCEDURES:  2022 Jama Werner MD  C5--T1 ACDF    PMH:  Past Medical History:   Diagnosis Date   • Anesthesia     slow to wake up and PONV - no problems since per pt   • Anxiety    • Arthritis     shoulders   • Dental disorder     upper dentures   • High cholesterol    • Hypertension 2022    pt states well controlled on meds   • Infectious disease     COVID 2020 and hx of MRSA   • Low blood sugar    • Pneumonia     2020 + COVID   • PONV (postoperative nausea and vomiting)    • Psychiatric problem     depression   • PTSD (post-traumatic stress disorder)    • Sleep apnea     cpap   • Snoring    • Urinary bladder disorder     bladder sling put in 21       PSH:  Past Surgical History:   Procedure Laterality Date   • CORPECTOMY N/A 2022    Procedure: CORPECTOMY, SPINE - PARTIAL;  Surgeon: Jama Werner M.D.;  Location: [facility];  Service: Orthopedics   • CERVICAL DISK AND FUSION ANTERIOR N/A 2022    Procedure: DISCECTOMY, SPINE, CERVICAL, ANTERIOR APPROACH, WITH FUSION - C5-T1;  Surgeon: Jama Werner M.D.;  Location: [facility];  Service: Orthopedics   • BLADDER SLING FEMALE N/A 2021    Procedure: BLADDER SLING, FEMALE - SOLYX.;  Surgeon: Rocío Dewey M.D.;  Location: [facility];  Service: Gynecology   • PB KNEE SCOPE,AID ANT CRUCIATE REPAIR Right 2019    Procedure: RT KNEE ARTHROSCOPY WITH ALLOGRAFT ACL RECONSTRUCTION AND PARTIAL MENISCECTOMY;  Surgeon: Onur Arriaza M.D.;  Location: [facility];  Service: Orthopedics   • BREAST BIOPSY  2010    Performed by ROCÍO BARON at SURGERY SAME DAY [facility]   • GYN SURGERY         • OTHER ORTHOPEDIC SURGERY      acl left knee   • OTHER ORTHOPEDIC SURGERY      right shoulder   • TONSILLECTOMY         FHX:  Family History   Problem Relation Age of Onset   • Alcohol/Drug Mother    • Anesthesia Mother    • Anxiety disorder Mother    • Bipolar  disorder Mother    • Cancer Mother    • Lung Cancer Mother    • Osteoporosis Mother    • Allergies Father    • Kidney stones Father    • Lung Cancer Father    • DVT Sister    • Lung Cancer Sister        Medications:  Current Facility-Administered Medications   Medication Dose   • senna-docusate (PERICOLACE or SENOKOT S) 8.6-50 MG per tablet 2 Tablet  2 Tablet    And   • polyethylene glycol/lytes (MIRALAX) PACKET 1 Packet  1 Packet    And   • magnesium hydroxide (MILK OF MAGNESIA) suspension 30 mL  30 mL    And   • bisacodyl (DULCOLAX) suppository 10 mg  10 mg   • 0.9 % NaCl with KCl 20 mEq infusion     • enoxaparin (LOVENOX) inj 40 mg  40 mg   • acetaminophen (Tylenol) tablet 650 mg  650 mg   • labetalol (NORMODYNE/TRANDATE) injection 10 mg  10 mg   • ondansetron (ZOFRAN) syringe/vial injection 4 mg  4 mg   • ondansetron (ZOFRAN ODT) dispertab 4 mg  4 mg   • promethazine (PHENERGAN) tablet 12.5-25 mg  12.5-25 mg   • promethazine (PHENERGAN) suppository 12.5-25 mg  12.5-25 mg   • prochlorperazine (COMPAZINE) injection 5-10 mg  5-10 mg   • ketorolac (TORADOL) injection 15 mg  15 mg   • gabapentin (NEURONTIN) capsule 200 mg  200 mg   • doxycycline monohydrate (ADOXA) tablet 100 mg  100 mg   • atorvastatin (LIPITOR) tablet 10 mg  10 mg   • propranolol (INDERAL) tablet 80 mg  80 mg   • QUEtiapine (Seroquel) tablet 50 mg  50 mg   • sertraline (Zoloft) tablet 100 mg  100 mg   • cefTRIAXone (Rocephin) syringe 2 g  2 g       Allergies:  Allergies   Allergen Reactions   • Morphine Anaphylaxis     Throat swelling         Physical Exam:  Vitals: /58   Pulse 73   Temp 36.9 °C (98.5 °F) (Temporal)   Resp 17   Ht 1.524 m (5')   Wt 70.3 kg (155 lb)   SpO2 95%   Gen: NAD  Head: NC/AT  Eyes/ Nose/ Mouth:  moist mucous membranes  Cardio: RRR, good distal perfusion, warm extremities  Pulm: normal respiratory effort, no cyanosis   Abd: Soft NTND, negative borborygmi   Ext: No peripheral edema. No calf tenderness. No  clubbing.    Mental status: answers questions appropriately follows commands  Speech: fluent, no aphasia or dysarthria    Motor:      Upper Extremity  Myotome R L   Shoulder flexion C5 4/5 4/5   Elbow flexion C5 4/5 5   Wrist extension C6 4/5 5   Elbow extension C7 4/5 5   Finger flexion C8 3/5 4/5   Finger abduction T1 2/5 3/5     Lower Extremity Myotome R L   Hip flexion L2 4/5 4/5   Knee extension L3 4/5 4/5   Ankle dorsiflexion L4 4/5 4/5   Toe extension L5 4/5 4/5   Ankle plantarflexion S1 4/5 4/5     Sensory:   intact to light touch through out    Labs: Reviewed and significant for   Recent Labs     02/15/22  14222  1200   RBC 3.86* 3.96*   HEMOGLOBIN 11.1* 11.5*   HEMATOCRIT 34.1* 34.3*   PLATELETCT 466* 389     Recent Labs     02/15/22  14222  1200   SODIUM 136 138   POTASSIUM 3.2* 2.9*   CHLORIDE 95* 97   CO2 28 28   GLUCOSE 103* 100*   BUN 4* 3*   CREATININE 0.59 0.49*   CALCIUM 8.6 8.6     Recent Results (from the past 24 hour(s))   EKG    Collection Time: 22 11:38 AM   Result Value Ref Range    Report       [facility]    Test Date:  2022  Pt Name:    CIPRIANO ARCE                  Department: ER  MRN:        [id number]                      Room:       [facility]  Gender:     Female                       Technician: [id number]  :        1960                   Requested By:DALE ROMAN  Order #:    [id number]                    Reading MD: DALE ROMAN MD    Measurements  Intervals                                Axis  Rate:       74                           P:          33  NH:         173                          QRS:        35  QRSD:       91                           T:          -1  QT:         396  QTc:        440    Interpretive Statements  Sinus rhythm  Compared to ECG 2022 14:17:27  ST (T wave) deviation no longer present  Electronically Signed On 2022 12:05:42 PST by DALE ROMAN MD     CBC WITH DIFFERENTIAL     Collection Time: 02/16/22 12:00 PM   Result Value Ref Range    WBC 8.9 4.8 - 10.8 K/uL    RBC 3.96 (L) 4.20 - 5.40 M/uL    Hemoglobin 11.5 (L) 12.0 - 16.0 g/dL    Hematocrit 34.3 (L) 37.0 - 47.0 %    MCV 86.6 81.4 - 97.8 fL    MCH 29.0 27.0 - 33.0 pg    MCHC 33.5 (L) 33.6 - 35.0 g/dL    RDW 45.2 35.9 - 50.0 fL    Platelet Count 389 164 - 446 K/uL    MPV 8.9 (L) 9.0 - 12.9 fL    Neutrophils-Polys 63.10 44.00 - 72.00 %    Lymphocytes 21.00 (L) 22.00 - 41.00 %    Monocytes 12.20 0.00 - 13.40 %    Eosinophils 1.80 0.00 - 6.90 %    Basophils 0.30 0.00 - 1.80 %    Immature Granulocytes 1.60 (H) 0.00 - 0.90 %    Nucleated RBC 0.00 /100 WBC    Neutrophils (Absolute) 5.63 2.00 - 7.15 K/uL    Lymphs (Absolute) 1.87 1.00 - 4.80 K/uL    Monos (Absolute) 1.09 (H) 0.00 - 0.85 K/uL    Eos (Absolute) 0.16 0.00 - 0.51 K/uL    Baso (Absolute) 0.03 0.00 - 0.12 K/uL    Immature Granulocytes (abs) 0.14 (H) 0.00 - 0.11 K/uL    NRBC (Absolute) 0.00 K/uL   CMP    Collection Time: 02/16/22 12:00 PM   Result Value Ref Range    Sodium 138 135 - 145 mmol/L    Potassium 2.9 (L) 3.6 - 5.5 mmol/L    Chloride 97 96 - 112 mmol/L    Co2 28 20 - 33 mmol/L    Anion Gap 13.0 7.0 - 16.0    Glucose 100 (H) 65 - 99 mg/dL    Bun 3 (L) 8 - 22 mg/dL    Creatinine 0.49 (L) 0.50 - 1.40 mg/dL    Calcium 8.6 8.5 - 10.5 mg/dL    AST(SGOT) 18 12 - 45 U/L    ALT(SGPT) 11 2 - 50 U/L    Alkaline Phosphatase 113 (H) 30 - 99 U/L    Total Bilirubin 0.4 0.1 - 1.5 mg/dL    Albumin 3.2 3.2 - 4.9 g/dL    Total Protein 6.0 6.0 - 8.2 g/dL    Globulin 2.8 1.9 - 3.5 g/dL    A-G Ratio 1.1 g/dL   PHOSPHORUS    Collection Time: 02/16/22 12:00 PM   Result Value Ref Range    Phosphorus 3.8 2.5 - 4.5 mg/dL   TROPONIN    Collection Time: 02/16/22 12:00 PM   Result Value Ref Range    Troponin T 16 6 - 19 ng/L   MAGNESIUM    Collection Time: 02/16/22 12:00 PM   Result Value Ref Range    Magnesium 1.0 (L) 1.5 - 2.5 mg/dL   ESTIMATED GFR    Collection Time: 02/16/22 12:00 PM   Result  Value Ref Range    GFR If African American >60 >60 mL/min/1.73 m 2    GFR If Non African American >60 >60 mL/min/1.73 m 2   URINALYSIS    Collection Time: 02/16/22  2:05 PM    Specimen: Urine   Result Value Ref Range    Color Yellow     Character Clear     Specific Gravity 1.011 <1.035    Ph 7.5 5.0 - 8.0    Glucose Negative Negative mg/dL    Ketones Negative Negative mg/dL    Protein Negative Negative mg/dL    Bilirubin Negative Negative    Urobilinogen, Urine 0.2 Negative    Nitrite Negative Negative    Leukocyte Esterase Trace (A) Negative    Occult Blood Negative Negative    Micro Urine Req Microscopic    URINE MICROSCOPIC (W/UA)    Collection Time: 02/16/22  2:05 PM   Result Value Ref Range    WBC 5-10 (A) /hpf    RBC 0-2 /hpf    Bacteria Negative None /hpf    Epithelial Cells Few /hpf    Hyaline Cast 0-2 /lpf         ASSESSMENT:  Patient is a 61 y.o. female admitted with Nausea and vommitting with history of ACDF and post op leukocytosis      [facility] Code / Diagnosis to Support: 0016 - Debility (Non-Cardiac, Non-Pulmonary)    Rehabilitation: Impaired ADLs and mobility  Patient is not a candidate for IPR because she lacks discharge support      Additional Recommendations:  - Recommend SNF placement.   - Awaiting PT/OT evaluations, ok for CM to start looking for SNF options   - Increasing gabapentin to 400mg TID. High risk medication, labs reviewed. CrCl 85.8, GFR>60.   - Medical management per primary team   - Would recommend PO pain control with oxycodone if she cna tolerate it, she has a severe allergy to morphine listed in her chart. Defer to primary team and pharmacology to decide best course of action. First line treatment should be with tylenol. NSAIDs probably are ok at this time, recommend confirming with Dr. Baxetr.  -PMR to follow in the periphery for neuropathic pain      DVT PPX: Lovenox      Thank you for allowing us to participate in the care of this patient.       Siddhartha Davis, DO   Physical Medicine  and Rehabilitation     Please note that this dictation was created using voice recognition software. I have made every reasonable attempt to correct obvious errors, but there may be errors of grammar and possibly content that I did not discover before finalizing the note.

## 2022-02-17 NOTE — PROGRESS NOTES
"Hospital Medicine Daily Progress Note    Date of Service  2/17/2022    Chief Complaint  Yolanda Reed is a 61 y.o. female admitted 2/16/2022 with nausea and falls    Hospital Course  \"Yolanda Reed is a 61 y.o. female who presented 2/16/2022 with nausea, vomiting, generalized weakness, and falls.  Patient recently had a surgery of the cervical spine complicated by wound infection and has been receiving outpatient IV antibiotics therapy through PICC line who has been having nausea which she attributed to her narcotic medications resulted in intractable vomiting and generalized weakness.  She also stated that she has been  falling at home.  Denies any fever chills.  Denies any loss of consciousness.  In ER noted to be hemodynamically stable.  Afebrile.  Lab work was significant for hypokalemia and hypomagnesemia.  UA negative for UTI.  Chest x-ray negative for any acute cardiopulmonary process.\"    Interval Problem Update  Seen and examined, discussed her previous surgeries, discussed with infectious disease  Patient is agreed to try tramadol for pain control understanding that it may not alleviate pain completely but at least will allow her to eat some without nausea    I have personally seen and examined the patient at bedside. I discussed the plan of care with patient, bedside RN and charge RN.    Consultants/Specialty  infectious disease    Code Status  Full Code    Disposition  Patient is not medically cleared for discharge.   Anticipate discharge to to home with close outpatient follow-up.  I have placed the appropriate orders for post-discharge needs.    Review of Systems  ROS     Physical Exam  Temp:  [36.4 °C (97.5 °F)-36.9 °C (98.5 °F)] 36.4 °C (97.5 °F)  Pulse:  [70-99] 70  Resp:  [16-18] 17  BP: ()/(54-77) 98/54  SpO2:  [93 %-98 %] 94 %    Physical Exam    Fluids  No intake or output data in the 24 hours ending 02/17/22 1320    Laboratory  Recent Labs     02/15/22  1425 02/16/22  1200 " 02/17/22  0600   WBC 9.0 8.9 4.8   RBC 3.86* 3.96* 4.92   HEMOGLOBIN 11.1* 11.5* 13.8   HEMATOCRIT 34.1* 34.3* 43.5   MCV 88.3 86.6 88.4   MCH 28.8 29.0 28.0   MCHC 32.6* 33.5* 31.7*   RDW 45.3 45.2 48.0   PLATELETCT 466* 389 290   MPV 9.4 8.9* 9.2     Recent Labs     02/15/22  1425 02/16/22  1200 02/17/22  0600   SODIUM 136 138 140   POTASSIUM 3.2* 2.9* 4.4   CHLORIDE 95* 97 102   CO2 28 28 29   GLUCOSE 103* 100* 92   BUN 4* 3* 5*   CREATININE 0.59 0.49* 0.61   CALCIUM 8.6 8.6 8.3*                   Imaging  DX-CHEST-PORTABLE (1 VIEW)   Final Result         No acute cardiac or pulmonary abnormality is identified.           Assessment/Plan  * Falls frequently- (present on admission)  Assessment & Plan  For being feeling weak with recent neck surgery.  PT/OT eval pending  PMR eval pending    Wound infection after surgery- (present on admission)  Assessment & Plan  Recently had surgery of the cervical spine complicated with wound infection and has been on antibiotics as an outpatient per  Not clear what her regimen and pharmacy will look into it  Received a dose of Rocephin by ER physician.  We will continue doxycycline reported on her home medications.    Electrolyte imbalance- (present on admission)  Assessment & Plan  Including hypokalemia and hypomagnesemia likely due to intractable nausea and vomiting.  Start on replacement therapy per  Monitor and replete as needed.    Nausea and vomiting  Assessment & Plan  Seems to be tolerating Toradol, adding tramadol as needed  Continue gabapentin       VTE prophylaxis: enoxaparin ppx    I have performed a physical exam and reviewed and updated ROS and Plan today (2/17/2022). In review of yesterday's note (2/16/2022), there are no changes except as documented above.

## 2022-02-18 LAB
BASOPHILS # BLD AUTO: 0.4 % (ref 0–1.8)
BASOPHILS # BLD: 0.03 K/UL (ref 0–0.12)
EOSINOPHIL # BLD AUTO: 0.23 K/UL (ref 0–0.51)
EOSINOPHIL NFR BLD: 2.7 % (ref 0–6.9)
ERYTHROCYTE [DISTWIDTH] IN BLOOD BY AUTOMATED COUNT: 48.5 FL (ref 35.9–50)
HCT VFR BLD AUTO: 31.7 % (ref 37–47)
HGB BLD-MCNC: 10.1 G/DL (ref 12–16)
IMM GRANULOCYTES # BLD AUTO: 0.08 K/UL (ref 0–0.11)
IMM GRANULOCYTES NFR BLD AUTO: 0.9 % (ref 0–0.9)
LYMPHOCYTES # BLD AUTO: 2.82 K/UL (ref 1–4.8)
LYMPHOCYTES NFR BLD: 33.4 % (ref 22–41)
MCH RBC QN AUTO: 28.9 PG (ref 27–33)
MCHC RBC AUTO-ENTMCNC: 31.9 G/DL (ref 33.6–35)
MCV RBC AUTO: 90.6 FL (ref 81.4–97.8)
MONOCYTES # BLD AUTO: 1.07 K/UL (ref 0–0.85)
MONOCYTES NFR BLD AUTO: 12.7 % (ref 0–13.4)
NEUTROPHILS # BLD AUTO: 4.21 K/UL (ref 2–7.15)
NEUTROPHILS NFR BLD: 49.9 % (ref 44–72)
NRBC # BLD AUTO: 0 K/UL
NRBC BLD-RTO: 0 /100 WBC
PLATELET # BLD AUTO: 344 K/UL (ref 164–446)
PMV BLD AUTO: 9.3 FL (ref 9–12.9)
RBC # BLD AUTO: 3.5 M/UL (ref 4.2–5.4)
WBC # BLD AUTO: 8.4 K/UL (ref 4.8–10.8)

## 2022-02-18 PROCEDURE — A9270 NON-COVERED ITEM OR SERVICE: HCPCS | Performed by: HOSPITALIST

## 2022-02-18 PROCEDURE — 96376 TX/PRO/DX INJ SAME DRUG ADON: CPT

## 2022-02-18 PROCEDURE — 700111 HCHG RX REV CODE 636 W/ 250 OVERRIDE (IP): Performed by: FAMILY MEDICINE

## 2022-02-18 PROCEDURE — G0378 HOSPITAL OBSERVATION PER HR: HCPCS

## 2022-02-18 PROCEDURE — 700102 HCHG RX REV CODE 250 W/ 637 OVERRIDE(OP): Performed by: HOSPITALIST

## 2022-02-18 PROCEDURE — A9270 NON-COVERED ITEM OR SERVICE: HCPCS | Performed by: FAMILY MEDICINE

## 2022-02-18 PROCEDURE — 99226 PR SUBSEQUENT OBSERVATION CARE,LEVEL III: CPT | Performed by: HOSPITALIST

## 2022-02-18 PROCEDURE — 700102 HCHG RX REV CODE 250 W/ 637 OVERRIDE(OP): Performed by: PHYSICAL MEDICINE & REHABILITATION

## 2022-02-18 PROCEDURE — 700102 HCHG RX REV CODE 250 W/ 637 OVERRIDE(OP): Performed by: FAMILY MEDICINE

## 2022-02-18 PROCEDURE — 700111 HCHG RX REV CODE 636 W/ 250 OVERRIDE (IP): Performed by: NURSE PRACTITIONER

## 2022-02-18 PROCEDURE — 85025 COMPLETE CBC W/AUTO DIFF WBC: CPT

## 2022-02-18 PROCEDURE — A9270 NON-COVERED ITEM OR SERVICE: HCPCS | Performed by: PHYSICAL MEDICINE & REHABILITATION

## 2022-02-18 PROCEDURE — 96372 THER/PROPH/DIAG INJ SC/IM: CPT

## 2022-02-18 PROCEDURE — 99213 OFFICE O/P EST LOW 20 MIN: CPT | Performed by: PHYSICAL MEDICINE & REHABILITATION

## 2022-02-18 RX ORDER — OXYCODONE HYDROCHLORIDE AND ACETAMINOPHEN 5; 325 MG/1; MG/1
1 TABLET ORAL EVERY 4 HOURS PRN
Status: DISCONTINUED | OUTPATIENT
Start: 2022-02-18 | End: 2022-02-22 | Stop reason: HOSPADM

## 2022-02-18 RX ADMIN — ATORVASTATIN CALCIUM 10 MG: 10 TABLET, FILM COATED ORAL at 19:41

## 2022-02-18 RX ADMIN — GABAPENTIN 400 MG: 400 CAPSULE ORAL at 04:56

## 2022-02-18 RX ADMIN — OXYCODONE HYDROCHLORIDE AND ACETAMINOPHEN 1 TABLET: 5; 325 TABLET ORAL at 19:42

## 2022-02-18 RX ADMIN — OXYCODONE HYDROCHLORIDE AND ACETAMINOPHEN 1 TABLET: 5; 325 TABLET ORAL at 12:18

## 2022-02-18 RX ADMIN — KETOROLAC TROMETHAMINE 30 MG: 30 INJECTION, SOLUTION INTRAMUSCULAR; INTRAVENOUS at 00:02

## 2022-02-18 RX ADMIN — DOCUSATE SODIUM 50 MG AND SENNOSIDES 8.6 MG 2 TABLET: 8.6; 5 TABLET, FILM COATED ORAL at 04:55

## 2022-02-18 RX ADMIN — PROPRANOLOL HYDROCHLORIDE 80 MG: 40 TABLET ORAL at 19:42

## 2022-02-18 RX ADMIN — DOXYCYCLINE 100 MG: 100 TABLET, FILM COATED ORAL at 04:55

## 2022-02-18 RX ADMIN — ONDANSETRON 4 MG: 2 INJECTION INTRAMUSCULAR; INTRAVENOUS at 19:41

## 2022-02-18 RX ADMIN — ENOXAPARIN SODIUM 40 MG: 40 INJECTION SUBCUTANEOUS at 04:56

## 2022-02-18 RX ADMIN — ONDANSETRON 4 MG: 4 TABLET, ORALLY DISINTEGRATING ORAL at 12:19

## 2022-02-18 RX ADMIN — DOXYCYCLINE 100 MG: 100 TABLET, FILM COATED ORAL at 17:24

## 2022-02-18 RX ADMIN — GABAPENTIN 400 MG: 400 CAPSULE ORAL at 17:24

## 2022-02-18 RX ADMIN — GABAPENTIN 400 MG: 400 CAPSULE ORAL at 12:19

## 2022-02-18 RX ADMIN — TRAMADOL HYDROCHLORIDE 50 MG: 50 TABLET, COATED ORAL at 04:55

## 2022-02-18 RX ADMIN — SERTRALINE HYDROCHLORIDE 100 MG: 100 TABLET ORAL at 17:24

## 2022-02-18 RX ADMIN — CEFTRIAXONE SODIUM 2 G: 10 INJECTION, POWDER, FOR SOLUTION INTRAVENOUS at 04:56

## 2022-02-18 RX ADMIN — QUETIAPINE FUMARATE 50 MG: 100 TABLET ORAL at 19:41

## 2022-02-18 ASSESSMENT — PAIN DESCRIPTION - PAIN TYPE
TYPE: ACUTE PAIN

## 2022-02-18 ASSESSMENT — ENCOUNTER SYMPTOMS
NAUSEA: 0
COUGH: 0
NECK PAIN: 1
SHORTNESS OF BREATH: 0
FALLS: 1
CONSTIPATION: 0
DIARRHEA: 0
ABDOMINAL PAIN: 0
VOMITING: 0
CHILLS: 0
FEVER: 0

## 2022-02-18 ASSESSMENT — FIBROSIS 4 INDEX: FIB4 SCORE: 1.24

## 2022-02-18 NOTE — CARE PLAN
The patient is Stable - Low risk of patient condition declining or worsening    Shift Goals  Clinical Goals: Pain management  Patient Goals: pain management  Family Goals: no family present    Progress made toward(s) clinical / shift goals: 0-10 pain scale in place. Pt medicated per MAR and repositioned for comfort.     Patient is not progressing towards the following goals: N/A

## 2022-02-18 NOTE — PROGRESS NOTES
Physical Medicine and Rehabilitation Consultation              Date of initial consultation: 2/17/2022  Consulting provider: Oswaldo Allen MD  Reason for consultation: assess for acute inpatient rehab appropriateness  LOS: 0 Day(s)    Chief complaint: Failure to thrive    HPI: The patient is a 61 y.o. right hand dominant female with a past medical history of anxiety, hypertension, hyperlipidemia, COVID 2020, depression, PTSD;  who presented on 2/16/2022 10:59 AM with nausea vomiting, generalized weakness and falls.  Patient reported she had C-spine surgery complicated by postop infection requiring IV antibiotics (ceftriaxone?) Through PICC line at home, and has also been taking opioid medications which has contributed to her nausea and vomiting.  Work-up in the ED found hypokalemia hypomagnesemia.      Chart review reveals patient underwent C5-T1 ACDF with Dr. Werner January 6, 2022 for herniated nucleus pulposus, cervical myelopathy, cervical stenosis, and cervical radiculopathy.  Patient also has a discharge summary from 1/19/2022 by Dr. Quentin PHILIPPE describes a readmission for generalized weakness and difficulty ambulating, found to have leukocytosis and tachycardia, however other details are unclear in the documentation.    The patient currently reports numbness and tingling in her feet and elbows, also endorses hands.  She endorses resolution of nausea with Zofran, has some shortness of breath currently on 2 L nasal cannula patient endorses feeling unstable, wobbly.  Patient states that her boyfriend works full-time night shift and is unable to help take care of her at home.    2/18/2022  Patient seen in follow-up for neuropathic pain management. Patient states that she still has some numbness, but it is not bothersome to her at this time. Patient is currently on 400 mg gabapentin, which she is tolerating well without lethargy or decrease in creatinine clearance or GFR.    ROS  Pertinent positives are  mentioned in the HPI, all others reviewed and are negative.    Social Hx:  1 SH  0 MASON, 4 steps inside   With: boyfriend    THERAPY:  Restrictions: Fall risk   PT: Functional mobility   12/17: Walking 25 feet with front wheel walker at contact-guard assist    OT: ADLs  12/17: Min assist for dressing    SLP:   [facility]    IMAGING:  CXR 2/196   No acute cardiac or pulmonary abnormality is identified.    PROCEDURES:  1/6/2022 Jama Werner MD  C5--T1 ACDF    PMH:  Past Medical History:   Diagnosis Date   • Anesthesia 2006    slow to wake up and PONV - no problems since per pt   • Anxiety    • Arthritis     shoulders   • Dental disorder     upper dentures   • High cholesterol    • Hypertension 01/05/2022    pt states well controlled on meds   • Infectious disease     COVID Nov 2020 and hx of MRSA   • Low blood sugar    • Pneumonia     Nov 2020 + COVID   • PONV (postoperative nausea and vomiting)    • Psychiatric problem     depression   • PTSD (post-traumatic stress disorder)    • Sleep apnea     cpap   • Snoring    • Urinary bladder disorder     bladder sling put in 6/29/21       PSH:  Past Surgical History:   Procedure Laterality Date   • CORPECTOMY N/A 1/6/2022    Procedure: CORPECTOMY, SPINE - PARTIAL;  Surgeon: Jama Werner M.D.;  Location: [facility];  Service: Orthopedics   • CERVICAL DISK AND FUSION ANTERIOR N/A 1/6/2022    Procedure: DISCECTOMY, SPINE, CERVICAL, ANTERIOR APPROACH, WITH FUSION - C5-T1;  Surgeon: Jama Werner M.D.;  Location: [facility];  Service: Orthopedics   • BLADDER SLING FEMALE N/A 6/29/2021    Procedure: BLADDER SLING, FEMALE - SOLYX.;  Surgeon: Rocío Dewey M.D.;  Location: [facility];  Service: Gynecology   • PB KNEE SCOPE,AID ANT CRUCIATE REPAIR Right 11/25/2019    Procedure: RT KNEE ARTHROSCOPY WITH ALLOGRAFT ACL RECONSTRUCTION AND PARTIAL MENISCECTOMY;  Surgeon: Onur Arriaza M.D.;  Location: [facility];  Service: Orthopedics   • BREAST BIOPSY   2010    Performed by HANS BARON at SURGERY SAME DAY [facility] ORS   • GYN SURGERY  ,       • OTHER ORTHOPEDIC SURGERY      acl left knee   • OTHER ORTHOPEDIC SURGERY      right shoulder   • TONSILLECTOMY         FHX:  Family History   Problem Relation Age of Onset   • Alcohol/Drug Mother    • Anesthesia Mother    • Anxiety disorder Mother    • Bipolar disorder Mother    • Cancer Mother    • Lung Cancer Mother    • Osteoporosis Mother    • Allergies Father    • Kidney stones Father    • Lung Cancer Father    • DVT Sister    • Lung Cancer Sister        Medications:  Current Facility-Administered Medications   Medication Dose   • oxyCODONE-acetaminophen (PERCOCET) 5-325 MG per tablet 1 Tablet  1 Tablet   • gabapentin (NEURONTIN) capsule 400 mg  400 mg   • traMADol (ULTRAM) 50 MG tablet 50 mg  50 mg   • senna-docusate (PERICOLACE or SENOKOT S) 8.6-50 MG per tablet 2 Tablet  2 Tablet    And   • polyethylene glycol/lytes (MIRALAX) PACKET 1 Packet  1 Packet    And   • magnesium hydroxide (MILK OF MAGNESIA) suspension 30 mL  30 mL    And   • bisacodyl (DULCOLAX) suppository 10 mg  10 mg   • enoxaparin (LOVENOX) inj 40 mg  40 mg   • acetaminophen (Tylenol) tablet 650 mg  650 mg   • labetalol (NORMODYNE/TRANDATE) injection 10 mg  10 mg   • ondansetron (ZOFRAN) syringe/vial injection 4 mg  4 mg   • ondansetron (ZOFRAN ODT) dispertab 4 mg  4 mg   • promethazine (PHENERGAN) tablet 12.5-25 mg  12.5-25 mg   • promethazine (PHENERGAN) suppository 12.5-25 mg  12.5-25 mg   • prochlorperazine (COMPAZINE) injection 5-10 mg  5-10 mg   • doxycycline monohydrate (ADOXA) tablet 100 mg  100 mg   • atorvastatin (LIPITOR) tablet 10 mg  10 mg   • propranolol (INDERAL) tablet 80 mg  80 mg   • QUEtiapine (Seroquel) tablet 50 mg  50 mg   • sertraline (Zoloft) tablet 100 mg  100 mg   • cefTRIAXone (Rocephin) syringe 2 g  2 g       Allergies:  Allergies   Allergen Reactions   • Morphine Anaphylaxis     Throat swelling          Physical Exam:  Vitals: /71   Pulse 68   Temp 36.1 °C (96.9 °F) (Temporal)   Resp 16   Ht 1.524 m (5')   Wt 73.7 kg (162 lb 7.7 oz)   SpO2 94%   Gen: NAD  Head: NC/AT  Eyes/ Nose/ Mouth:  moist mucous membranes  Cardio: RRR, good distal perfusion, warm extremities  Pulm: normal respiratory effort, no cyanosis   Abd: Soft NTND, negative borborygmi   Ext: No peripheral edema. No calf tenderness. No clubbing.    Labs: Reviewed and significant for   Recent Labs     02/16/22  1200 02/17/22  0600 02/18/22  0750   RBC 3.96* 4.92 3.50*   HEMOGLOBIN 11.5* 13.8 10.1*   HEMATOCRIT 34.3* 43.5 31.7*   PLATELETCT 389 290 344     Recent Labs     02/16/22  1200 02/17/22  0600   SODIUM 138 140   POTASSIUM 2.9* 4.4   CHLORIDE 97 102   CO2 28 29   GLUCOSE 100* 92   BUN 3* 5*   CREATININE 0.49* 0.61   CALCIUM 8.6 8.3*     Recent Results (from the past 24 hour(s))   CBC WITH DIFFERENTIAL    Collection Time: 02/18/22  7:50 AM   Result Value Ref Range    WBC 8.4 4.8 - 10.8 K/uL    RBC 3.50 (L) 4.20 - 5.40 M/uL    Hemoglobin 10.1 (L) 12.0 - 16.0 g/dL    Hematocrit 31.7 (L) 37.0 - 47.0 %    MCV 90.6 81.4 - 97.8 fL    MCH 28.9 27.0 - 33.0 pg    MCHC 31.9 (L) 33.6 - 35.0 g/dL    RDW 48.5 35.9 - 50.0 fL    Platelet Count 344 164 - 446 K/uL    MPV 9.3 9.0 - 12.9 fL    Neutrophils-Polys 49.90 44.00 - 72.00 %    Lymphocytes 33.40 22.00 - 41.00 %    Monocytes 12.70 0.00 - 13.40 %    Eosinophils 2.70 0.00 - 6.90 %    Basophils 0.40 0.00 - 1.80 %    Immature Granulocytes 0.90 0.00 - 0.90 %    Nucleated RBC 0.00 /100 WBC    Neutrophils (Absolute) 4.21 2.00 - 7.15 K/uL    Lymphs (Absolute) 2.82 1.00 - 4.80 K/uL    Monos (Absolute) 1.07 (H) 0.00 - 0.85 K/uL    Eos (Absolute) 0.23 0.00 - 0.51 K/uL    Baso (Absolute) 0.03 0.00 - 0.12 K/uL    Immature Granulocytes (abs) 0.08 0.00 - 0.11 K/uL    NRBC (Absolute) 0.00 K/uL         ASSESSMENT:  Patient is a 61 y.o. female admitted with Nausea and vommitting with history of ACDF and post op  leukocytosis      [facility] Code / Diagnosis to Support: 0016 - Debility (Non-Cardiac, Non-Pulmonary)    Rehabilitation: Impaired ADLs and mobility  Patient is not a candidate for IPR because she lacks discharge support      Additional Recommendations:  - Recommend SNF placement.   -Continue gabapentin to 400mg TID. Follow-up with PCP for continued surveillance and dose titration  - Medical management per primary team   -PMR will sign off, please reconsult or reach out via Voalte if further evaluation or medical management is requested      DVT PPX: Lovenox      Thank you for allowing us to participate in the care of this patient.     Patient was seen for 26 minutes on unit/floor of which > 50% of time was spent on counseling and coordination of care regarding the above, including prognosis, risk reduction, benefits of treatment, and options for next stage of care.      Siddhartha Davis, DO   Physical Medicine and Rehabilitation     Please note that this dictation was created using voice recognition software. I have made every reasonable attempt to correct obvious errors, but there may be errors of grammar and possibly content that I did not discover before finalizing the note.

## 2022-02-18 NOTE — PROGRESS NOTES
4 Eyes Skin Assessment Completed by ALAN Marie and ALAN Marina.    Head WDL  Ears Redness and Blanching  Nose WDL  Mouth WDL  Neck Incision  Breast/Chest WDL  Shoulder Blades WDL  Spine WDL  (R) Arm/Elbow/Hand Bruising  (L) Arm/Elbow/Hand WDL  Abdomen Bruising  Groin WDL  Scrotum/Coccyx/Buttocks WDL  (R) Leg Abrasion  (L) Leg Bruising and Abrasion  (R) Heel/Foot/Toe WDL  (L) Heel/Foot/Toe WDL          Devices In Places Blood Pressure Cuff and Nasal Cannula      Interventions In Place Pillows and Pressure Redistribution Mattress    Possible Skin Injury Yes    Pictures Uploaded Into Epic Yes  Wound Consult Placed N/A  RN Wound Prevention Protocol Ordered Yes

## 2022-02-18 NOTE — DISCHARGE PLANNING
Please review the consult from Dr. Davis regarding post acute recommendations.  [facility] will no longer follow.  Please reach out to myself with any interval changes/questions.

## 2022-02-18 NOTE — CARE PLAN
The patient is Stable - Low risk of patient condition declining or worsening    Shift Goals  Clinical Goals: safety, free from falls  Patient Goals: pain management  Family Goals: no family present    Progress made toward(s) clinical / shift goals:  Pt c/o pain overnight. One dose of 30mg IV toradol ordered. Patient states this helped pain.       Problem: Knowledge Deficit - Standard  Goal: Patient and family/care givers will demonstrate understanding of plan of care, disease process/condition, diagnostic tests and medications  Outcome: Progressing     Problem: Fall Risk  Goal: Patient will remain free from falls  Outcome: Progressing     Problem: Pain - Standard  Goal: Alleviation of pain or a reduction in pain to the patient’s comfort goal  Outcome: Progressing       Patient is not progressing towards the following goals:

## 2022-02-18 NOTE — DISCHARGE PLANNING
Received Choice form at 6029  Agency/Facility Name: [facility]   Referral sent per Choice form @ 3490
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
## 2022-02-18 NOTE — PROGRESS NOTES
"Hospital Medicine Daily Progress Note    Date of Service  2/18/2022    Chief Complaint  Yolanda Reed is a 61 y.o. female admitted 2/16/2022 with nausea and falls    Hospital Course  \"Yolanda Reed is a 61 y.o. female who presented 2/16/2022 with nausea, vomiting, generalized weakness, and falls.  Patient recently had a surgery of the cervical spine complicated by wound infection and has been receiving outpatient IV antibiotics therapy through PICC line who has been having nausea which she attributed to her narcotic medications resulted in intractable vomiting and generalized weakness.  She also stated that she has been  falling at home.  Denies any fever chills.  Denies any loss of consciousness.  In ER noted to be hemodynamically stable.  Afebrile.  Lab work was significant for hypokalemia and hypomagnesemia.  UA negative for UTI.  Chest x-ray negative for any acute cardiopulmonary process.\"    Interval Problem Update  Seen and examined, discussed her previous surgeries, discussed with infectious disease  Social situation at home is poor, will likely need SNF vs reside in hospital to finish ABX  Adding narcotics with antiemetics    I have personally seen and examined the patient at bedside. I discussed the plan of care with patient, bedside RN and charge RN.    Consultants/Specialty  infectious disease    Code Status  Full Code    Disposition  Patient is not medically cleared for discharge.   Anticipate discharge to tbd.  I have placed the appropriate orders for post-discharge needs.    Review of Systems  Review of Systems   Constitutional: Negative for chills and fever.   Cardiovascular: Negative for chest pain.        Physical Exam  Temp:  [36.1 °C (96.9 °F)-36.7 °C (98.1 °F)] 36.1 °C (96.9 °F)  Pulse:  [68-85] 68  Resp:  [16-17] 16  BP: (102-139)/(50-96) 105/71  SpO2:  [94 %-97 %] 94 %    Physical Exam  Vitals and nursing note reviewed.   Constitutional:       General: She is not in acute distress.     " Appearance: She is not ill-appearing.   HENT:      Head: Normocephalic and atraumatic.      Nose: No congestion.      Mouth/Throat:      Comments: Bandages anterior neck clean dry intact  Eyes:      General:         Right eye: No discharge.         Left eye: No discharge.      Extraocular Movements: Extraocular movements intact.      Conjunctiva/sclera: Conjunctivae normal.   Cardiovascular:      Rate and Rhythm: Normal rate.      Pulses: Normal pulses.      Heart sounds: Normal heart sounds.   Pulmonary:      Effort: Pulmonary effort is normal. No respiratory distress.      Breath sounds: Normal breath sounds. No wheezing.   Abdominal:      General: Bowel sounds are normal. There is no distension.      Palpations: Abdomen is soft.   Musculoskeletal:      Cervical back: Normal range of motion and neck supple.   Skin:     General: Skin is warm and dry.   Neurological:      General: No focal deficit present.      Mental Status: She is alert and oriented to person, place, and time.      Cranial Nerves: No cranial nerve deficit.   Psychiatric:         Mood and Affect: Mood normal.         Thought Content: Thought content normal.         Fluids    Intake/Output Summary (Last 24 hours) at 2/18/2022 1456  Last data filed at 2/18/2022 1224  Gross per 24 hour   Intake 300 ml   Output --   Net 300 ml       Laboratory  Recent Labs     02/16/22  1200 02/17/22  0600 02/18/22  0750   WBC 8.9 4.8 8.4   RBC 3.96* 4.92 3.50*   HEMOGLOBIN 11.5* 13.8 10.1*   HEMATOCRIT 34.3* 43.5 31.7*   MCV 86.6 88.4 90.6   MCH 29.0 28.0 28.9   MCHC 33.5* 31.7* 31.9*   RDW 45.2 48.0 48.5   PLATELETCT 389 290 344   MPV 8.9* 9.2 9.3     Recent Labs     02/16/22  1200 02/17/22  0600   SODIUM 138 140   POTASSIUM 2.9* 4.4   CHLORIDE 97 102   CO2 28 29   GLUCOSE 100* 92   BUN 3* 5*   CREATININE 0.49* 0.61   CALCIUM 8.6 8.3*                   Imaging  DX-CHEST-PORTABLE (1 VIEW)   Final Result         No acute cardiac or pulmonary abnormality is identified.            Assessment/Plan  * Wound infection after surgery- (present on admission)  Assessment & Plan  Recently had surgery of the cervical spine complicated with wound infection and has been on antibiotics as an outpatient per  Continue OP ABX regimen  ID following    Electrolyte imbalance- (present on admission)  Assessment & Plan  Including hypokalemia and hypomagnesemia likely due to intractable nausea and vomiting.  Start on replacement therapy per  Monitor and replete as needed.    Falls frequently- (present on admission)  Assessment & Plan  For being feeling weak with recent neck surgery.  Would benefit from SNF    Nausea and vomiting  Assessment & Plan  Seems to be tolerating Toradol, adding tramadol as needed  Continue gabapentin    Trial of narcotics with antiemetics started       VTE prophylaxis: enoxaparin ppx    I have performed a physical exam and reviewed and updated ROS and Plan today (2/18/2022). In review of yesterday's note (2/17/2022), there are no changes except as documented above.
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
## 2022-02-18 NOTE — PROGRESS NOTES
Infectious Disease Progress Note    Author: Salas Livingston M.D. Date & Time created: 2/18/2022  12:58 PM    Ceftriaxone 2/1-day #17    2/18: The patient is a bit more alert today.  She states that her neck is improving some.  She is complaining of some pain in her forearms and elbows at this point.  She attributes that pain discomfort to her falling on her elbows at home.  She has no other specific complaints at this time.    Interval History:  Past 24 hours reviewed with RN.    Labs Reviewed, Medications Reviewed, Radiology Reviewed, Wound Reviewed, Fluids Reviewed and GI Nutrition Reviewed    Review of Systems:  Review of Systems   Constitutional: Negative for chills and fever.   Respiratory: Negative for cough and shortness of breath.    Cardiovascular: Negative for chest pain.   Gastrointestinal: Negative for abdominal pain, constipation, diarrhea, nausea and vomiting.   Genitourinary: Negative for dysuria.   Musculoskeletal: Positive for falls (She is complaining of some pain in both of her elbows and forearms as a result of a fall on her elbows a few days ago.) and neck pain (Slightly better).   Skin: Negative for itching.       Physical Exam:  Physical Exam  Vitals and nursing note reviewed.   Constitutional:       General: She is not in acute distress.     Appearance: Normal appearance. She is overweight. She is not ill-appearing, toxic-appearing or diaphoretic.   HENT:      Head: Normocephalic and atraumatic.   Cardiovascular:      Rate and Rhythm: Normal rate and regular rhythm.      Heart sounds: No murmur heard.  Pulmonary:      Effort: Pulmonary effort is normal. No respiratory distress.      Breath sounds: No wheezing or rhonchi.   Abdominal:      General: Abdomen is flat. There is no distension.      Palpations: Abdomen is soft.      Tenderness: There is no abdominal tenderness. There is no guarding.   Musculoskeletal:      Comments: Neck wound dressed dressing not removed today.   Skin:      General: Skin is warm and dry.   Neurological:      Mental Status: She is alert and oriented to person, place, and time.   Psychiatric:         Mood and Affect: Affect is flat.         Labs:  Recent Results (from the past 24 hour(s))   CBC WITH DIFFERENTIAL    Collection Time: 22  7:50 AM   Result Value Ref Range    WBC 8.4 4.8 - 10.8 K/uL    RBC 3.50 (L) 4.20 - 5.40 M/uL    Hemoglobin 10.1 (L) 12.0 - 16.0 g/dL    Hematocrit 31.7 (L) 37.0 - 47.0 %    MCV 90.6 81.4 - 97.8 fL    MCH 28.9 27.0 - 33.0 pg    MCHC 31.9 (L) 33.6 - 35.0 g/dL    RDW 48.5 35.9 - 50.0 fL    Platelet Count 344 164 - 446 K/uL    MPV 9.3 9.0 - 12.9 fL    Neutrophils-Polys 49.90 44.00 - 72.00 %    Lymphocytes 33.40 22.00 - 41.00 %    Monocytes 12.70 0.00 - 13.40 %    Eosinophils 2.70 0.00 - 6.90 %    Basophils 0.40 0.00 - 1.80 %    Immature Granulocytes 0.90 0.00 - 0.90 %    Nucleated RBC 0.00 /100 WBC    Neutrophils (Absolute) 4.21 2.00 - 7.15 K/uL    Lymphs (Absolute) 2.82 1.00 - 4.80 K/uL    Monos (Absolute) 1.07 (H) 0.00 - 0.85 K/uL    Eos (Absolute) 0.23 0.00 - 0.51 K/uL    Baso (Absolute) 0.03 0.00 - 0.12 K/uL    Immature Granulocytes (abs) 0.08 0.00 - 0.11 K/uL    NRBC (Absolute) 0.00 K/uL     Results     Procedure Component Value Units Date/Time    URINALYSIS [[id number]]  (Abnormal) Collected: 22 9734    Order Status: Completed Specimen: Urine Updated: 22 1608     Color Yellow     Character Clear     Specific Gravity 1.011     Ph 7.5     Glucose Negative mg/dL      Ketones Negative mg/dL      Protein Negative mg/dL      Bilirubin Negative     Urobilinogen, Urine 0.2     Nitrite Negative     Leukocyte Esterase Trace     Occult Blood Negative     Micro Urine Req Microscopic        Hemodynamics:  Temp (24hrs), Av.4 °C (97.5 °F), Min:36.1 °C (96.9 °F), Max:36.7 °C (98.1 °F)  Temperature: 36.1 °C (96.9 °F)  Pulse  Av  Min: 68  Max: 99   Blood Pressure: 105/71     PICC Single Lumen Left Basilic (Active)   Site  Assessment Clean;Dry;Intact 02/17/22 2000   Line Status Scrubbed the hub prior to access;Flushed;Saline locked 02/17/22 2000   Dressing Type Occlusive;Transparent 02/17/22 2000   Dressing Status Clean;Dry;Intact 02/17/22 2000   Dressing Intervention N/A 02/17/22 2000   Dressing Change Due 02/19/22 02/17/22 2000   Line Necessity Assessed Lack of Peripheral Access;Antibiotic Therapy Greater than 7 Days 02/16/22 2000     Wound:  @WOUNDLDA(4)@     Fluids:  Intake/Output                             02/16/22 0700 - 02/17/22 0659 02/17/22 0700 - 02/18/22 0659 02/18/22 0700 - 02/19/22 0659     0310-7100 6275-9311 Total 5351-6974 5272-7903 Total 4190-9200 5321-3833 Total                    Intake    Total Intake -- -- -- -- -- -- -- -- --       Output    Stool  --  -- --  --  -- --  --  -- --    Number of Times Stooled -- 0 x 0 x -- 0 x 0 x -- -- --    Total Output -- -- -- -- -- -- -- -- --       Net I/O     -- -- -- -- -- -- -- -- --        Weight: 73.7 kg (162 lb 7.7 oz)  GI/Nutrition:  Orders Placed This Encounter   Procedures   • Diet Order Diet: Regular     Standing Status:   Standing     Number of Occurrences:   1     Order Specific Question:   Diet:     Answer:   Regular [1]     Medications:  Current Facility-Administered Medications   Medication Last Admin   • oxyCODONE-acetaminophen (PERCOCET) 5-325 MG per tablet 1 Tablet 1 Tablet at 02/18/22 1218   • gabapentin (NEURONTIN) capsule 400 mg 400 mg at 02/18/22 1219   • traMADol (ULTRAM) 50 MG tablet 50 mg 50 mg at 02/18/22 0455   • senna-docusate (PERICOLACE or SENOKOT S) 8.6-50 MG per tablet 2 Tablet 2 Tablet at 02/18/22 0455    And   • polyethylene glycol/lytes (MIRALAX) PACKET 1 Packet      And   • magnesium hydroxide (MILK OF MAGNESIA) suspension 30 mL      And   • bisacodyl (DULCOLAX) suppository 10 mg     • enoxaparin (LOVENOX) inj 40 mg 40 mg at 02/18/22 0456   • acetaminophen (Tylenol) tablet 650 mg 650 mg at 02/17/22 2120   • labetalol (NORMODYNE/TRANDATE)  injection 10 mg     • ondansetron (ZOFRAN) syringe/vial injection 4 mg     • ondansetron (ZOFRAN ODT) dispertab 4 mg 4 mg at 02/18/22 1219   • promethazine (PHENERGAN) tablet 12.5-25 mg     • promethazine (PHENERGAN) suppository 12.5-25 mg     • prochlorperazine (COMPAZINE) injection 5-10 mg     • doxycycline monohydrate (ADOXA) tablet 100 mg 100 mg at 02/18/22 0455   • atorvastatin (LIPITOR) tablet 10 mg 10 mg at 02/17/22 2120   • propranolol (INDERAL) tablet 80 mg 80 mg at 02/17/22 2120   • QUEtiapine (Seroquel) tablet 50 mg 50 mg at 02/17/22 1744   • sertraline (Zoloft) tablet 100 mg 100 mg at 02/17/22 1744   • cefTRIAXone (Rocephin) syringe 2 g 2 g at 02/18/22 0456     Medical Decision Making, by Problem:  Active Hospital Problems    Diagnosis    • *Falls frequently [R29.6]    • Electrolyte imbalance [E87.8]    • Wound infection after surgery [T81.49XA]    • Nausea and vomiting [R11.2]    IMPRESSION:  1.  Postoperative cervical spine infection with Serratia marcescens, on IV   antibiotic therapy.  2.  Recent pyelonephritis secondary to Serratia marcescens.  3.  Essential hypertension.  4.  Bipolar disorder.  5.  Nausea and vomiting secondary to pain medications.  6.  History of low blood pressure.  7.  History of anxiety.  8.  Obstructive sleep apnea.  9.  History of urinary tract infections.          PLAN:  1.  Continue ceftriaxone.  2.   to work on discharge planning.  3.  Continue to support patient's other medical issues.  4.  Continue to work with medicine team.    The patient is a bit more alert today.  She states that her neck is improving some.  She is complaining of some pain in her forearms and elbows at this point.  She attributes that pain discomfort to her falling on her elbows at home.  She has no other specific complaints at this time.Total time spent with patient today obtaining history, perform physical exam, reviewing labs, reviewing with nursing and dictating this report 25+  minutes with 50% of time spent face-to-face and 100 present time in direct patient care/counseling today.

## 2022-02-19 PROCEDURE — 99225 PR SUBSEQUENT OBSERVATION CARE,LEVEL II: CPT | Performed by: HOSPITALIST

## 2022-02-19 PROCEDURE — A9270 NON-COVERED ITEM OR SERVICE: HCPCS | Performed by: FAMILY MEDICINE

## 2022-02-19 PROCEDURE — 700102 HCHG RX REV CODE 250 W/ 637 OVERRIDE(OP): Performed by: FAMILY MEDICINE

## 2022-02-19 PROCEDURE — 700102 HCHG RX REV CODE 250 W/ 637 OVERRIDE(OP): Performed by: HOSPITALIST

## 2022-02-19 PROCEDURE — 700111 HCHG RX REV CODE 636 W/ 250 OVERRIDE (IP): Performed by: FAMILY MEDICINE

## 2022-02-19 PROCEDURE — A9270 NON-COVERED ITEM OR SERVICE: HCPCS | Performed by: PHYSICAL MEDICINE & REHABILITATION

## 2022-02-19 PROCEDURE — 700102 HCHG RX REV CODE 250 W/ 637 OVERRIDE(OP): Performed by: PHYSICAL MEDICINE & REHABILITATION

## 2022-02-19 PROCEDURE — 96376 TX/PRO/DX INJ SAME DRUG ADON: CPT

## 2022-02-19 PROCEDURE — 96372 THER/PROPH/DIAG INJ SC/IM: CPT

## 2022-02-19 PROCEDURE — A9270 NON-COVERED ITEM OR SERVICE: HCPCS | Performed by: HOSPITALIST

## 2022-02-19 PROCEDURE — G0378 HOSPITAL OBSERVATION PER HR: HCPCS

## 2022-02-19 RX ADMIN — OXYCODONE HYDROCHLORIDE AND ACETAMINOPHEN 1 TABLET: 5; 325 TABLET ORAL at 12:29

## 2022-02-19 RX ADMIN — ATORVASTATIN CALCIUM 10 MG: 10 TABLET, FILM COATED ORAL at 20:16

## 2022-02-19 RX ADMIN — ONDANSETRON 4 MG: 4 TABLET, ORALLY DISINTEGRATING ORAL at 06:20

## 2022-02-19 RX ADMIN — PROPRANOLOL HYDROCHLORIDE 80 MG: 40 TABLET ORAL at 20:17

## 2022-02-19 RX ADMIN — OXYCODONE HYDROCHLORIDE AND ACETAMINOPHEN 1 TABLET: 5; 325 TABLET ORAL at 17:04

## 2022-02-19 RX ADMIN — OXYCODONE HYDROCHLORIDE AND ACETAMINOPHEN 1 TABLET: 5; 325 TABLET ORAL at 00:24

## 2022-02-19 RX ADMIN — ONDANSETRON 4 MG: 4 TABLET, ORALLY DISINTEGRATING ORAL at 12:29

## 2022-02-19 RX ADMIN — GABAPENTIN 400 MG: 400 CAPSULE ORAL at 17:04

## 2022-02-19 RX ADMIN — QUETIAPINE FUMARATE 50 MG: 100 TABLET ORAL at 20:17

## 2022-02-19 RX ADMIN — SERTRALINE HYDROCHLORIDE 100 MG: 100 TABLET ORAL at 17:04

## 2022-02-19 RX ADMIN — ONDANSETRON 4 MG: 4 TABLET, ORALLY DISINTEGRATING ORAL at 00:24

## 2022-02-19 RX ADMIN — ACETAMINOPHEN 650 MG: 325 TABLET, FILM COATED ORAL at 19:27

## 2022-02-19 RX ADMIN — GABAPENTIN 400 MG: 400 CAPSULE ORAL at 06:09

## 2022-02-19 RX ADMIN — GABAPENTIN 400 MG: 400 CAPSULE ORAL at 12:30

## 2022-02-19 RX ADMIN — DOXYCYCLINE 100 MG: 100 TABLET, FILM COATED ORAL at 06:09

## 2022-02-19 RX ADMIN — CEFTRIAXONE SODIUM 2 G: 10 INJECTION, POWDER, FOR SOLUTION INTRAVENOUS at 06:09

## 2022-02-19 RX ADMIN — DOXYCYCLINE 100 MG: 100 TABLET, FILM COATED ORAL at 17:04

## 2022-02-19 RX ADMIN — OXYCODONE HYDROCHLORIDE AND ACETAMINOPHEN 1 TABLET: 5; 325 TABLET ORAL at 06:19

## 2022-02-19 RX ADMIN — ENOXAPARIN SODIUM 40 MG: 40 INJECTION SUBCUTANEOUS at 06:09

## 2022-02-19 RX ADMIN — ONDANSETRON 4 MG: 4 TABLET, ORALLY DISINTEGRATING ORAL at 17:04

## 2022-02-19 ASSESSMENT — ENCOUNTER SYMPTOMS
ABDOMINAL PAIN: 0
COUGH: 0
FALLS: 1
VOMITING: 0
SHORTNESS OF BREATH: 0
CHILLS: 0
DIARRHEA: 0
CONSTIPATION: 0
FEVER: 0
NECK PAIN: 1
NAUSEA: 0

## 2022-02-19 ASSESSMENT — PAIN DESCRIPTION - PAIN TYPE
TYPE: ACUTE PAIN;SURGICAL PAIN
TYPE: ACUTE PAIN

## 2022-02-19 NOTE — CARE PLAN
The patient is Stable - Low risk of patient condition declining or worsening    Shift Goals  Clinical Goals: Pain management/safety  Patient Goals: Pain management  Family Goals: Comfort    Progress made toward(s) clinical / shift goals: 0-10 pain scale in place     Patient is not progressing towards the following goals: N/A

## 2022-02-19 NOTE — CARE PLAN
The patient is Stable - Low risk of patient condition declining or worsening    Shift Goals  Clinical Goals: Pain management/safety  Patient Goals: Pain management  Family Goals: Comfort    Progress made toward(s) clinical / shift goals:  0-10 pain scale in place. Pt medicated per MAR. Fall precautions in place, Bed alarm, bedside table, and pt belongings within reach.     Patient is not progressing towards the following goals:N/A

## 2022-02-19 NOTE — PROGRESS NOTES
Infectious Disease Progress Note    Author: Salas Livingston M.D. Date & Time created: 2/19/2022  1:47 PM    Interval History:  Ceftriaxone 2/1-day #18    2/18: The patient is a bit more alert today.  She states that her neck is improving some.  She is complaining of some pain in her forearms and elbows at this point.  She attributes that pain discomfort to her falling on her elbows at home.  She has no other specific complaints at this time.  2/19: No complaints today. N/V resolved. Pending rehab.       Review of Systems:  Review of Systems   Constitutional: Negative for chills and fever.   Respiratory: Negative for cough and shortness of breath.    Cardiovascular: Negative for chest pain.   Gastrointestinal: Negative for abdominal pain, constipation, diarrhea, nausea and vomiting.   Genitourinary: Negative for dysuria.   Musculoskeletal: Positive for falls (She is complaining of some pain in both of her elbows and forearms ) and neck pain (Slightly better).   Skin: Negative for itching.       Physical Exam:  Physical Exam  Constitutional:       Appearance: She is overweight. She is not ill-appearing.   HENT:      Head: Normocephalic.   Neck:      Comments: Incision is clean - minimal serous drainage. Nontender  Cardiovascular:      Rate and Rhythm: Normal rate and regular rhythm.      Heart sounds: No murmur heard.  Pulmonary:      Effort: Pulmonary effort is normal.      Breath sounds: Normal breath sounds.   Abdominal:      General: Abdomen is flat.      Palpations: Abdomen is soft.      Tenderness: There is no abdominal tenderness.   Musculoskeletal:      Comments: Neck wound dressed dressing not removed today.   Skin:     Findings: No rash.   Neurological:      Mental Status: She is alert and oriented to person, place, and time.   Psychiatric:         Mood and Affect: Affect is flat.         Labs:  No results found for this or any previous visit (from the past 24 hour(s)).  Results     Procedure Component  Value Units Date/Time    URINALYSIS [[id number]]  (Abnormal) Collected: 02/16/22 1405    Order Status: Completed Specimen: Urine Updated: 02/16/22 1443     Color Yellow     Character Clear     Specific Gravity 1.011     Ph 7.5     Glucose Negative mg/dL      Ketones Negative mg/dL      Protein Negative mg/dL      Bilirubin Negative     Urobilinogen, Urine 0.2     Nitrite Negative     Leukocyte Esterase Trace     Occult Blood Negative     Micro Urine Req Microscopic           Medications:  gabapentin, 400 mg, Oral, TID  senna-docusate, 2 Tablet, Oral, BID  enoxaparin, 40 mg, Subcutaneous, DAILY  doxycycline monohydrate, 100 mg, Oral, BID  atorvastatin, 10 mg, Oral, Nightly  propranolol, 80 mg, Oral, QHS  QUEtiapine, 50 mg, Oral, Q EVENING  sertraline, 100 mg, Oral, Q EVENING  cefTRIAXone (ROCEPHIN) IV, 2 g, Intravenous, Q24HRS        Impression:  1.  Postoperative cervical spine infection with Serratia marcescens, on IV   antibiotic therapy.  2.  Recent pyelonephritis secondary to Serratia marcescens.  3.  Essential hypertension.  4.  Bipolar disorder.  5.  Nausea and vomiting secondary to pain medications.  6.  History of low blood pressure.  7.  History of anxiety.  8.  Obstructive sleep apnea.  9.  History of urinary tract infections.       Plan:  1.  Continue ceftriaxone. End date for 6 weeks of therapy from surgery on 2/1 is 3/15/22.    2.   to work on discharge planning. May be going to rehab.   3.  Continue to support patient's other medical issues.  4.  Continue to work with medicine team.    Total time spent with patient today obtaining history, perform physical exam, reviewing labs, reviewing with nursing and dictating this report 25+ minutes with 50% of time spent face-to-face and 100 present time in direct patient care/counseling today.

## 2022-02-19 NOTE — PROGRESS NOTES
"Hospital Medicine Daily Progress Note    Date of Service  2/19/2022    Chief Complaint  Yolanda Reed is a 61 y.o. female admitted 2/16/2022 with nausea and falls    Hospital Course  \"Yolanda Reed is a 61 y.o. female who presented 2/16/2022 with nausea, vomiting, generalized weakness, and falls.  Patient recently had a surgery of the cervical spine complicated by wound infection and has been receiving outpatient IV antibiotics therapy through PICC line who has been having nausea which she attributed to her narcotic medications resulted in intractable vomiting and generalized weakness.  She also stated that she has been  falling at home.  Denies any fever chills.  Denies any loss of consciousness.  In ER noted to be hemodynamically stable.  Afebrile.  Lab work was significant for hypokalemia and hypomagnesemia.  UA negative for UTI.  Chest x-ray negative for any acute cardiopulmonary process.\"    Interval Problem Update  Seen and examined. Chart reviewed, including labs and any pertinent imaging.  Appears better today, sitting up in bed eating all of breakfast  Discussed with ID and case management  Patient will likely need SNF versus staying in hospital    I have personally seen and examined the patient at bedside. I discussed the plan of care with patient, bedside RN and charge RN.    Consultants/Specialty  infectious disease    Code Status  Full Code    Disposition  Patient is not medically cleared for discharge.   Anticipate discharge to to home with close outpatient follow-up.  I have placed the appropriate orders for post-discharge needs.    Review of Systems  Review of Systems   Constitutional: Negative for chills and fever.   Cardiovascular: Negative for chest pain.        Physical Exam  Temp:  [36 °C (96.8 °F)-37.2 °C (98.9 °F)] 36 °C (96.8 °F)  Pulse:  [69-89] 69  Resp:  [16-19] 18  BP: ()/(58-80) 96/58  SpO2:  [92 %-96 %] 96 %    Physical Exam  Vitals and nursing note reviewed. "   Constitutional:       General: She is not in acute distress.     Appearance: She is obese. She is not ill-appearing.   HENT:      Head: Normocephalic and atraumatic.      Nose: No congestion.      Mouth/Throat:      Mouth: Mucous membranes are moist.      Pharynx: Oropharynx is clear.      Comments: Bandage on anterior neck CDI  Eyes:      General:         Right eye: No discharge.         Left eye: No discharge.      Conjunctiva/sclera: Conjunctivae normal.   Cardiovascular:      Rate and Rhythm: Normal rate.   Pulmonary:      Effort: Pulmonary effort is normal. No respiratory distress.      Breath sounds: No wheezing.   Abdominal:      General: There is no distension.      Tenderness: There is no abdominal tenderness.   Musculoskeletal:      Cervical back: Normal range of motion. No rigidity.   Skin:     General: Skin is warm and dry.   Neurological:      General: No focal deficit present.      Mental Status: She is alert and oriented to person, place, and time.      Cranial Nerves: No cranial nerve deficit.   Psychiatric:         Mood and Affect: Mood normal.         Thought Content: Thought content normal.         Fluids    Intake/Output Summary (Last 24 hours) at 2/19/2022 1349  Last data filed at 2/19/2022 0800  Gross per 24 hour   Intake 250 ml   Output --   Net 250 ml       Laboratory  Recent Labs     02/17/22  0600 02/18/22  0750   WBC 4.8 8.4   RBC 4.92 3.50*   HEMOGLOBIN 13.8 10.1*   HEMATOCRIT 43.5 31.7*   MCV 88.4 90.6   MCH 28.0 28.9   MCHC 31.7* 31.9*   RDW 48.0 48.5   PLATELETCT 290 344   MPV 9.2 9.3     Recent Labs     02/17/22  0600   SODIUM 140   POTASSIUM 4.4   CHLORIDE 102   CO2 29   GLUCOSE 92   BUN 5*   CREATININE 0.61   CALCIUM 8.3*                   Imaging  DX-CHEST-PORTABLE (1 VIEW)   Final Result         No acute cardiac or pulmonary abnormality is identified.           Assessment/Plan  * Wound infection after surgery- (present on admission)  Assessment & Plan  Recently had surgery of  the cervical spine complicated with wound infection and has been on antibiotics as an outpatient per  Continue OP ABX regimen  ID following    Electrolyte imbalance- (present on admission)  Assessment & Plan  Including hypokalemia and hypomagnesemia likely due to intractable nausea and vomiting.  Start on replacement therapy per  Monitor and replete as needed.    Falls frequently- (present on admission)  Assessment & Plan  For being feeling weak with recent neck surgery.  Would benefit from SNF    Nausea and vomiting  Assessment & Plan  Seems to be tolerating Toradol, adding tramadol as needed  Continue gabapentin    Trial of narcotics with antiemetics started       VTE prophylaxis: enoxaparin ppx    I have performed a physical exam and reviewed and updated ROS and Plan today (2/19/2022). In review of yesterday's note (2/18/2022), there are no changes except as documented above.

## 2022-02-20 PROCEDURE — 700101 HCHG RX REV CODE 250: Performed by: FAMILY MEDICINE

## 2022-02-20 PROCEDURE — 700102 HCHG RX REV CODE 250 W/ 637 OVERRIDE(OP): Performed by: FAMILY MEDICINE

## 2022-02-20 PROCEDURE — A9270 NON-COVERED ITEM OR SERVICE: HCPCS | Performed by: HOSPITALIST

## 2022-02-20 PROCEDURE — 97110 THERAPEUTIC EXERCISES: CPT

## 2022-02-20 PROCEDURE — 700102 HCHG RX REV CODE 250 W/ 637 OVERRIDE(OP): Performed by: HOSPITALIST

## 2022-02-20 PROCEDURE — 97530 THERAPEUTIC ACTIVITIES: CPT

## 2022-02-20 PROCEDURE — 700111 HCHG RX REV CODE 636 W/ 250 OVERRIDE (IP): Performed by: FAMILY MEDICINE

## 2022-02-20 PROCEDURE — A9270 NON-COVERED ITEM OR SERVICE: HCPCS | Performed by: PHYSICAL MEDICINE & REHABILITATION

## 2022-02-20 PROCEDURE — 99225 PR SUBSEQUENT OBSERVATION CARE,LEVEL II: CPT | Performed by: HOSPITALIST

## 2022-02-20 PROCEDURE — 700102 HCHG RX REV CODE 250 W/ 637 OVERRIDE(OP): Performed by: PHYSICAL MEDICINE & REHABILITATION

## 2022-02-20 PROCEDURE — A9270 NON-COVERED ITEM OR SERVICE: HCPCS | Performed by: FAMILY MEDICINE

## 2022-02-20 PROCEDURE — G0378 HOSPITAL OBSERVATION PER HR: HCPCS

## 2022-02-20 RX ORDER — FLUCONAZOLE 100 MG/1
150 TABLET ORAL ONCE
Status: COMPLETED | OUTPATIENT
Start: 2022-02-20 | End: 2022-02-20

## 2022-02-20 RX ADMIN — ONDANSETRON 4 MG: 2 INJECTION INTRAMUSCULAR; INTRAVENOUS at 23:12

## 2022-02-20 RX ADMIN — SERTRALINE HYDROCHLORIDE 100 MG: 100 TABLET ORAL at 17:19

## 2022-02-20 RX ADMIN — FLUCONAZOLE 150 MG: 100 TABLET ORAL at 12:07

## 2022-02-20 RX ADMIN — ONDANSETRON 4 MG: 2 INJECTION INTRAMUSCULAR; INTRAVENOUS at 18:35

## 2022-02-20 RX ADMIN — OXYCODONE HYDROCHLORIDE AND ACETAMINOPHEN 1 TABLET: 5; 325 TABLET ORAL at 04:54

## 2022-02-20 RX ADMIN — CEFTRIAXONE SODIUM 2 G: 10 INJECTION, POWDER, FOR SOLUTION INTRAVENOUS at 05:03

## 2022-02-20 RX ADMIN — ATORVASTATIN CALCIUM 10 MG: 10 TABLET, FILM COATED ORAL at 20:25

## 2022-02-20 RX ADMIN — ENOXAPARIN SODIUM 40 MG: 40 INJECTION SUBCUTANEOUS at 04:54

## 2022-02-20 RX ADMIN — GABAPENTIN 400 MG: 400 CAPSULE ORAL at 12:07

## 2022-02-20 RX ADMIN — OXYCODONE HYDROCHLORIDE AND ACETAMINOPHEN 1 TABLET: 5; 325 TABLET ORAL at 23:14

## 2022-02-20 RX ADMIN — QUETIAPINE FUMARATE 50 MG: 100 TABLET ORAL at 20:26

## 2022-02-20 RX ADMIN — DOXYCYCLINE 100 MG: 100 TABLET, FILM COATED ORAL at 04:55

## 2022-02-20 RX ADMIN — ACETAMINOPHEN 650 MG: 325 TABLET, FILM COATED ORAL at 09:14

## 2022-02-20 RX ADMIN — OXYCODONE HYDROCHLORIDE AND ACETAMINOPHEN 1 TABLET: 5; 325 TABLET ORAL at 14:04

## 2022-02-20 RX ADMIN — ONDANSETRON 4 MG: 4 TABLET, ORALLY DISINTEGRATING ORAL at 04:54

## 2022-02-20 RX ADMIN — OXYCODONE HYDROCHLORIDE AND ACETAMINOPHEN 1 TABLET: 5; 325 TABLET ORAL at 18:33

## 2022-02-20 RX ADMIN — GABAPENTIN 400 MG: 400 CAPSULE ORAL at 04:55

## 2022-02-20 RX ADMIN — GABAPENTIN 400 MG: 400 CAPSULE ORAL at 17:19

## 2022-02-20 RX ADMIN — PROPRANOLOL HYDROCHLORIDE 80 MG: 40 TABLET ORAL at 20:25

## 2022-02-20 ASSESSMENT — COGNITIVE AND FUNCTIONAL STATUS - GENERAL
WALKING IN HOSPITAL ROOM: A LITTLE
MOBILITY SCORE: 22
SUGGESTED CMS G CODE MODIFIER MOBILITY: CJ
CLIMB 3 TO 5 STEPS WITH RAILING: A LITTLE

## 2022-02-20 ASSESSMENT — PAIN DESCRIPTION - PAIN TYPE
TYPE: ACUTE PAIN

## 2022-02-20 ASSESSMENT — ENCOUNTER SYMPTOMS
NECK PAIN: 1
VOMITING: 0
FEVER: 0
SHORTNESS OF BREATH: 0
ABDOMINAL PAIN: 0
FALLS: 1
CONSTIPATION: 0
CHILLS: 0
NAUSEA: 0
DIARRHEA: 0
COUGH: 0

## 2022-02-20 ASSESSMENT — GAIT ASSESSMENTS
DISTANCE (FEET): 150
DEVIATION: ATAXIC
GAIT LEVEL OF ASSIST: CONTACT GUARD ASSIST
ASSISTIVE DEVICE: FRONT WHEEL WALKER

## 2022-02-20 ASSESSMENT — FIBROSIS 4 INDEX: FIB4 SCORE: 1.05

## 2022-02-20 NOTE — PROGRESS NOTES
"Hospital Medicine Daily Progress Note    Date of Service  2/20/2022    Chief Complaint  Yolanda Reed is a 61 y.o. female admitted 2/16/2022 with nausea and falls    Hospital Course  \"Yolanda Reed is a 61 y.o. female who presented 2/16/2022 with nausea, vomiting, generalized weakness, and falls.  Patient recently had a surgery of the cervical spine complicated by wound infection and has been receiving outpatient IV antibiotics therapy through PICC line who has been having nausea which she attributed to her narcotic medications resulted in intractable vomiting and generalized weakness.  She also stated that she has been  falling at home.  Denies any fever chills.  Denies any loss of consciousness.  In ER noted to be hemodynamically stable.  Afebrile.  Lab work was significant for hypokalemia and hypomagnesemia.  UA negative for UTI.  Chest x-ray negative for any acute cardiopulmonary process.\"    Interval Problem Update  Seen and examined. Chart reviewed, including labs and any pertinent imaging.  Discussed with ID and case management  Patient will likely need SNF versus staying in hospital  No major issues overnight no fevers or chills    I have personally seen and examined the patient at bedside. I discussed the plan of care with patient, bedside RN and charge RN.    Consultants/Specialty  infectious disease    Code Status  Full Code    Disposition  Patient is not medically cleared for discharge.   Anticipate discharge to to home with close outpatient follow-up.  I have placed the appropriate orders for post-discharge needs.    Review of Systems  Review of Systems   Constitutional: Negative for chills and fever.   Cardiovascular: Negative for chest pain.        Physical Exam  Temp:  [36.4 °C (97.5 °F)-36.4 °C (97.6 °F)] 36.4 °C (97.6 °F)  Pulse:  [67-80] 67  Resp:  [16-18] 16  BP: (101-116)/(52-61) 101/52  SpO2:  [91 %-97 %] 91 %    Physical Exam  Vitals and nursing note reviewed.   Constitutional:       " General: She is not in acute distress.     Appearance: She is obese. She is not ill-appearing.   HENT:      Head: Normocephalic and atraumatic.      Nose: No congestion.      Mouth/Throat:      Mouth: Mucous membranes are moist.      Pharynx: Oropharynx is clear.      Comments: Bandage on anterior neck CDI  Eyes:      General:         Right eye: No discharge.         Left eye: No discharge.      Conjunctiva/sclera: Conjunctivae normal.   Cardiovascular:      Rate and Rhythm: Normal rate.   Pulmonary:      Effort: Pulmonary effort is normal. No respiratory distress.      Breath sounds: No wheezing.   Abdominal:      General: There is no distension.      Tenderness: There is no abdominal tenderness.   Musculoskeletal:      Cervical back: Normal range of motion. No rigidity.   Skin:     General: Skin is warm and dry.   Neurological:      General: No focal deficit present.      Mental Status: She is alert and oriented to person, place, and time.      Cranial Nerves: No cranial nerve deficit.   Psychiatric:         Mood and Affect: Mood normal.         Thought Content: Thought content normal.         Fluids    Intake/Output Summary (Last 24 hours) at 2/20/2022 1231  Last data filed at 2/20/2022 0800  Gross per 24 hour   Intake 300 ml   Output --   Net 300 ml       Laboratory  Recent Labs     02/18/22  0750   WBC 8.4   RBC 3.50*   HEMOGLOBIN 10.1*   HEMATOCRIT 31.7*   MCV 90.6   MCH 28.9   MCHC 31.9*   RDW 48.5   PLATELETCT 344   MPV 9.3                       Imaging  DX-CHEST-PORTABLE (1 VIEW)   Final Result         No acute cardiac or pulmonary abnormality is identified.           Assessment/Plan  * Wound infection after surgery- (present on admission)  Assessment & Plan  Recently had surgery of the cervical spine complicated with wound infection and has been on antibiotics as an outpatient per  Continue OP ABX regimen  ID following    Electrolyte imbalance- (present on admission)  Assessment & Plan  Including  hypokalemia and hypomagnesemia likely due to intractable nausea and vomiting.  Start on replacement therapy per  Monitor and replete as needed.    Falls frequently- (present on admission)  Assessment & Plan  For being feeling weak with recent neck surgery.  Would benefit from SNF    Nausea and vomiting  Assessment & Plan  Seems to be tolerating Toradol, adding tramadol as needed  Continue gabapentin    Trial of narcotics with antiemetics started       VTE prophylaxis: enoxaparin ppx    I have performed a physical exam and reviewed and updated ROS and Plan today (2/20/2022). In review of yesterday's note (2/19/2022), there are no changes except as documented above.

## 2022-02-20 NOTE — CARE PLAN
The patient is Stable - Low risk of patient condition declining or worsening    Shift Goals  Clinical Goals: Pain management, safety, mobility  Patient Goals: Comfort  Family Goals: GA    Progress made toward(s) clinical / shift goals:        Problem: Knowledge Deficit - Standard  Goal: Patient and family/care givers will demonstrate understanding of plan of care, disease process/condition, diagnostic tests and medications  Outcome: Progressing     Problem: Fall Risk  Goal: Patient will remain free from falls  Outcome: Progressing     Problem: Pain - Standard  Goal: Alleviation of pain or a reduction in pain to the patient’s comfort goal  Outcome: Progressing

## 2022-02-20 NOTE — CARE PLAN
The patient is Stable - Low risk of patient condition declining or worsening    Shift Goals  Clinical Goals: Pain management/Mobility  Patient Goals: Comfort  Family Goals: GA    Progress made toward(s) clinical / shift goals: 0-10 pain scale in place. Pain medication administered per MAR. Encouraged pt to mobilize and turn for comfort. Pt encouraged to mobilize to bathroom during hourly rounding.PICC line cleaned and dressing changed per protocol.     Patient is not progressing towards the following goals: N/A
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
## 2022-02-21 LAB
FLUAV RNA SPEC QL NAA+PROBE: NEGATIVE
FLUBV RNA SPEC QL NAA+PROBE: NEGATIVE
RSV RNA SPEC QL NAA+PROBE: NEGATIVE
SARS-COV-2 RNA RESP QL NAA+PROBE: NOTDETECTED
SPECIMEN SOURCE: NORMAL

## 2022-02-21 PROCEDURE — 700111 HCHG RX REV CODE 636 W/ 250 OVERRIDE (IP): Performed by: NURSE PRACTITIONER

## 2022-02-21 PROCEDURE — 700102 HCHG RX REV CODE 250 W/ 637 OVERRIDE(OP): Performed by: HOSPITALIST

## 2022-02-21 PROCEDURE — A9270 NON-COVERED ITEM OR SERVICE: HCPCS | Performed by: FAMILY MEDICINE

## 2022-02-21 PROCEDURE — C9803 HOPD COVID-19 SPEC COLLECT: HCPCS | Performed by: HOSPITALIST

## 2022-02-21 PROCEDURE — 700102 HCHG RX REV CODE 250 W/ 637 OVERRIDE(OP): Performed by: PHYSICAL MEDICINE & REHABILITATION

## 2022-02-21 PROCEDURE — A9270 NON-COVERED ITEM OR SERVICE: HCPCS | Performed by: HOSPITALIST

## 2022-02-21 PROCEDURE — 700111 HCHG RX REV CODE 636 W/ 250 OVERRIDE (IP): Performed by: FAMILY MEDICINE

## 2022-02-21 PROCEDURE — 700111 HCHG RX REV CODE 636 W/ 250 OVERRIDE (IP): Performed by: STUDENT IN AN ORGANIZED HEALTH CARE EDUCATION/TRAINING PROGRAM

## 2022-02-21 PROCEDURE — G0378 HOSPITAL OBSERVATION PER HR: HCPCS

## 2022-02-21 PROCEDURE — 0241U HCHG SARS-COV-2 COVID-19 NFCT DS RESP RNA 4 TRGT MIC: CPT

## 2022-02-21 PROCEDURE — 99225 PR SUBSEQUENT OBSERVATION CARE,LEVEL II: CPT | Performed by: HOSPITALIST

## 2022-02-21 PROCEDURE — A9270 NON-COVERED ITEM OR SERVICE: HCPCS | Performed by: PHYSICAL MEDICINE & REHABILITATION

## 2022-02-21 PROCEDURE — 700102 HCHG RX REV CODE 250 W/ 637 OVERRIDE(OP): Performed by: FAMILY MEDICINE

## 2022-02-21 PROCEDURE — 700101 HCHG RX REV CODE 250: Performed by: STUDENT IN AN ORGANIZED HEALTH CARE EDUCATION/TRAINING PROGRAM

## 2022-02-21 RX ORDER — LORAZEPAM 2 MG/ML
0.5 INJECTION INTRAMUSCULAR ONCE
Status: COMPLETED | OUTPATIENT
Start: 2022-02-21 | End: 2022-02-21

## 2022-02-21 RX ADMIN — OXYCODONE HYDROCHLORIDE AND ACETAMINOPHEN 1 TABLET: 5; 325 TABLET ORAL at 03:40

## 2022-02-21 RX ADMIN — PROPRANOLOL HYDROCHLORIDE 80 MG: 40 TABLET ORAL at 20:33

## 2022-02-21 RX ADMIN — ONDANSETRON 4 MG: 2 INJECTION INTRAMUSCULAR; INTRAVENOUS at 03:39

## 2022-02-21 RX ADMIN — ONDANSETRON 4 MG: 2 INJECTION INTRAMUSCULAR; INTRAVENOUS at 18:49

## 2022-02-21 RX ADMIN — ENOXAPARIN SODIUM 40 MG: 40 INJECTION SUBCUTANEOUS at 05:11

## 2022-02-21 RX ADMIN — QUETIAPINE FUMARATE 50 MG: 100 TABLET ORAL at 20:33

## 2022-02-21 RX ADMIN — OXYCODONE HYDROCHLORIDE AND ACETAMINOPHEN 1 TABLET: 5; 325 TABLET ORAL at 08:46

## 2022-02-21 RX ADMIN — SERTRALINE HYDROCHLORIDE 100 MG: 100 TABLET ORAL at 16:50

## 2022-02-21 RX ADMIN — LORAZEPAM 0.5 MG: 2 INJECTION INTRAMUSCULAR; INTRAVENOUS at 20:58

## 2022-02-21 RX ADMIN — GABAPENTIN 400 MG: 400 CAPSULE ORAL at 05:03

## 2022-02-21 RX ADMIN — OXYCODONE HYDROCHLORIDE AND ACETAMINOPHEN 1 TABLET: 5; 325 TABLET ORAL at 18:47

## 2022-02-21 RX ADMIN — TRAMADOL HYDROCHLORIDE 50 MG: 50 TABLET, COATED ORAL at 20:57

## 2022-02-21 RX ADMIN — GABAPENTIN 400 MG: 400 CAPSULE ORAL at 13:07

## 2022-02-21 RX ADMIN — GABAPENTIN 400 MG: 400 CAPSULE ORAL at 16:50

## 2022-02-21 RX ADMIN — ONDANSETRON 4 MG: 2 INJECTION INTRAMUSCULAR; INTRAVENOUS at 08:44

## 2022-02-21 RX ADMIN — OXYCODONE HYDROCHLORIDE AND ACETAMINOPHEN 1 TABLET: 5; 325 TABLET ORAL at 13:44

## 2022-02-21 RX ADMIN — CEFTRIAXONE SODIUM 2 G: 10 INJECTION, POWDER, FOR SOLUTION INTRAVENOUS at 05:06

## 2022-02-21 RX ADMIN — ATORVASTATIN CALCIUM 10 MG: 10 TABLET, FILM COATED ORAL at 20:33

## 2022-02-21 ASSESSMENT — ENCOUNTER SYMPTOMS
COUGH: 0
CONSTIPATION: 0
NECK PAIN: 1
DIARRHEA: 0
FEVER: 0
VOMITING: 0
CHILLS: 0
FALLS: 1
ABDOMINAL PAIN: 0
NAUSEA: 0
SHORTNESS OF BREATH: 0

## 2022-02-21 ASSESSMENT — PAIN DESCRIPTION - PAIN TYPE
TYPE: ACUTE PAIN

## 2022-02-21 NOTE — PROGRESS NOTES
Infectious Disease Progress Note    Author: Doris Woods M.D. Date & Time created: 2/21/2022  12:36 PM    Interval History:  Ceftriaxone 2/1-day #19    2/18: The patient is a bit more alert today.  She states that her neck is improving some.  She is complaining of some pain in her forearms and elbows at this point.  She attributes that pain discomfort to her falling on her elbows at home.  She has no other specific complaints at this time.  2/19: No complaints today. N/V resolved. Pending rehab.   2/20: No complaints today. Wound with mild drainage still. Pending placement.   2/21: Did not sleep well last night. States no emesis yesterday or today.  Pending placement.  Per  notes, potential transfer to [facility] tomorrow.      Review of Systems:  Review of Systems   Constitutional: Negative for chills and fever.   Respiratory: Negative for cough and shortness of breath.    Cardiovascular: Negative for chest pain.   Gastrointestinal: Negative for abdominal pain, constipation, diarrhea, nausea and vomiting.   Genitourinary: Negative for dysuria.   Musculoskeletal: Positive for falls (She is complaining of some pain in both of her elbows and forearms ) and neck pain (Slightly better).   Skin: Negative for itching.       Physical Exam:  Physical Exam  Constitutional:       Appearance: She is overweight. She is not ill-appearing.   HENT:      Head: Normocephalic.   Neck:      Comments: Incision is clean - minimal serous drainage. Nontender  Cardiovascular:      Rate and Rhythm: Normal rate and regular rhythm.      Heart sounds: No murmur heard.  Pulmonary:      Effort: Pulmonary effort is normal.      Breath sounds: Normal breath sounds.   Abdominal:      General: Abdomen is flat.      Palpations: Abdomen is soft.      Tenderness: There is no abdominal tenderness.   Skin:     Findings: No rash.   Neurological:      Mental Status: She is alert and oriented to person, place, and time.   Psychiatric:         Mood and  Affect: Affect is flat.         Labs:  No results found for this or any previous visit (from the past 24 hour(s)).  Results     Procedure Component Value Units Date/Time    COV-2, FLU A/B, AND RSV BY PCR (2-4 HOURS CEPHEID): Collect NP swab in VTM [[id number]]     Order Status: No result Specimen: Respirate     URINALYSIS [[id number]]  (Abnormal) Collected: 02/16/22 1405    Order Status: Completed Specimen: Urine Updated: 02/16/22 1443     Color Yellow     Character Clear     Specific Gravity 1.011     Ph 7.5     Glucose Negative mg/dL      Ketones Negative mg/dL      Protein Negative mg/dL      Bilirubin Negative     Urobilinogen, Urine 0.2     Nitrite Negative     Leukocyte Esterase Trace     Occult Blood Negative     Micro Urine Req Microscopic           Medications:  gabapentin, 400 mg, Oral, TID  senna-docusate, 2 Tablet, Oral, BID  enoxaparin, 40 mg, Subcutaneous, DAILY  atorvastatin, 10 mg, Oral, Nightly  propranolol, 80 mg, Oral, QHS  QUEtiapine, 50 mg, Oral, Q EVENING  sertraline, 100 mg, Oral, Q EVENING  cefTRIAXone (ROCEPHIN) IV, 2 g, Intravenous, Q24HRS        Impression:  1.  Postoperative cervical spine infection with Serratia marcescens, on IV   antibiotic therapy.  2.  Recent pyelonephritis secondary to Serratia marcescens.  3.  Essential hypertension.  4.  Bipolar disorder.  5.  Nausea and vomiting secondary to pain medications.  6.  History of low blood pressure.  7.  History of anxiety.  8.  Obstructive sleep apnea.  9.  History of urinary tract infections.       Plan:  1.  Continue ceftriaxone. End date for 6 weeks of therapy from surgery on 2/1 is 3/15/22.    2.  CM notes reviewed.  Pending [facility] tomorrow.  3.  Other medical issues per IM.      CM notes reviewed.  Probable transfer to SNF tomorrow.  While at SNF needs weekly labs: CBC, CMP, ESR and CRP while on abx.  Routine PICC care.    Will need to have follow up in our office ~ one week prior to completing antibiotics.   We do not need to see her weekly, but will be available by phone for any questions: [phone].  Can fax lab results to our office if questions.    Total time spent with patient today obtaining history, perform physical exam, reviewing labs, reviewing with nursing and dictating this report 25+ minutes with 50% of time spent face-to-face and 100 present time in direct patient care/counseling today.

## 2022-02-21 NOTE — CARE PLAN
The patient is Stable - Low risk of patient condition declining or worsening    Shift Goals  Clinical Goals: safety  Patient Goals: sleep  Family Goals: GA    Progress made toward(s) clinical / shift goals:  Pt. Is safely resting in bed with fall precautions in place. Call light within reach and Pt. Uses it appropriately.     Patient is not progressing towards the following goals:  na

## 2022-02-21 NOTE — CARE PLAN
The patient is Stable - Low risk of patient condition declining or worsening    Shift Goals  Clinical Goals: Safety, pain management  Patient Goals: Sleep  Family Goals: GA    Progress made toward(s) clinical / shift goals:    Problem: Knowledge Deficit - Standard  Goal: Patient and family/care givers will demonstrate understanding of plan of care, disease process/condition, diagnostic tests and medications  Outcome: Progressing   The patient received education reinforcement regarding plan of care, condition, and medications.      Problem: Fall Risk  Goal: Patient will remain free from falls  Outcome: Progressing   Appropriate fall precautions are in place. Bed alarm is on at all times. Staff present for bathroom needs.     Problem: Pain - Standard  Goal: Alleviation of pain or a reduction in pain to the patient’s comfort goal  Outcome: Progressing   Patient verbalizes pain level and comfort goals. Receives PRN pain medication.     Patient is not progressing towards the following goals:

## 2022-02-21 NOTE — DISCHARGE PLANNING
Agency/Facility Name: [facility]   Spoke To: Cinthia   Outcome: DPA informed [facility] pt is medically cleared. Per Cinthia bed will be available tomorrow. RN CM will need to set up transport. Cinthia asked for 1300 tomorrow..

## 2022-02-21 NOTE — THERAPY
"Physical Therapy   Daily Treatment     Patient Name: Yolanda Reed  Age:  61 y.o., Sex:  female  Medical Record #: [id number]  Today's Date: 2/20/2022     Precautions  Precautions: Fall Risk  Comments: C spine sx 01/2022; soft collar ill fitting    Assessment    Patient demonstrating slow progress with functional mobility. She verbalized depression and fear around mobility several times throughout session. She performed bed mobility with supervision though required cueing to maintain spinal precautions. She ambulated in unit with FWW and SBA however demonstrated ataxic gait and reported weakness and buckling at end of ambulation. Provided extensive education regarding cervical precautions, acute healing process, neurological recovery, and exercises. Patient reported she is eager to return home to her dog [facility] (anticipate she will refuse placement) but expressed concern over safety in home environment and lack of partner's ability to assist with IADLs. Will continue to follow.    Plan    Continue current treatment plan.    DC Equipment Recommendations: None  Discharge Recommendations: Recommend post-acute placement for additional physical therapy services prior to discharge home (anticipate she will refuse,  PT if DCing home)      Subjective    \"I'm going to look for different housing, I can't stay with my boyfriend; he just says it is too much to help me and he can't do it.\"     Objective      Precautions   Precautions Fall Risk   Comments C spine sx 01/2022; soft collar ill fitting   Vitals   O2 (LPM) 0   O2 Delivery Device None - Room Air   Pain 0 - 10 Group   Location Neck   Therapist Pain Assessment During Activity;Post Activity Pain Same as Prior to Activity;Nurse Notified   Cognition    Cognition / Consciousness WDL   Level of Consciousness Alert   Comments pleasant, cooperative. emotionally labile and depressed   Balance   Sitting Balance (Static) Fair +   Sitting Balance (Dynamic) Fair +   Standing " Balance (Static) Fair   Standing Balance (Dynamic) Fair   Weight Shift Sitting Fair   Weight Shift Standing Fair   Skilled Intervention Compensatory Strategies;Verbal Cuing   Comments with FWW, no LOB but patient reported R knee buckling at end of ambulation   Gait Analysis   Gait Level Of Assist Contact Guard Assist   Assistive Device Front Wheel Walker   Distance (Feet) 150   # of Times Distance was Traveled 1   Deviation Ataxic  (decreased carine)   # of Stairs Climbed 0   Weight Bearing Status no restrictions   Vision Deficits Impacting Mobility NT   Skilled Intervention Verbal Cuing;Compensatory Strategies   Bed Mobility    Supine to Sit Supervised   Sit to Supine Supervised   Scooting Supervised   Rolling Supervised   Skilled Intervention Verbal Cuing   Comments cues for log roll   Functional Mobility   Sit to Stand Supervised   Bed, Chair, Wheelchair Transfer Supervised   Transfer Method Stand Step   Skilled Intervention Verbal Cuing   How much difficulty does the patient currently have...   Turning over in bed (including adjusting bedclothes, sheets and blankets)? 4   Sitting down on and standing up from a chair with arms (e.g., wheelchair, bedside commode, etc.) 4   Moving from lying on back to sitting on the side of the bed? 4   How much help from another person does the patient currently need...   Moving to and from a bed to a chair (including a wheelchair)? 4   Need to walk in a hospital room? 3   Climbing 3-5 steps with a railing? 3   6 clicks Mobility Score 22   Activity Tolerance   Sitting in Chair NT   Sitting Edge of Bed 15 min   Standing 6-8 min   Comments limited by behavior/emotion   Patient / Family Goals    Patient / Family Goal #1 to get better   Goal #1 Outcome Progressing as expected   Short Term Goals    Short Term Goal # 1 pt will perform supine <> sit with HOB flat, no railing and SPV in 6 visits   Goal Outcome # 1 goal not met   Short Term Goal # 2 pt will perform sit <> stand and  functional transfers with LRAD and SPV to improve mobility independence in 6 visits   Goal Outcome # 2 Goal met   Short Term Goal # 3 pt will ambulate > 150 ft with LRAD and SPV to access community needs (MD appointments) in 6 visits   Goal Outcome # 3 Progressing as expected   Short Term Goal # 4 pt will negotiate 1-4 steps with LRAD/Railing with SPV to access home environment in 6 visits   Goal Outcome # 4 Goal not met   Anticipated Discharge Equipment and Recommendations   DC Equipment Recommendations None   Discharge Recommendations Recommend post-acute placement for additional physical therapy services prior to discharge home   Interdisciplinary Plan of Care Collaboration   IDT Collaboration with  Nursing   Patient Position at End of Therapy In Bed;Bed Alarm On;Call Light within Reach;Tray Table within Reach;Phone within Reach   Collaboration Comments RN aware of visit, response   Session Information   Date / Session Number  2/20-2 (2/4, 2/23)

## 2022-02-21 NOTE — DISCHARGE PLANNING
Anticipated Discharge Disposition: SNF    Action: CM noted a SNF accept at [facility]. CM sent the DPA a request to f/u on bed availability.     Barriers to Discharge: placement    Plan: CM to f/u bed availability.

## 2022-02-21 NOTE — PROGRESS NOTES
"Hospital Medicine Daily Progress Note    Date of Service  2/21/2022    Chief Complaint  Yolanda Reed is a 61 y.o. female admitted 2/16/2022 with nausea and falls    Hospital Course  \"Yolanda Reed is a 61 y.o. female who presented 2/16/2022 with nausea, vomiting, generalized weakness, and falls.  Patient recently had a surgery of the cervical spine complicated by wound infection and has been receiving outpatient IV antibiotics therapy through PICC line who has been having nausea which she attributed to her narcotic medications resulted in intractable vomiting and generalized weakness.  She also stated that she has been  falling at home.  Denies any fever chills.  Denies any loss of consciousness.  In ER noted to be hemodynamically stable.  Afebrile.  Lab work was significant for hypokalemia and hypomagnesemia.  UA negative for UTI.  Chest x-ray negative for any acute cardiopulmonary process.\"    Interval Problem Update  Seen and examined. Chart reviewed, including labs and any pertinent imaging.  Discussed with ID and case management  Patient will likely need SNF versus staying in hospital, potentially discharging tomorrow  No major issues overnight no fevers or chills    Addendum: Requested to join [id number] with Dr. Ramires, insurance is \"unable to approve\" inpatient stay for 2/18-2/20, despite my mentioning patient had unstable home situation, there were no shelters for patient to discharge to and she required IV ABX and adjustments with her pain medications.     I have personally seen and examined the patient at bedside. I discussed the plan of care with patient, bedside RN and charge RN.    Consultants/Specialty  infectious disease    Code Status  Full Code    Disposition  Patient is not medically cleared for discharge.   Anticipate discharge to to home with close outpatient follow-up.  I have placed the appropriate orders for post-discharge needs.    Review of Systems  Review of Systems "   Constitutional: Negative for chills and fever.   Cardiovascular: Negative for chest pain.        Physical Exam  Temp:  [36.1 °C (96.9 °F)-36.6 °C (97.8 °F)] 36.1 °C (96.9 °F)  Pulse:  [66-80] 68  Resp:  [16-17] 17  BP: ()/(52-68) 95/60  SpO2:  [92 %-95 %] 92 %    Physical Exam  Vitals and nursing note reviewed.   Constitutional:       General: She is not in acute distress.     Appearance: She is obese. She is not ill-appearing.   HENT:      Head: Normocephalic and atraumatic.      Nose: No congestion.      Mouth/Throat:      Mouth: Mucous membranes are moist.      Pharynx: Oropharynx is clear.      Comments: Bandage on anterior neck CDI  Eyes:      General:         Right eye: No discharge.         Left eye: No discharge.      Conjunctiva/sclera: Conjunctivae normal.   Cardiovascular:      Rate and Rhythm: Normal rate.   Pulmonary:      Effort: Pulmonary effort is normal. No respiratory distress.      Breath sounds: No wheezing.   Abdominal:      General: There is no distension.      Tenderness: There is no abdominal tenderness.   Musculoskeletal:      Cervical back: Normal range of motion. No rigidity.   Skin:     General: Skin is warm and dry.   Neurological:      General: No focal deficit present.      Mental Status: She is alert and oriented to person, place, and time.      Cranial Nerves: No cranial nerve deficit.   Psychiatric:         Mood and Affect: Mood normal.         Thought Content: Thought content normal.         Fluids    Intake/Output Summary (Last 24 hours) at 2/21/2022 1146  Last data filed at 2/20/2022 1200  Gross per 24 hour   Intake 300 ml   Output --   Net 300 ml       Laboratory                        Imaging  DX-CHEST-PORTABLE (1 VIEW)   Final Result         No acute cardiac or pulmonary abnormality is identified.           Assessment/Plan  * Wound infection after surgery- (present on admission)  Assessment & Plan  Recently had surgery of the cervical spine complicated with wound  infection and has been on antibiotics as an outpatient per  Continue OP ABX regimen  ID following    Electrolyte imbalance- (present on admission)  Assessment & Plan  Including hypokalemia and hypomagnesemia likely due to intractable nausea and vomiting.  Start on replacement therapy per  Monitor and replete as needed.    Falls frequently- (present on admission)  Assessment & Plan  For being feeling weak with recent neck surgery.  Would benefit from SNF    Nausea and vomiting  Assessment & Plan  Seems to be tolerating Toradol, adding tramadol as needed  Continue gabapentin    Trial of narcotics with antiemetics started       VTE prophylaxis: enoxaparin ppx    I have performed a physical exam and reviewed and updated ROS and Plan today (2/21/2022). In review of yesterday's note (2/20/2022), there are no changes except as documented above.

## 2022-02-21 NOTE — DISCHARGE PLANNING
Anticipated Discharge Disposition: [facility]    Action: CM was informed the patient can transfer to [facility] tomorrow.  CM sent a voalte message to the attending to inform of acceptance and request a COVID test. CM informed the patient and she is in agreement with the transfer.    Barriers to Discharge: placement, patient can transfer tomorrow 2/22/22    Plan: CM will continue to follow.

## 2022-02-22 ENCOUNTER — PATIENT OUTREACH (OUTPATIENT)
Dept: HEALTH INFORMATION MANAGEMENT | Facility: OTHER | Age: 62
End: 2022-02-22
Payer: MEDICAID

## 2022-02-22 VITALS
HEIGHT: 60 IN | SYSTOLIC BLOOD PRESSURE: 97 MMHG | BODY MASS INDEX: 31.6 KG/M2 | WEIGHT: 160.94 LBS | RESPIRATION RATE: 18 BRPM | TEMPERATURE: 97.1 F | DIASTOLIC BLOOD PRESSURE: 55 MMHG | HEART RATE: 64 BPM | OXYGEN SATURATION: 93 %

## 2022-02-22 PROCEDURE — 700102 HCHG RX REV CODE 250 W/ 637 OVERRIDE(OP): Performed by: HOSPITALIST

## 2022-02-22 PROCEDURE — 99217 PR OBSERVATION CARE DISCHARGE: CPT | Performed by: INTERNAL MEDICINE

## 2022-02-22 PROCEDURE — 700101 HCHG RX REV CODE 250: Performed by: STUDENT IN AN ORGANIZED HEALTH CARE EDUCATION/TRAINING PROGRAM

## 2022-02-22 PROCEDURE — 700102 HCHG RX REV CODE 250 W/ 637 OVERRIDE(OP): Performed by: FAMILY MEDICINE

## 2022-02-22 PROCEDURE — 700111 HCHG RX REV CODE 636 W/ 250 OVERRIDE (IP): Performed by: STUDENT IN AN ORGANIZED HEALTH CARE EDUCATION/TRAINING PROGRAM

## 2022-02-22 PROCEDURE — 700111 HCHG RX REV CODE 636 W/ 250 OVERRIDE (IP): Performed by: FAMILY MEDICINE

## 2022-02-22 PROCEDURE — A9270 NON-COVERED ITEM OR SERVICE: HCPCS | Performed by: PHYSICAL MEDICINE & REHABILITATION

## 2022-02-22 PROCEDURE — 700102 HCHG RX REV CODE 250 W/ 637 OVERRIDE(OP): Performed by: PHYSICAL MEDICINE & REHABILITATION

## 2022-02-22 PROCEDURE — A9270 NON-COVERED ITEM OR SERVICE: HCPCS | Performed by: FAMILY MEDICINE

## 2022-02-22 PROCEDURE — G0378 HOSPITAL OBSERVATION PER HR: HCPCS

## 2022-02-22 PROCEDURE — A9270 NON-COVERED ITEM OR SERVICE: HCPCS | Performed by: HOSPITALIST

## 2022-02-22 RX ORDER — OXYCODONE HYDROCHLORIDE AND ACETAMINOPHEN 5; 325 MG/1; MG/1
1 TABLET ORAL EVERY 8 HOURS PRN
Qty: 9 TABLET | Refills: 0 | Status: SHIPPED | OUTPATIENT
Start: 2022-02-22 | End: 2022-03-01

## 2022-02-22 RX ORDER — GABAPENTIN 400 MG/1
400 CAPSULE ORAL 3 TIMES DAILY
Qty: 90 CAPSULE | Status: ON HOLD
Start: 2022-02-22 | End: 2022-03-01 | Stop reason: SDUPTHER

## 2022-02-22 RX ADMIN — CEFTRIAXONE SODIUM 2 G: 10 INJECTION, POWDER, FOR SOLUTION INTRAVENOUS at 06:24

## 2022-02-22 RX ADMIN — ACETAMINOPHEN 650 MG: 325 TABLET, FILM COATED ORAL at 04:21

## 2022-02-22 RX ADMIN — OXYCODONE HYDROCHLORIDE AND ACETAMINOPHEN 1 TABLET: 5; 325 TABLET ORAL at 10:27

## 2022-02-22 RX ADMIN — GABAPENTIN 400 MG: 400 CAPSULE ORAL at 06:29

## 2022-02-22 RX ADMIN — ONDANSETRON 4 MG: 2 INJECTION INTRAMUSCULAR; INTRAVENOUS at 10:27

## 2022-02-22 RX ADMIN — GABAPENTIN 400 MG: 400 CAPSULE ORAL at 11:32

## 2022-02-22 RX ADMIN — ONDANSETRON 4 MG: 2 INJECTION INTRAMUSCULAR; INTRAVENOUS at 06:21

## 2022-02-22 RX ADMIN — ENOXAPARIN SODIUM 40 MG: 40 INJECTION SUBCUTANEOUS at 06:30

## 2022-02-22 RX ADMIN — ONDANSETRON 4 MG: 2 INJECTION INTRAMUSCULAR; INTRAVENOUS at 02:16

## 2022-02-22 RX ADMIN — TRAMADOL HYDROCHLORIDE 50 MG: 50 TABLET, COATED ORAL at 13:08

## 2022-02-22 RX ADMIN — OXYCODONE HYDROCHLORIDE AND ACETAMINOPHEN 1 TABLET: 5; 325 TABLET ORAL at 02:16

## 2022-02-22 RX ADMIN — OXYCODONE HYDROCHLORIDE AND ACETAMINOPHEN 1 TABLET: 5; 325 TABLET ORAL at 06:23

## 2022-02-22 ASSESSMENT — ENCOUNTER SYMPTOMS
DIARRHEA: 0
NAUSEA: 0
COUGH: 0
FALLS: 0
NECK PAIN: 1
FEVER: 0
CONSTIPATION: 0
VOMITING: 0
ABDOMINAL PAIN: 0
CHILLS: 0
SHORTNESS OF BREATH: 0

## 2022-02-22 ASSESSMENT — PAIN DESCRIPTION - PAIN TYPE
TYPE: ACUTE PAIN

## 2022-02-22 NOTE — DISCHARGE INSTRUCTIONS
Discharge Instructions    Discharged to other by car with escort. Discharged via wheelchair, hospital escort: Yes.  Special equipment needed: Not Applicable    Be sure to schedule a follow-up appointment with your primary care doctor or any specialists as instructed.     Discharge Plan:   Diet Plan: Discussed  Activity Level: Discussed  Confirmed Follow up Appointment: No (Comments)  Confirmed Symptoms Management: Discussed  Medication Reconciliation Updated: Yes    I understand that a diet low in cholesterol, fat, and sodium is recommended for good health. Unless I have been given specific instructions below for another diet, I accept this instruction as my diet prescription.   Other diet: regular      Special Instructions: None    · Is patient discharged on Warfarin / Coumadin?   No     Depression / Suicide Risk    As you are discharged from this [facility] facility, it is important to learn how to keep safe from harming yourself.    Recognize the warning signs:  · Abrupt changes in personality, positive or negative- including increase in energy   · Giving away possessions  · Change in eating patterns- significant weight changes-  positive or negative  · Change in sleeping patterns- unable to sleep or sleeping all the time   · Unwillingness or inability to communicate  · Depression  · Unusual sadness, discouragement and loneliness  · Talk of wanting to die  · Neglect of personal appearance   · Rebelliousness- reckless behavior  · Withdrawal from people/activities they love  · Confusion- inability to concentrate     If you or a loved one observes any of these behaviors or has concerns about self-harm, here's what you can do:  · Talk about it- your feelings and reasons for harming yourself  · Remove any means that you might use to hurt yourself (examples: pills, rope, extension cords, firearm)  · Get professional help from the community (Mental Health, Substance Abuse, psychological counseling)  · Do not be  alone:Call your Safe Contact- someone whom you trust who will be there for you.  · Call your local CRISIS HOTLINE 120-4824 or 528-273-6917  · Call your local Children's Mobile Crisis Response Team [place] (680) 927-1114 or www.Cute Attack  · Call the toll free National Suicide Prevention Hotlines   · National Suicide Prevention Lifeline 138-717-BMWN (5671)  · National ITS Compliance Line Network 800-SUICIDE (571-6383)

## 2022-02-22 NOTE — DISCHARGE PLANNING
DC Transport Scheduled    Received request at: 0828    Transport Company Scheduled:  [facility]  Spoke with Radha at [facility] to schedule transport.  [facility] Trip #: [id number]  Scheduled Date: 02/22/22  Scheduled Time: 1300  Destination: [address], [place] [address]    Notified care team of scheduled transport via Voalte.     If there are any changes needed to the DC transportation scheduled, please contact [facility] at ext. [phone] between the hours of 4121-3403 Mon-Fri. If outside those hours, contact the ED Case Manager at ext. [phone].

## 2022-02-22 NOTE — DISCHARGE PLANNING
Agency/Facility Name: [facility]  Spoke To: Cinthia   Outcome: DPA informed [facility] transport is set for 1300 today and pt covid results are in as well.      Personal collateral

## 2022-02-22 NOTE — PROGRESS NOTES
Infectious Disease Progress Note    Author: Doris Woods M.D. Date & Time created: 2/22/2022  7:49 AM    Interval History:  Ceftriaxone 2/1-day #21    2/18: The patient is a bit more alert today.  She states that her neck is improving some.  She is complaining of some pain in her forearms and elbows at this point.  She attributes that pain discomfort to her falling on her elbows at home.  She has no other specific complaints at this time.  2/19: No complaints today. N/V resolved. Pending rehab.   2/20: No complaints today. Wound with mild drainage still. Pending placement.   2/21: Did not sleep well last night. States no emesis yesterday or today.  Pending placement.  Per CM notes, potential transfer to [facility] tomorrow.  2/22: Slept better.  Bed at [facility].  COVID negative.  States needs some things from home.  Told her hospital will transport her to SNF and not able to take her home first.  Friends/family will need to bring things for her.    Review of Systems:  Review of Systems   Constitutional: Negative for chills and fever.   Respiratory: Negative for cough and shortness of breath.    Cardiovascular: Negative for chest pain.   Gastrointestinal: Negative for abdominal pain, constipation, diarrhea, nausea and vomiting.   Genitourinary: Negative for dysuria.   Musculoskeletal: Positive for neck pain (Slightly better). Negative for falls (She is complaining of some pain in both of her elbows and forearms ).   Skin: Negative for itching.       Physical Exam:  Physical Exam  Constitutional:       Appearance: She is overweight. She is not ill-appearing.   HENT:      Head: Normocephalic.   Neck:      Comments: Incision is clean - minimal serous drainage. Nontender  Cardiovascular:      Rate and Rhythm: Normal rate and regular rhythm.      Heart sounds: No murmur heard.  Pulmonary:      Effort: Pulmonary effort is normal.      Breath sounds: Normal breath sounds.   Abdominal:      General: Abdomen is flat.       "Palpations: Abdomen is soft.      Tenderness: There is no abdominal tenderness.   Skin:     Findings: No rash.   Neurological:      Mental Status: She is alert and oriented to person, place, and time.   Psychiatric:         Mood and Affect: Affect is flat.         Labs:  Recent Results (from the past 24 hour(s))   COV-2, FLU A/B, AND RSV BY PCR (2-4 HOURS CEPHEID): Collect NP swab in VTM    Collection Time: 02/21/22  1:11 PM    Specimen: Nasopharyngeal; Respirate   Result Value Ref Range    Influenza virus A RNA Negative Negative    Influenza virus B, PCR Negative Negative    RSV, PCR Negative Negative    SARS-CoV-2 by PCR NotDetected     SARS-CoV-2 Source NP Swab      Results     Procedure Component Value Units Date/Time    COV-2, FLU A/B, AND RSV BY PCR (2-4 HOURS CEPHEID): Collect NP swab in VTM [[id number]] Collected: 02/21/22 1311    Order Status: Completed Specimen: Respirate from Nasopharyngeal Updated: 02/21/22 1855     Influenza virus A RNA Negative     Influenza virus B, PCR Negative     RSV, PCR Negative     SARS-CoV-2 by PCR NotDetected     Comment: PATIENTS: Important information regarding your results and instructions can  be found at [email or web address]   \"After your  Covid-19 Test\"    [facility] providers: PLEASE REFER TO DE-ESCALATION AND RETESTING PROTOCOL  on [email or web address]    **The [employer] GeneXpert Xpress SARS-CoV-2 RT-PCR Test has been made  available for use under the Emergency Use Authorization (EUA) only.          SARS-CoV-2 Source NP Swab    Narrative:      Collected By: [id number] MIGDALIA MARTINEZ    URINALYSIS [[id number]]  (Abnormal) Collected: 02/16/22 1405    Order Status: Completed Specimen: Urine Updated: 02/16/22 1443     Color Yellow     Character Clear     Specific Gravity 1.011     Ph 7.5     Glucose Negative mg/dL      Ketones Negative mg/dL      Protein Negative mg/dL      Bilirubin Negative     Urobilinogen, Urine 0.2     Nitrite Negative     Leukocyte " Esterase Trace     Occult Blood Negative     Micro Urine Req Microscopic           Medications:  gabapentin, 400 mg, Oral, TID  senna-docusate, 2 Tablet, Oral, BID  enoxaparin, 40 mg, Subcutaneous, DAILY  atorvastatin, 10 mg, Oral, Nightly  propranolol, 80 mg, Oral, QHS  QUEtiapine, 50 mg, Oral, Q EVENING  sertraline, 100 mg, Oral, Q EVENING  cefTRIAXone (ROCEPHIN) IV, 2 g, Intravenous, Q24HRS        Impression:  1.  Postoperative cervical spine infection with Serratia marcescens, on IV   antibiotic therapy.  2.  Recent pyelonephritis secondary to Serratia marcescens.  3.  Essential hypertension.  4.  Bipolar disorder.  5.  Nausea and vomiting secondary to pain medications.  6.  History of low blood pressure.  7.  History of anxiety.  8.  Obstructive sleep apnea.  9.  History of urinary tract infections.       Plan:      CM notes reviewed.  Probable transfer to SNF today.  Target date to complete abx is 3/15/22  While at SNF needs weekly labs: CBC, CMP, ESR and CRP while on abx.  Routine PICC care.    Will need to have follow up in our office ~ one week prior to completing antibiotics.  We do not need to see her weekly, but will be available by phone for any questions: [phone].  Can fax lab results to our office if questions.    Total time spent with patient today obtaining history, perform physical exam, reviewing labs, reviewing with nursing and dictating this report 30+ minutes with 50% of time spent face-to-face and 100 present time in direct patient care/counseling today.

## 2022-02-22 NOTE — DISCHARGE PLANNING
Anticipated Discharge Disposition: [facility] and [facility]    Action: COBRA completed and faxed the transport request to [facility].     Plan: CM waiting on transport confirmation.

## 2022-02-22 NOTE — CARE PLAN
The patient is Stable - Low risk of patient condition declining or worsening    Shift Goals  Clinical Goals: Pain and anxiety management, safety  Patient Goals: Pain and anxiety management, sleep  Family Goals: GA    Progress made toward(s) clinical / shift goals:    Problem: Knowledge Deficit - Standard  Goal: Patient and family/care givers will demonstrate understanding of plan of care, disease process/condition, diagnostic tests and medications  Outcome: Progressing   The patient received education reinforcement regarding plan of care, condition, and medications.      Problem: Fall Risk  Goal: Patient will remain free from falls  Outcome: Progressing   Appropriate fall precautions are in place. Bed alarm is on at all times. Staff present for bathroom needs.     Problem: Psychosocial  Goal: Patient's level of anxiety will decrease  Outcome: Progressing   Patient had an episode of anxiety at the beginning of shift and received Ativan PRN. Anxiety resolved at this moment.     Patient is not progressing towards the following goals:

## 2022-02-25 PROCEDURE — 99285 EMERGENCY DEPT VISIT HI MDM: CPT

## 2022-02-25 ASSESSMENT — FIBROSIS 4 INDEX: FIB4 SCORE: 1.05

## 2022-02-26 ENCOUNTER — APPOINTMENT (OUTPATIENT)
Dept: RADIOLOGY | Facility: MEDICAL CENTER | Age: 62
DRG: 189 | End: 2022-02-26
Attending: EMERGENCY MEDICINE
Payer: MEDICAID

## 2022-02-26 ENCOUNTER — HOSPITAL ENCOUNTER (INPATIENT)
Facility: MEDICAL CENTER | Age: 62
LOS: 3 days | DRG: 189 | End: 2022-03-01
Attending: EMERGENCY MEDICINE | Admitting: STUDENT IN AN ORGANIZED HEALTH CARE EDUCATION/TRAINING PROGRAM
Payer: MEDICAID

## 2022-02-26 DIAGNOSIS — R11.2 NAUSEA AND VOMITING, INTRACTABILITY OF VOMITING NOT SPECIFIED, UNSPECIFIED VOMITING TYPE: ICD-10-CM

## 2022-02-26 DIAGNOSIS — T81.49XA WOUND INFECTION AFTER SURGERY: ICD-10-CM

## 2022-02-26 DIAGNOSIS — E66.01 MORBID OBESITY (HCC): ICD-10-CM

## 2022-02-26 DIAGNOSIS — J96.01 ACUTE RESPIRATORY FAILURE WITH HYPOXIA (HCC): ICD-10-CM

## 2022-02-26 DIAGNOSIS — M54.12 CERVICAL MYELOPATHY WITH CERVICAL RADICULOPATHY (HCC): ICD-10-CM

## 2022-02-26 DIAGNOSIS — G95.9 CERVICAL MYELOPATHY WITH CERVICAL RADICULOPATHY (HCC): ICD-10-CM

## 2022-02-26 DIAGNOSIS — G89.18 POST-OP PAIN: ICD-10-CM

## 2022-02-26 DIAGNOSIS — R09.02 HYPOXIA: ICD-10-CM

## 2022-02-26 DIAGNOSIS — G47.33 OBSTRUCTIVE SLEEP APNEA SYNDROME: ICD-10-CM

## 2022-02-26 LAB
ALBUMIN SERPL BCP-MCNC: 3.5 G/DL (ref 3.2–4.9)
ALBUMIN/GLOB SERPL: 1.2 G/DL
ALP SERPL-CCNC: 108 U/L (ref 30–99)
ALT SERPL-CCNC: 17 U/L (ref 2–50)
ANION GAP SERPL CALC-SCNC: 16 MMOL/L (ref 7–16)
AST SERPL-CCNC: 34 U/L (ref 12–45)
BASOPHILS # BLD AUTO: 0.2 % (ref 0–1.8)
BASOPHILS # BLD: 0.02 K/UL (ref 0–0.12)
BILIRUB SERPL-MCNC: 0.3 MG/DL (ref 0.1–1.5)
BUN SERPL-MCNC: 6 MG/DL (ref 8–22)
CALCIUM SERPL-MCNC: 9.8 MG/DL (ref 8.5–10.5)
CHLORIDE SERPL-SCNC: 102 MMOL/L (ref 96–112)
CO2 SERPL-SCNC: 22 MMOL/L (ref 20–33)
CREAT SERPL-MCNC: 0.42 MG/DL (ref 0.5–1.4)
CRP SERPL HS-MCNC: 0.37 MG/DL (ref 0–0.75)
EOSINOPHIL # BLD AUTO: 0.08 K/UL (ref 0–0.51)
EOSINOPHIL NFR BLD: 0.8 % (ref 0–6.9)
ERYTHROCYTE [DISTWIDTH] IN BLOOD BY AUTOMATED COUNT: 43.5 FL (ref 35.9–50)
ERYTHROCYTE [SEDIMENTATION RATE] IN BLOOD BY WESTERGREN METHOD: 40 MM/HOUR (ref 0–25)
FLUAV RNA SPEC QL NAA+PROBE: NEGATIVE
FLUBV RNA SPEC QL NAA+PROBE: NEGATIVE
GLOBULIN SER CALC-MCNC: 2.9 G/DL (ref 1.9–3.5)
GLUCOSE SERPL-MCNC: 98 MG/DL (ref 65–99)
HCT VFR BLD AUTO: 37 % (ref 37–47)
HGB BLD-MCNC: 11.9 G/DL (ref 12–16)
IMM GRANULOCYTES # BLD AUTO: 0.04 K/UL (ref 0–0.11)
IMM GRANULOCYTES NFR BLD AUTO: 0.4 % (ref 0–0.9)
LACTATE BLD-SCNC: 1.6 MMOL/L (ref 0.5–2)
LYMPHOCYTES # BLD AUTO: 2.77 K/UL (ref 1–4.8)
LYMPHOCYTES NFR BLD: 29.1 % (ref 22–41)
MAGNESIUM SERPL-MCNC: 1.5 MG/DL (ref 1.5–2.5)
MCH RBC QN AUTO: 27.9 PG (ref 27–33)
MCHC RBC AUTO-ENTMCNC: 32.2 G/DL (ref 33.6–35)
MCV RBC AUTO: 86.7 FL (ref 81.4–97.8)
MONOCYTES # BLD AUTO: 0.74 K/UL (ref 0–0.85)
MONOCYTES NFR BLD AUTO: 7.8 % (ref 0–13.4)
NEUTROPHILS # BLD AUTO: 5.88 K/UL (ref 2–7.15)
NEUTROPHILS NFR BLD: 61.7 % (ref 44–72)
NRBC # BLD AUTO: 0 K/UL
NRBC BLD-RTO: 0 /100 WBC
NT-PROBNP SERPL IA-MCNC: 165 PG/ML (ref 0–125)
PHOSPHATE SERPL-MCNC: 3.4 MG/DL (ref 2.5–4.5)
PLATELET # BLD AUTO: 294 K/UL (ref 164–446)
PMV BLD AUTO: 10.1 FL (ref 9–12.9)
POTASSIUM SERPL-SCNC: 4 MMOL/L (ref 3.6–5.5)
PROCALCITONIN SERPL-MCNC: <0.05 NG/ML
PROT SERPL-MCNC: 6.4 G/DL (ref 6–8.2)
RBC # BLD AUTO: 4.27 M/UL (ref 4.2–5.4)
RSV RNA SPEC QL NAA+PROBE: NEGATIVE
SARS-COV-2 RNA RESP QL NAA+PROBE: NOTDETECTED
SODIUM SERPL-SCNC: 140 MMOL/L (ref 135–145)
SPECIMEN SOURCE: NORMAL
WBC # BLD AUTO: 9.5 K/UL (ref 4.8–10.8)

## 2022-02-26 PROCEDURE — 0241U HCHG SARS-COV-2 COVID-19 NFCT DS RESP RNA 4 TRGT MIC: CPT

## 2022-02-26 PROCEDURE — 96365 THER/PROPH/DIAG IV INF INIT: CPT

## 2022-02-26 PROCEDURE — 84100 ASSAY OF PHOSPHORUS: CPT

## 2022-02-26 PROCEDURE — 83880 ASSAY OF NATRIURETIC PEPTIDE: CPT

## 2022-02-26 PROCEDURE — 87040 BLOOD CULTURE FOR BACTERIA: CPT

## 2022-02-26 PROCEDURE — 71045 X-RAY EXAM CHEST 1 VIEW: CPT

## 2022-02-26 PROCEDURE — 86140 C-REACTIVE PROTEIN: CPT

## 2022-02-26 PROCEDURE — 700111 HCHG RX REV CODE 636 W/ 250 OVERRIDE (IP): Performed by: STUDENT IN AN ORGANIZED HEALTH CARE EDUCATION/TRAINING PROGRAM

## 2022-02-26 PROCEDURE — A9270 NON-COVERED ITEM OR SERVICE: HCPCS | Performed by: EMERGENCY MEDICINE

## 2022-02-26 PROCEDURE — 85652 RBC SED RATE AUTOMATED: CPT

## 2022-02-26 PROCEDURE — A9270 NON-COVERED ITEM OR SERVICE: HCPCS

## 2022-02-26 PROCEDURE — 83735 ASSAY OF MAGNESIUM: CPT

## 2022-02-26 PROCEDURE — A9270 NON-COVERED ITEM OR SERVICE: HCPCS | Performed by: STUDENT IN AN ORGANIZED HEALTH CARE EDUCATION/TRAINING PROGRAM

## 2022-02-26 PROCEDURE — 96372 THER/PROPH/DIAG INJ SC/IM: CPT

## 2022-02-26 PROCEDURE — 700111 HCHG RX REV CODE 636 W/ 250 OVERRIDE (IP): Performed by: EMERGENCY MEDICINE

## 2022-02-26 PROCEDURE — 80053 COMPREHEN METABOLIC PANEL: CPT

## 2022-02-26 PROCEDURE — 700111 HCHG RX REV CODE 636 W/ 250 OVERRIDE (IP): Performed by: INTERNAL MEDICINE

## 2022-02-26 PROCEDURE — 83605 ASSAY OF LACTIC ACID: CPT

## 2022-02-26 PROCEDURE — C9803 HOPD COVID-19 SPEC COLLECT: HCPCS | Performed by: EMERGENCY MEDICINE

## 2022-02-26 PROCEDURE — 99223 1ST HOSP IP/OBS HIGH 75: CPT | Performed by: STUDENT IN AN ORGANIZED HEALTH CARE EDUCATION/TRAINING PROGRAM

## 2022-02-26 PROCEDURE — 700102 HCHG RX REV CODE 250 W/ 637 OVERRIDE(OP): Performed by: EMERGENCY MEDICINE

## 2022-02-26 PROCEDURE — 700102 HCHG RX REV CODE 250 W/ 637 OVERRIDE(OP): Performed by: INTERNAL MEDICINE

## 2022-02-26 PROCEDURE — 96366 THER/PROPH/DIAG IV INF ADDON: CPT

## 2022-02-26 PROCEDURE — 700102 HCHG RX REV CODE 250 W/ 637 OVERRIDE(OP): Performed by: STUDENT IN AN ORGANIZED HEALTH CARE EDUCATION/TRAINING PROGRAM

## 2022-02-26 PROCEDURE — 85025 COMPLETE CBC W/AUTO DIFF WBC: CPT

## 2022-02-26 PROCEDURE — 770001 HCHG ROOM/CARE - MED/SURG/GYN PRIV*

## 2022-02-26 PROCEDURE — 700102 HCHG RX REV CODE 250 W/ 637 OVERRIDE(OP)

## 2022-02-26 PROCEDURE — A9270 NON-COVERED ITEM OR SERVICE: HCPCS | Performed by: INTERNAL MEDICINE

## 2022-02-26 PROCEDURE — 96375 TX/PRO/DX INJ NEW DRUG ADDON: CPT

## 2022-02-26 PROCEDURE — 84145 PROCALCITONIN (PCT): CPT

## 2022-02-26 RX ORDER — ONDANSETRON 2 MG/ML
4 INJECTION INTRAMUSCULAR; INTRAVENOUS EVERY 4 HOURS PRN
Status: DISCONTINUED | OUTPATIENT
Start: 2022-02-26 | End: 2022-03-01 | Stop reason: HOSPADM

## 2022-02-26 RX ORDER — QUETIAPINE FUMARATE 25 MG/1
50 TABLET, FILM COATED ORAL EVERY EVENING
Status: DISCONTINUED | OUTPATIENT
Start: 2022-02-26 | End: 2022-02-26

## 2022-02-26 RX ORDER — POLYETHYLENE GLYCOL 3350 17 G/17G
1 POWDER, FOR SOLUTION ORAL
Status: DISCONTINUED | OUTPATIENT
Start: 2022-02-26 | End: 2022-03-01 | Stop reason: HOSPADM

## 2022-02-26 RX ORDER — BISACODYL 10 MG
10 SUPPOSITORY, RECTAL RECTAL
Status: DISCONTINUED | OUTPATIENT
Start: 2022-02-26 | End: 2022-03-01 | Stop reason: HOSPADM

## 2022-02-26 RX ORDER — PROCHLORPERAZINE EDISYLATE 5 MG/ML
5-10 INJECTION INTRAMUSCULAR; INTRAVENOUS EVERY 4 HOURS PRN
Status: DISCONTINUED | OUTPATIENT
Start: 2022-02-26 | End: 2022-03-01 | Stop reason: HOSPADM

## 2022-02-26 RX ORDER — ONDANSETRON 2 MG/ML
4 INJECTION INTRAMUSCULAR; INTRAVENOUS ONCE
Status: COMPLETED | OUTPATIENT
Start: 2022-02-26 | End: 2022-02-26

## 2022-02-26 RX ORDER — IPRATROPIUM BROMIDE AND ALBUTEROL SULFATE 2.5; .5 MG/3ML; MG/3ML
3 SOLUTION RESPIRATORY (INHALATION)
Status: DISCONTINUED | OUTPATIENT
Start: 2022-02-26 | End: 2022-03-01 | Stop reason: HOSPADM

## 2022-02-26 RX ORDER — PROMETHAZINE HYDROCHLORIDE 25 MG/1
12.5-25 SUPPOSITORY RECTAL EVERY 4 HOURS PRN
Status: DISCONTINUED | OUTPATIENT
Start: 2022-02-26 | End: 2022-03-01 | Stop reason: HOSPADM

## 2022-02-26 RX ORDER — CHOLECALCIFEROL (VITAMIN D3) 125 MCG
5 CAPSULE ORAL NIGHTLY PRN
Status: DISCONTINUED | OUTPATIENT
Start: 2022-02-26 | End: 2022-03-01 | Stop reason: HOSPADM

## 2022-02-26 RX ORDER — SERTRALINE HYDROCHLORIDE 100 MG/1
100 TABLET, FILM COATED ORAL EVERY EVENING
Status: DISCONTINUED | OUTPATIENT
Start: 2022-02-26 | End: 2022-03-01 | Stop reason: HOSPADM

## 2022-02-26 RX ORDER — MAGNESIUM SULFATE HEPTAHYDRATE 40 MG/ML
2 INJECTION, SOLUTION INTRAVENOUS ONCE
Status: COMPLETED | OUTPATIENT
Start: 2022-02-26 | End: 2022-02-26

## 2022-02-26 RX ORDER — OXYCODONE HYDROCHLORIDE 5 MG/1
5-10 CAPSULE ORAL EVERY 6 HOURS PRN
Status: ON HOLD | COMMUNITY
End: 2022-03-01 | Stop reason: SDUPTHER

## 2022-02-26 RX ORDER — QUETIAPINE FUMARATE 25 MG/1
50 TABLET, FILM COATED ORAL NIGHTLY
Status: DISCONTINUED | OUTPATIENT
Start: 2022-02-26 | End: 2022-02-27

## 2022-02-26 RX ORDER — ACETAMINOPHEN 500 MG
1000 TABLET ORAL EVERY 6 HOURS PRN
COMMUNITY
End: 2023-01-17

## 2022-02-26 RX ORDER — LORAZEPAM 2 MG/ML
0.5 INJECTION INTRAMUSCULAR EVERY 4 HOURS PRN
Status: DISCONTINUED | OUTPATIENT
Start: 2022-02-26 | End: 2022-03-01

## 2022-02-26 RX ORDER — GABAPENTIN 400 MG/1
400 CAPSULE ORAL 3 TIMES DAILY
Status: DISCONTINUED | OUTPATIENT
Start: 2022-02-26 | End: 2022-03-01 | Stop reason: HOSPADM

## 2022-02-26 RX ORDER — OXYCODONE HYDROCHLORIDE 5 MG/1
5 TABLET ORAL EVERY 4 HOURS PRN
Status: DISCONTINUED | OUTPATIENT
Start: 2022-02-26 | End: 2022-03-01 | Stop reason: HOSPADM

## 2022-02-26 RX ORDER — AMOXICILLIN 250 MG
2 CAPSULE ORAL 2 TIMES DAILY
Status: DISCONTINUED | OUTPATIENT
Start: 2022-02-26 | End: 2022-03-01 | Stop reason: HOSPADM

## 2022-02-26 RX ORDER — ATORVASTATIN CALCIUM 10 MG/1
10 TABLET, FILM COATED ORAL NIGHTLY
Status: DISCONTINUED | OUTPATIENT
Start: 2022-02-26 | End: 2022-02-26

## 2022-02-26 RX ORDER — ACETAMINOPHEN 325 MG/1
650 TABLET ORAL EVERY 6 HOURS PRN
Status: DISCONTINUED | OUTPATIENT
Start: 2022-02-26 | End: 2022-02-27

## 2022-02-26 RX ORDER — LABETALOL HYDROCHLORIDE 5 MG/ML
10 INJECTION, SOLUTION INTRAVENOUS EVERY 4 HOURS PRN
Status: DISCONTINUED | OUTPATIENT
Start: 2022-02-26 | End: 2022-03-01 | Stop reason: HOSPADM

## 2022-02-26 RX ORDER — HYDROMORPHONE HYDROCHLORIDE 1 MG/ML
0.5 INJECTION, SOLUTION INTRAMUSCULAR; INTRAVENOUS; SUBCUTANEOUS ONCE
Status: DISCONTINUED | OUTPATIENT
Start: 2022-02-26 | End: 2022-02-26

## 2022-02-26 RX ORDER — PRAMIPEXOLE DIHYDROCHLORIDE 0.25 MG/1
0.12 TABLET ORAL 3 TIMES DAILY
Status: DISCONTINUED | OUTPATIENT
Start: 2022-02-26 | End: 2022-02-26

## 2022-02-26 RX ORDER — ONDANSETRON 4 MG/1
4 TABLET, ORALLY DISINTEGRATING ORAL EVERY 4 HOURS PRN
Status: DISCONTINUED | OUTPATIENT
Start: 2022-02-26 | End: 2022-03-01 | Stop reason: HOSPADM

## 2022-02-26 RX ORDER — PROMETHAZINE HYDROCHLORIDE 25 MG/1
12.5-25 TABLET ORAL EVERY 4 HOURS PRN
Status: DISCONTINUED | OUTPATIENT
Start: 2022-02-26 | End: 2022-03-01 | Stop reason: HOSPADM

## 2022-02-26 RX ORDER — PROPRANOLOL HYDROCHLORIDE 40 MG/1
80 TABLET ORAL
Status: DISCONTINUED | OUTPATIENT
Start: 2022-02-26 | End: 2022-02-26

## 2022-02-26 RX ADMIN — GABAPENTIN 400 MG: 400 CAPSULE ORAL at 17:09

## 2022-02-26 RX ADMIN — GABAPENTIN 400 MG: 400 CAPSULE ORAL at 13:52

## 2022-02-26 RX ADMIN — OXYCODONE 5 MG: 5 TABLET ORAL at 04:47

## 2022-02-26 RX ADMIN — OXYCODONE 5 MG: 5 TABLET ORAL at 13:54

## 2022-02-26 RX ADMIN — PRAMIPEXOLE DIHYDROCHLORIDE 0.12 MG: 0.25 TABLET ORAL at 17:09

## 2022-02-26 RX ADMIN — ACETAMINOPHEN 650 MG: 325 TABLET, FILM COATED ORAL at 16:09

## 2022-02-26 RX ADMIN — SERTRALINE 100 MG: 100 TABLET, FILM COATED ORAL at 17:09

## 2022-02-26 RX ADMIN — LORAZEPAM 0.5 MG: 2 INJECTION INTRAMUSCULAR; INTRAVENOUS at 18:20

## 2022-02-26 RX ADMIN — ONDANSETRON 4 MG: 2 INJECTION INTRAMUSCULAR; INTRAVENOUS at 04:31

## 2022-02-26 RX ADMIN — LORAZEPAM 0.5 MG: 2 INJECTION INTRAMUSCULAR; INTRAVENOUS at 22:28

## 2022-02-26 RX ADMIN — QUETIAPINE FUMARATE 50 MG: 25 TABLET ORAL at 22:28

## 2022-02-26 RX ADMIN — ENOXAPARIN SODIUM 40 MG: 40 INJECTION SUBCUTANEOUS at 09:27

## 2022-02-26 RX ADMIN — ONDANSETRON 4 MG: 2 INJECTION INTRAMUSCULAR; INTRAVENOUS at 17:09

## 2022-02-26 RX ADMIN — MAGNESIUM SULFATE HEPTAHYDRATE 2 G: 40 INJECTION, SOLUTION INTRAVENOUS at 09:27

## 2022-02-26 RX ADMIN — Medication 5 MG: at 22:28

## 2022-02-26 ASSESSMENT — COGNITIVE AND FUNCTIONAL STATUS - GENERAL
SUGGESTED CMS G CODE MODIFIER MOBILITY: CJ
MOBILITY SCORE: 20
CLIMB 3 TO 5 STEPS WITH RAILING: A LOT
DRESSING REGULAR UPPER BODY CLOTHING: A LITTLE
HELP NEEDED FOR BATHING: A LITTLE
STANDING UP FROM CHAIR USING ARMS: A LITTLE
DRESSING REGULAR LOWER BODY CLOTHING: A LITTLE
WALKING IN HOSPITAL ROOM: A LITTLE

## 2022-02-26 ASSESSMENT — ENCOUNTER SYMPTOMS
SHORTNESS OF BREATH: 0
SPEECH CHANGE: 0
VOMITING: 0
CONSTIPATION: 0
FEVER: 0
SINUS PAIN: 0
ABDOMINAL PAIN: 0
NAUSEA: 1
VOMITING: 1
LOSS OF CONSCIOUSNESS: 0
CHILLS: 0
HEADACHES: 0
SEIZURES: 0
EYE PAIN: 0
FOCAL WEAKNESS: 0
SENSORY CHANGE: 0
NERVOUS/ANXIOUS: 1
PALPITATIONS: 0
NECK PAIN: 1
COUGH: 0
WEAKNESS: 1
FALLS: 0
PHOTOPHOBIA: 0

## 2022-02-26 ASSESSMENT — PAIN DESCRIPTION - PAIN TYPE
TYPE: ACUTE PAIN;CHRONIC PAIN
TYPE: ACUTE PAIN;CHRONIC PAIN
TYPE: ACUTE PAIN

## 2022-02-26 ASSESSMENT — PATIENT HEALTH QUESTIONNAIRE - PHQ9
2. FEELING DOWN, DEPRESSED, IRRITABLE, OR HOPELESS: NOT AT ALL
1. LITTLE INTEREST OR PLEASURE IN DOING THINGS: NOT AT ALL
SUM OF ALL RESPONSES TO PHQ9 QUESTIONS 1 AND 2: 0

## 2022-02-26 ASSESSMENT — LIFESTYLE VARIABLES: SUBSTANCE_ABUSE: 0

## 2022-02-26 ASSESSMENT — FIBROSIS 4 INDEX: FIB4 SCORE: 1.68

## 2022-02-26 NOTE — ED NOTES
WC to room cc nausea and complaining of SNF, pt with PICC L arm for antibiotics for prior neck surgery infection

## 2022-02-26 NOTE — PROGRESS NOTES
Hospital Medicine Daily Progress Note    Date of Service  2/26/2022    Chief Complaint  Yolanda Reed is a 61 y.o. female admitted 2/26/2022 with nausea    Hospital Course  No notes on file    Interval Problem Update  - admitted this am  - doesn't want to go back to SNF, hoping for home health, has BF at home that can help  - neck pain but stable  - afebrile    I have personally seen and examined the patient at bedside. I discussed the plan of care with patient and bedside RN.    Consultants/Specialty  None    Code Status  Full Code    Disposition  Patient is medically cleared for discharge.   Anticipate discharge to TBD.  I have placed the appropriate orders for post-discharge needs.    Review of Systems  Review of Systems   Constitutional: Negative for chills and fever.   Gastrointestinal: Positive for nausea and vomiting. Negative for constipation.   Musculoskeletal: Positive for neck pain.   Neurological: Positive for weakness.        Physical Exam  Temp:  [36.4 °C (97.5 °F)] 36.4 °C (97.5 °F)  Pulse:  [] 84  Resp:  [14-24] 16  BP: (122-149)/() 138/73  SpO2:  [67 %-97 %] 97 %    Physical Exam  Constitutional:       General: She is not in acute distress.     Appearance: She is obese. She is not ill-appearing, toxic-appearing or diaphoretic.   HENT:      Head: Normocephalic and atraumatic.      Nose: Nose normal.      Mouth/Throat:      Mouth: Mucous membranes are moist.      Pharynx: Oropharynx is clear.   Eyes:      General: No scleral icterus.     Conjunctiva/sclera: Conjunctivae normal.   Neck:      Comments: Anterior neck incision healing well c/d/i  Cardiovascular:      Rate and Rhythm: Normal rate and regular rhythm.      Heart sounds: No murmur heard.    No friction rub. No gallop.   Pulmonary:      Effort: Pulmonary effort is normal.      Breath sounds: Normal breath sounds.   Abdominal:      General: Bowel sounds are normal. There is no distension.      Palpations: Abdomen is soft.       Tenderness: There is no abdominal tenderness.   Musculoskeletal:      Right lower leg: No edema.      Left lower leg: No edema.      Comments: PICC LUE   Skin:     Coloration: Skin is not jaundiced.      Findings: No rash.   Neurological:      Mental Status: She is alert and oriented to person, place, and time.   Psychiatric:         Mood and Affect: Mood normal.         Behavior: Behavior normal.         Fluids  No intake or output data in the 24 hours ending 02/26/22 0952    Laboratory  Recent Labs     02/26/22  0300   WBC 9.5   RBC 4.27   HEMOGLOBIN 11.9*   HEMATOCRIT 37.0   MCV 86.7   MCH 27.9   MCHC 32.2*   RDW 43.5   PLATELETCT 294   MPV 10.1     Recent Labs     02/26/22  0300   SODIUM 140   POTASSIUM 4.0   CHLORIDE 102   CO2 22   GLUCOSE 98   BUN 6*   CREATININE 0.42*   CALCIUM 9.8                   Imaging  DX-CHEST-PORTABLE (1 VIEW)   Final Result      No acute cardiopulmonary abnormality.           Assessment/Plan  * Acute respiratory failure with hypoxia (HCC)- (present on admission)  Assessment & Plan  Chest x-ray clear, check procalcitonin and BNP  TTE recently performed showed mild pulmonary hypertension RVSP 45  Incentive spirometry, duo nebs as needed  Continuous pulse ox, RT consult  Covid, RSV, influenza swab negative      Wound infection after surgery- (present on admission)  Assessment & Plan  Recent C5-T1 ACDF in January 2022 for herniated nucleus pulposus, cervical myelopathy, cervical stenosis, cervical radiculopathy complicated by postop infection requiring IV antibiotics via PICC for Serratia marcescens, ID consulted and plan was to complete course of ceftriaxone through March 15, 2022.  Continue ceftriaxone  Check blood cultures    Hyperlipidemia- (present on admission)  Assessment & Plan  Continue statin    Essential hypertension- (present on admission)  Assessment & Plan  Continue home propanolol.  IV antihypertensives with parameters    Nausea and vomiting- (present on  admission)  Assessment & Plan  Having nausea at SNF  No emesis so far here  ABdomen benign  No constipation  CTM  Nausea meds prn       VTE prophylaxis: enoxaparin ppx    I have performed a physical exam and reviewed and updated ROS and Plan today (2/26/2022). In review of yesterday's note (2/25/2022), there are no changes except as documented above.

## 2022-02-26 NOTE — ED NOTES
Pharmacy Medication Reconciliation    ~Med rec updated and complete per ALAN Saavedra over the phone from [facility] ([phone])      ~ALAN Saavedra  reports that patient is on a course of Rocephin that was started on 02/24/2022 and is due to stop on 03/08/2022

## 2022-02-26 NOTE — ASSESSMENT & PLAN NOTE
Having nausea at SNF  No emesis so far here  ABdomen benign  No constipation  CTM  Nausea meds prn

## 2022-02-26 NOTE — ED NOTES
Pt medicated for pain.    Pt up to [facility] via pt transport. Pt has all belongings including walker labeled with pt sticker.

## 2022-02-26 NOTE — H&P
Hospital Medicine History & Physical Note    Date of Service  2/26/2022    Primary Care Physician  SHON Galicia.    Consultants  None    Code Status  Full Code    Chief Complaint  Chief Complaint   Patient presents with   • Nausea     BIB EMS for complaints of nausea x2 days from not eating at SNF. Pt at SNF for infection of neck post surgery. Pt states no vomiting, only nausea. Pt also states that she does not want to go back to SNF due to treatment by staff at facility. Pt denies any pain at current time.       History of Presenting Illness  Yolanda Reed is a 61 y.o. female with recent ACDF in January 2020 at C5-T1 for herniated nucleus pulposus, cervical myelopathy, cervical stenosis and cervical radiculopathy with Dr. Werner that was complicated by postop infection requiring IV antibiotics via PICC line for Serratia marcescens supposed to complete course of ceftriaxone March 18, 2022 with hypertension, hyperlipidemia, anxiety, prior Covid infection 2020, depression, PTSD, who presented 2/26/2022 with nausea.  Patient was recently discharged to SNF and is very upset with the care she has been receiving.  She states they have not been adequately treating her pain or nausea.  She attempted multiple times to have any but states she was ignored so she called EMS to have them take her to [facility].  She denies recent fevers or chills, denies cough or dyspnea, denies vomiting or abdominal pain, denies dysuria or constipation.  On arrival she was tachycardic, tachypneic, hypoxic initially requiring 4 5 L nasal cannula to keep oxygen saturations greater than 90% however this was able to be weaned down to 1 to 2 L nasal cannula.  CBC without leukocytosis, shows anemia improved from prior, CHEM panel without significant electrolyte abnormalities, normal renal function, normal liver function, lactic acid 1.6, Covid swab negative, chest x-ray negative for acute cardiopulmonary abnormalities.  Patient subsequently  referred to hospitalist for admission    I discussed the plan of care with patient, bedside RN and pharmacy.    Review of Systems  Review of Systems   Constitutional: Negative for chills and fever.   HENT: Negative for congestion and sinus pain.    Eyes: Negative for photophobia and pain.   Respiratory: Negative for cough and shortness of breath.    Cardiovascular: Negative for chest pain and palpitations.   Gastrointestinal: Positive for nausea. Negative for abdominal pain and vomiting.   Genitourinary: Negative for dysuria and urgency.   Musculoskeletal: Positive for neck pain (chronic, improving). Negative for falls.   Neurological: Positive for weakness. Negative for sensory change, speech change, focal weakness, seizures, loss of consciousness and headaches.   Psychiatric/Behavioral: Negative for substance abuse. The patient is nervous/anxious.        Past Medical History   has a past medical history of Anesthesia (2006), Anxiety, Arthritis, Dental disorder, High cholesterol, Hypertension (01/05/2022), Infectious disease, Low blood sugar, Pneumonia, PONV (postoperative nausea and vomiting), Psychiatric problem, PTSD (post-traumatic stress disorder), Sleep apnea, Snoring, and Urinary bladder disorder.    Surgical History   has a past surgical history that includes tonsillectomy; breast biopsy (8/20/2010); pr knee scope,aid ant cruciate repair (Right, 11/25/2019); bladder sling female (N/A, 6/29/2021); gyn surgery (1981,1988); other orthopedic surgery; other orthopedic surgery; corpectomy (N/A, 1/6/2022); and cervical disk and fusion anterior (N/A, 1/6/2022).     Family History  family history includes Alcohol/Drug in her mother; Allergies in her father; Anesthesia in her mother; Anxiety disorder in her mother; Bipolar disorder in her mother; Cancer in her mother; DVT in her sister; Kidney stones in her father; Lung Cancer in her father, mother, and sister; Osteoporosis in her mother.       Social History    reports that she quit smoking about 8 years ago. Her smoking use included cigarettes. She has a 15.00 pack-year smoking history. She has never used smokeless tobacco. She reports current drug use. Drug: Inhaled. She reports that she does not drink alcohol.    Allergies  Allergies   Allergen Reactions   • Morphine Anaphylaxis     Throat swelling       Medications  Prior to Admission Medications   Prescriptions Last Dose Informant Patient Reported? Taking?   QUEtiapine (SEROQUEL) 50 MG tablet  Patient Yes No   Sig: Take 50 mg by mouth every evening.   atorvastatin (LIPITOR) 10 MG Tab  Patient Yes No   Sig: Take 10 mg by mouth every evening.   cefTRIAXone (ROCEPHIN) 2 g/20 mL   No No   Sig: Infuse 20 mL into a venous catheter every 24 hours for 13 days.   gabapentin (NEURONTIN) 400 MG Cap   No No   Sig: Take 1 Capsule by mouth 3 times a day.   oxyCODONE-acetaminophen (PERCOCET) 5-325 MG Tab   No No   Sig: Take 1 Tablet by mouth every 8 hours as needed for Moderate Pain or Severe Pain for up to 3 days.   propranolol (INDERAL) 80 MG Tab  Patient Yes No   Sig: Take 80 mg by mouth at bedtime.   sertraline (ZOLOFT) 100 MG Tab  Patient Yes No   Sig: Take 100 mg by mouth every evening.   vitamin D (CHOLECALCIFEROL) 1000 UNIT Tab  Patient Yes No   Sig: Take 1,000 Units by mouth every evening.      Facility-Administered Medications: None       Physical Exam  Temp:  [36.4 °C (97.5 °F)] 36.4 °C (97.5 °F)  Pulse:  [] 86  Resp:  [14-24] 15  BP: (122-149)/() 122/65  SpO2:  [67 %-95 %] 95 %  Blood Pressure: 122/65   Temperature: 36.4 °C (97.5 °F)   Pulse: 86   Respiration: 15   Pulse Oximetry: 95 %       Physical Exam  Vitals and nursing note reviewed.   Constitutional:       Appearance: She is obese. She is not toxic-appearing.      Comments: 61-year-old female looks older than stated age, appears chronically ill, alert and conversant able to speak full sentences without becoming breathless, anxious   HENT:      Head:  Normocephalic and atraumatic.      Nose: Nose normal. No rhinorrhea.      Mouth/Throat:      Mouth: Mucous membranes are moist.      Pharynx: Oropharynx is clear.   Eyes:      Extraocular Movements: Extraocular movements intact.      Conjunctiva/sclera: Conjunctivae normal.      Pupils: Pupils are equal, round, and reactive to light.   Neck:      Comments: Healed surgical wound, small hard piece of material on lateral aspect of surgical incision,? Retained suture, no apparent abscess or areas of fluctuance or induration, not particularly tender  Cardiovascular:      Rate and Rhythm: Normal rate and regular rhythm.      Pulses: Normal pulses.      Heart sounds: No murmur heard.  Pulmonary:      Effort: Pulmonary effort is normal. No respiratory distress.      Breath sounds: Normal breath sounds. No wheezing or rhonchi.   Abdominal:      General: Bowel sounds are normal.      Palpations: Abdomen is soft.      Tenderness: There is no abdominal tenderness. There is no guarding or rebound.   Musculoskeletal:         General: No deformity. Normal range of motion.      Cervical back: Normal range of motion and neck supple.   Skin:     General: Skin is warm and dry.      Capillary Refill: Capillary refill takes less than 2 seconds.   Neurological:      General: No focal deficit present.      Mental Status: She is alert and oriented to person, place, and time. Mental status is at baseline.      Cranial Nerves: No cranial nerve deficit.      Comments: Face symmetrical, tongue midline, able to move all 4 extremities, no drift, no hemineglect or gaze preference, finger-to-nose normal         Laboratory:  Recent Labs     02/26/22  0300   WBC 9.5   RBC 4.27   HEMOGLOBIN 11.9*   HEMATOCRIT 37.0   MCV 86.7   MCH 27.9   MCHC 32.2*   RDW 43.5   PLATELETCT 294   MPV 10.1     Recent Labs     02/26/22  0300   SODIUM 140   POTASSIUM 4.0   CHLORIDE 102   CO2 22   GLUCOSE 98   BUN 6*   CREATININE 0.42*   CALCIUM 9.8     Recent Labs      02/26/22  0300   ALTSGPT 17   ASTSGOT 34   ALKPHOSPHAT 108*   TBILIRUBIN 0.3   GLUCOSE 98         No results for input(s): NTPROBNP in the last 72 hours.      No results for input(s): TROPONINT in the last 72 hours.    Imaging:  DX-CHEST-PORTABLE (1 VIEW)   Final Result      No acute cardiopulmonary abnormality.          X-Ray:  My impression is: No acute cardiopulmonary abnormalities    Assessment/Plan:  I anticipate this patient will require at least two midnights for appropriate medical management, necessitating inpatient admission.    * Acute respiratory failure with hypoxia (HCC)- (present on admission)  Assessment & Plan  Chest x-ray clear, check procalcitonin and BNP  TTE recently performed showed mild pulmonary hypertension RVSP 45  Incentive spirometry, duo nebs as needed  Continuous pulse ox, RT consult  Covid, RSV, influenza swab negative      Wound infection after surgery- (present on admission)  Assessment & Plan  Recent C5-T1 ACDF in January 2022 for herniated nucleus pulposus, cervical myelopathy, cervical stenosis, cervical radiculopathy complicated by postop infection requiring IV antibiotics via PICC for Serratia marcescens, ID consulted and plan was to complete course of ceftriaxone through March 15, 2022.  Continue ceftriaxone  Check blood cultures    Hyperlipidemia- (present on admission)  Assessment & Plan  Continue statin    Essential hypertension- (present on admission)  Assessment & Plan  Continue home propanolol.  IV antihypertensives with parameters        VTE prophylaxis: enoxaparin ppx

## 2022-02-26 NOTE — ED NOTES
Patient unable to participate in interview  Called [facility] ( [phone] ) Why stated they are unable to send a MAR at this time due to technical difficulties and to please try again later.   Unable to complete med rec at this time

## 2022-02-26 NOTE — ASSESSMENT & PLAN NOTE
Recent C5-T1 ACDF in January 2022 for herniated nucleus pulposus, cervical myelopathy, cervical stenosis, cervical radiculopathy complicated by postop infection requiring IV antibiotics via PICC for Serratia marcescens, ID consulted and plan was to complete course of ceftriaxone through March 15, 2022.  Continue ceftriaxone,  Patient refusing return to SNF.  Probably okay to receive infusions at home per PT/OT evals obtained 2/28.  Written orders for outpatient antibiotics written by Dr. Richter on 2/28, can request assistance in coordination and transition of care from ID if necessary.

## 2022-02-26 NOTE — ASSESSMENT & PLAN NOTE
Chest x-ray clear, check procalcitonin and BNP  TTE recently performed showed mild pulmonary hypertension RVSP 45  Incentive spirometry, duo nebs as needed  Continuous pulse ox, RT consult  Covid, RSV, influenza swab negative

## 2022-02-26 NOTE — ED PROVIDER NOTES
ED Provider Note        Primary care provider: SHON Galicia.    I verified that the patient was wearing a mask and I was wearing appropriate PPE every time I entered the room. The patient's mask was on the patient at all times during my encounter except for a brief view of the oropharynx.      CHIEF COMPLAINT  Chief Complaint   Patient presents with   • Nausea     BIB EMS for complaints of nausea x2 days from not eating at SNF. Pt at SNF for infection of neck post surgery. Pt states no vomiting, only nausea. Pt also states that she does not want to go back to SNF due to treatment by staff at facility. Pt denies any pain at current time.       HPI  Yolanda Reed is a 61 y.o. female who presents to the Emergency Department with chief complaint of nausea and anorexia.  Patient's been at a skilled nursing facility she apparently has not been eating for the last 2 days had profound nausea multiple episodes of emesis.  Patient's status post a DCF multiple levels by Dr. Werner months prior.  She was recently admitted to our hospital for concerns for infection of the surgical site.  She has PICC line in place.  She is receiving daily Rocephin infusions which she is supposed to be continuing through March 15.  Patient reports ongoing severe pain in her neck.  Some minimal abdominal pain no headache no altered mental status no cough congestion she has had slight chills without measured fever.  She does report some redness and slight swelling at the surgical site.    REVIEW OF SYSTEMS  10 systems reviewed and otherwise negative, pertinent positives and negatives listed in the history of present illness.    PAST MEDICAL HISTORY   has a past medical history of Anesthesia (2006), Anxiety, Arthritis, Dental disorder, High cholesterol, Hypertension (01/05/2022), Infectious disease, Low blood sugar, Pneumonia, PONV (postoperative nausea and vomiting), Psychiatric problem, PTSD (post-traumatic stress disorder), Sleep  "apnea, Snoring, and Urinary bladder disorder.    SURGICAL HISTORY   has a past surgical history that includes tonsillectomy; breast biopsy (2010); knee scope,aid ant cruciate repair (Right, 2019); bladder sling female (N/A, 2021); gyn surgery (,); other orthopedic surgery; other orthopedic surgery; corpectomy (N/A, 2022); and cervical disk and fusion anterior (N/A, 2022).    SOCIAL HISTORY  Social History     Tobacco Use   • Smoking status: Former Smoker     Packs/day: 0.50     Years: 30.00     Pack years: 15.00     Types: Cigarettes     Quit date: 2013     Years since quittin.2   • Smokeless tobacco: Never Used   • Tobacco comment: .5 ppd, 30 yrs   Vaping Use   • Vaping Use: Every day   • Substances: Nicotine, THC   • Devices: Pre-filled or refillable cartridge   Substance Use Topics   • Alcohol use: No   • Drug use: Yes     Types: Inhaled     Comment: \"clean since \",  THC last used 1/3/22      Social History     Substance and Sexual Activity   Drug Use Yes   • Types: Inhaled    Comment: \"clean since \",  THC last used 1/3/22       FAMILY HISTORY  Non-Contributory    CURRENT MEDICATIONS  Home Medications    **Home medications have not yet been reviewed for this encounter**         ALLERGIES  Allergies   Allergen Reactions   • Morphine Anaphylaxis     Throat swelling       PHYSICAL EXAM  VITAL SIGNS: /65   Pulse 86   Temp 36.4 °C (97.5 °F) (Temporal)   Resp 15   Ht 1.524 m (5')   Wt 73 kg (160 lb 15 oz)   LMP 2010   SpO2 95%   BMI 31.43 kg/m²   Pulse ox interpretation: I interpret this pulse ox as normal.  Constitutional: Alert and oriented x 3, moderate distress  HEENT: Atraumatic normocephalic, pupils are equal round, extraocular movements are intact. The nares is clear, external ears are normal, mouth shows moist mucous membranes  Neck: no obvious JVD or tracheal deviation left-sided AD CF site shows some minimal erythema and some slight " induration no warmth no streaking fluctuance  Cardiovascular: R tachycardic no murmur rub or gallop   Thorax & Lungs: No respiratory distress, no wheezes rales or rhonchi, No chest tenderness.   GI: Soft nontender nondistended positive bowel sounds, no peritoneal signs  Skin: Warm dry no obvious acute rash or lesion  Musculoskeletal: Moving all extremities with normal range strength, no acute  deformity  Neurologic: Cranial nerves III through XII are grossly intact, no sensory deficit, no cerebellar dysfunction   Psychiatric: Appropriate affect for situation at this time     DIAGNOSTIC STUDIES / PROCEDURES  LABS      Results for orders placed or performed during the hospital encounter of 02/26/22   LACTIC ACID   Result Value Ref Range    Lactic Acid 1.6 0.5 - 2.0 mmol/L   CBC WITH DIFFERENTIAL   Result Value Ref Range    WBC 9.5 4.8 - 10.8 K/uL    RBC 4.27 4.20 - 5.40 M/uL    Hemoglobin 11.9 (L) 12.0 - 16.0 g/dL    Hematocrit 37.0 37.0 - 47.0 %    MCV 86.7 81.4 - 97.8 fL    MCH 27.9 27.0 - 33.0 pg    MCHC 32.2 (L) 33.6 - 35.0 g/dL    RDW 43.5 35.9 - 50.0 fL    Platelet Count 294 164 - 446 K/uL    MPV 10.1 9.0 - 12.9 fL    Neutrophils-Polys 61.70 44.00 - 72.00 %    Lymphocytes 29.10 22.00 - 41.00 %    Monocytes 7.80 0.00 - 13.40 %    Eosinophils 0.80 0.00 - 6.90 %    Basophils 0.20 0.00 - 1.80 %    Immature Granulocytes 0.40 0.00 - 0.90 %    Nucleated RBC 0.00 /100 WBC    Neutrophils (Absolute) 5.88 2.00 - 7.15 K/uL    Lymphs (Absolute) 2.77 1.00 - 4.80 K/uL    Monos (Absolute) 0.74 0.00 - 0.85 K/uL    Eos (Absolute) 0.08 0.00 - 0.51 K/uL    Baso (Absolute) 0.02 0.00 - 0.12 K/uL    Immature Granulocytes (abs) 0.04 0.00 - 0.11 K/uL    NRBC (Absolute) 0.00 K/uL   COMP METABOLIC PANEL   Result Value Ref Range    Sodium 140 135 - 145 mmol/L    Potassium 4.0 3.6 - 5.5 mmol/L    Chloride 102 96 - 112 mmol/L    Co2 22 20 - 33 mmol/L    Anion Gap 16.0 7.0 - 16.0    Glucose 98 65 - 99 mg/dL    Bun 6 (L) 8 - 22 mg/dL     Creatinine 0.42 (L) 0.50 - 1.40 mg/dL    Calcium 9.8 8.5 - 10.5 mg/dL    AST(SGOT) 34 12 - 45 U/L    ALT(SGPT) 17 2 - 50 U/L    Alkaline Phosphatase 108 (H) 30 - 99 U/L    Total Bilirubin 0.3 0.1 - 1.5 mg/dL    Albumin 3.5 3.2 - 4.9 g/dL    Total Protein 6.4 6.0 - 8.2 g/dL    Globulin 2.9 1.9 - 3.5 g/dL    A-G Ratio 1.2 g/dL   ESTIMATED GFR   Result Value Ref Range    GFR If African American >60 >60 mL/min/1.73 m 2    GFR If Non African American >60 >60 mL/min/1.73 m 2       All labs reviewed by me.      RADIOLOGY  DX-CHEST-PORTABLE (1 VIEW)   Final Result      No acute cardiopulmonary abnormality.            COURSE & MEDICAL DECISION MAKING  Pertinent Labs & Imaging studies reviewed. (See chart for details)    3:35 AM - Patient seen and examined at bedside.         Patient noted to have slightly elevated blood pressure likely circumstantial secondary to presenting complaint. Referred to primary care physician for further evaluation.      Medical Decision Making: Tachycardic at arrival with concern for possible ongoing infection.  White count is normal lactic acid is negative she is feeling better after interventions here.  Patient was quite hypoxic on room air into the 60s.  This response to nasal cannula her chest x-ray is negative.  Covid test is pending vital signs are vastly improved at this time.  Patient will need admission due to her hypoxia need for ongoing antibiotic administration.  Patient is also refusing to return back to her previous skilled nursing facility.  I discussed the case up to this point with Dr. Koch she is admitted for ongoing evaluation and treatment.    /65   Pulse 86   Temp 36.4 °C (97.5 °F) (Temporal)   Resp 15   Ht 1.524 m (5')   Wt 73 kg (160 lb 15 oz)   LMP 08/01/2010   SpO2 95%   BMI 31.43 kg/m²           FINAL IMPRESSION  1. Nausea and vomiting, intractability of vomiting not specified, unspecified vomiting type Active   2. Hypoxia Active   3. Post-op pain Active    4.  SIRS without identified acute infection    This dictation has been created using voice recognition software and/or scribes. The accuracy of the dictation is limited by the abilities of the software and the expertise of the scribes. I expect there may be some errors of grammar and possibly content. I made every attempt to manually correct the errors within my dictation. However, errors related to voice recognition software and/or scribes may still exist and should be interpreted within the appropriate context.

## 2022-02-26 NOTE — ED TRIAGE NOTES
Chief Complaint   Patient presents with   • Nausea     BIB EMS for complaints of nausea x2 days from not eating at SNF. Pt at SNF for infection of neck post surgery. Pt states no vomiting, only nausea. Pt also states that she does not want to go back to SNF due to treatment by staff at facility. Pt denies any pain at current time.     Pt anxious in triage    Pt educated upon triage process and told to inform  staff of any changes in condition so that Pt may be reassessed. No further questions at this time. Pt sitting out in lobby.

## 2022-02-26 NOTE — PROGRESS NOTES
4 Eyes Skin Assessment Completed by ALAN Casiano and ALAN Morrow.    Head WDL  Ears WDL  Nose WDL  Mouth WDL  Neck WDL  Breast/Chest WDL  Shoulder Blades WDL  Spine WDL  (R) Arm/Elbow/Hand WDL  (L) Arm/Elbow/Hand WDL  Abdomen WDL  Groin WDL  Scrotum/Coccyx/Buttocks WDL  (R) Leg WDL  (L) Leg WDL  (R) Heel/Foot/Toe WDL  (L) Heel/Foot/Toe WDL          Devices In Places Blood Pressure Cuff      Interventions In Place Pillows, Q2 Turns and Pressure Redistribution Mattress    Possible Skin Injury No    Pictures Uploaded Into Epic N/A  Wound Consult Placed N/A  RN Wound Prevention Protocol Ordered No

## 2022-02-27 LAB
ALBUMIN SERPL BCP-MCNC: 3.5 G/DL (ref 3.2–4.9)
ALBUMIN/GLOB SERPL: 1.4 G/DL
ALP SERPL-CCNC: 100 U/L (ref 30–99)
ALT SERPL-CCNC: 15 U/L (ref 2–50)
ANION GAP SERPL CALC-SCNC: 10 MMOL/L (ref 7–16)
AST SERPL-CCNC: 24 U/L (ref 12–45)
BASOPHILS # BLD AUTO: 0.2 % (ref 0–1.8)
BASOPHILS # BLD: 0.01 K/UL (ref 0–0.12)
BILIRUB SERPL-MCNC: 0.3 MG/DL (ref 0.1–1.5)
BUN SERPL-MCNC: 4 MG/DL (ref 8–22)
CALCIUM SERPL-MCNC: 9.1 MG/DL (ref 8.5–10.5)
CHLORIDE SERPL-SCNC: 103 MMOL/L (ref 96–112)
CO2 SERPL-SCNC: 28 MMOL/L (ref 20–33)
CREAT SERPL-MCNC: 0.46 MG/DL (ref 0.5–1.4)
EOSINOPHIL # BLD AUTO: 0.11 K/UL (ref 0–0.51)
EOSINOPHIL NFR BLD: 1.8 % (ref 0–6.9)
ERYTHROCYTE [DISTWIDTH] IN BLOOD BY AUTOMATED COUNT: 45.1 FL (ref 35.9–50)
GLOBULIN SER CALC-MCNC: 2.5 G/DL (ref 1.9–3.5)
GLUCOSE SERPL-MCNC: 96 MG/DL (ref 65–99)
HCT VFR BLD AUTO: 35.3 % (ref 37–47)
HGB BLD-MCNC: 11.2 G/DL (ref 12–16)
IMM GRANULOCYTES # BLD AUTO: 0.03 K/UL (ref 0–0.11)
IMM GRANULOCYTES NFR BLD AUTO: 0.5 % (ref 0–0.9)
LYMPHOCYTES # BLD AUTO: 2.67 K/UL (ref 1–4.8)
LYMPHOCYTES NFR BLD: 43.8 % (ref 22–41)
MCH RBC QN AUTO: 27.9 PG (ref 27–33)
MCHC RBC AUTO-ENTMCNC: 31.7 G/DL (ref 33.6–35)
MCV RBC AUTO: 87.8 FL (ref 81.4–97.8)
MONOCYTES # BLD AUTO: 0.69 K/UL (ref 0–0.85)
MONOCYTES NFR BLD AUTO: 11.3 % (ref 0–13.4)
NEUTROPHILS # BLD AUTO: 2.58 K/UL (ref 2–7.15)
NEUTROPHILS NFR BLD: 42.4 % (ref 44–72)
NRBC # BLD AUTO: 0 K/UL
NRBC BLD-RTO: 0 /100 WBC
PLATELET # BLD AUTO: 261 K/UL (ref 164–446)
PMV BLD AUTO: 9.4 FL (ref 9–12.9)
POTASSIUM SERPL-SCNC: 3.5 MMOL/L (ref 3.6–5.5)
PROT SERPL-MCNC: 6 G/DL (ref 6–8.2)
RBC # BLD AUTO: 4.02 M/UL (ref 4.2–5.4)
SODIUM SERPL-SCNC: 141 MMOL/L (ref 135–145)
WBC # BLD AUTO: 6.1 K/UL (ref 4.8–10.8)

## 2022-02-27 PROCEDURE — 700102 HCHG RX REV CODE 250 W/ 637 OVERRIDE(OP): Performed by: HOSPITALIST

## 2022-02-27 PROCEDURE — 770001 HCHG ROOM/CARE - MED/SURG/GYN PRIV*

## 2022-02-27 PROCEDURE — 700111 HCHG RX REV CODE 636 W/ 250 OVERRIDE (IP): Performed by: STUDENT IN AN ORGANIZED HEALTH CARE EDUCATION/TRAINING PROGRAM

## 2022-02-27 PROCEDURE — A9270 NON-COVERED ITEM OR SERVICE: HCPCS | Performed by: HOSPITALIST

## 2022-02-27 PROCEDURE — 85025 COMPLETE CBC W/AUTO DIFF WBC: CPT

## 2022-02-27 PROCEDURE — 700102 HCHG RX REV CODE 250 W/ 637 OVERRIDE(OP): Performed by: STUDENT IN AN ORGANIZED HEALTH CARE EDUCATION/TRAINING PROGRAM

## 2022-02-27 PROCEDURE — 99233 SBSQ HOSP IP/OBS HIGH 50: CPT | Performed by: HOSPITALIST

## 2022-02-27 PROCEDURE — A9270 NON-COVERED ITEM OR SERVICE: HCPCS | Performed by: STUDENT IN AN ORGANIZED HEALTH CARE EDUCATION/TRAINING PROGRAM

## 2022-02-27 PROCEDURE — 80053 COMPREHEN METABOLIC PANEL: CPT

## 2022-02-27 RX ORDER — DIAZEPAM 5 MG/1
10 TABLET ORAL EVERY 6 HOURS PRN
Status: ON HOLD | COMMUNITY
End: 2022-10-03

## 2022-02-27 RX ORDER — DIAZEPAM 5 MG/1
10 TABLET ORAL EVERY 6 HOURS PRN
Status: DISCONTINUED | OUTPATIENT
Start: 2022-02-27 | End: 2022-03-01

## 2022-02-27 RX ORDER — OXYCODONE HYDROCHLORIDE 10 MG/1
10 TABLET ORAL EVERY 4 HOURS PRN
Status: DISCONTINUED | OUTPATIENT
Start: 2022-02-27 | End: 2022-03-01 | Stop reason: HOSPADM

## 2022-02-27 RX ORDER — CELECOXIB 100 MG/1
100 CAPSULE ORAL 2 TIMES DAILY
Status: DISCONTINUED | OUTPATIENT
Start: 2022-02-27 | End: 2022-03-01 | Stop reason: HOSPADM

## 2022-02-27 RX ORDER — ATORVASTATIN CALCIUM 10 MG/1
10 TABLET, FILM COATED ORAL NIGHTLY
COMMUNITY
End: 2023-11-17

## 2022-02-27 RX ORDER — PROPRANOLOL HYDROCHLORIDE 40 MG/1
80 TABLET ORAL
Status: ON HOLD | COMMUNITY
End: 2022-10-03

## 2022-02-27 RX ORDER — METHOCARBAMOL 500 MG/1
500 TABLET, FILM COATED ORAL EVERY 6 HOURS PRN
COMMUNITY
End: 2022-09-23

## 2022-02-27 RX ORDER — QUETIAPINE FUMARATE 50 MG/1
50 TABLET, FILM COATED ORAL NIGHTLY
Status: ON HOLD | COMMUNITY
End: 2024-02-22

## 2022-02-27 RX ORDER — QUETIAPINE FUMARATE 25 MG/1
50 TABLET, FILM COATED ORAL NIGHTLY
Status: DISCONTINUED | OUTPATIENT
Start: 2022-02-27 | End: 2022-03-01 | Stop reason: HOSPADM

## 2022-02-27 RX ORDER — PROPRANOLOL HYDROCHLORIDE 40 MG/1
80 TABLET ORAL EVERY EVENING
Status: DISCONTINUED | OUTPATIENT
Start: 2022-02-27 | End: 2022-02-27

## 2022-02-27 RX ORDER — ACETAMINOPHEN 325 MG/1
650 TABLET ORAL EVERY 6 HOURS
Status: DISCONTINUED | OUTPATIENT
Start: 2022-02-27 | End: 2022-03-01 | Stop reason: HOSPADM

## 2022-02-27 RX ORDER — PROPRANOLOL HYDROCHLORIDE 40 MG/1
80 TABLET ORAL EVERY EVENING
Status: DISCONTINUED | OUTPATIENT
Start: 2022-02-27 | End: 2022-03-01 | Stop reason: HOSPADM

## 2022-02-27 RX ORDER — METHOCARBAMOL 500 MG/1
500 TABLET, FILM COATED ORAL EVERY 6 HOURS PRN
Status: DISCONTINUED | OUTPATIENT
Start: 2022-02-27 | End: 2022-03-01 | Stop reason: HOSPADM

## 2022-02-27 RX ADMIN — GABAPENTIN 400 MG: 400 CAPSULE ORAL at 05:33

## 2022-02-27 RX ADMIN — ENOXAPARIN SODIUM 40 MG: 40 INJECTION SUBCUTANEOUS at 05:34

## 2022-02-27 RX ADMIN — SERTRALINE 100 MG: 100 TABLET, FILM COATED ORAL at 18:00

## 2022-02-27 RX ADMIN — ONDANSETRON 4 MG: 2 INJECTION INTRAMUSCULAR; INTRAVENOUS at 11:33

## 2022-02-27 RX ADMIN — CELECOXIB 100 MG: 100 CAPSULE ORAL at 17:59

## 2022-02-27 RX ADMIN — ACETAMINOPHEN 650 MG: 325 TABLET, FILM COATED ORAL at 18:00

## 2022-02-27 RX ADMIN — CEFTRIAXONE SODIUM 2 G: 10 INJECTION, POWDER, FOR SOLUTION INTRAVENOUS at 05:29

## 2022-02-27 RX ADMIN — OXYCODONE 5 MG: 5 TABLET ORAL at 16:19

## 2022-02-27 RX ADMIN — QUETIAPINE FUMARATE 50 MG: 25 TABLET ORAL at 21:32

## 2022-02-27 RX ADMIN — ONDANSETRON 4 MG: 2 INJECTION INTRAMUSCULAR; INTRAVENOUS at 16:22

## 2022-02-27 RX ADMIN — PROPRANOLOL HYDROCHLORIDE 80 MG: 40 TABLET ORAL at 16:19

## 2022-02-27 ASSESSMENT — PAIN DESCRIPTION - PAIN TYPE
TYPE: ACUTE PAIN
TYPE: ACUTE PAIN

## 2022-02-27 NOTE — DIETARY
Nutrition services: Day 1 of admit.  Yolanda Reed is a 61 y.o. female with nausea and vomiting admitted from SNF where she was s/p cervical neck surgery that was complicated by postop infection requiring IV antibiotics   · Patient with poor PO intake and weight loss noted on admit screen.  · Patient reported poor intake was in part related to conditions of SNF.  Patient complained of smells, neglect, etc.  She stated she was still having intermittent nausea, but that it is improving.  She stated she ate all of her breakfast and has a much better appetite.  She had snacks at bedside from home and was requesting more snacks with meals.    Assessment:  Height: 152.4 cm (5')  Weight: 69.7 kg (153 lb 9.6 oz)  Body mass index is 30 kg/m².  Diet/Intake: Regular; eating % so far    Evaluation:   1. 16# weight loss since middle of Jan.  2. Bun 4 and Creatinine 0.46 likely related to poor nutrition prior to admit.    Malnutrition Risk: Not noted currently.  Eating very well.  Not cachectic on physical examination.  Severe weight loss of 9% in less than 3 months likely related to conditions at SNF per patient report.    Recommendations/Interventions/Plan:  1. Continue regular diet.  2. Monitor weight.  3. Nutrition rep will continue to see patient for ongoing meal and snack preferences.     RD following

## 2022-02-27 NOTE — ASSESSMENT & PLAN NOTE
Patient appears to be taking benzodiazepines on a rather chronic basis at home, these were held on her discharge to skilled nursing facility, this may be contributing to many of her systemic symptoms.  These medictions were resumed on HD#1

## 2022-02-27 NOTE — PROGRESS NOTES
Hospital Medicine Daily Progress Note    Date of Service  2/27/2022    Chief Complaint  Yolanda Reed is a 61 y.o. female admitted 2/26/2022 after she called emergency medical services from her skilled nursing facility as she felt she was decompensating and being inadequately cared for.    Hospital Course  No notes on file    Interval Problem Update  No acute overnight events, patient is anxious, concerned about her perceived lack of recovery from surgery.  Was concerned about tremors and inability to function well at this time.  Is uncomfortable with the thought that these changes may be permanent.  Physical and Occupational Therapy evaluations pending.    I have personally seen and examined the patient at bedside. I discussed the plan of care with patient.    Consultants/Specialty      Code Status  Full Code    Disposition  Patient is not medically cleared for discharge.   Anticipate discharge to pending.  I have placed the appropriate orders for post-discharge needs.    Review of Systems      Physical Exam  Temp:  [36.1 °C (97 °F)-36.7 °C (98.1 °F)] 36.5 °C (97.7 °F)  Pulse:  [] 144  Resp:  [16-20] 20  BP: (112-143)/(65-85) 119/77  SpO2:  [85 %-96 %] 96 %    General appearance: NAD, conversant  Eyes: anicteric sclerae, moist conjunctivae; no lid-lag; PERRLA  HENT: Atraumatic; oropharynx clear with moist mucous membranes and no mucosal ulcerations;  normal hard and soft palate  Neck: Trachea midline; FROM, supple, no thyromegaly or lymphadenopathy.  Incisions are clean dry and intact, small white loop of suture is protruding along the lateral border of incision.  Does not appear to be infected.  Lungs: CTA, with normal respiratory effort and no intercostal retractions  CV: RRR, no MRGs  Abdomen: Soft, non-tender; no masses or HSM  Extremities: No peripheral edema or extremity lymphadenopathy  Skin: Normal temperature, turgor and texture; no rash, ulcers or subcutaneous nodules  Psych: Alert and oriented  to person, place and time.  She is visibly anxious over what she perceives as her failure to improve.  Has tremors across all 4 extremities.        Current Facility-Administered Medications:   •  oxyCODONE immediate release (ROXICODONE) tablet 10 mg, 10 mg, Oral, Q4HRS PRN, Onur Richter M.D.  •  acetaminophen (Tylenol) tablet 650 mg, 650 mg, Oral, Q6HRS, Onur Richter M.D.  •  celecoxib (CELEBREX) capsule 100 mg, 100 mg, Oral, BID, Onur Richter M.D.  •  propranolol (INDERAL) tablet 80 mg, 80 mg, Oral, Q EVENING, Onur Richter M.D.  •  methocarbamol (ROBAXIN) tablet 500 mg, 500 mg, Oral, Q6HRS PRN, Onur Richter M.D.  •  oxyCODONE immediate-release (ROXICODONE) tablet 5 mg, 5 mg, Oral, Q4HRS PRN, Onur Richter M.D., 5 mg at 02/26/22 1354  •  gabapentin (NEURONTIN) capsule 400 mg, 400 mg, Oral, TID, Sameer Koch M.D., 400 mg at 02/27/22 0533  •  sertraline (Zoloft) tablet 100 mg, 100 mg, Oral, Q EVENING, Sameer Koch M.D., 100 mg at 02/26/22 1709  •  senna-docusate (PERICOLACE or SENOKOT S) 8.6-50 MG per tablet 2 Tablet, 2 Tablet, Oral, BID **AND** polyethylene glycol/lytes (MIRALAX) PACKET 1 Packet, 1 Packet, Oral, QDAY PRN **AND** magnesium hydroxide (MILK OF MAGNESIA) suspension 30 mL, 30 mL, Oral, QDAY PRN **AND** bisacodyl (DULCOLAX) suppository 10 mg, 10 mg, Rectal, QDAY PRN, Sameer Koch M.D.  •  enoxaparin (LOVENOX) inj 40 mg, 40 mg, Subcutaneous, DAILY, Sameer Koch M.D., 40 mg at 02/27/22 0534  •  labetalol (NORMODYNE/TRANDATE) injection 10 mg, 10 mg, Intravenous, Q4HRS PRN, Sameer Koch M.D.  •  ondansetron (ZOFRAN) syringe/vial injection 4 mg, 4 mg, Intravenous, Q4HRS PRN, Saemer Koch M.D., 4 mg at 02/27/22 1133  •  ondansetron (ZOFRAN ODT) dispertab 4 mg, 4 mg, Oral, Q4HRS PRN, Sameer Koch M.D.  •  promethazine (PHENERGAN) tablet 12.5-25 mg, 12.5-25 mg, Oral, Q4HRS PRN, Sameer Koch M.D.  •  promethazine (PHENERGAN) suppository 12.5-25 mg, 12.5-25  mg, Rectal, Q4HRS PRN, Sameer Koch M.D.  •  prochlorperazine (COMPAZINE) injection 5-10 mg, 5-10 mg, Intravenous, Q4HRS PRN, Sameer Koch M.D.  •  ipratropium-albuterol (DUONEB) nebulizer solution, 3 mL, Nebulization, Q4H PRN (RT), Sameer Koch M.D.  •  cefTRIAXone (Rocephin) syringe 2 g, 2 g, Intravenous, Q24HRS, Sameer Koch M.D., 2 g at 02/27/22 0529  •  LORazepam (ATIVAN) injection 0.5 mg, 0.5 mg, Intravenous, Q4HRS PRN, Ashley Dave M.D., 0.5 mg at 02/26/22 2228  •  QUEtiapine (Seroquel) tablet 50 mg, 50 mg, Oral, Nightly, Keri Botello, A.P.R.N., 50 mg at 02/26/22 2228  •  melatonin tablet 5 mg, 5 mg, Oral, HS PRN, Keri Botello, A.P.R.N., 5 mg at 02/26/22 2228      Fluids    Intake/Output Summary (Last 24 hours) at 2/27/2022 1546  Last data filed at 2/27/2022 1300  Gross per 24 hour   Intake 720 ml   Output --   Net 720 ml       Laboratory  Recent Labs     02/26/22  0300 02/27/22  0530   WBC 9.5 6.1   RBC 4.27 4.02*   HEMOGLOBIN 11.9* 11.2*   HEMATOCRIT 37.0 35.3*   MCV 86.7 87.8   MCH 27.9 27.9   MCHC 32.2* 31.7*   RDW 43.5 45.1   PLATELETCT 294 261   MPV 10.1 9.4     Recent Labs     02/26/22  0300 02/27/22  0530   SODIUM 140 141   POTASSIUM 4.0 3.5*   CHLORIDE 102 103   CO2 22 28   GLUCOSE 98 96   BUN 6* 4*   CREATININE 0.42* 0.46*   CALCIUM 9.8 9.1                   Imaging  DX-CHEST-PORTABLE (1 VIEW)   Final Result      No acute cardiopulmonary abnormality.           Assessment/Plan  * Acute respiratory failure with hypoxia (HCC)- (present on admission)  Assessment & Plan  Chest x-ray clear, check procalcitonin and BNP  TTE recently performed showed mild pulmonary hypertension RVSP 45  Incentive spirometry, duo nebs as needed  Continuous pulse ox, RT consult  Covid, RSV, influenza swab negative      Wound infection after surgery- (present on admission)  Assessment & Plan  Recent C5-T1 ACDF in January 2022 for herniated nucleus pulposus, cervical myelopathy, cervical  stenosis, cervical radiculopathy complicated by postop infection requiring IV antibiotics via PICC for Serratia marcescens, ID consulted and plan was to complete course of ceftriaxone through March 15, 2022.  Continue ceftriaxone  Check blood cultures    Anxiety- (present on admission)  Assessment & Plan  Patient appears to be taking benzodiazepines on a rather chronic basis at home, these were held on her discharge to skilled nursing facility, this may be contributing to many of her systemic symptoms.  These medictions were resumed on HD#1    Hyperlipidemia- (present on admission)  Assessment & Plan  Continue statin    Essential hypertension- (present on admission)  Assessment & Plan  Continue home propanolol.  IV antihypertensives with parameters    Nausea and vomiting- (present on admission)  Assessment & Plan  Having nausea at SNF  No emesis so far here  ABdomen benign  No constipation  CTM  Nausea meds prn       VTE prophylaxis: enoxaparin ppx    I have performed a physical exam and reviewed and updated ROS and Plan today (2/27/2022). In review of yesterday's note (2/26/2022), there are no changes except as documented above.

## 2022-02-27 NOTE — CARE PLAN
The patient is Stable - Low risk of patient condition declining or worsening    Shift Goals  Clinical Goals: pain control  Patient Goals: pain control, stay here as long as possible    Progress made toward(s) clinical / shift goals:    Problem: Pain - Standard  Goal: Alleviation of pain or a reduction in pain to the patient’s comfort goal  Outcome: Progressing  Note: Pain controlled with PRN oxycodone and Tylenol.  Continue to monitor.      Problem: Knowledge Deficit - Standard  Goal: Patient and family/care givers will demonstrate understanding of plan of care, disease process/condition, diagnostic tests and medications  Outcome: Progressing     Problem: Fall Risk  Goal: Patient will remain free from falls  Outcome: Progressing  Note: Free of falls.  Fall precautions in place.  Patient using call light appropriately.         Patient is not progressing towards the following goals:

## 2022-02-27 NOTE — PROGRESS NOTES
Assume care of pt at 1900. Report received from day RN. Pt is A&O x4. Pain is denied. Pt is resting in bed. Bed in lowest and locked position, call light in reach, hourly rounding in place. Labs reviewed. Communication board updated. Will continue to monitor.     Pt is a high fall risk. Pt refused bed alarm. Education provided, pt continues to refuse.

## 2022-02-27 NOTE — CARE PLAN
The patient is Stable - Low risk of patient condition declining or worsening    Shift Goals  Clinical Goals: decrease anxiety, increase mobility  Patient Goals: pain control, discharge to home    Progress made toward(s) clinical / shift goals:    Problem: Pain - Standard  Goal: Alleviation of pain or a reduction in pain to the patient’s comfort goal  Outcome: Progressing  Note: No complaints of pain.  Continue to monitor.       Problem: Fall Risk  Goal: Patient will remain free from falls  Outcome: Progressing  Note: Free of falls.  Fall precautions in place.  Patient uses call light appropriately.         Patient is not progressing towards the following goals:

## 2022-02-27 NOTE — CARE PLAN
The patient is Stable - Low risk of patient condition declining or worsening    Shift Goals  Clinical Goals: decrease anxiety  Patient Goals: pain control    Progress made toward(s) clinical / shift goals:  Pt is very anxious last night. Medicated pt with PRN Ativan. Pt did not complain of any pain last night.     Patient is not progressing towards the following goals:

## 2022-02-28 PROCEDURE — 770001 HCHG ROOM/CARE - MED/SURG/GYN PRIV*

## 2022-02-28 PROCEDURE — 700102 HCHG RX REV CODE 250 W/ 637 OVERRIDE(OP)

## 2022-02-28 PROCEDURE — A9270 NON-COVERED ITEM OR SERVICE: HCPCS | Performed by: STUDENT IN AN ORGANIZED HEALTH CARE EDUCATION/TRAINING PROGRAM

## 2022-02-28 PROCEDURE — A9270 NON-COVERED ITEM OR SERVICE: HCPCS | Performed by: HOSPITALIST

## 2022-02-28 PROCEDURE — 97165 OT EVAL LOW COMPLEX 30 MIN: CPT

## 2022-02-28 PROCEDURE — 700102 HCHG RX REV CODE 250 W/ 637 OVERRIDE(OP): Performed by: STUDENT IN AN ORGANIZED HEALTH CARE EDUCATION/TRAINING PROGRAM

## 2022-02-28 PROCEDURE — A9270 NON-COVERED ITEM OR SERVICE: HCPCS

## 2022-02-28 PROCEDURE — 97162 PT EVAL MOD COMPLEX 30 MIN: CPT

## 2022-02-28 PROCEDURE — 700111 HCHG RX REV CODE 636 W/ 250 OVERRIDE (IP): Performed by: STUDENT IN AN ORGANIZED HEALTH CARE EDUCATION/TRAINING PROGRAM

## 2022-02-28 PROCEDURE — 99233 SBSQ HOSP IP/OBS HIGH 50: CPT | Performed by: HOSPITALIST

## 2022-02-28 PROCEDURE — 700102 HCHG RX REV CODE 250 W/ 637 OVERRIDE(OP): Performed by: HOSPITALIST

## 2022-02-28 RX ADMIN — CELECOXIB 100 MG: 100 CAPSULE ORAL at 05:42

## 2022-02-28 RX ADMIN — CELECOXIB 100 MG: 100 CAPSULE ORAL at 17:13

## 2022-02-28 RX ADMIN — CEFTRIAXONE SODIUM 2 G: 10 INJECTION, POWDER, FOR SOLUTION INTRAVENOUS at 05:43

## 2022-02-28 RX ADMIN — ENOXAPARIN SODIUM 40 MG: 40 INJECTION SUBCUTANEOUS at 05:43

## 2022-02-28 RX ADMIN — GABAPENTIN 400 MG: 400 CAPSULE ORAL at 05:43

## 2022-02-28 RX ADMIN — ACETAMINOPHEN 650 MG: 325 TABLET, FILM COATED ORAL at 05:43

## 2022-02-28 RX ADMIN — ACETAMINOPHEN 650 MG: 325 TABLET, FILM COATED ORAL at 17:13

## 2022-02-28 RX ADMIN — Medication 5 MG: at 21:54

## 2022-02-28 RX ADMIN — ACETAMINOPHEN 650 MG: 325 TABLET, FILM COATED ORAL at 00:36

## 2022-02-28 RX ADMIN — GABAPENTIN 400 MG: 400 CAPSULE ORAL at 17:13

## 2022-02-28 RX ADMIN — ACETAMINOPHEN 650 MG: 325 TABLET, FILM COATED ORAL at 12:05

## 2022-02-28 RX ADMIN — DIAZEPAM 10 MG: 5 TABLET ORAL at 14:36

## 2022-02-28 RX ADMIN — OXYCODONE HYDROCHLORIDE 10 MG: 10 TABLET ORAL at 03:23

## 2022-02-28 RX ADMIN — PROPRANOLOL HYDROCHLORIDE 80 MG: 40 TABLET ORAL at 17:13

## 2022-02-28 RX ADMIN — QUETIAPINE FUMARATE 50 MG: 25 TABLET ORAL at 21:15

## 2022-02-28 RX ADMIN — DIAZEPAM 10 MG: 5 TABLET ORAL at 21:54

## 2022-02-28 RX ADMIN — GABAPENTIN 400 MG: 400 CAPSULE ORAL at 12:05

## 2022-02-28 RX ADMIN — SERTRALINE 100 MG: 100 TABLET, FILM COATED ORAL at 17:13

## 2022-02-28 RX ADMIN — METHOCARBAMOL 500 MG: 500 TABLET ORAL at 05:44

## 2022-02-28 ASSESSMENT — COGNITIVE AND FUNCTIONAL STATUS - GENERAL
MOVING TO AND FROM BED TO CHAIR: A LITTLE
DRESSING REGULAR LOWER BODY CLOTHING: A LITTLE
WALKING IN HOSPITAL ROOM: A LOT
MOVING FROM LYING ON BACK TO SITTING ON SIDE OF FLAT BED: A LITTLE
MOBILITY SCORE: 16
MOBILITY SCORE: 16
SUGGESTED CMS G CODE MODIFIER MOBILITY: CK
TURNING FROM BACK TO SIDE WHILE IN FLAT BAD: A LITTLE
DRESSING REGULAR LOWER BODY CLOTHING: A LITTLE
SUGGESTED CMS G CODE MODIFIER MOBILITY: CK
SUGGESTED CMS G CODE MODIFIER DAILY ACTIVITY: CI
CLIMB 3 TO 5 STEPS WITH RAILING: A LOT
DAILY ACTIVITIY SCORE: 23
DAILY ACTIVITIY SCORE: 23
SUGGESTED CMS G CODE MODIFIER DAILY ACTIVITY: CI
STANDING UP FROM CHAIR USING ARMS: A LITTLE
MOVING TO AND FROM BED TO CHAIR: A LITTLE
MOVING FROM LYING ON BACK TO SITTING ON SIDE OF FLAT BED: A LITTLE
WALKING IN HOSPITAL ROOM: A LOT
STANDING UP FROM CHAIR USING ARMS: A LITTLE
TURNING FROM BACK TO SIDE WHILE IN FLAT BAD: A LITTLE
CLIMB 3 TO 5 STEPS WITH RAILING: A LOT

## 2022-02-28 ASSESSMENT — PAIN DESCRIPTION - PAIN TYPE
TYPE: ACUTE PAIN

## 2022-02-28 ASSESSMENT — LIFESTYLE VARIABLES
AVERAGE NUMBER OF DAYS PER WEEK YOU HAVE A DRINK CONTAINING ALCOHOL: 0
HOW MANY TIMES IN THE PAST YEAR HAVE YOU HAD 5 OR MORE DRINKS IN A DAY: 0
TOTAL SCORE: 0
CONSUMPTION TOTAL: NEGATIVE
HAVE YOU EVER FELT YOU SHOULD CUT DOWN ON YOUR DRINKING: NO
DOES PATIENT WANT TO STOP DRINKING: NO
TOTAL SCORE: 0
ON A TYPICAL DAY WHEN YOU DRINK ALCOHOL HOW MANY DRINKS DO YOU HAVE: 0
EVER HAD A DRINK FIRST THING IN THE MORNING TO STEADY YOUR NERVES TO GET RID OF A HANGOVER: NO
TOTAL SCORE: 0
HAVE PEOPLE ANNOYED YOU BY CRITICIZING YOUR DRINKING: NO
ALCOHOL_USE: NO
EVER FELT BAD OR GUILTY ABOUT YOUR DRINKING: NO

## 2022-02-28 ASSESSMENT — GAIT ASSESSMENTS
DISTANCE (FEET): 120
GAIT LEVEL OF ASSIST: MINIMAL ASSIST
DEVIATION: ATAXIC;DECREASED HEEL STRIKE;DECREASED TOE OFF
ASSISTIVE DEVICE: FRONT WHEEL WALKER

## 2022-02-28 ASSESSMENT — ACTIVITIES OF DAILY LIVING (ADL): TOILETING: INDEPENDENT

## 2022-02-28 NOTE — DISCHARGE PLANNING
Received Choice form at 4043  Agency/Facility Name: [facility]   Referral sent per Choice form @ 1730    LSW notified

## 2022-02-28 NOTE — CARE PLAN
Problem: Pain - Standard  Goal: Alleviation of pain or a reduction in pain to the patient’s comfort goal  Outcome: Progressing     Problem: Knowledge Deficit - Standard  Goal: Patient and family/care givers will demonstrate understanding of plan of care, disease process/condition, diagnostic tests and medications  Outcome: Progressing     Problem: Fall Risk  Goal: Patient will remain free from falls  Outcome: Progressing   The patient is Stable - Low risk of patient condition declining or worsening    Shift Goals  Clinical Goals: increase mobility  Patient Goals: anxiety control, sleep    Progress made toward(s) clinical / shift goals:  Fall precautions in place. POC reviewed with pt. Pain medication given as needed per MAR    Patient is not progressing towards the following goals:

## 2022-02-28 NOTE — PROGRESS NOTES
Hospital Medicine Daily Progress Note    Date of Service  2/28/2022    Chief Complaint  Yolanda Reed is a 61 y.o. female admitted 2/26/2022 after she called emergency medical services from her skilled nursing facility as she felt she was decompensating and being inadequately cared for.    Hospital Course  No notes on file    Interval Problem Update  No acute overnight events, patient is anxious, concerned about her perceived lack of recovery from surgery.  Was concerned about tremors and inability to function well at this time.  Is uncomfortable with the thought that these changes may be permanent.  Physical and Occupational Therapy evaluations reviewed, likely okay for home health PT and OT per their recommendations.  Referrals placed, face-to-face encounter documented.  Additionally, orders for outpatient IV antibiotic infusion therapy have been placed with case management.    I have personally seen and examined the patient at bedside. I discussed the plan of care with patient.    Consultants/Specialty      Code Status  Full Code    Disposition  Patient is not medically cleared for discharge.   Anticipate discharge to home with home health.  I have placed the appropriate orders for post-discharge needs.    Review of Systems  All systems reviewed and negative except as noted per above.    Physical Exam  Temp:  [36.3 °C (97.3 °F)-36.4 °C (97.5 °F)] 36.3 °C (97.4 °F)  Pulse:  [78-82] 82  Resp:  [16-18] 16  BP: (110-121)/(53-63) 110/63  SpO2:  [94 %-96 %] 95 %    General appearance: NAD, conversant  Eyes: anicteric sclerae, moist conjunctivae; no lid-lag; PERRLA  HENT: Atraumatic; oropharynx clear with moist mucous membranes and no mucosal ulcerations;  normal hard and soft palate  Neck: Trachea midline; FROM, supple, no thyromegaly or lymphadenopathy.  Incisions are clean dry and intact, small white loop of suture is protruding along the lateral border of incision.  Does not appear to be infected.  Lungs: CTA,  with normal respiratory effort and no intercostal retractions  CV: RRR, no MRGs  Abdomen: Soft, non-tender; no masses or HSM  Extremities: No peripheral edema or extremity lymphadenopathy  Skin: Normal temperature, turgor and texture; no rash, ulcers or subcutaneous nodules  Psych: Alert and oriented to person, place and time.  She is visibly anxious over what she perceives as her failure to improve.  Has tremors across all 4 extremities.        Current Facility-Administered Medications:   •  oxyCODONE immediate release (ROXICODONE) tablet 10 mg, 10 mg, Oral, Q4HRS PRN, Onur Richter M.D., 10 mg at 02/28/22 0323  •  acetaminophen (Tylenol) tablet 650 mg, 650 mg, Oral, Q6HRS, Onur Richter M.D., 650 mg at 02/28/22 1205  •  celecoxib (CELEBREX) capsule 100 mg, 100 mg, Oral, BID, Onur Richter M.D., 100 mg at 02/28/22 0542  •  methocarbamol (ROBAXIN) tablet 500 mg, 500 mg, Oral, Q6HRS PRN, Onur Richter M.D., 500 mg at 02/28/22 0544  •  diazePAM (VALIUM) tablet 10 mg, 10 mg, Oral, Q6HRS PRN, Onur Richter M.D., 10 mg at 02/28/22 1436  •  QUEtiapine (Seroquel) tablet 50 mg, 50 mg, Oral, Nightly, Onur Richter M.D., 50 mg at 02/27/22 2132  •  propranolol (INDERAL) tablet 80 mg, 80 mg, Oral, Q EVENING, Onur Richter M.D., 80 mg at 02/27/22 1619  •  oxyCODONE immediate-release (ROXICODONE) tablet 5 mg, 5 mg, Oral, Q4HRS PRN, Onur Richter M.D., 5 mg at 02/27/22 1619  •  gabapentin (NEURONTIN) capsule 400 mg, 400 mg, Oral, TID, Sameer Koch M.D., 400 mg at 02/28/22 1205  •  sertraline (Zoloft) tablet 100 mg, 100 mg, Oral, Q EVENING, Sameer Koch M.D., 100 mg at 02/27/22 1800  •  senna-docusate (PERICOLACE or SENOKOT S) 8.6-50 MG per tablet 2 Tablet, 2 Tablet, Oral, BID **AND** polyethylene glycol/lytes (MIRALAX) PACKET 1 Packet, 1 Packet, Oral, QDAY PRN **AND** magnesium hydroxide (MILK OF MAGNESIA) suspension 30 mL, 30 mL, Oral, QDAY PRN **AND** bisacodyl (DULCOLAX) suppository 10 mg, 10 mg,  Rectal, QDAY PRN, Sameer Koch M.D.  •  enoxaparin (LOVENOX) inj 40 mg, 40 mg, Subcutaneous, DAILY, Sameer Koch M.D., 40 mg at 02/28/22 0543  •  labetalol (NORMODYNE/TRANDATE) injection 10 mg, 10 mg, Intravenous, Q4HRS PRN, Sameer Koch M.D.  •  ondansetron (ZOFRAN) syringe/vial injection 4 mg, 4 mg, Intravenous, Q4HRS PRN, Sameer Koch M.D., 4 mg at 02/27/22 1622  •  ondansetron (ZOFRAN ODT) dispertab 4 mg, 4 mg, Oral, Q4HRS PRN, Sameer Koch M.D.  •  promethazine (PHENERGAN) tablet 12.5-25 mg, 12.5-25 mg, Oral, Q4HRS PRN, Sameer Koch M.D.  •  promethazine (PHENERGAN) suppository 12.5-25 mg, 12.5-25 mg, Rectal, Q4HRS PRN, Sameer Koch M.D.  •  prochlorperazine (COMPAZINE) injection 5-10 mg, 5-10 mg, Intravenous, Q4HRS PRN, Sameer Koch M.D.  •  ipratropium-albuterol (DUONEB) nebulizer solution, 3 mL, Nebulization, Q4H PRN (RT), Sameer Koch M.D.  •  cefTRIAXone (Rocephin) syringe 2 g, 2 g, Intravenous, Q24HRS, Sameer Koch M.D., 2 g at 02/28/22 0543  •  LORazepam (ATIVAN) injection 0.5 mg, 0.5 mg, Intravenous, Q4HRS PRN, Ashley Dave M.D., 0.5 mg at 02/26/22 2228  •  melatonin tablet 5 mg, 5 mg, Oral, HS PRN, MICHELLE Tee.P.R.NOniel, 5 mg at 02/26/22 2228      Fluids    Intake/Output Summary (Last 24 hours) at 2/28/2022 1532  Last data filed at 2/28/2022 1319  Gross per 24 hour   Intake 600 ml   Output --   Net 600 ml       Laboratory  Recent Labs     02/26/22  0300 02/27/22  0530   WBC 9.5 6.1   RBC 4.27 4.02*   HEMOGLOBIN 11.9* 11.2*   HEMATOCRIT 37.0 35.3*   MCV 86.7 87.8   MCH 27.9 27.9   MCHC 32.2* 31.7*   RDW 43.5 45.1   PLATELETCT 294 261   MPV 10.1 9.4     Recent Labs     02/26/22  0300 02/27/22  0530   SODIUM 140 141   POTASSIUM 4.0 3.5*   CHLORIDE 102 103   CO2 22 28   GLUCOSE 98 96   BUN 6* 4*   CREATININE 0.42* 0.46*   CALCIUM 9.8 9.1                   Imaging  DX-CHEST-PORTABLE (1 VIEW)   Final Result      No acute cardiopulmonary  abnormality.           Assessment/Plan  * Acute respiratory failure with hypoxia (HCC)- (present on admission)  Assessment & Plan  Chest x-ray clear, check procalcitonin and BNP  TTE recently performed showed mild pulmonary hypertension RVSP 45  Incentive spirometry, duo nebs as needed  Continuous pulse ox, RT consult  Covid, RSV, influenza swab negative      Wound infection after surgery- (present on admission)  Assessment & Plan  Recent C5-T1 ACDF in January 2022 for herniated nucleus pulposus, cervical myelopathy, cervical stenosis, cervical radiculopathy complicated by postop infection requiring IV antibiotics via PICC for Serratia marcescens, ID consulted and plan was to complete course of ceftriaxone through March 15, 2022.  Continue ceftriaxone,  Patient refusing return to SNF.  Probably okay to receive infusions at home per PT/OT evals obtained 2/28.  Written orders for outpatient antibiotics written by Dr. Richter on 2/28, can request assistance in coordination and transition of care from ID if necessary.      Anxiety- (present on admission)  Assessment & Plan  Patient appears to be taking benzodiazepines on a rather chronic basis at home, these were held on her discharge to skilled nursing facility, this may be contributing to many of her systemic symptoms.  These medictions were resumed on HD#1    Hyperlipidemia- (present on admission)  Assessment & Plan  Continue statin    Essential hypertension- (present on admission)  Assessment & Plan  Continue home propanolol.  IV antihypertensives with parameters    Nausea and vomiting- (present on admission)  Assessment & Plan  Having nausea at SNF  No emesis so far here  ABdomen benign  No constipation  CTM  Nausea meds prn       VTE prophylaxis: enoxaparin ppx    I have performed a physical exam and reviewed and updated ROS and Plan today (2/28/2022). In review of yesterday's note (2/27/2022), there are no changes except as documented above.

## 2022-02-28 NOTE — THERAPY
Physical Therapy   Initial Evaluation     Patient Name: Yolanda Reed  Age:  61 y.o., Sex:  female  Medical Record #: [id number]  Today's Date: 2/28/2022     Precautions  Precautions: Fall Risk  Comments: hx of c/s surgery in early January 2022    Assessment  Patient is 61 y.o. female admitted from SNF for nausea. Pt reports she was not being cared for at SNF and is doing so much better since being admitted to the hospital. Pt expressed awareness of needing to mobilize and was able to complete supine to sit with SPV and transfers with CGA for safety. Demonstrated very ataxic gait and impaired coordination during ambulation with FWW leading to high fall risk, experienced 2 moderate LOB during ambulation requiring assist to recover. Pt reports her friend will be staying with her upon DC home and able to assist her 24/7 as needed. Feels more comfortable with DC home and continuing rehab process at home (receptive to HH and possible need for OP PT). PT to follow while in house to address impairments noted below.     Plan    Recommend Physical Therapy 3 times per week until therapy goals are met for the following treatments:  Bed Mobility, Equipment, Gait Training, Neuro Re-Education / Balance, Self Care/Home Evaluation, Stair Training, Therapeutic Activities, and Therapeutic Exercises    DC Equipment Recommendations: None  Discharge Recommendations: Recommend home health for continued physical therapy services        02/28/22 1136   Precautions   Precautions Fall Risk   Comments hx of c/s surgery in early January 2022   Vitals   O2 Delivery Device None - Room Air   Pain 0 - 10 Group   Therapist Pain Assessment During Activity;Nurse Notified   Prior Living Situation   Prior Services None   Housing / Facility 1 Story Apartment / Condo   Steps Into Home 0   Steps In Home 1  (into bathroom to 4 steps into kitchen with railing)   Equipment Owned Front-Wheel Walker;Single Point Cane   Lives with - Patient's Self Care Capacity  "Significant Other   Comments Pt was at a SNF prior to current admission. Pt reports her friend (who was a CNA) will be staying with her and her full time caregiver upon DC home.   Prior Level of Functional Mobility   Bed Mobility Independent   Transfer Status Independent   Ambulation Required Assist   Distance Ambulation (Feet)   (household since spine surgery)   Assistive Devices Used Front-Wheel Walker   Stairs Independent   Comments reports SO plans to install a grab bar near single step   Cognition    Level of Consciousness Alert   Comments pleasant and cooperative, adamant about not returning to SNF. Has \"researched on google\" and feels she has issues with strength and proprioception   Active ROM Lower Body    Active ROM Lower Body  WDL   Comments adequate for functional mobility   Strength Lower Body   Lower Body Strength  X   Lt Knee Flexion Strength 4- (G-)   Lt Knee Extension Strength 3 (F)   Comments otherwise 5/5 MMT   Coordination Lower Body    Coordination Lower Body  X   Heel To Shin Right Impaired   Heel To Shin Left Impaired   Comments L > R   Balance Assessment   Sitting Balance (Static) Fair +   Sitting Balance (Dynamic) Fair +   Standing Balance (Static) Fair -   Standing Balance (Dynamic) Poor +   Weight Shift Sitting Fair   Weight Shift Standing Fair   Comments w/ FWW   Gait Analysis   Gait Level Of Assist Minimal Assist   Assistive Device Front Wheel Walker   Distance (Feet) 120   # of Times Distance was Traveled 1   Deviation Ataxic;Decreased Heel Strike;Decreased Toe Off  (variable SHARITA)   # of Stairs Climbed 0   Weight Bearing Status no restrictions   Comments pt with 2 moderate LOB, requiring assist to correct, during ambulation due to rushing and distraction. very ataxic gait quality   Bed Mobility    Supine to Sit Supervised   Sit to Supine   (seated EOB at end of session)   Scooting Supervised   Functional Mobility   Sit to Stand Supervised   Bed, Chair, Wheelchair Transfer Contact Guard " Assist   Transfer Method Stand Step   How much difficulty does the patient currently have...   Turning over in bed (including adjusting bedclothes, sheets and blankets)? 3   Sitting down on and standing up from a chair with arms (e.g., wheelchair, bedside commode, etc.) 3   Moving from lying on back to sitting on the side of the bed? 3   How much help from another person does the patient currently need...   Moving to and from a bed to a chair (including a wheelchair)? 3   Need to walk in a hospital room? 2   Climbing 3-5 steps with a railing? 2   6 clicks Mobility Score 16   Activity Tolerance   Sitting Edge of Bed left seated EOB at end of session   Standing 10 min   Patient / Family Goals    Patient / Family Goal #1 to return home   Short Term Goals    Short Term Goal # 1 pt will perform stand pivot transfers between various surfaces with LRAD and SPV to improve mobility independence in 6 vistis   Short Term Goal # 2 pt will ambulate > 150 ft with LRAD and SPV to access community in 6 visits   Short Term Goal # 3 pt will negotiate 1-4 steps with FWW and SPV to access home environment in 6 visits   Education Group   Education Provided Role of Physical Therapist   Role of Physical Therapist Patient Response Patient;Acceptance;Explanation;Verbal Demonstration   Problem List    Problems Impaired Transfers;Impaired Ambulation;Impaired Balance;Impaired Coordination;Decreased Activity Tolerance   Anticipated Discharge Equipment and Recommendations   DC Equipment Recommendations None   Discharge Recommendations Recommend home health for continued physical therapy services

## 2022-02-28 NOTE — THERAPY
Occupational Therapy   Initial Evaluation     Patient Name: Yolanda Reed  Age:  61 y.o., Sex:  female  Medical Record #: [id number]  Today's Date: 2/28/2022     Precautions  Precautions: Fall Risk,Spinal / Back Precautions   Comments: hx of c/s surgery in early January 2022    Assessment  Patient is 61 y.o. female admitted from SNF for nausea and per patient not being cared for properly. Pt had several questions about recoevery process, whether she will ever fully recover and whether she had proprioceptive issues. Pt eager to discharge home and continue therapy in the home setting with friend in room being her 24/7 caregiver which friend confirmed. Pt able to complete bed mobility with supervision, lower body ADLs with supervision, pt required CGA for functional mobility with use of FWW due to intermittent small LOB, will recommend home health therapy with 24/7 caregiver support.    Plan    Recommend Occupational Therapy for Evaluation only.    Objective       02/28/22 1140   Prior Living Situation   Prior Services None   Housing / Facility 1 Story Apartment / Condo   Steps Into Home 0   Steps In Home 1   Bathroom Set up Bathtub / Shower Combination;Shower Chair  (patient and friend stated patient will sponge bathe with assist from friend)   Equipment Owned Front-Wheel Walker;Single Point Cane;Tub / Shower Seat   Lives with - Patient's Self Care Capacity Significant Other   Prior Level of ADL Function   Self Feeding Independent   Grooming / Hygiene Independent   Bathing Independent   Dressing Independent   Toileting Independent   Comments prior to surgery for neck   History of Falls   History of Falls Yes   Precautions   Precautions Fall Risk;Spinal / Back Precautions    Pain 0 - 10 Group   Therapist Pain Assessment Post Activity Pain Same as Prior to Activity;Nurse Notified   Cognition    Cognition / Consciousness WDL   Level of Consciousness Alert   Comments Pleasant, cooperative, adamant about not returning to  SNF   Strength Upper Body   Upper Body Strength  X   Gross Strength Generalized Weakness, Equal Bilaterally.    Sensation Upper Body   Upper Extremity Sensation  WDL   Upper Body Muscle Tone   Upper Body Muscle Tone  WDL   Balance Assessment   Sitting Balance (Static) Fair +   Sitting Balance (Dynamic) Fair +   Standing Balance (Static) Fair -   Standing Balance (Dynamic) Poor +   Weight Shift Sitting Fair   Weight Shift Standing Fair   Comments w/ FWW   Bed Mobility    Supine to Sit Supervised   Scooting Supervised   Rolling Supervised   ADL Assessment   Grooming Supervision   Upper Body Dressing Supervision   Lower Body Dressing Supervision   Toileting   (NT-refused)   How much help from another person does the patient currently need...   Putting on and taking off regular lower body clothing? 3   Bathing (including washing, rinsing, and drying)? 4   Toileting, which includes using a toilet, bedpan, or urinal? 4   Putting on and taking off regular upper body clothing? 4   Taking care of personal grooming such as brushing teeth? 4   Eating meals? 4   6 Clicks Daily Activity Score 23   Functional Mobility   Sit to Stand Supervised   Bed, Chair, Wheelchair Transfer Contact Guard Assist   Transfer Method Stand Step   Mobility bed mobility, hallway mobility, back to bed   Comments w/ FWW   Activity Tolerance   Sitting Edge of Bed left seated EOB   Standing 10 min   Education Group   Education Provided Role of Occupational Therapist;Back Safety   Role of Occupational Therapist Patient Response Patient;Acceptance;Explanation   Back Safety Patient Response Patient;Acceptance;Explanation   Problem List   Problem List None   Interdisciplinary Plan of Care Collaboration   IDT Collaboration with  Nursing   Patient Position at End of Therapy Seated;Edge of Bed;Call Light within Reach;Tray Table within Reach;Phone within Reach   Collaboration Comments RN updated

## 2022-02-28 NOTE — PROGRESS NOTES
Received report from day shift RN. Assumed pt care at 1900. Assessment completed. Pt A&Ox4 . Reports pain of 0/10, medicated per MAR. No s/s of discomfort/distress noted. Bed in lowest position, bed locked, bed alarm on, call light within reach. Labs and orders reviewed. Communication board updated. Hourly rounding in place.     Pt refusing bed alarm. Educated on fall risk by RN. Pt continues to refuse.

## 2022-02-28 NOTE — PROGRESS NOTES
Assumed care at 0700. Patient A&Ox4. Patient denies pain. Call light and belongings within reach. Bed locked and in lowest position. Pt up to chair eating meal. Will continue to monitor.

## 2022-02-28 NOTE — FACE TO FACE
Face to Face Supporting Documentation - Home Health    The encounter with this patient was in whole or in part the primary reason for home health admission.    Date of encounter:   Patient:                    MRN:                       YOB: 2021  Yolanda Reed  [id number]  1960     Home health to see patient for:  Skilled Nursing care for assessment, interventions & education, Physical Therapy evaluation and treatment and Occupational therapy evaluation and treatment    Skilled need for:  Surgical Aftercare Recent history significant for C5-T1 ACDF in January 2022 for herniated nucleus pulposus, cervical myelopathy, cervical stenosis and cervical radiculopathy. Complicated by postop infection requiring IV antibiotics via PICC for serratia marcesens.    Skilled nursing interventions to include:  Home IV infusion therapy and Comment: Patient is 61 y.o. female admitted from SNF for nausea and per patient not being cared for properly. Pt had several questions about recoevery process, whether she will ever fully recover and whether she had proprioceptive issues. Pt eager to discharge home and continue therapy in the home setting with friend in room being her 24/7 caregiver which friend confirmed. Pt able to complete bed mobility with supervision, lower body ADLs with supervision, pt required CGA for functional mobility with use of FWW due to intermittent small LOB, will recommend home health therapy with 24/7 caregiver support.     Patient is 61 y.o. female admitted from SNF for nausea. Pt reports she was not being cared for at SNF and is doing so much better since being admitted to the hospital. Pt expressed awareness of needing to mobilize and was able to complete supine to sit with SPV and transfers with CGA for safety. Demonstrated very ataxic gait and impaired coordination during ambulation with FWW leading to high fall risk, experienced 2 moderate LOB during ambulation requiring assist  to recover. Pt reports her friend will be staying with her upon DC home and able to assist her 24/7 as needed. Feels more comfortable with DC home and continuing rehab process at home (receptive to HH and possible need for OP PT). PT to follow while in house to address impairments noted below.     Homebound status evidenced by:  Need the aid of supportive devices such as crutches, canes, wheelchairs or walkers. Leaving home requires a considerable and taxing effort. There is a normal inability to leave the home.    Community Physician to provide follow up care: MAGDY Galicia     Optional Interventions? Yes, Details: Catriaxone via PICC 2gm daily through 3/15.        I certify the face to face encounter for this home health care referral meets the CMS requirements and the encounter/clinical assessment with the patient was, in whole, or in part, for the medical condition(s) listed above, which is the primary reason for home health care. Based on my clinical findings: the service(s) are medically necessary, support the need for home health care, and the homebound criteria are met.  I certify that this patient has had a face to face encounter by myself.  Onur Richter M.D. - NPI: [id number]

## 2022-03-01 ENCOUNTER — PATIENT OUTREACH (OUTPATIENT)
Dept: HEALTH INFORMATION MANAGEMENT | Facility: OTHER | Age: 62
End: 2022-03-01
Payer: MEDICAID

## 2022-03-01 VITALS
SYSTOLIC BLOOD PRESSURE: 126 MMHG | DIASTOLIC BLOOD PRESSURE: 63 MMHG | OXYGEN SATURATION: 94 % | HEART RATE: 67 BPM | WEIGHT: 153.6 LBS | RESPIRATION RATE: 18 BRPM | TEMPERATURE: 97.2 F | BODY MASS INDEX: 30.15 KG/M2 | HEIGHT: 60 IN

## 2022-03-01 PROCEDURE — 700102 HCHG RX REV CODE 250 W/ 637 OVERRIDE(OP): Performed by: STUDENT IN AN ORGANIZED HEALTH CARE EDUCATION/TRAINING PROGRAM

## 2022-03-01 PROCEDURE — A9270 NON-COVERED ITEM OR SERVICE: HCPCS | Performed by: HOSPITALIST

## 2022-03-01 PROCEDURE — 700101 HCHG RX REV CODE 250: Performed by: STUDENT IN AN ORGANIZED HEALTH CARE EDUCATION/TRAINING PROGRAM

## 2022-03-01 PROCEDURE — 700102 HCHG RX REV CODE 250 W/ 637 OVERRIDE(OP): Performed by: HOSPITALIST

## 2022-03-01 PROCEDURE — 99239 HOSP IP/OBS DSCHRG MGMT >30: CPT | Performed by: STUDENT IN AN ORGANIZED HEALTH CARE EDUCATION/TRAINING PROGRAM

## 2022-03-01 PROCEDURE — 700111 HCHG RX REV CODE 636 W/ 250 OVERRIDE (IP): Performed by: STUDENT IN AN ORGANIZED HEALTH CARE EDUCATION/TRAINING PROGRAM

## 2022-03-01 PROCEDURE — A9270 NON-COVERED ITEM OR SERVICE: HCPCS | Performed by: STUDENT IN AN ORGANIZED HEALTH CARE EDUCATION/TRAINING PROGRAM

## 2022-03-01 RX ORDER — DIAZEPAM 5 MG/1
5 TABLET ORAL EVERY 8 HOURS PRN
Status: DISCONTINUED | OUTPATIENT
Start: 2022-03-01 | End: 2022-03-01 | Stop reason: HOSPADM

## 2022-03-01 RX ORDER — GABAPENTIN 400 MG/1
400 CAPSULE ORAL 3 TIMES DAILY
Qty: 90 CAPSULE | Refills: 0 | Status: SHIPPED | OUTPATIENT
Start: 2022-03-01 | End: 2022-03-31

## 2022-03-01 RX ORDER — ONDANSETRON 4 MG/1
4 TABLET, ORALLY DISINTEGRATING ORAL EVERY 8 HOURS PRN
Qty: 30 TABLET | Refills: 0 | Status: SHIPPED | OUTPATIENT
Start: 2022-03-01 | End: 2022-03-31

## 2022-03-01 RX ORDER — OXYCODONE HYDROCHLORIDE 5 MG/1
5-10 CAPSULE ORAL EVERY 6 HOURS PRN
Qty: 10 CAPSULE | Refills: 0 | Status: SHIPPED | OUTPATIENT
Start: 2022-03-01 | End: 2022-03-04

## 2022-03-01 RX ORDER — OXYCODONE HYDROCHLORIDE 5 MG/1
5 CAPSULE ORAL EVERY 6 HOURS PRN
Qty: 10 CAPSULE | Refills: 0 | Status: CANCELLED | OUTPATIENT
Start: 2022-03-01 | End: 2022-03-04

## 2022-03-01 RX ADMIN — CEFTRIAXONE SODIUM 2 G: 10 INJECTION, POWDER, FOR SOLUTION INTRAVENOUS at 05:12

## 2022-03-01 RX ADMIN — GABAPENTIN 400 MG: 400 CAPSULE ORAL at 05:11

## 2022-03-01 RX ADMIN — GABAPENTIN 400 MG: 400 CAPSULE ORAL at 11:14

## 2022-03-01 RX ADMIN — ACETAMINOPHEN 650 MG: 325 TABLET, FILM COATED ORAL at 05:11

## 2022-03-01 RX ADMIN — ENOXAPARIN SODIUM 40 MG: 40 INJECTION SUBCUTANEOUS at 05:12

## 2022-03-01 RX ADMIN — CELECOXIB 100 MG: 100 CAPSULE ORAL at 05:12

## 2022-03-01 RX ADMIN — ACETAMINOPHEN 650 MG: 325 TABLET, FILM COATED ORAL at 11:14

## 2022-03-01 SDOH — ECONOMIC STABILITY: TRANSPORTATION INSECURITY
IN THE PAST 12 MONTHS, HAS LACK OF TRANSPORTATION KEPT YOU FROM MEETINGS, WORK, OR FROM GETTING THINGS NEEDED FOR DAILY LIVING?: NO

## 2022-03-01 SDOH — ECONOMIC STABILITY: FOOD INSECURITY: WITHIN THE PAST 12 MONTHS, YOU WORRIED THAT YOUR FOOD WOULD RUN OUT BEFORE YOU GOT MONEY TO BUY MORE.: NEVER TRUE

## 2022-03-01 SDOH — ECONOMIC STABILITY: FOOD INSECURITY: WITHIN THE PAST 12 MONTHS, THE FOOD YOU BOUGHT JUST DIDN'T LAST AND YOU DIDN'T HAVE MONEY TO GET MORE.: NEVER TRUE

## 2022-03-01 SDOH — ECONOMIC STABILITY: TRANSPORTATION INSECURITY
IN THE PAST 12 MONTHS, HAS THE LACK OF TRANSPORTATION KEPT YOU FROM MEDICAL APPOINTMENTS OR FROM GETTING MEDICATIONS?: NO

## 2022-03-01 ASSESSMENT — PAIN DESCRIPTION - PAIN TYPE
TYPE: ACUTE PAIN

## 2022-03-01 ASSESSMENT — SOCIAL DETERMINANTS OF HEALTH (SDOH): HOW HARD IS IT FOR YOU TO PAY FOR THE VERY BASICS LIKE FOOD, HOUSING, MEDICAL CARE, AND HEATING?: NOT VERY HARD

## 2022-03-01 NOTE — PROGRESS NOTES
Received report from day shift RN. Assumed pt care at 1900. Assessment completed. Pt A&Ox4 . Reports pain of 0/10. No s/s of discomfort/distress noted. Bed in lowest position, bed locked, call light within reach. Labs and orders reviewed. Communication board updated. Hourly rounding in place.     Pt refusing bed alarm. Educated on fall risk by RN. Pt continues to refuse.

## 2022-03-01 NOTE — DISCHARGE PLANNING
Anticipated Discharge Disposition: Home w/ home infusion     Action: Notified by MD Dr. Livingston is unable to sign order due to being out of network for outpatient infusions. Paged MD to clarify [facility] will be providing abx.     Received call from Coleen with [facility] stating she received a signed order from Dr. George with [facility]. She states if pt has already received her abx dose for today pt can d/c and they will arrange a time with pt to complete training and  abx tomorrow.     Met with pt at bedside to update. Pt confirms she has an appt tomorrow @ 1100 @ [facility], friend will provide transport    MD and RN updated    Barriers to Discharge: None    Plan: D/C home

## 2022-03-01 NOTE — DISCHARGE INSTRUCTIONS
Discharge Instructions    Discharged to home by car with friend. Discharged via wheelchair, hospital escort: Refused.  Special equipment needed: none    Be sure to schedule a follow-up appointment with your primary care doctor or any specialists as instructed.     Discharge Plan:   Diet Plan: Discussed  Activity Level: Discussed  Confirmed Follow up Appointment: Patient to Call and Schedule Appointment  Confirmed Symptoms Management: Discussed  Medication Reconciliation Updated: Yes  Influenza Vaccine Indication: Patient Refuses    I understand that a diet low in cholesterol, fat, and sodium is recommended for good health. Unless I have been given specific instructions below for another diet, I accept this instruction as my diet prescription.   Other diet: regular    Special Instructions: None    · Is patient discharged on Warfarin / Coumadin?   No     Depression / Suicide Risk    As you are discharged from this [facility] facility, it is important to learn how to keep safe from harming yourself.    Recognize the warning signs:  · Abrupt changes in personality, positive or negative- including increase in energy   · Giving away possessions  · Change in eating patterns- significant weight changes-  positive or negative  · Change in sleeping patterns- unable to sleep or sleeping all the time   · Unwillingness or inability to communicate  · Depression  · Unusual sadness, discouragement and loneliness  · Talk of wanting to die  · Neglect of personal appearance   · Rebelliousness- reckless behavior  · Withdrawal from people/activities they love  · Confusion- inability to concentrate     If you or a loved one observes any of these behaviors or has concerns about self-harm, here's what you can do:  · Talk about it- your feelings and reasons for harming yourself  · Remove any means that you might use to hurt yourself (examples: pills, rope, extension cords, firearm)  · Get professional help from the community (Mental  Health, Substance Abuse, psychological counseling)  · Do not be alone:Call your Safe Contact- someone whom you trust who will be there for you.  · Call your local CRISIS HOTLINE 196-5314 or 704-017-6421  · Call your local Children's Mobile Crisis Response Team [place] (645) 832-2217 or www.[employer]  · Call the toll free National Suicide Prevention Hotlines   · National Suicide Prevention Lifeline 179-390-WNIE (2329)  · National Hope Line Network 800-SUICIDE (734-2817)      PICC Home Care Guide    A peripherally inserted central catheter (PICC) is a form of IV access that allows medicines and IV fluids to be quickly distributed throughout the body. The PICC is a long, thin, flexible tube (catheter) that is inserted into a vein in the upper arm. The catheter ends in a large vein in the chest (superior vena cava, or SVC). After the PICC is inserted, a chest X-ray may be done to make sure that it is in the correct place.  A PICC may be placed for different reasons, such as:  · To give medicines and liquid nutrition.  · To give IV fluids and blood products.  · If there is trouble placing a peripheral intravenous (PIV) catheter.  If taken care of properly, a PICC can remain in place for several months. Having a PICC can also allow a person to go home from the hospital sooner. Medicine and PICC care can be managed at home by a family member, caregiver, or home health care team.  What are the risks?  Generally, having a PICC is safe. However, problems may occur, including:  · A blood clot (thrombus) forming in or at the tip of the PICC.  · A blood clot forming in a vein (deep vein thrombosis) or traveling to the lung (pulmonary embolism).  · Inflammation of the vein (phlebitis) in which the PICC is placed.  · Infection. Central line associated blood stream infection (CLABSI) is a serious infection that often requires hospitalization.  · PICC movement (malposition). The PICC tip may move from its original  position due to excessive physical activity, forceful coughing, sneezing, or vomiting.  · A break or cut in the PICC. It is important not to use scissors near the PICC.  · Nerve or tendon irritation or injury during PICC insertion.  How to take care of your PICC  Preventing problems  · You and any caregivers should wash your hands often with soap. Wash hands:  ? Before touching the PICC line or the infusion device.  ? Before changing a bandage (dressing).  · Flush the PICC as told by your health care provider. Let your health care provider know right away if the PICC is hard to flush or does not flush. Do not use force to flush the PICC.  · Do not use a syringe that is less than 10 mL to flush the PICC.  · Avoid blood pressure checks on the arm in which the PICC is placed.  · Never pull or tug on the PICC.  · Do not take the PICC out yourself. Only a trained clinical professional should remove the PICC.  · Use clean and sterile supplies only. Keep the supplies in a dry place. Do not reuse needles, syringes, or any other supplies. Doing that can lead to infection.  · Keep pets and children away from your PICC line.  · Check the PICC insertion site every day for signs of infection. Check for:  ? Leakage.  ? Redness, swelling, or pain.  ? Fluid or blood.  ? Warmth.  ? Pus or a bad smell.  PICC dressing care  · Keep your PICC bandage (dressing) clean and dry to prevent infection.  · Do not take baths, swim, or use a hot tub until your health care provider approves. Ask your health care provider if you can take showers. You may only be allowed to take sponge baths for bathing. When you are allowed to shower:  ? Ask your health care provider to teach you how to wrap the PICC line.  ? Cover the PICC line with clear plastic wrap and tape to keep it dry while showering.  · Follow instructions from your health care provider about how to take care of your insertion site and dressing. Make sure you:  ? Wash your hands with soap  and water before you change your bandage (dressing). If soap and water are not available, use hand .  ? Change your dressing as told by your health care provider.  ? Leave stitches (sutures), skin glue, or adhesive strips in place. These skin closures may need to stay in place for 2 weeks or longer. If adhesive strip edges start to loosen and curl up, you may trim the loose edges. Do not remove adhesive strips completely unless your health care provider tells you to do that.  · Change your PICC dressing if it becomes loose or wet.  General instructions    · Carry your PICC identification card or wear a medical alert bracelet at all times.  · Keep the tube clamped at all times, unless it is being used.  · Carry a smooth-edge clamp with you at all times to place on the tube if it breaks.  · Do not use scissors or sharp objects near the tube.  · You may bend your arm and move it freely. If your PICC is near or at the bend of your elbow, avoid activity with repeated motion at the elbow.  · Avoid lifting heavy objects as told by your health care provider.  · Keep all follow-up visits as told by your health care provider. This is important.  Disposal of supplies  · Throw away any syringes in a disposal container that is meant for sharp items (sharps container). You can buy a sharps container from a pharmacy, or you can make one by using an empty hard plastic bottle with a cover.  · Place any used dressings or infusion bags into a plastic bag. Throw that bag in the trash.  Contact a health care provider if:  · You have pain in your arm, ear, face, or teeth.  · You have a fever or chills.  · You have redness, swelling, or pain around the insertion site.  · You have fluid or blood coming from the insertion site.  · Your insertion site feels warm to the touch.  · You have pus or a bad smell coming from the insertion site.  · Your skin feels hard and raised around the insertion site.  Get help right away if:  · Your  "PICC is accidentally pulled all the way out. If this happens, cover the insertion site with a bandage or gauze dressing. Do not throw the PICC away. Your health care provider will need to check it.  · Your PICC was tugged or pulled and has partially come out. Do not  push the PICC back in.  · You cannot flush the PICC, it is hard to flush, or the PICC leaks around the insertion site when it is flushed.  · You hear a \"flushing\" sound when the PICC is flushed.  · You feel your heart racing or skipping beats.  · There is a hole or tear in the PICC.  · You have swelling in the arm in which the PICC was inserted.  · You have a red streak going up your arm from where the PICC was inserted.  Summary  · A peripherally inserted central catheter (PICC) is a long, thin, flexible tube (catheter) that is inserted into a vein in the upper arm.  · The PICC is inserted using a sterile technique by a specially trained nurse or physician. Only a trained clinical professional should remove it.  · Keep your PICC identification card with you at all times.  · Avoid blood pressure checks on the arm in which the PICC is placed.  · If cared for properly, a PICC can remain in place for several months. Having a PICC can also allow a person to go home from the hospital sooner.  This information is not intended to replace advice given to you by your health care provider. Make sure you discuss any questions you have with your health care provider.  Document Released: 06/23/2004 Document Revised: 11/30/2018 Document Reviewed: 01/20/2018  Elsevier Patient Education © 2020 [employer]  Ceftriaxone injection  What is this medicine?  CEFTRIAXONE (sef try AX one) is a cephalosporin antibiotic. It is used to treat certain kinds of bacterial infections. It will not work for colds, flu, or other viral infections.  This medicine may be used for other purposes; ask your health care provider or pharmacist if you have questions.  COMMON BRAND NAME(S): " Ceftrisol Plus, Rocephin  What should I tell my health care provider before I take this medicine?  They need to know if you have any of these conditions:  · any chronic illness  · bowel disease, like colitis  · both kidney and liver disease  · high bilirubin level in  patients  · an unusual or allergic reaction to ceftriaxone, other cephalosporin or penicillin antibiotics, foods, dyes, or preservatives  · pregnant or trying to get pregnant  · breast-feeding  How should I use this medicine?  This medicine is injected into a muscle or infused it into a vein. It is usually given in a medical office or clinic. If you are to give this medicine you will be taught how to inject it. Follow instructions carefully. Use your doses at regular intervals. Do not take your medicine more often than directed. Do not skip doses or stop your medicine early even if you feel better. Do not stop taking except on your doctor's advice.  Talk to your pediatrician regarding the use of this medicine in children. Special care may be needed.  Overdosage: If you think you have taken too much of this medicine contact a poison control center or emergency room at once.  NOTE: This medicine is only for you. Do not share this medicine with others.  What if I miss a dose?  If you miss a dose, take it as soon as you can. If it is almost time for your next dose, take only that dose. Do not take double or extra doses.  What may interact with this medicine?  Do not take this medicine with any of the following medications:  · intravenous calcium  This medicine may also interact with the following medications:  · birth control pills  This list may not describe all possible interactions. Give your health care provider a list of all the medicines, herbs, non-prescription drugs, or dietary supplements you use. Also tell them if you smoke, drink alcohol, or use illegal drugs. Some items may interact with your medicine.  What should I watch for while  using this medicine?  Tell your doctor or health care provider if your symptoms do not improve or if they get worse.  This medicine may cause serious skin reactions. They can happen weeks to months after starting the medicine. Contact your health care provider right away if you notice fevers or flu-like symptoms with a rash. The rash may be red or purple and then turn into blisters or peeling of the skin. Or, you might notice a red rash with swelling of the face, lips or lymph nodes in your neck or under your arms.  Do not treat diarrhea with over the counter products. Contact your doctor if you have diarrhea that lasts more than 2 days or if it is severe and watery.  If you are being treated for a sexually transmitted disease, avoid sexual contact until you have finished your treatment. Having sex can infect your sexual partner.  Calcium may bind to this medicine and cause lung or kidney problems. Avoid calcium products while taking this medicine and for 48 hours after taking the last dose of this medicine.  What side effects may I notice from receiving this medicine?  Side effects that you should report to your doctor or health care professional as soon as possible:  · allergic reactions like skin rash, itching or hives, swelling of the face, lips, or tongue  · breathing problems  · fever, chills  · irregular heartbeat  · pain when passing urine  · redness, blistering, peeling, or loosening of the skin, including inside the mouth  · seizures  · stomach pain, cramps  · unusual bleeding, bruising  · unusually weak or tired  Side effects that usually do not require medical attention (report to your doctor or health care professional if they continue or are bothersome):  · diarrhea  · dizzy, drowsy  · headache  · nausea, vomiting  · pain, swelling, irritation where injected  · stomach upset  · sweating  This list may not describe all possible side effects. Call your doctor for medical advice about side effects. You  may report side effects to FDA at 1-505-FDA-5002.  Where should I keep my medicine?  Keep out of the reach of children.  Store at room temperature below 25 degrees C (77 degrees F). Protect from light. Throw away any unused vials after the expiration date.  NOTE: This sheet is a summary. It may not cover all possible information. If you have questions about this medicine, talk to your doctor, pharmacist, or health care provider.  © 2020 Elsevier/Gold Standard (2020-03-20 10:10:06)    Discharge Instructions per Moses Flores M.D.    Please follow-up with PCP as outpatient.  Continue ceftriaxone 2gm daily till 3/15/2022 and follow up with [facility] as outpatient    DIET: regular diet    ACTIVITY: As tolerated    DIAGNOSIS: Postoperative cervical spine infection with Serratia marcescens, Bipolar, HTN    Return to ER in the event of new or worsening symptoms. Please note importance of compliance and the patient has agreed to proceed with all medical recommendations and follow up plan indicated above. All medications come with benefits and risks. Risks may include permanent injury or death and these risks can be minimized with close reassessment and monitoring. Please make it to your scheduled follow ups with PCP and ID

## 2022-03-01 NOTE — DOCUMENTATION QUERY
"                                                                         [facility]                                                                       Query Response Note      PATIENT:               CIPRIANO ARCE  ACCT #:                  [id number]  MRN:                     [id number]  :                      1960  ADMIT DATE:       2022 2:23 AM  DISCH DATE:          RESPONDING  PROVIDER #:        [id number]           QUERY TEXT:    The diagnosis of acute respiratory failure is documented in the History and Physical, with SpO2 of 67-95% on room air.  The ED RN documented saturation normalized on 2L O2 NC and continued with a maximum of 2 Liters NC since arrival to the ED.  Per the ED physical assessment and the H&P the patient has ?no respiratory distress.?  H&P also documented \"alert and conversant able to speak full sentences without becoming breathless\"  Is acute respiratory failure an accurate diagnosis for this admission?    Note: If an appropriate response is not listed below, please respond with a new note.              The patient's Clinical Indicators include:  Admit Vitals: , RR 22    ED Note - not eating x 2 days, profound nausea, multiple episodes of emesis, recent hospitalization for infection of surgical site, has a PICC line and receiving daily Rocephin infusions; concern for possible ongoing infection; no respiratory distress; hypoxic on RA in 60's responsive to NC.  ED RN - Pt hypoxic on RA. Place on 2L O2. Saturation normalized with O2.  H&P - On arrival she was tachycardic, tachypneic, hypoxic initially requiring 4 5 L nasal cannula to keep oxygen saturations greater than 90% however this was able to be weaned down to 1 to 2 L NC; non toxic appearing;  alert and conversant able to speak full sentences without becoming breathless; Constitutional no respiratory distress, negative for shortness of breath.    Treatment - 2-4L O2; IV Rocephin    Risk factors - Receiving daily IV " Rocephin; recent hospitalization for infected surgical site, HTN    Thank you,  Beth BENAVIDEZ  Clinical   Connect via [employer]  Options provided:   -- No, acute respiratory failure is not a valid diagnosis during this admission. only hypoxia is a valid diagnosis during this admission   -- Yes, acute respiratory failure with hypoxia is present active during this admission, (please provide relevant clinical indicators)   -- Other explanation   -- Unable to Determine      Query created by: Beth Whiting on 2/28/2022 3:26 PM    RESPONSE TEXT:    Yes, acute respiratory failure with hypoxia is present active during this admission- She required supplemental O2       QUERY TEXT:    Patient presents to ED with chief complaint of not eating x 2 days, profound nausea, multiple episodes of emesis, recent hospitalization for infection of surgical site, with concern for possible ongoing infection, has a PICC line and receiving daily Rocephin infusions. Meets SIRS criteria 2/4 with admit  and RR 22. Admitted for hypoxia on RA in 60's. ED provider diagnosed SIRS without identified acute infection.      NOTE:  If an appropriate response is not listed below, please respond with a new note.      The patient's Clinical Indicators include:  Admit Vitals: , RR 22    ED Note - not eating x 2 days, profound nausea, multiple episodes of emesis, recent hospitalization for infection of surgical site, has a PICC line and receiving daily Rocephin infusions; concern for possible ongoing infection; Dx SIRS without identified acute infection    Treatment - IV Rocephin    Risk factors - Receiving daily IV Rocephin; recent hospitalization for infected surgical site, acute respiratory failure w/ hypoxia, HTN    Thank you,  Beth BENAVIDEZ  Clinical   Connect via [employer]  Options provided:   -- Patient with SIRS and suspected ongoing surgical site infection,  Sepsis ruled out   -- Patient with SIRS that is unrelated to any infection   -- Other explanation for findings   -- Unable to determine      Query created by: Beth Whiting on 2/28/2022 3:26 PM    RESPONSE TEXT:    Patient with SIRS and suspected ongoing surgical site infection, Sepsis ruled out          Electronically signed by:  ARMAND SEBASTIAN MD 3/1/2022 6:52 AM

## 2022-03-01 NOTE — PROGRESS NOTES
The patient wants a consultation started a history and physical examination consultation will be awaiting for now just put in a note to let the hospital staff and the patient know that she is requesting orthopedic to do a referral to psychiatry where she definitely needs to be on psychiatry and psychiatrists medication including more than just Zoloft a SSRI she more likely would benefit from antipsychotics or some type of medication associated with borderline personality disorder and bipolar disorder.  So I will do a referral for psychiatry not psychology psychiatry would she need to get prescribed psych medications for her condition.  Overall she said from the operation itself she does have quite a bit of relief in her upper extremities and hands and chest but she still weak throughout her whole body due to the infections postoperatively.  More likely benefit from physical therapy and it is he will take some time for her to regain her state strength but she is very satisfied with the operation.  I will see the patient in 2-4 weeks in orthopedic clinic will do some x-rays evaluate the patient postoperatively again.  The wound itself is completely healed no signs of infection she has actually had really good talking to today I do not see any problems taught her distress talking to her today on physical examination and consultation.

## 2022-03-01 NOTE — DISCHARGE SUMMARY
Discharge Summary    CHIEF COMPLAINT ON ADMISSION  Chief Complaint   Patient presents with   • Nausea     BIB EMS for complaints of nausea x2 days from not eating at SNF. Pt at SNF for infection of neck post surgery. Pt states no vomiting, only nausea. Pt also states that she does not want to go back to SNF due to treatment by staff at facility. Pt denies any pain at current time.       Reason for Admission  N/V     Admission Date  2/26/2022    CODE STATUS  Full Code    HPI & HOSPITAL COURSE  61 y.o. female with recent ACDF in January 2020 at C5-T1 for herniated nucleus pulposus, cervical myelopathy, cervical stenosis and cervical radiculopathy with Dr. Werner that was complicated by postop infection requiring IV ceftriaxone via PICC line for Serratia marcescens supposed to complete course of ceftriaxone March 15, 2022, hypertension, hyperlipidemia, anxiety, prior Covid infection 2020, depression, PTSD, who presented 2/26/2022 with nausea.  Patient was recently discharged to SNF and is very upset with the care she has been receiving.  She states they have not been adequately treating her pain or nausea.  She attempted multiple times to have any but states she was ignored so she called EMS to have them take her to [facility].    On arrival she was tachycardic, tachypneic, hypoxic initially requiring 4 5 L nasal cannula to keep oxygen saturations greater than 90% however this was able to be weaned down to 1 to 2 L nasal cannula.  CXR unremarkable, procalcitonin negative. COVID and influenza negative. On the day of discharge, she was on room air.   Orthopedics was consulted, and recommended 2-4 weeks in orthopedic spine outpatient.    PT evaluated the patient and rec patient going home with [facility]. Patient reports she has friend and was able to take care of her.   Outpatient ceftriaxone script was sent by Dr. George and patient will have appointment scheduled at 1100 3/2/2022 at [facility] center and her friend will provide  transport.     Therefore, she is discharged in fair and stable condition to home with close outpatient follow-up.    The patient met 2-midnight criteria for an inpatient stay at the time of discharge.    Discharge Date  3/1/2022    FOLLOW UP ITEMS POST DISCHARGE  PCP  ID    DISCHARGE DIAGNOSES  Principal Problem:    Acute respiratory failure with hypoxia (HCC) POA: Yes  Active Problems:    Nausea and vomiting POA: Yes    Essential hypertension POA: Yes    Hyperlipidemia POA: Yes    Anxiety POA: Yes    Wound infection after surgery POA: Yes  Resolved Problems:    * No resolved hospital problems. *      FOLLOW UP  No future appointments.  MAGDY Galicia  [address] [facility] [address]  [phone]    Schedule an appointment as soon as possible for a visit in 2 weeks  As needed      MEDICATIONS ON DISCHARGE     Medication List      START taking these medications      Instructions   ondansetron 4 MG Tbdp  Commonly known as: ZOFRAN ODT   Take 1 Tablet by mouth every 8 hours as needed for Nausea (give PO if no IV route available) for up to 30 days.  Dose: 4 mg        CHANGE how you take these medications      Instructions   oxycodone 5 MG capsule  What changed: reasons to take this   Take 1-2 Capsules by mouth every 6 hours as needed for Severe Pain (PAIN) for up to 3 days.  Dose: 5-10 mg        CONTINUE taking these medications      Instructions   acetaminophen 500 MG Tabs  Commonly known as: TYLENOL   Take 1,000 mg by mouth every 6 hours as needed for Moderate Pain.  Dose: 1,000 mg     atorvastatin 10 MG Tabs  Commonly known as: LIPITOR   Take 10 mg by mouth every evening.  Dose: 10 mg     cefTRIAXone 2 g/20 mL   Commonly known as: Rocephin   Infuse 20 mL into a venous catheter every 24 hours for 14 days.  Dose: 2 g     diazePAM 5 MG Tabs  Commonly known as: VALIUM   Take 10 mg by mouth every 6 hours as needed for Anxiety (Muscle Spasms).  Dose: 10 mg     gabapentin 400 MG Caps  Commonly known as:  NEURONTIN   Take 1 Capsule by mouth 3 times a day for 30 days.  Dose: 400 mg     methocarbamol 500 MG Tabs  Commonly known as: ROBAXIN   Take 500 mg by mouth every 6 hours as needed (Muscle Spasms).  Dose: 500 mg     propranolol 40 MG Tabs  Commonly known as: INDERAL   Take 80 mg by mouth at bedtime.  Dose: 80 mg     QUEtiapine 25 MG Tabs  Commonly known as: Seroquel   Take 50 mg by mouth every evening.  Dose: 50 mg     sertraline 100 MG Tabs  Commonly known as: Zoloft   Take 100 mg by mouth every evening.  Dose: 100 mg     vitamin D 1000 Unit (25 mcg) Tabs  Commonly known as: cholecalciferol   Take 1,000 Units by mouth every evening.  Dose: 1,000 Units        STOP taking these medications    oxyCODONE-acetaminophen 5-325 MG Tabs  Commonly known as: PERCOCET            Allergies  Allergies   Allergen Reactions   • Morphine Anaphylaxis     Throat swelling  Tolerates oxycodone       DIET  Orders Placed This Encounter   Procedures   • Diet Order Diet: Regular     Standing Status:   Standing     Number of Occurrences:   1     Order Specific Question:   Diet:     Answer:   Regular [1]       ACTIVITY  As tolerated.  Weight bearing as tolerated    CONSULTATIONS  orthopedics    PROCEDURES  none    LABORATORY  Lab Results   Component Value Date    SODIUM 141 02/27/2022    POTASSIUM 3.5 (L) 02/27/2022    CHLORIDE 103 02/27/2022    CO2 28 02/27/2022    GLUCOSE 96 02/27/2022    BUN 4 (L) 02/27/2022    CREATININE 0.46 (L) 02/27/2022        Lab Results   Component Value Date    WBC 6.1 02/27/2022    HEMOGLOBIN 11.2 (L) 02/27/2022    HEMATOCRIT 35.3 (L) 02/27/2022    PLATELETCT 261 02/27/2022     DX-CHEST-PORTABLE (1 VIEW)   Final Result      No acute cardiopulmonary abnormality.            Total time of the discharge process exceeds 33 minutes.

## 2022-03-01 NOTE — PROGRESS NOTES
Assumed care at 0700. Patient A&Ox4. Patient complains of 1/10 pain, declines intervention. Call light and belongings within reach. Bed locked and in low position. Will continue to monitor.

## 2022-03-01 NOTE — CARE PLAN
The patient is Stable - Low risk of patient condition declining or worsening    Shift Goals  Clinical Goals: increase mobility  Patient Goals: work with therapies  Family Goals: n/a    Progress made toward(s) clinical / shift goals:    Problem: Pain - Standard  Goal: Alleviation of pain or a reduction in pain to the patient’s comfort goal  Outcome: Progressing     Problem: Knowledge Deficit - Standard  Goal: Patient and family/care givers will demonstrate understanding of plan of care, disease process/condition, diagnostic tests and medications  Outcome: Progressing     Problem: Fall Risk  Goal: Patient will remain free from falls  Outcome: Progressing       Patient is not progressing towards the following goals:

## 2022-03-01 NOTE — DISCHARGE PLANNING
Anticipated Discharge Disposition: Home with infusions    Action: LSW met with Pt. She was using [facility] prior to her admission at SNF. She would like to go home and do that again. She does not want to go back to SNF. MD gave LSW order and LSW faxed order and Choice to [facility]. Pt says that [facility] may be hesitant to take her back because she missed some appointments. LSW talked to Pt about MTM and will discuss with [facility] if there are concerns.     Barriers to Discharge: Infusion set up    Plan: [facility] pending

## 2022-03-01 NOTE — CARE PLAN
Problem: Knowledge Deficit - Standard  Goal: Patient and family/care givers will demonstrate understanding of plan of care, disease process/condition, diagnostic tests and medications  Outcome: Progressing     Problem: Fall Risk  Goal: Patient will remain free from falls  Outcome: Progressing   The patient is Stable - Low risk of patient condition declining or worsening    Shift Goals  Clinical Goals: anxiety control, increase mobiltity  Patient Goals: shower, walk around more  Family Goals: n/a    Progress made toward(s) clinical / shift goals:  fall precautions in place. Anti anxiety medications given as needed per MAR.     Patient is not progressing towards the following goals:

## 2022-03-01 NOTE — PROGRESS NOTES
CHW introduced community care management to the patient. Patient reports no barriers to housing, transportation, or food at this time. Patient states that she live with her significant other but is interested in [place] [employer]. Patient states her son is helping with obtaining the application. Patient reports that she is established with [facility] but is interested in switching providers to Dr. Kasper. Patient will call to schedule.     Community Health Worker Intake  • Social determinates of health intake complete.   • Identified barriers to none.   • Contact information provided to Yolanda Reed   • Inpatient assessment completed.    Plan: CHW will reach out after DC.

## 2022-03-01 NOTE — DISCHARGE PLANNING
"Anticipated Discharge Disposition: Home w/ home infusion    Action: Called [facility] to f/u on pt's referral. Spoke with  who requested MD order be faxed. MD signed order faxed to [phone].      voiced concerns over accepting pt back on service. He states pt had no support through s/o previously, was deconditioned and was missing appointments. He states he spoke with pt and pt states her friend \"Yanely\" is willing to help.  states he will review order and call SW back.     Barriers to Discharge: Home infusion arrangements     Plan: Care coordination will continue to follow and assist with discharge planning       Update: Received call from Coleen arnold/ [facility] stating they need MD order that is signed by an Infectious Disease MD, as current order is signed by hospitalist and hospitalist is unable to follow outpatient.   "

## 2022-03-02 NOTE — CONSULTS
DATE OF SERVICE:  03/01/2022     HISTORY AND PHYSICAL EXAMINATION AND CONSULTATION     CHIEF COMPLAINT:  The patient is a 61-year-old female who was admitted on   2/26/2022.  She called EMS from her skilled nursing facility due to   decompensation of mobility and inadequate care for, she stated.     Her primary consultation was orthopedic surgery.  We did an anterior cervical   diskectomy and fusion and we did 2 washout, status post fusion of a   gram-negative bacteria infection in the cervical anterior wound.  The wound   itself is healed completely.  There are no signs of infection.  There may be a   small suture coming down of the proximally wound but I told her do not pick   at it, it will just fall out naturally.  It does not look infected.  In fact,   she states that she got tremendous amount of relief from the operation itself.    Her main concern today is she wants to have a referral to psychiatry.  She   definitely has a complex diagnosis, I would say definitively borderline   personality disorder, possibly some bipolar manic disorder association.  She   is only really taking Zoloft, a strong SSRI, but she needs to probably be on   some type of antipsychotic drugs or some type of drug that a psychiatry   prescribes and orders on a regular basis.  So I put a referral in for her to   go to psychiatry for complex disorders and see if we can get her taken care of   from that point.     Again, I evaluated her upper extremity wounds that she does still have some   weakness in the upper extremity but she has weakness in her whole body from   the infections she stated in hospitalization.  She does have dramatic relief   of pain and associated numbness and tingling in bilateral upper extremities.    She still has weakness when she is walking.  She does have somebody help her   care for her, so hopefully she gets this all taken care of.  We will follow up   in approximately 2-4 weeks in orthopedic spine outpatient  where we will do   some x-ray to evaluate her wound and she can work on her referral for   psychiatry at that point.        ______________________________  Nimesh Lamar PA-C        ______________________________  MD HAILEE SHAH/JURGEN/JAYLAN    DD:  03/01/2022 14:35  DT:  03/01/2022 16:25    Job#:  [id number]

## 2022-03-03 ENCOUNTER — PATIENT OUTREACH (OUTPATIENT)
Dept: HEALTH INFORMATION MANAGEMENT | Facility: OTHER | Age: 62
End: 2022-03-03
Payer: MEDICAID

## 2022-03-03 NOTE — PROGRESS NOTES
ALAINA Skinner followed up with the patient. Patient reports that she is doing well but is wondering about home health referral for physical therapy. CHW advised the patient to inquire about outpatient PT with Dr. Werner. Phone: [phone]. Patient states that she is waiting for a call back from the office to schedule office visit.   Patient states that she has a PCP appointment on 3/7/2022 @ 3pm to re-establish care with Dr. Kasper. Pt has no other questions at this time.     Community Health Worker Intake  • Social determinates of health intake complete.   • Identified barriers to none.   • Contact information provided to Yolanda Reed   • Has PCP appointment scheduled for 3/7  • Outpatient assessment completed.  • Did the patient receive medications post discharge: Yes    Plan: CHW will remove th patient from CCM caseload as all goals have been met.

## 2022-03-08 ENCOUNTER — HOSPITAL ENCOUNTER (OUTPATIENT)
Facility: MEDICAL CENTER | Age: 62
End: 2022-03-08
Attending: INTERNAL MEDICINE
Payer: MEDICAID

## 2022-03-08 LAB
ALBUMIN SERPL BCP-MCNC: 3.8 G/DL (ref 3.2–4.9)
ALBUMIN/GLOB SERPL: 1.5 G/DL
ALP SERPL-CCNC: 86 U/L (ref 30–99)
ALT SERPL-CCNC: 11 U/L (ref 2–50)
ANION GAP SERPL CALC-SCNC: 13 MMOL/L (ref 7–16)
AST SERPL-CCNC: 16 U/L (ref 12–45)
BASOPHILS # BLD AUTO: 0.7 % (ref 0–1.8)
BASOPHILS # BLD: 0.04 K/UL (ref 0–0.12)
BILIRUB SERPL-MCNC: 0.4 MG/DL (ref 0.1–1.5)
BUN SERPL-MCNC: 9 MG/DL (ref 8–22)
CALCIUM SERPL-MCNC: 9.3 MG/DL (ref 8.5–10.5)
CHLORIDE SERPL-SCNC: 102 MMOL/L (ref 96–112)
CO2 SERPL-SCNC: 25 MMOL/L (ref 20–33)
CREAT SERPL-MCNC: 0.53 MG/DL (ref 0.5–1.4)
CRP SERPL HS-MCNC: 0.37 MG/DL (ref 0–0.75)
EOSINOPHIL # BLD AUTO: 0.28 K/UL (ref 0–0.51)
EOSINOPHIL NFR BLD: 5 % (ref 0–6.9)
ERYTHROCYTE [DISTWIDTH] IN BLOOD BY AUTOMATED COUNT: 44.3 FL (ref 35.9–50)
ERYTHROCYTE [SEDIMENTATION RATE] IN BLOOD BY WESTERGREN METHOD: 28 MM/HOUR (ref 0–25)
GLOBULIN SER CALC-MCNC: 2.6 G/DL (ref 1.9–3.5)
GLUCOSE SERPL-MCNC: 93 MG/DL (ref 65–99)
HCT VFR BLD AUTO: 36.5 % (ref 37–47)
HGB BLD-MCNC: 11.9 G/DL (ref 12–16)
IMM GRANULOCYTES # BLD AUTO: 0.04 K/UL (ref 0–0.11)
IMM GRANULOCYTES NFR BLD AUTO: 0.7 % (ref 0–0.9)
LYMPHOCYTES # BLD AUTO: 2.13 K/UL (ref 1–4.8)
LYMPHOCYTES NFR BLD: 38.2 % (ref 22–41)
MCH RBC QN AUTO: 27.5 PG (ref 27–33)
MCHC RBC AUTO-ENTMCNC: 32.6 G/DL (ref 33.6–35)
MCV RBC AUTO: 84.3 FL (ref 81.4–97.8)
MONOCYTES # BLD AUTO: 0.64 K/UL (ref 0–0.85)
MONOCYTES NFR BLD AUTO: 11.5 % (ref 0–13.4)
NEUTROPHILS # BLD AUTO: 2.44 K/UL (ref 2–7.15)
NEUTROPHILS NFR BLD: 43.9 % (ref 44–72)
NRBC # BLD AUTO: 0 K/UL
NRBC BLD-RTO: 0 /100 WBC
PLATELET # BLD AUTO: 367 K/UL (ref 164–446)
PMV BLD AUTO: 9.6 FL (ref 9–12.9)
POTASSIUM SERPL-SCNC: 4 MMOL/L (ref 3.6–5.5)
PROT SERPL-MCNC: 6.4 G/DL (ref 6–8.2)
RBC # BLD AUTO: 4.33 M/UL (ref 4.2–5.4)
SODIUM SERPL-SCNC: 140 MMOL/L (ref 135–145)
WBC # BLD AUTO: 5.6 K/UL (ref 4.8–10.8)

## 2022-03-08 PROCEDURE — 85652 RBC SED RATE AUTOMATED: CPT

## 2022-03-08 PROCEDURE — 80053 COMPREHEN METABOLIC PANEL: CPT

## 2022-03-08 PROCEDURE — 86140 C-REACTIVE PROTEIN: CPT

## 2022-03-08 PROCEDURE — 85025 COMPLETE CBC W/AUTO DIFF WBC: CPT

## 2022-06-03 ENCOUNTER — OFFICE VISIT (OUTPATIENT)
Dept: NEUROLOGY | Facility: MEDICAL CENTER | Age: 62
End: 2022-06-03
Attending: PSYCHIATRY & NEUROLOGY
Payer: MEDICAID

## 2022-06-03 VITALS
HEART RATE: 89 BPM | DIASTOLIC BLOOD PRESSURE: 68 MMHG | BODY MASS INDEX: 29.35 KG/M2 | TEMPERATURE: 96.2 F | OXYGEN SATURATION: 95 % | WEIGHT: 149.47 LBS | HEIGHT: 60 IN | SYSTOLIC BLOOD PRESSURE: 112 MMHG

## 2022-06-03 DIAGNOSIS — Z98.890 H/O CERVICAL SPINE SURGERY: ICD-10-CM

## 2022-06-03 DIAGNOSIS — R20.0 BILATERAL HAND NUMBNESS: ICD-10-CM

## 2022-06-03 DIAGNOSIS — M50.00 CERVICAL DISC DISORDER WITH MYELOPATHY: ICD-10-CM

## 2022-06-03 DIAGNOSIS — M48.07 SPINAL STENOSIS OF LUMBOSACRAL REGION: ICD-10-CM

## 2022-06-03 PROCEDURE — 99204 OFFICE O/P NEW MOD 45 MIN: CPT | Performed by: PSYCHIATRY & NEUROLOGY

## 2022-06-03 PROCEDURE — 99212 OFFICE O/P EST SF 10 MIN: CPT | Performed by: PSYCHIATRY & NEUROLOGY

## 2022-06-03 RX ORDER — BUPROPION HYDROCHLORIDE 150 MG/1
150 TABLET ORAL
COMMUNITY
Start: 2022-05-16 | End: 2022-09-23

## 2022-06-03 RX ORDER — IBUPROFEN 800 MG/1
TABLET ORAL
COMMUNITY
Start: 2022-05-17 | End: 2023-01-17

## 2022-06-03 RX ORDER — OXYCODONE HYDROCHLORIDE 10 MG/1
5 TABLET ORAL EVERY 6 HOURS PRN
Status: ON HOLD | COMMUNITY
Start: 2022-04-25 | End: 2022-10-03

## 2022-06-03 RX ORDER — SULFAMETHOXAZOLE AND TRIMETHOPRIM 800; 160 MG/1; MG/1
1 TABLET ORAL ONCE
Status: ON HOLD | COMMUNITY
Start: 2022-05-09 | End: 2022-10-03 | Stop reason: SDUPTHER

## 2022-06-03 RX ORDER — CEFAZOLIN SODIUM 1 G/3ML
INJECTION, POWDER, FOR SOLUTION INTRAMUSCULAR; INTRAVENOUS
COMMUNITY
Start: 2022-03-09 | End: 2022-09-23

## 2022-06-03 ASSESSMENT — FIBROSIS 4 INDEX: FIB4 SCORE: 0.79

## 2022-06-03 NOTE — PROGRESS NOTES
Reason for Consult:  Myelopathy- s/p surgery.    History of present illness:    Yolanda Reed 61 y.o. female who lives with a boyfriend (common law) and is a Native of [place]. She has been on Disability for nearly 4 years duet to Bipolar/Anxiety issues. 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     He recalls having a numb patch or spot (painless) in October 2021 where he recall slowly and progressively over a period of 1 to 2 months to around her left entire flank area and then  Wrapped her enter torso and spread upwards just above her breast areas and then down to just below her knees (with little involvement of her feet). She noticed a change in balance when the numbness started in the left lateral abdominal area where it remained for a few weeks for week before it spread.    She saw Doug Navarro in November-December 2021 and he ordered a Spinal Cord MRI     Dr. Doug Navarro also did electrical testing of her limbs at this was normal per report. She had a Thoracic MRI done in 12/2021 at [facility] showing normal thoracic spine for age and no significant stenosis or normal appearing cord.    Then atleast 1 month she had a Cervical MRI and this showed moderately severe central stenosis at C7-T1 from a disc extending cephalad 7 mm with hypertrophic facet joint changes of the left that extended towards the stenosis and moderately severe central stenosis at C6-7 and moderate severe central stenosis at C5-6 with bilateral bony neural foraminal narrowing right worse than left.      She saw Dr. Werner ([facility]) and had surgery 2 days later on January 9th 2022 and she had to return back to the hospital due to an infection in the left front area near the surgical site requiring an extending hospital stay for over 1 month.     She has noticed improved feeling in the mid to lower thoracic and there was additional numbness (very low level degree and she  "even noticed difficulty buttoning clothes). The upper chest and abdominal areas feel 75% back to normal post surgery without worsening of sensory disturbances in those areas. She has persistent sensitivity (hyper) of there thighs- her hands and feet have remained \"freezing cold\" after the surgery- this cold feeling was not clearly present to her before the surgery.    There has has been no new or evolving numbness or other sensory disturbances of the face,tongue,scalp, upper shoulders bilaterally.    Her walking has not improved in the last 6 months or so ; she describes her legs as feeling tight after the first 1st steps. Her thighs feel sore (anterior)- feels tight to her. No muscle cramping  Has occurred in the arms or legs in the recent months.    No bowel incontinence issues.    No dysarthria,dysphagia,vertigo,headache(s),evolving neck pains in the last 3 months nor any lhermitte's sign.    No falls in last 3 months; at home she walks around her house without a roller walker but needs a roller walker outside the house.    Patient Active Problem List    Diagnosis Date Noted   • Acute respiratory failure with hypoxia (HCC) 02/26/2022   • Falls frequently 02/16/2022   • Electrolyte imbalance 02/16/2022   • Wound infection after surgery 02/16/2022   • Hyperlipidemia 01/17/2022   • Pain in the chest 01/17/2022   • Morbid obesity (HCC) 01/17/2022   • Anxiety 01/17/2022   • Chest pain, rule out acute myocardial infarction 01/17/2022   • Cervical myelopathy with cervical radiculopathy (HCC) 01/06/2022   • Nausea and vomiting 06/29/2021   • Delayed emergence from anesthesia 06/29/2021   • Essential hypertension 06/29/2021   • Obstructive sleep apnea syndrome 06/29/2021       Past medical history:   Past Medical History:   Diagnosis Date   • Anesthesia 2006    slow to wake up and PONV - no problems since per pt   • Anxiety    • Arthritis     shoulders   • Dental disorder     upper dentures   • High cholesterol    • " Hypertension 2022    pt states well controlled on meds   • Infectious disease     COVID 2020 and hx of MRSA   • Low blood sugar    • Pneumonia     2020 + COVID   • PONV (postoperative nausea and vomiting)    • Psychiatric problem     depression   • PTSD (post-traumatic stress disorder)    • Sleep apnea     cpap   • Snoring    • Urinary bladder disorder     bladder sling put in 21       Past surgical history:   Past Surgical History:   Procedure Laterality Date   • CORPECTOMY N/A 2022    Procedure: CORPECTOMY, SPINE - PARTIAL;  Surgeon: Jama Werner M.D.;  Location: [facility];  Service: Orthopedics   • CERVICAL DISK AND FUSION ANTERIOR N/A 2022    Procedure: DISCECTOMY, SPINE, CERVICAL, ANTERIOR APPROACH, WITH FUSION - C5-T1;  Surgeon: Jama Werner M.D.;  Location: [facility];  Service: Orthopedics   • BLADDER SLING FEMALE N/A 2021    Procedure: BLADDER SLING, FEMALE - SOLYX.;  Surgeon: Rocío Dewey M.D.;  Location: [facility];  Service: Gynecology   • PB KNEE SCOPE,AID ANT CRUCIATE REPAIR Right 2019    Procedure: RT KNEE ARTHROSCOPY WITH ALLOGRAFT ACL RECONSTRUCTION AND PARTIAL MENISCECTOMY;  Surgeon: Onur Arriaza M.D.;  Location: [facility];  Service: Orthopedics   • BREAST BIOPSY  2010    Performed by ROCÍO BARON at SURGERY SAME DAY [facility] ORS   • GYN SURGERY  ,       • OTHER ORTHOPEDIC SURGERY      acl left knee   • OTHER ORTHOPEDIC SURGERY      right shoulder   • TONSILLECTOMY           Social history:   Social History     Socioeconomic History   • Marital status:      Spouse name: Not on file   • Number of children: Not on file   • Years of education: Not on file   • Highest education level: Not on file   Occupational History   • Not on file   Tobacco Use   • Smoking status: Former Smoker     Packs/day: 0.50     Years: 30.00     Pack years: 15.00     Types: Cigarettes     Quit date: 2013  "    Years since quittin.5   • Smokeless tobacco: Never Used   • Tobacco comment: .5 ppd, 30 yrs   Vaping Use   • Vaping Use: Every day   • Substances: Nicotine, THC   • Devices: Pre-filled or refillable cartridge   Substance and Sexual Activity   • Alcohol use: No   • Drug use: Yes     Types: Inhaled     Comment: \"clean since \",  THC last used 1/3/22   • Sexual activity: Not on file   Other Topics Concern   • Not on file   Social History Narrative   • Not on file     Social Determinants of Health     Financial Resource Strain: Low Risk    • Difficulty of Paying Living Expenses: Not very hard   Food Insecurity: No Food Insecurity   • Worried About Running Out of Food in the Last Year: Never true   • Ran Out of Food in the Last Year: Never true   Transportation Needs: No Transportation Needs   • Lack of Transportation (Medical): No   • Lack of Transportation (Non-Medical): No   Physical Activity: Not on file   Stress: Not on file   Social Connections: Not on file   Intimate Partner Violence: Not on file   Housing Stability: Not on file       Family history:   Family History   Problem Relation Age of Onset   • Alcohol/Drug Mother    • Anesthesia Mother    • Anxiety disorder Mother    • Bipolar disorder Mother    • Cancer Mother    • Lung Cancer Mother    • Osteoporosis Mother    • Allergies Father    • Kidney stones Father    • Lung Cancer Father    • DVT Sister    • Lung Cancer Sister          Current medications:   Current Outpatient Medications   Medication   • buPROPion (WELLBUTRIN XL) 150 MG XL tablet   • ceFAZolin (ANCEF) 1 GM Recon Soln   • ibuprofen (MOTRIN) 800 MG Tab   • oxyCODONE immediate release (ROXICODONE) 10 MG immediate release tablet   • sulfamethoxazole-trimethoprim (BACTRIM DS) 800-160 MG tablet   • atorvastatin (LIPITOR) 10 MG Tab   • QUEtiapine (SEROQUEL) 25 MG Tab   • propranolol (INDERAL) 40 MG Tab   • sertraline (ZOLOFT) 100 MG Tab   • vitamin D (CHOLECALCIFEROL) 1000 UNIT Tab   • " diazePAM (VALIUM) 5 MG Tab   • methocarbamol (ROBAXIN) 500 MG Tab   • acetaminophen (TYLENOL) 500 MG Tab     No current facility-administered medications for this visit.       Medication Allergy:  Allergies   Allergen Reactions   • Morphine Anaphylaxis     Throat swelling  Tolerates oxycodone           Physical examination:   Vitals:    06/03/22 0735   BP: 112/68   BP Location: Right arm   Patient Position: Sitting   BP Cuff Size: Adult   Pulse: 89   Temp: (!) 35.7 °C (96.2 °F)   TempSrc: Temporal   SpO2: 95%   Weight: 67.8 kg (149 lb 7.6 oz)   Height: 1.524 m (5')       Normal Cephalic Atraumatic.  General: Full Range of Movement around the Neck in all directions without restrictions or discrete pain evoked triggers.  No lower extremity edema.      Neurological  Exam:    Mental status: Awake, alert and fully oriented to person, place, time and situation. Normal attention, concentration and fund of knowledge for education level.  Did not appear/act combative,irritable,anxious,paranoid/delusional or aggressive to or with me.  Speech and language: Speech is fluent without errors, clear, intact to repetition and intact to naming.     Follows 3 step motor commands in sequence without significant delay and correctly.    Cranial nerve exam:  II: Pupils are equally round and reactive to light. Visual fields are intact by confrontation.  III, IV, VI: EOMI, no diplopia, no ptosis.  V: Sensation to light touch is normal over V1-3 distributions bilaterally.  .  VII: Facial movements are symmetrical. There is no facial droop. .  VIII: Hearing intact to soft speech and finger rub bilaterally  IX: Palate elevates symmetrically, uvula is midline. Dysarthria is not present.  XI: Shoulder shrug are symmetrical and strong.   XII: Tongue protrudes midline.        Motor exam:  Muscle tone is normal in all 4 limbs. and No abnormal movements appreciated.    Muscle strength:    Neck Flexors/Extensors:  5/5       Right  Left  Deltoid   5/5  5/5      Biceps   5/5  5/5  Triceps  5/5  5/5   Wrist extensors 5/5  5/5  Wrist flexors  5/5  5/5     5/5  5/5  Interossei  4+/5  4/5  Thenar (APB)  4/5  4/5   Hip flexors  5/5  4+/5  Quadriceps  5/5  5/5    Hamstrings  5/5  4+/5  Dorsiflexors  5/5  5/5  Plantarflexors  5/5  5/5  Toe extension  5/5  5/5  Toe Flexors                5/5                   5/5    Sensory exam:    Vibratory threshold- 6-8 seconds at great toes, 8 seconds at ankles, 6-8 seconds at knees and 18-20 seconds at the index and 5th fingers.    Proprioception- mildly reduced at the great toes, normal at ankles and normal at the index fingers.    Reflexes:       Right  Left  Biceps   2/4  2/4  Triceps  2/4  2/4  Brachioradialis 2/4  2/4  Knee jerk  3/4  3/4  Ankle jerk  2/4  2/4     Frontal release signs are absent.    bilaterally toes are equivocal.    Coordination (finger-to-nose, heel/knee/shin, rapid alternating movements) was normal.     There was no truncal ataxia, no tremors, and no dysmetria.     Station and gait - Easily stands up from exam chair without retropulsion,veering,leaning,swaying (to either side).   Arm swing symmetrical.    No Rombergism.      Labs and Tests:     NEUROIMAGING:  Spinal Cord and Spine MRI(s) reports reviewed- images not available.      Impression/Plans/Recommendations:    1. Cervical Myelopathy- subacute with onset of lateral left abdominal numbness and tingling in October 2021 with extension and progression gradually over the next 1-2 months to extend both towards upper chest and into the arms (partially) and hands and extending downwards into her legs. No associated cranial neuropathic findings.    Has residual disturbances post surgery but she feels her walking is actually worse to her.    Exam shows weakness of her left more than right hands (intrinsic muscles) and left lower extremity (proximally more than distally).    She has persistent lower back pains with  occasional radicuolpathy down the RIGHT leg and not left.    It is hard to determine what is new and old in terms of weakness but the overall encouraging fact is she has more sensation in her upper arms and chest/abdominal areas (to the point her left lower abdominal area is very close to normal).    There is no history of diabetes,alcohol issues or any discrete medications that should cause this neurological situation.    There is no history of dysautonomia at this point.    Plans:    A. Repeat Cervical MRI to re evaluate the spinal cord.    B. Lumbar MRI to determine if she has advanced spinal stenosis or not given persisting back pain and radiculopathic pain(s) down primarily the right leg to the calf.    C. Electrodiagnostic testing of the upper limbs to determine if she has any peripheral neuropathic features to explain the degree of weakness of her hands at this point nearly 6 months post surgery.  (note: prior electrical testing done only of the lower extremities)    D. I do not feel a Brain or Thoracic MRI is needed at this time nor CSF evaluation.      I have performed  a history and physical exam and a directed /focused  ROS today.    Total time spent today or this patient's care was 42 minutes    and included reviewing diagnostic workup to date (labs and imaging that include interpreting such tests relevant to this patient's neurological condition),  reviewing/obtaining separately obtained history (from patient and/or accompanying family member)  for today's neurological problem(s) , ordering either/or medications and additional tests, giving advise and suggestions on the present neurological problem and  documenting the clinical information in the EMR.    Follow up in 3-4 months or so.    Arturo Lu MD  [facility].   [facility]

## 2022-06-13 ENCOUNTER — APPOINTMENT (OUTPATIENT)
Dept: RADIOLOGY | Facility: MEDICAL CENTER | Age: 62
End: 2022-06-13
Attending: PSYCHIATRY & NEUROLOGY
Payer: MEDICAID

## 2022-06-14 ENCOUNTER — HOSPITAL ENCOUNTER (OUTPATIENT)
Dept: RADIOLOGY | Facility: MEDICAL CENTER | Age: 62
End: 2022-06-14
Attending: PSYCHIATRY & NEUROLOGY
Payer: MEDICAID

## 2022-06-14 DIAGNOSIS — Z98.890 H/O CERVICAL SPINE SURGERY: ICD-10-CM

## 2022-06-14 DIAGNOSIS — M48.07 SPINAL STENOSIS OF LUMBOSACRAL REGION: ICD-10-CM

## 2022-06-14 DIAGNOSIS — R20.0 BILATERAL HAND NUMBNESS: ICD-10-CM

## 2022-06-14 PROCEDURE — 72141 MRI NECK SPINE W/O DYE: CPT

## 2022-06-14 PROCEDURE — 72148 MRI LUMBAR SPINE W/O DYE: CPT

## 2022-06-23 ENCOUNTER — APPOINTMENT (OUTPATIENT)
Dept: NEUROLOGY | Facility: MEDICAL CENTER | Age: 62
End: 2022-06-23
Attending: PSYCHIATRY & NEUROLOGY
Payer: MEDICAID

## 2022-09-15 ENCOUNTER — APPOINTMENT (OUTPATIENT)
Dept: ADMISSIONS | Facility: MEDICAL CENTER | Age: 62
DRG: 455 | End: 2022-09-15
Payer: MEDICAID

## 2022-09-23 ENCOUNTER — PRE-ADMISSION TESTING (OUTPATIENT)
Dept: ADMISSIONS | Facility: MEDICAL CENTER | Age: 62
DRG: 455 | End: 2022-09-23
Attending: ORTHOPAEDIC SURGERY
Payer: MEDICAID

## 2022-09-23 ENCOUNTER — HOSPITAL ENCOUNTER (OUTPATIENT)
Dept: RADIOLOGY | Facility: MEDICAL CENTER | Age: 62
DRG: 455 | End: 2022-09-23
Attending: ORTHOPAEDIC SURGERY | Admitting: ORTHOPAEDIC SURGERY
Payer: MEDICAID

## 2022-09-23 ENCOUNTER — OFFICE VISIT (OUTPATIENT)
Dept: NEUROLOGY | Facility: MEDICAL CENTER | Age: 62
End: 2022-09-23
Attending: PSYCHIATRY & NEUROLOGY
Payer: MEDICAID

## 2022-09-23 VITALS
SYSTOLIC BLOOD PRESSURE: 112 MMHG | HEART RATE: 86 BPM | TEMPERATURE: 98.9 F | WEIGHT: 147.93 LBS | BODY MASS INDEX: 29.04 KG/M2 | DIASTOLIC BLOOD PRESSURE: 82 MMHG | HEIGHT: 60 IN | RESPIRATION RATE: 18 BRPM | OXYGEN SATURATION: 95 %

## 2022-09-23 DIAGNOSIS — M48.07 SPINAL STENOSIS OF LUMBOSACRAL REGION: ICD-10-CM

## 2022-09-23 DIAGNOSIS — M54.12 CERVICAL MYELOPATHY WITH CERVICAL RADICULOPATHY (HCC): ICD-10-CM

## 2022-09-23 DIAGNOSIS — M54.16 LUMBAR RADICULOPATHY, CHRONIC: ICD-10-CM

## 2022-09-23 DIAGNOSIS — Z98.890 H/O CERVICAL SPINE SURGERY: Primary | ICD-10-CM

## 2022-09-23 DIAGNOSIS — Z01.811 PRE-OPERATIVE RESPIRATORY EXAMINATION: ICD-10-CM

## 2022-09-23 DIAGNOSIS — Z01.810 PRE-OPERATIVE CARDIOVASCULAR EXAMINATION: ICD-10-CM

## 2022-09-23 DIAGNOSIS — Z01.812 PRE-OPERATIVE LABORATORY EXAMINATION: ICD-10-CM

## 2022-09-23 DIAGNOSIS — G95.9 CERVICAL MYELOPATHY WITH CERVICAL RADICULOPATHY (HCC): ICD-10-CM

## 2022-09-23 DIAGNOSIS — G89.29 CHRONIC BACK PAIN GREATER THAN 3 MONTHS DURATION: ICD-10-CM

## 2022-09-23 DIAGNOSIS — M54.9 CHRONIC BACK PAIN GREATER THAN 3 MONTHS DURATION: ICD-10-CM

## 2022-09-23 LAB
ABO GROUP BLD: NORMAL
APTT PPP: 35.9 SEC (ref 24.7–36)
BLD GP AB SCN SERPL QL: NORMAL
EKG IMPRESSION: NORMAL
EST. AVERAGE GLUCOSE BLD GHB EST-MCNC: 105 MG/DL
HBA1C MFR BLD: 5.3 % (ref 4–5.6)
INR PPP: 1.01 (ref 0.87–1.13)
PROTHROMBIN TIME: 13.2 SEC (ref 12–14.6)
RH BLD: NORMAL

## 2022-09-23 PROCEDURE — 86850 RBC ANTIBODY SCREEN: CPT

## 2022-09-23 PROCEDURE — 93005 ELECTROCARDIOGRAM TRACING: CPT

## 2022-09-23 PROCEDURE — 99215 OFFICE O/P EST HI 40 MIN: CPT | Performed by: PSYCHIATRY & NEUROLOGY

## 2022-09-23 PROCEDURE — 86901 BLOOD TYPING SEROLOGIC RH(D): CPT

## 2022-09-23 PROCEDURE — 85730 THROMBOPLASTIN TIME PARTIAL: CPT

## 2022-09-23 PROCEDURE — 86900 BLOOD TYPING SEROLOGIC ABO: CPT

## 2022-09-23 PROCEDURE — 93010 ELECTROCARDIOGRAM REPORT: CPT | Performed by: INTERNAL MEDICINE

## 2022-09-23 PROCEDURE — 83036 HEMOGLOBIN GLYCOSYLATED A1C: CPT

## 2022-09-23 PROCEDURE — 85610 PROTHROMBIN TIME: CPT

## 2022-09-23 PROCEDURE — 99212 OFFICE O/P EST SF 10 MIN: CPT

## 2022-09-23 PROCEDURE — 36415 COLL VENOUS BLD VENIPUNCTURE: CPT

## 2022-09-23 PROCEDURE — 71046 X-RAY EXAM CHEST 2 VIEWS: CPT

## 2022-09-23 RX ORDER — BUPROPION HYDROCHLORIDE 300 MG/1
300 TABLET ORAL DAILY
COMMUNITY
Start: 2022-07-19 | End: 2023-01-17

## 2022-09-23 ASSESSMENT — FIBROSIS 4 INDEX
FIB4 SCORE: 0.79
FIB4 SCORE: 0.79

## 2022-09-23 NOTE — PROGRESS NOTES
Neuro Follow Up:    History of Cervical Myelopathy with prior C spine surgery and lumbar spinal stenosis with residual sensory disturbances of the upper limbs (and inside of both arms- let more than right) and radiculopathic features down either leg (left more than right) when bending down and arising up.    Yolanda Burch Reed 61 y.o. female who lives with a boyfriend (common law) and is a Native of [place]. She has been on Disability for nearly 4 years duet to Bipolar/Anxiety issues. 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      He recalls having a numb patch or spot (painless) in October 2021 where he recall slowly and progressively over a period of 1 to 2 months to around her left entire flank area and then  Wrapped her enter torso and spread upwards just above her breast areas and then down to just below her knees (with little involvement of her feet). She noticed a change in balance when the numbness started in the left lateral abdominal area where it remained for a few weeks for week before it spread.     She saw Doug Navarro in November-December 2021 and he ordered a Spinal Cord MRI      Dr. Doug Navarro also did electrical testing of her limbs at this was normal per report. She had a Thoracic MRI done in 12/2021 at [facility] showing normal thoracic spine for age and no significant stenosis or normal appearing cord.    Then atleast 1 month she had a Cervical MRI and this showed moderately severe central stenosis at C7-T1 from a disc extending cephalad 7 mm with hypertrophic facet joint changes of the left that extended towards the stenosis and moderately severe central stenosis at C6-7 and moderate severe central stenosis at C5-6 with bilateral bony neural foraminal narrowing right worse than left.       She saw Dr. Werner ([facility]) and had surgery 2 days later on January 9th 2022 and she had to return back to the hospital due to an infection  "in the left front area near the surgical site requiring an extending hospital stay for over 1 month.      She has noticed improved feeling in the mid to lower thoracic and there was additional numbness (very low level degree and she even noticed difficulty buttoning clothes). The upper chest and abdominal areas feel 75% back to normal post surgery without worsening of sensory disturbances in those areas. She has persistent sensitivity (hyper) of there thighs- her hands and feet have remained \"freezing cold\" after the surgery- this cold feeling was not clearly present to her before the surgery.     There has has been no new or evolving numbness or other sensory disturbances of the face,tongue,scalp, upper shoulders bilaterally.     Her walking has not improved in the last 6 months or so ; she describes her legs as feeling tight after the first 1st steps. Her thighs feel sore (anterior)- feels tight to her. No muscle cramping  Has occurred in the arms or legs in the recent months.     No bowel incontinence issues.     No dysarthria,dysphagia,vertigo,headache(s),evolving neck pains in the last 3 months nor any lhermitte's sign.     No falls in last 3 months; at home she walks around her house without a roller walker but needs a roller walker outside the house.    Reason for Consult:       Patient Active Problem List    Diagnosis Date Noted    Acute respiratory failure with hypoxia (HCC) 02/26/2022    Falls frequently 02/16/2022    Electrolyte imbalance 02/16/2022    Wound infection after surgery 02/16/2022    Hyperlipidemia 01/17/2022    Pain in the chest 01/17/2022    Morbid obesity (HCC) 01/17/2022    Anxiety 01/17/2022    Chest pain, rule out acute myocardial infarction 01/17/2022    Cervical myelopathy with cervical radiculopathy (HCC) 01/06/2022    Nausea and vomiting 06/29/2021    Delayed emergence from anesthesia 06/29/2021    Essential hypertension 06/29/2021    Obstructive sleep apnea syndrome 06/29/2021 "       Past medical history:   Past Medical History:   Diagnosis Date    Anesthesia 2006    slow to wake up and PONV - no problems since per pt    Anxiety     Arthritis     shoulders    Dental disorder     upper dentures    High cholesterol     Hypertension 2022    pt states well controlled on meds    Infectious disease     COVID 2020 and hx of MRSA    Low blood sugar     Pneumonia     2020 + COVID    PONV (postoperative nausea and vomiting)     Psychiatric problem     depression    PTSD (post-traumatic stress disorder)     Sleep apnea     cpap    Snoring     Urinary bladder disorder     bladder sling put in 21       Past surgical history:   Past Surgical History:   Procedure Laterality Date    CORPECTOMY N/A 2022    Procedure: CORPECTOMY, SPINE - PARTIAL;  Surgeon: Jama Werner M.D.;  Location: [facility];  Service: Orthopedics    CERVICAL DISK AND FUSION ANTERIOR N/A 2022    Procedure: DISCECTOMY, SPINE, CERVICAL, ANTERIOR APPROACH, WITH FUSION - C5-T1;  Surgeon: Jama Werner M.D.;  Location: [facility];  Service: Orthopedics    BLADDER SLING FEMALE N/A 2021    Procedure: BLADDER SLING, FEMALE - SOLYX.;  Surgeon: Rocío Dewey M.D.;  Location: [facility];  Service: Gynecology    PB KNEE SCOPE,AID ANT CRUCIATE REPAIR Right 2019    Procedure: RT KNEE ARTHROSCOPY WITH ALLOGRAFT ACL RECONSTRUCTION AND PARTIAL MENISCECTOMY;  Surgeon: Onur Arriaza M.D.;  Location: [facility];  Service: Orthopedics    BREAST BIOPSY  2010    Performed by ROCÍO BARON at SURGERY SAME DAY [facility]    GYN SURGERY  ,        OTHER ORTHOPEDIC SURGERY      acl left knee    OTHER ORTHOPEDIC SURGERY      right shoulder    TONSILLECTOMY           Social history:   Social History     Socioeconomic History    Marital status:      Spouse name: Not on file    Number of children: Not on file    Years of education: Not on file    Highest  "education level: Not on file   Occupational History    Not on file   Tobacco Use    Smoking status: Former     Packs/day: 0.50     Years: 30.00     Pack years: 15.00     Types: Cigarettes     Quit date: 2013     Years since quittin.8    Smokeless tobacco: Never    Tobacco comments:     .5 ppd, 30 yrs   Vaping Use    Vaping Use: Every day    Substances: Nicotine, THC    Devices: Pre-filled or refillable cartridge   Substance and Sexual Activity    Alcohol use: No    Drug use: Yes     Types: Inhaled     Comment: \"clean since \",  THC last used 1/3/22    Sexual activity: Not on file   Other Topics Concern    Not on file   Social History Narrative    Not on file     Social Determinants of Health     Financial Resource Strain: Low Risk     Difficulty of Paying Living Expenses: Not very hard   Food Insecurity: No Food Insecurity    Worried About Running Out of Food in the Last Year: Never true    Ran Out of Food in the Last Year: Never true   Transportation Needs: No Transportation Needs    Lack of Transportation (Medical): No    Lack of Transportation (Non-Medical): No   Physical Activity: Not on file   Stress: Not on file   Social Connections: Not on file   Intimate Partner Violence: Not on file   Housing Stability: Not on file       Family history:   Family History   Problem Relation Age of Onset    Alcohol/Drug Mother     Anesthesia Mother     Anxiety disorder Mother     Bipolar disorder Mother     Cancer Mother     Lung Cancer Mother     Osteoporosis Mother     Allergies Father     Kidney stones Father     Lung Cancer Father     DVT Sister     Lung Cancer Sister          Current medications:   Current Outpatient Medications   Medication    buPROPion (WELLBUTRIN XL) 150 MG XL tablet    ibuprofen (MOTRIN) 800 MG Tab    sulfamethoxazole-trimethoprim (BACTRIM DS) 800-160 MG tablet    atorvastatin (LIPITOR) 10 MG Tab    QUEtiapine (SEROQUEL) 25 MG Tab    propranolol (INDERAL) 40 MG Tab    acetaminophen " (TYLENOL) 500 MG Tab    sertraline (ZOLOFT) 100 MG Tab    vitamin D (CHOLECALCIFEROL) 1000 UNIT Tab    ceFAZolin (ANCEF) 1 GM Recon Soln    oxyCODONE immediate release (ROXICODONE) 10 MG immediate release tablet    diazePAM (VALIUM) 5 MG Tab    methocarbamol (ROBAXIN) 500 MG Tab     No current facility-administered medications for this visit.       Medication Allergy:  Allergies   Allergen Reactions    Morphine Anaphylaxis     Throat swelling  Tolerates oxycodone           Physical examination:   Vitals:    09/23/22 1322   BP: 112/82   BP Location: Right arm   Patient Position: Sitting   BP Cuff Size: Adult   Pulse: 86   Resp: 18   Temp: 37.2 °C (98.9 °F)   TempSrc: Temporal   SpO2: 95%   Weight: 67.1 kg (147 lb 14.9 oz)   Height: 1.524 m (5')       Normal Cephalic Atraumatic.  General: Full Range of Movement around the Neck in all directions without restrictions or discrete pain evoked triggers.  No lower extremity edema.      Neurological  Exam:    Mental status: Awake, alert and fully oriented to person, place, time, and situation. Normal attention and concentration.  Did not appear/act combative,irritable,anxious,paranoid/delusional or aggressive to or with me.  Speech and language: Speech is fluent without errors, clear, intact to repetition, and intact to naming.     Follows 3 step motor commands in sequence without significant delay and correctly.    Cranial nerve exam:  II: Pupils are equally round and reactive to light. Visual fields are intact by confrontation.  III, IV, VI: EOMI, no diplopia, no ptosis.  V: Sensation to light touch is normal over V1-3 distributions bilaterally.  .  VII: Facial movements are symmetrical. There is no facial droop. .  VIII: Hearing intact to soft speech and finger rub bilaterally  IX: Palate elevates symmetrically, uvula is midline. Dysarthria is not present.  XI: Shoulder shrug are symmetrical and strong.   XII: Tongue protrudes midline.        Motor exam:  Muscle tone is  normal in all 4 limbs. and No abnormal movements appreciated.    Muscle strength:    Neck Flexors/Extensors: 5/5       Right  Left    Deltoid                         5/5                   5/5                                             Biceps                         5/5                   5/5  Triceps                        5/5                   5/5         Wrist extensors           5/5                   5/5  Wrist flexors                 5/5                   5/5                                4/5                   5/5  Interossei                    4/5                  4+/5  Thenar (APB)              4/5                   4/5         Hip flexors                  5/5                   4+/5  Quadriceps                  5/5                   4 to 4+5                     Hamstrings                  5/5                   4+/5  Dorsiflexors                 5/5                   5/5  Plantarflexors              5/5                   5/5  Toe extension             5/5                   5/5  Toe Flexors                5/5                   5/5    Flexor Pollicus Longus 3/5                 5-/5  Finger extensors        4/5                   4+/5  Finger Flexors           5/5                     5/5  Flexor Carpi Ulnaris  5/5                     5/5    Sensory exam:      Right Great Toe- 8 to 10 seconds; Left Great toe- 10 seconds  Ankles: 10 to 12 seconds at great toes.  Knees: 8-10 seconds at knees (symmetrical)    Index fingers: 14 seconds bilaterally;  5th fingers and 14 seconds at 5th fingers; 14-16 at the ulnar styloids (bilaterally) and 14 seconds at elbow (right) and 10 seconds at elbow (left)    Reduced pin prick along Right C8-T1 Dermatome and Reduced pin prick more so than on right at similar dermatomal locations (C8-T1) in the 4th/5th fingers of both hands (which are slightly reduced in sensation to light touch and pin prick compared to normal middle fingers,second fingers and thumbs  bilaterally.    Proprioception: normal at the great toes.    No stocking loss of the lower extremities to pin prick from great toes to forelegs go mid anterior thighs.    Reflexes:       Right  Left  Biceps   2/4  2/4  Triceps              2/4  2/4  Brachioradialis             2/4  2/4  Knee jerk  2/4  2/4  Ankle jerk  2/4  2/4     Frontal release signs are absent    Toe UPGOING ON RIGHT ; EQUIVOCAL ON LEFT.    Coordination (finger-to-nose, heel/knee/shin, rapid alternating movements) was normal.     There was no truncal ataxia, no tremors, and no dysmetria.     Station and gait - easily stands up from exam chair without retropulsion,veering,leaning,swaying (to either side).     Has wide based gait> this has been stable to her for well over the last 6 months or so.    Arm swing symmetrical.    No Rombergism.      Labs and Tests:     NEUROIMAGING:     Cervical MRI:  6/2022:    IMPRESSION:        1. Postsurgical changes from ACDF of C5-T1.  2. Severe spinal canal narrowing at C5-6 and C6-C7, especially posterior to the C6 vertebral body.  3. Severe bilateral neuroforaminal narrowing at C5-6 and C6-C7           Exam Ended: 06/14/22  5:51 PM Last Resulted: 06/15/22  8:13 AM           Lumbar MRI  6/2022:    IMPRESSION:        1. Mild anterolisthesis of L4-5, likely degenerative.     2. Severe spinal canal narrowing at L4-5. Moderate spinal canal narrowing at L3-4.     3. Severe narrowing of the right neuroforamen at L4-5.     4. Narrowing of the left lateral recess at L2-3 and impingement of left L3 nerve root is possible.           Exam Ended: 06/14/22  6:23 PM             Impression/Plans/Recommendations:    Cervical Myelopathy- subacute with onset of lateral left abdominal numbness and tingling in October 2021 with extension and progression gradually over the next 1-2 months to extend both towards upper chest and into the arms (partially) and hands and extending downwards into her legs. No associated cranial  "neuropathic findings.    She does recall before her prior cervical spine surgery that she could not make fists with her hands and post surgery within 1 week she can and still is able to do.  2. Lumbar (Lower Back Pain)- worsening pain with ambulation and radiating down either leg(s)> radiculopathic pain down the left more than right leg. When she bend over to blow dry her hair and stand then stands up to a vertical position , she will get transient pains down the left more than right sides to her \"heels or ankles\"- this can be from a few seconds to a minute.    She has been evaluated by Dr Baxter (Orthopedic Surgeon) and is planning to have spine surgery next week.     Has residual disturbances post surgery but she feels her walking is actually worse to her.     Her exam shows weakness of her right  more thanl left  hands (intrinsic muscles) and left lower extremity (proximally more than distally).      It is still hard to determine what is new and old in terms of weakness but the overall encouraging fact is she has more sensation in her upper arms and chest/abdominal areas (to the point her left lower abdominal area is very close to normal).    There is no history of diabetes,alcohol issues or any discrete medications that should cause this neurological situation.    There is no evidence for dysautonomia at this point.    Plans:  1. Cervical Myelopathy (subacute) with residual numbness and of the left greater than right inner arms and hands; since January 2022's surgery there has been no improvement of the hand sensations including both palms without any ruby neuropathic pains of the arms or hands evolving in the recent few months.      I still advocate for Yoalnda to have electrodiagnostic testing of the upper limbs to determine if she has any peripheral neuropathic features to explain the degree of weakness of her hands at this point nearly 6 months post surgery.  (note: prior electrical testing done only of the " lower extremities)   I  do not feel a Brain or Thoracic MRI is needed at this time nor CSF evaluation at this time.      Total time spent today or this patient's care was 60  minutes    and included reviewing diagnostic workup to date (labs and imaging that include interpreting such tests relevant to this patient's neurological condition),  reviewing/obtaining separately obtained history from Yolanda  for today's neurological problem(s) , ordering either/or medications and additional tests, giving advise and suggestions on the present neurological problem and  documenting the clinical information in the EMR.     Follow up in 3-4 months or so.     Arturo Lu MD  [facility].   [facility]

## 2022-09-29 ENCOUNTER — APPOINTMENT (OUTPATIENT)
Dept: RADIOLOGY | Facility: MEDICAL CENTER | Age: 62
DRG: 455 | End: 2022-09-29
Attending: ORTHOPAEDIC SURGERY
Payer: MEDICAID

## 2022-09-29 ENCOUNTER — HOSPITAL ENCOUNTER (INPATIENT)
Facility: MEDICAL CENTER | Age: 62
LOS: 5 days | DRG: 455 | End: 2022-10-04
Attending: ORTHOPAEDIC SURGERY | Admitting: ORTHOPAEDIC SURGERY
Payer: MEDICAID

## 2022-09-29 ENCOUNTER — ANESTHESIA (OUTPATIENT)
Dept: SURGERY | Facility: MEDICAL CENTER | Age: 62
DRG: 455 | End: 2022-09-29
Payer: MEDICAID

## 2022-09-29 ENCOUNTER — ANESTHESIA EVENT (OUTPATIENT)
Dept: SURGERY | Facility: MEDICAL CENTER | Age: 62
DRG: 455 | End: 2022-09-29
Payer: MEDICAID

## 2022-09-29 DIAGNOSIS — G89.18 POSTOPERATIVE PAIN: ICD-10-CM

## 2022-09-29 DIAGNOSIS — Z98.890 PONV (POSTOPERATIVE NAUSEA AND VOMITING): ICD-10-CM

## 2022-09-29 DIAGNOSIS — M46.22 INFECTION OF CERVICAL SPINE (HCC): ICD-10-CM

## 2022-09-29 DIAGNOSIS — R11.2 PONV (POSTOPERATIVE NAUSEA AND VOMITING): ICD-10-CM

## 2022-09-29 DIAGNOSIS — M62.838 MUSCLE SPASM: ICD-10-CM

## 2022-09-29 PROBLEM — Z98.1 S/P LUMBAR FUSION: Status: ACTIVE | Noted: 2022-09-29

## 2022-09-29 LAB — ABO + RH BLD: NORMAL

## 2022-09-29 PROCEDURE — 160031 HCHG SURGERY MINUTES - 1ST 30 MINS LEVEL 5: Performed by: ORTHOPAEDIC SURGERY

## 2022-09-29 PROCEDURE — 700111 HCHG RX REV CODE 636 W/ 250 OVERRIDE (IP): Performed by: ORTHOPAEDIC SURGERY

## 2022-09-29 PROCEDURE — 700105 HCHG RX REV CODE 258: Performed by: ORTHOPAEDIC SURGERY

## 2022-09-29 PROCEDURE — 700106 HCHG RX REV CODE 271: Performed by: ORTHOPAEDIC SURGERY

## 2022-09-29 PROCEDURE — 07DR3ZZ EXTRACTION OF ILIAC BONE MARROW, PERCUTANEOUS APPROACH: ICD-10-PCS | Performed by: ORTHOPAEDIC SURGERY

## 2022-09-29 PROCEDURE — 160009 HCHG ANES TIME/MIN: Performed by: ORTHOPAEDIC SURGERY

## 2022-09-29 PROCEDURE — 0SG1071 FUSION OF 2 OR MORE LUMBAR VERTEBRAL JOINTS WITH AUTOLOGOUS TISSUE SUBSTITUTE, POSTERIOR APPROACH, POSTERIOR COLUMN, OPEN APPROACH: ICD-10-PCS | Performed by: ORTHOPAEDIC SURGERY

## 2022-09-29 PROCEDURE — 95861 NEEDLE EMG 2 EXTREMITIES: CPT | Performed by: ORTHOPAEDIC SURGERY

## 2022-09-29 PROCEDURE — 700101 HCHG RX REV CODE 250: Performed by: ANESTHESIOLOGY

## 2022-09-29 PROCEDURE — 700101 HCHG RX REV CODE 250: Performed by: ORTHOPAEDIC SURGERY

## 2022-09-29 PROCEDURE — 0SB20ZZ EXCISION OF LUMBAR VERTEBRAL DISC, OPEN APPROACH: ICD-10-PCS | Performed by: ORTHOPAEDIC SURGERY

## 2022-09-29 PROCEDURE — 700102 HCHG RX REV CODE 250 W/ 637 OVERRIDE(OP): Performed by: ORTHOPAEDIC SURGERY

## 2022-09-29 PROCEDURE — 160036 HCHG PACU - EA ADDL 30 MINS PHASE I: Performed by: ORTHOPAEDIC SURGERY

## 2022-09-29 PROCEDURE — 95955 EEG DURING SURGERY: CPT | Performed by: ORTHOPAEDIC SURGERY

## 2022-09-29 PROCEDURE — 72100 X-RAY EXAM L-S SPINE 2/3 VWS: CPT

## 2022-09-29 PROCEDURE — 110454 HCHG SHELL REV 250: Performed by: ORTHOPAEDIC SURGERY

## 2022-09-29 PROCEDURE — C1713 ANCHOR/SCREW BN/BN,TIS/BN: HCPCS | Performed by: ORTHOPAEDIC SURGERY

## 2022-09-29 PROCEDURE — A9270 NON-COVERED ITEM OR SERVICE: HCPCS | Performed by: ANESTHESIOLOGY

## 2022-09-29 PROCEDURE — 770001 HCHG ROOM/CARE - MED/SURG/GYN PRIV*

## 2022-09-29 PROCEDURE — 95940 IONM IN OPERATNG ROOM 15 MIN: CPT | Performed by: ORTHOPAEDIC SURGERY

## 2022-09-29 PROCEDURE — 95938 SOMATOSENSORY TESTING: CPT | Performed by: ORTHOPAEDIC SURGERY

## 2022-09-29 PROCEDURE — 00670 ANES XTNSV SP&SPI CORD PX: CPT | Performed by: ANESTHESIOLOGY

## 2022-09-29 PROCEDURE — 00NY0ZZ RELEASE LUMBAR SPINAL CORD, OPEN APPROACH: ICD-10-PCS | Performed by: ORTHOPAEDIC SURGERY

## 2022-09-29 PROCEDURE — 4A11X4G MONITORING OF PERIPHERAL NERVOUS ELECTRICAL ACTIVITY, INTRAOPERATIVE, EXTERNAL APPROACH: ICD-10-PCS | Performed by: ORTHOPAEDIC SURGERY

## 2022-09-29 PROCEDURE — A9270 NON-COVERED ITEM OR SERVICE: HCPCS | Performed by: ORTHOPAEDIC SURGERY

## 2022-09-29 PROCEDURE — 700111 HCHG RX REV CODE 636 W/ 250 OVERRIDE (IP): Performed by: ANESTHESIOLOGY

## 2022-09-29 PROCEDURE — 502000 HCHG MISC OR IMPLANTS RC 0278: Performed by: ORTHOPAEDIC SURGERY

## 2022-09-29 PROCEDURE — 36415 COLL VENOUS BLD VENIPUNCTURE: CPT

## 2022-09-29 PROCEDURE — 700105 HCHG RX REV CODE 258: Performed by: ANESTHESIOLOGY

## 2022-09-29 PROCEDURE — 160042 HCHG SURGERY MINUTES - EA ADDL 1 MIN LEVEL 5: Performed by: ORTHOPAEDIC SURGERY

## 2022-09-29 PROCEDURE — 160035 HCHG PACU - 1ST 60 MINS PHASE I: Performed by: ORTHOPAEDIC SURGERY

## 2022-09-29 PROCEDURE — 95937 NEUROMUSCULAR JUNCTION TEST: CPT | Performed by: ORTHOPAEDIC SURGERY

## 2022-09-29 PROCEDURE — 160002 HCHG RECOVERY MINUTES (STAT): Performed by: ORTHOPAEDIC SURGERY

## 2022-09-29 PROCEDURE — 0SG10AJ FUSION OF 2 OR MORE LUMBAR VERTEBRAL JOINTS WITH INTERBODY FUSION DEVICE, POSTERIOR APPROACH, ANTERIOR COLUMN, OPEN APPROACH: ICD-10-PCS | Performed by: ORTHOPAEDIC SURGERY

## 2022-09-29 PROCEDURE — 95907 NVR CNDJ TST 1-2 STUDIES: CPT | Performed by: ORTHOPAEDIC SURGERY

## 2022-09-29 PROCEDURE — 700102 HCHG RX REV CODE 250 W/ 637 OVERRIDE(OP): Performed by: ANESTHESIOLOGY

## 2022-09-29 PROCEDURE — 160048 HCHG OR STATISTICAL LEVEL 1-5: Performed by: ORTHOPAEDIC SURGERY

## 2022-09-29 DEVICE — SCREW LOCKING: Type: IMPLANTABLE DEVICE | Site: BACK | Status: FUNCTIONAL

## 2022-09-29 DEVICE — SCREW BONE 6.0 X 45MM (1EA): Type: IMPLANTABLE DEVICE | Site: BACK | Status: FUNCTIONAL

## 2022-09-29 DEVICE — IMPLANTABLE DEVICE: Type: IMPLANTABLE DEVICE | Site: BACK | Status: FUNCTIONAL

## 2022-09-29 RX ORDER — HYDROMORPHONE HYDROCHLORIDE 1 MG/ML
0.1 INJECTION, SOLUTION INTRAMUSCULAR; INTRAVENOUS; SUBCUTANEOUS
Status: DISCONTINUED | OUTPATIENT
Start: 2022-09-29 | End: 2022-09-29 | Stop reason: HOSPADM

## 2022-09-29 RX ORDER — ENEMA 19; 7 G/133ML; G/133ML
1 ENEMA RECTAL
Status: DISCONTINUED | OUTPATIENT
Start: 2022-09-29 | End: 2022-10-04 | Stop reason: HOSPADM

## 2022-09-29 RX ORDER — IBUPROFEN 800 MG/1
800 TABLET ORAL 3 TIMES DAILY PRN
Status: DISCONTINUED | OUTPATIENT
Start: 2022-10-02 | End: 2022-10-04 | Stop reason: HOSPADM

## 2022-09-29 RX ORDER — OXYCODONE HCL 5 MG/5 ML
10 SOLUTION, ORAL ORAL
Status: COMPLETED | OUTPATIENT
Start: 2022-09-29 | End: 2022-09-29

## 2022-09-29 RX ORDER — VANCOMYCIN HYDROCHLORIDE 1 G/20ML
INJECTION, POWDER, LYOPHILIZED, FOR SOLUTION INTRAVENOUS
Status: COMPLETED | OUTPATIENT
Start: 2022-09-29 | End: 2022-09-29

## 2022-09-29 RX ORDER — ONDANSETRON 2 MG/ML
4 INJECTION INTRAMUSCULAR; INTRAVENOUS EVERY 4 HOURS PRN
Status: DISCONTINUED | OUTPATIENT
Start: 2022-09-29 | End: 2022-10-04 | Stop reason: HOSPADM

## 2022-09-29 RX ORDER — OXYCODONE HYDROCHLORIDE 10 MG/1
10 TABLET ORAL
Status: DISCONTINUED | OUTPATIENT
Start: 2022-09-29 | End: 2022-09-29

## 2022-09-29 RX ORDER — DEXTROSE MONOHYDRATE, SODIUM CHLORIDE, AND POTASSIUM CHLORIDE 50; 1.49; 4.5 G/1000ML; G/1000ML; G/1000ML
INJECTION, SOLUTION INTRAVENOUS CONTINUOUS
Status: DISCONTINUED | OUTPATIENT
Start: 2022-09-29 | End: 2022-10-04 | Stop reason: HOSPADM

## 2022-09-29 RX ORDER — MIDAZOLAM HYDROCHLORIDE 1 MG/ML
INJECTION INTRAMUSCULAR; INTRAVENOUS
Status: DISPENSED
Start: 2022-09-29 | End: 2022-09-30

## 2022-09-29 RX ORDER — LABETALOL HYDROCHLORIDE 5 MG/ML
10 INJECTION, SOLUTION INTRAVENOUS
Status: DISCONTINUED | OUTPATIENT
Start: 2022-09-29 | End: 2022-10-04 | Stop reason: HOSPADM

## 2022-09-29 RX ORDER — PROMETHAZINE HYDROCHLORIDE 25 MG/1
12.5-25 TABLET ORAL EVERY 4 HOURS PRN
Status: DISCONTINUED | OUTPATIENT
Start: 2022-09-29 | End: 2022-10-04 | Stop reason: HOSPADM

## 2022-09-29 RX ORDER — SODIUM CHLORIDE, SODIUM LACTATE, POTASSIUM CHLORIDE, CALCIUM CHLORIDE 600; 310; 30; 20 MG/100ML; MG/100ML; MG/100ML; MG/100ML
INJECTION, SOLUTION INTRAVENOUS
Status: DISCONTINUED | OUTPATIENT
Start: 2022-09-29 | End: 2022-09-29 | Stop reason: SURG

## 2022-09-29 RX ORDER — METOPROLOL TARTRATE 1 MG/ML
1 INJECTION, SOLUTION INTRAVENOUS
Status: DISCONTINUED | OUTPATIENT
Start: 2022-09-29 | End: 2022-09-29 | Stop reason: HOSPADM

## 2022-09-29 RX ORDER — DOCUSATE SODIUM 100 MG/1
100 CAPSULE, LIQUID FILLED ORAL 2 TIMES DAILY
Status: DISCONTINUED | OUTPATIENT
Start: 2022-09-30 | End: 2022-10-04 | Stop reason: HOSPADM

## 2022-09-29 RX ORDER — DIPHENHYDRAMINE HCL 25 MG
25 TABLET ORAL EVERY 6 HOURS PRN
Status: DISCONTINUED | OUTPATIENT
Start: 2022-09-29 | End: 2022-10-04 | Stop reason: HOSPADM

## 2022-09-29 RX ORDER — CEFAZOLIN SODIUM 1 G/3ML
INJECTION, POWDER, FOR SOLUTION INTRAMUSCULAR; INTRAVENOUS PRN
Status: DISCONTINUED | OUTPATIENT
Start: 2022-09-29 | End: 2022-09-29 | Stop reason: SURG

## 2022-09-29 RX ORDER — AMOXICILLIN 250 MG
1 CAPSULE ORAL
Status: DISCONTINUED | OUTPATIENT
Start: 2022-09-29 | End: 2022-10-04 | Stop reason: HOSPADM

## 2022-09-29 RX ORDER — SODIUM CHLORIDE, SODIUM LACTATE, POTASSIUM CHLORIDE, CALCIUM CHLORIDE 600; 310; 30; 20 MG/100ML; MG/100ML; MG/100ML; MG/100ML
INJECTION, SOLUTION INTRAVENOUS CONTINUOUS
Status: ACTIVE | OUTPATIENT
Start: 2022-09-29 | End: 2022-09-29

## 2022-09-29 RX ORDER — OXYCODONE HYDROCHLORIDE 5 MG/1
5 TABLET ORAL
Status: DISCONTINUED | OUTPATIENT
Start: 2022-09-29 | End: 2022-09-29

## 2022-09-29 RX ORDER — TRANEXAMIC ACID 100 MG/ML
INJECTION, SOLUTION INTRAVENOUS PRN
Status: DISCONTINUED | OUTPATIENT
Start: 2022-09-29 | End: 2022-09-29 | Stop reason: SURG

## 2022-09-29 RX ORDER — DEXAMETHASONE SODIUM PHOSPHATE 4 MG/ML
4 INJECTION, SOLUTION INTRA-ARTICULAR; INTRALESIONAL; INTRAMUSCULAR; INTRAVENOUS; SOFT TISSUE EVERY 6 HOURS
Status: DISPENSED | OUTPATIENT
Start: 2022-09-29 | End: 2022-09-30

## 2022-09-29 RX ORDER — ONDANSETRON 4 MG/1
4 TABLET, ORALLY DISINTEGRATING ORAL EVERY 4 HOURS PRN
Status: DISCONTINUED | OUTPATIENT
Start: 2022-09-29 | End: 2022-10-04 | Stop reason: HOSPADM

## 2022-09-29 RX ORDER — ONDANSETRON 2 MG/ML
INJECTION INTRAMUSCULAR; INTRAVENOUS PRN
Status: DISCONTINUED | OUTPATIENT
Start: 2022-09-29 | End: 2022-09-29 | Stop reason: SURG

## 2022-09-29 RX ORDER — HYDROMORPHONE HYDROCHLORIDE 1 MG/ML
0.4 INJECTION, SOLUTION INTRAMUSCULAR; INTRAVENOUS; SUBCUTANEOUS
Status: DISCONTINUED | OUTPATIENT
Start: 2022-09-29 | End: 2022-09-29 | Stop reason: HOSPADM

## 2022-09-29 RX ORDER — KETOROLAC TROMETHAMINE 30 MG/ML
30 INJECTION, SOLUTION INTRAMUSCULAR; INTRAVENOUS EVERY 6 HOURS
Status: DISPENSED | OUTPATIENT
Start: 2022-09-29 | End: 2022-10-02

## 2022-09-29 RX ORDER — ACETAMINOPHEN 500 MG
1000 TABLET ORAL EVERY 6 HOURS PRN
Status: DISCONTINUED | OUTPATIENT
Start: 2022-10-04 | End: 2022-10-04 | Stop reason: HOSPADM

## 2022-09-29 RX ORDER — PHENYLEPHRINE HYDROCHLORIDE 10 MG/ML
INJECTION, SOLUTION INTRAMUSCULAR; INTRAVENOUS; SUBCUTANEOUS PRN
Status: DISCONTINUED | OUTPATIENT
Start: 2022-09-29 | End: 2022-09-29 | Stop reason: SURG

## 2022-09-29 RX ORDER — POLYETHYLENE GLYCOL 3350 17 G/17G
1 POWDER, FOR SOLUTION ORAL 2 TIMES DAILY PRN
Status: DISCONTINUED | OUTPATIENT
Start: 2022-09-29 | End: 2022-10-04 | Stop reason: HOSPADM

## 2022-09-29 RX ORDER — ALBUTEROL SULFATE 2.5 MG/3ML
2.5 SOLUTION RESPIRATORY (INHALATION)
Status: DISCONTINUED | OUTPATIENT
Start: 2022-09-29 | End: 2022-09-29 | Stop reason: HOSPADM

## 2022-09-29 RX ORDER — ZOLPIDEM TARTRATE 5 MG/1
5 TABLET ORAL
Status: DISCONTINUED | OUTPATIENT
Start: 2022-09-30 | End: 2022-10-04 | Stop reason: HOSPADM

## 2022-09-29 RX ORDER — HALOPERIDOL 5 MG/ML
1 INJECTION INTRAMUSCULAR
Status: DISCONTINUED | OUTPATIENT
Start: 2022-09-29 | End: 2022-09-29 | Stop reason: HOSPADM

## 2022-09-29 RX ORDER — OXYCODONE HCL 5 MG/5 ML
SOLUTION, ORAL ORAL
Status: DISPENSED
Start: 2022-09-29 | End: 2022-09-30

## 2022-09-29 RX ORDER — PROCHLORPERAZINE EDISYLATE 5 MG/ML
5-10 INJECTION INTRAMUSCULAR; INTRAVENOUS EVERY 4 HOURS PRN
Status: DISCONTINUED | OUTPATIENT
Start: 2022-09-29 | End: 2022-10-04 | Stop reason: HOSPADM

## 2022-09-29 RX ORDER — DIPHENHYDRAMINE HYDROCHLORIDE 50 MG/ML
12.5 INJECTION INTRAMUSCULAR; INTRAVENOUS
Status: DISCONTINUED | OUTPATIENT
Start: 2022-09-29 | End: 2022-09-29 | Stop reason: HOSPADM

## 2022-09-29 RX ORDER — AMOXICILLIN 250 MG
1 CAPSULE ORAL NIGHTLY
Status: DISCONTINUED | OUTPATIENT
Start: 2022-09-29 | End: 2022-10-04 | Stop reason: HOSPADM

## 2022-09-29 RX ORDER — DIPHENHYDRAMINE HYDROCHLORIDE 50 MG/ML
25 INJECTION INTRAMUSCULAR; INTRAVENOUS EVERY 6 HOURS PRN
Status: DISCONTINUED | OUTPATIENT
Start: 2022-09-29 | End: 2022-10-04 | Stop reason: HOSPADM

## 2022-09-29 RX ORDER — BISACODYL 10 MG
10 SUPPOSITORY, RECTAL RECTAL
Status: DISCONTINUED | OUTPATIENT
Start: 2022-09-29 | End: 2022-10-04 | Stop reason: HOSPADM

## 2022-09-29 RX ORDER — LIDOCAINE HYDROCHLORIDE 20 MG/ML
INJECTION, SOLUTION EPIDURAL; INFILTRATION; INTRACAUDAL; PERINEURAL PRN
Status: DISCONTINUED | OUTPATIENT
Start: 2022-09-29 | End: 2022-09-29 | Stop reason: SURG

## 2022-09-29 RX ORDER — OXYCODONE HCL 5 MG/5 ML
5 SOLUTION, ORAL ORAL
Status: COMPLETED | OUTPATIENT
Start: 2022-09-29 | End: 2022-09-29

## 2022-09-29 RX ORDER — CALCIUM CARBONATE 500 MG/1
500 TABLET, CHEWABLE ORAL 2 TIMES DAILY
Status: DISCONTINUED | OUTPATIENT
Start: 2022-09-29 | End: 2022-10-04 | Stop reason: HOSPADM

## 2022-09-29 RX ORDER — SODIUM CHLORIDE, SODIUM LACTATE, POTASSIUM CHLORIDE, CALCIUM CHLORIDE 600; 310; 30; 20 MG/100ML; MG/100ML; MG/100ML; MG/100ML
INJECTION, SOLUTION INTRAVENOUS CONTINUOUS
Status: DISCONTINUED | OUTPATIENT
Start: 2022-09-29 | End: 2022-09-29 | Stop reason: HOSPADM

## 2022-09-29 RX ORDER — CYCLOBENZAPRINE HCL 10 MG
10 TABLET ORAL EVERY 8 HOURS PRN
Status: DISCONTINUED | OUTPATIENT
Start: 2022-09-29 | End: 2022-10-04 | Stop reason: HOSPADM

## 2022-09-29 RX ORDER — HYDROMORPHONE HYDROCHLORIDE 1 MG/ML
0.2 INJECTION, SOLUTION INTRAMUSCULAR; INTRAVENOUS; SUBCUTANEOUS
Status: DISCONTINUED | OUTPATIENT
Start: 2022-09-29 | End: 2022-09-29 | Stop reason: HOSPADM

## 2022-09-29 RX ORDER — ONDANSETRON 2 MG/ML
4 INJECTION INTRAMUSCULAR; INTRAVENOUS
Status: COMPLETED | OUTPATIENT
Start: 2022-09-29 | End: 2022-09-29

## 2022-09-29 RX ORDER — HYDRALAZINE HYDROCHLORIDE 20 MG/ML
5 INJECTION INTRAMUSCULAR; INTRAVENOUS
Status: DISCONTINUED | OUTPATIENT
Start: 2022-09-29 | End: 2022-09-29 | Stop reason: HOSPADM

## 2022-09-29 RX ORDER — MIDAZOLAM HYDROCHLORIDE 1 MG/ML
1 INJECTION INTRAMUSCULAR; INTRAVENOUS
Status: DISCONTINUED | OUTPATIENT
Start: 2022-09-29 | End: 2022-09-29 | Stop reason: HOSPADM

## 2022-09-29 RX ORDER — ACETAMINOPHEN 500 MG
1000 TABLET ORAL EVERY 6 HOURS
Status: DISCONTINUED | OUTPATIENT
Start: 2022-09-29 | End: 2022-10-04 | Stop reason: HOSPADM

## 2022-09-29 RX ORDER — SODIUM CHLORIDE, SODIUM LACTATE, POTASSIUM CHLORIDE, AND CALCIUM CHLORIDE .6; .31; .03; .02 G/100ML; G/100ML; G/100ML; G/100ML
IRRIGANT IRRIGATION
Status: DISCONTINUED | OUTPATIENT
Start: 2022-09-29 | End: 2022-09-29 | Stop reason: HOSPADM

## 2022-09-29 RX ORDER — VASOPRESSIN 20 U/ML
INJECTION PARENTERAL PRN
Status: DISCONTINUED | OUTPATIENT
Start: 2022-09-29 | End: 2022-09-29 | Stop reason: SURG

## 2022-09-29 RX ORDER — PROMETHAZINE HYDROCHLORIDE 25 MG/1
12.5-25 SUPPOSITORY RECTAL EVERY 4 HOURS PRN
Status: DISCONTINUED | OUTPATIENT
Start: 2022-09-29 | End: 2022-10-04 | Stop reason: HOSPADM

## 2022-09-29 RX ADMIN — PHENYLEPHRINE HYDROCHLORIDE 100 MCG: 10 INJECTION INTRAVENOUS at 08:06

## 2022-09-29 RX ADMIN — SODIUM CHLORIDE, POTASSIUM CHLORIDE, SODIUM LACTATE AND CALCIUM CHLORIDE: 600; 310; 30; 20 INJECTION, SOLUTION INTRAVENOUS at 07:41

## 2022-09-29 RX ADMIN — EPHEDRINE SULFATE 10 MG: 50 INJECTION INTRAMUSCULAR; INTRAVENOUS; SUBCUTANEOUS at 07:55

## 2022-09-29 RX ADMIN — HYDROMORPHONE HYDROCHLORIDE 0.4 MG: 1 INJECTION, SOLUTION INTRAMUSCULAR; INTRAVENOUS; SUBCUTANEOUS at 14:31

## 2022-09-29 RX ADMIN — ONDANSETRON 4 MG: 2 INJECTION INTRAMUSCULAR; INTRAVENOUS at 13:06

## 2022-09-29 RX ADMIN — KETOROLAC TROMETHAMINE 30 MG: 30 INJECTION, SOLUTION INTRAMUSCULAR; INTRAVENOUS at 22:48

## 2022-09-29 RX ADMIN — SODIUM CHLORIDE, POTASSIUM CHLORIDE, SODIUM LACTATE AND CALCIUM CHLORIDE: 600; 310; 30; 20 INJECTION, SOLUTION INTRAVENOUS at 07:11

## 2022-09-29 RX ADMIN — CYCLOBENZAPRINE 10 MG: 10 TABLET, FILM COATED ORAL at 21:15

## 2022-09-29 RX ADMIN — MIDAZOLAM 2 MG: 1 INJECTION INTRAMUSCULAR; INTRAVENOUS at 07:42

## 2022-09-29 RX ADMIN — HALOPERIDOL LACTATE 1 MG: 5 INJECTION, SOLUTION INTRAMUSCULAR at 13:06

## 2022-09-29 RX ADMIN — MIDAZOLAM HYDROCHLORIDE 1 MG: 2 INJECTION, SOLUTION INTRAMUSCULAR; INTRAVENOUS at 12:30

## 2022-09-29 RX ADMIN — EPHEDRINE SULFATE 10 MG: 50 INJECTION INTRAMUSCULAR; INTRAVENOUS; SUBCUTANEOUS at 07:54

## 2022-09-29 RX ADMIN — PROPOFOL 150 MG: 10 INJECTION, EMULSION INTRAVENOUS at 07:45

## 2022-09-29 RX ADMIN — EPHEDRINE SULFATE 10 MG: 50 INJECTION INTRAMUSCULAR; INTRAVENOUS; SUBCUTANEOUS at 07:52

## 2022-09-29 RX ADMIN — ONDANSETRON 4 MG: 2 INJECTION INTRAMUSCULAR; INTRAVENOUS at 11:36

## 2022-09-29 RX ADMIN — PHENYLEPHRINE HYDROCHLORIDE 100 MCG: 10 INJECTION INTRAVENOUS at 07:55

## 2022-09-29 RX ADMIN — TRANEXAMIC ACID 1000 MG: 100 INJECTION, SOLUTION INTRAVENOUS at 08:06

## 2022-09-29 RX ADMIN — FENTANYL CITRATE 100 MCG: 50 INJECTION, SOLUTION INTRAMUSCULAR; INTRAVENOUS at 07:45

## 2022-09-29 RX ADMIN — DEXAMETHASONE SODIUM PHOSPHATE 4 MG: 4 INJECTION, SOLUTION INTRA-ARTICULAR; INTRALESIONAL; INTRAMUSCULAR; INTRAVENOUS; SOFT TISSUE at 22:49

## 2022-09-29 RX ADMIN — ROCURONIUM BROMIDE 100 MG: 10 INJECTION, SOLUTION INTRAVENOUS at 07:45

## 2022-09-29 RX ADMIN — DOCUSATE SODIUM 50 MG AND SENNOSIDES 8.6 MG 1 TABLET: 8.6; 5 TABLET, FILM COATED ORAL at 21:16

## 2022-09-29 RX ADMIN — FENTANYL CITRATE: 50 INJECTION INTRAVENOUS at 22:52

## 2022-09-29 RX ADMIN — PHENYLEPHRINE HYDROCHLORIDE 100 MCG: 10 INJECTION INTRAVENOUS at 07:54

## 2022-09-29 RX ADMIN — REMIFENTANIL HYDROCHLORIDE 0.14 MCG/KG/MIN: 1 INJECTION, POWDER, LYOPHILIZED, FOR SOLUTION INTRAVENOUS at 07:45

## 2022-09-29 RX ADMIN — OXYCODONE HYDROCHLORIDE 10 MG: 5 SOLUTION ORAL at 13:13

## 2022-09-29 RX ADMIN — FENTANYL CITRATE 50 MCG: 50 INJECTION, SOLUTION INTRAMUSCULAR; INTRAVENOUS at 12:32

## 2022-09-29 RX ADMIN — POTASSIUM CHLORIDE, DEXTROSE MONOHYDRATE AND SODIUM CHLORIDE: 150; 5; 450 INJECTION, SOLUTION INTRAVENOUS at 21:21

## 2022-09-29 RX ADMIN — CEFAZOLIN 2 G: 330 INJECTION, POWDER, FOR SOLUTION INTRAMUSCULAR; INTRAVENOUS at 07:45

## 2022-09-29 RX ADMIN — FENTANYL CITRATE 50 MCG: 50 INJECTION, SOLUTION INTRAMUSCULAR; INTRAVENOUS at 13:00

## 2022-09-29 RX ADMIN — CEFAZOLIN 1 G: 330 INJECTION, POWDER, FOR SOLUTION INTRAMUSCULAR; INTRAVENOUS at 11:42

## 2022-09-29 RX ADMIN — VANCOMYCIN HYDROCHLORIDE 1000 MG: 500 INJECTION, POWDER, LYOPHILIZED, FOR SOLUTION INTRAVENOUS at 07:11

## 2022-09-29 RX ADMIN — HYDROMORPHONE HYDROCHLORIDE 0.4 MG: 1 INJECTION, SOLUTION INTRAMUSCULAR; INTRAVENOUS; SUBCUTANEOUS at 14:16

## 2022-09-29 RX ADMIN — SUGAMMADEX 200 MG: 100 INJECTION, SOLUTION INTRAVENOUS at 12:04

## 2022-09-29 RX ADMIN — GLYCOPYRROLATE 0.2 MG: 0.2 INJECTION INTRAMUSCULAR; INTRAVENOUS at 08:20

## 2022-09-29 RX ADMIN — LIDOCAINE HYDROCHLORIDE 70 MG: 20 INJECTION, SOLUTION EPIDURAL; INFILTRATION; INTRACAUDAL at 07:45

## 2022-09-29 RX ADMIN — FENTANYL CITRATE 50 MCG: 50 INJECTION, SOLUTION INTRAMUSCULAR; INTRAVENOUS at 13:05

## 2022-09-29 RX ADMIN — VASOPRESSIN 1 UNITS: 20 INJECTION INTRAVENOUS at 08:18

## 2022-09-29 RX ADMIN — EPHEDRINE SULFATE 10 MG: 50 INJECTION INTRAMUSCULAR; INTRAVENOUS; SUBCUTANEOUS at 08:06

## 2022-09-29 RX ADMIN — FENTANYL CITRATE 50 MCG: 50 INJECTION, SOLUTION INTRAMUSCULAR; INTRAVENOUS at 12:40

## 2022-09-29 ASSESSMENT — PAIN DESCRIPTION - PAIN TYPE
TYPE: SURGICAL PAIN

## 2022-09-29 ASSESSMENT — FIBROSIS 4 INDEX: FIB4 SCORE: 0.79

## 2022-09-29 ASSESSMENT — PAIN SCALES - GENERAL: PAIN_LEVEL: 6

## 2022-09-29 NOTE — ANESTHESIA PREPROCEDURE EVALUATION
Case: [id number] Date/Time: 09/29/22 0715    Procedures:       L3-L5 TRANSFORAMINAL LUMBAR INTERBODY FUSION, L2-5 DECOMPRESSION      DECOMPRESSION, SPINE, LUMBAR    Pre-op diagnosis: L3-L5 SPONDYLOLISTHESIS, L2-L5 STENOSIS, HERNIATED NUCLEUS PULPOSUS WITH RADICULOPATHY    Location: [facility] 06 / SURGERY [facility]    Surgeons: Jama Werner M.D.          Relevant Problems   ANESTHESIA   (positive) Delayed emergence from anesthesia   (positive) Obstructive sleep apnea syndrome      CARDIAC   (positive) Essential hypertension      Other   (positive) Nausea and vomiting       Physical Exam    Airway   Mallampati: II  TM distance: >3 FB  Neck ROM: full       Cardiovascular - normal exam  Rhythm: regular  Rate: normal  (-) murmur     Dental   (+) upper dentures, lower dentures           Pulmonary - normal exam  Breath sounds clear to auscultation     Abdominal    Neurological - normal exam                 Anesthesia Plan    ASA 2       Plan - general       Airway plan will be ETT          Induction: intravenous    Postoperative Plan: Postoperative administration of opioids is intended.    Pertinent diagnostic labs and testing reviewed    Informed Consent:    Anesthetic plan and risks discussed with patient.    Use of blood products discussed with: patient whom consented to blood products.

## 2022-09-29 NOTE — ANESTHESIA PROCEDURE NOTES
Airway    Date/Time: 9/29/2022 7:46 AM  Performed by: Yaniv Montenegro D.O.  Authorized by: Yaniv Montenegro D.O.     Location:  OR  Urgency:  Elective  Indications for Airway Management:  Anesthesia      Spontaneous Ventilation: absent    Sedation Level:  Deep  Preoxygenated: Yes    Patient Position:  Sniffing  Mask Difficulty Assessment:  0 - not attempted  Final Airway Type:  Endotracheal airway  Final Endotracheal Airway:  ETT  Cuffed: Yes    Technique Used for Successful ETT Placement:  Direct laryngoscopy    Insertion Site:  Oral  Blade Type:  Halie  Laryngoscope Blade/Videolaryngoscope Blade Size:  3  ETT Size (mm):  7.0  Measured from:  Teeth  ETT to Teeth (cm):  22  Placement Verified by: auscultation and capnometry    Cormack-Lehane Classification:  Grade I - full view of glottis  Number of Attempts at Approach:  1

## 2022-09-29 NOTE — ANESTHESIA POSTPROCEDURE EVALUATION
Patient: Yolanda Reed    Procedure Summary     Date: 09/29/22 Room / Location: [facility] 06 / SURGERY [facility]    Anesthesia Start: 0741 Anesthesia Stop: 1210    Procedures:       L3-L5 TRANSFORAMINAL LUMBAR INTERBODY FUSION, L2-5 DECOMPRESSION (Bilateral: Back)      DECOMPRESSION, SPINE, LUMBAR (Bilateral: Back) Diagnosis: (L3-L5 SPONDYLOLISTHESIS, L2-L5 STENOSIS, HERNIATED NUCLEUS PULPOSUS WITH RADICULOPATHY)    Surgeons: Jama Werner M.D. Responsible Provider: Yaniv Montenegro D.O.    Anesthesia Type: general ASA Status: 2          Final Anesthesia Type: general  Last vitals  BP   Blood Pressure: (!) 96/52    Temp   36.1 °C (96.9 °F)    Pulse   76   Resp   12    SpO2   91 %      Anesthesia Post Evaluation    Patient location during evaluation: PACU  Patient participation: complete - patient participated  Level of consciousness: awake and alert  Pain score: 6    Airway patency: patent  Anesthetic complications: no  Cardiovascular status: hemodynamically stable  Respiratory status: acceptable  Hydration status: euvolemic    PONV: none          No notable events documented.     Nurse Pain Score: 7 (NPRS)

## 2022-09-29 NOTE — ANESTHESIA TIME REPORT
Anesthesia Start and Stop Event Times     Date Time Event    9/29/2022 0729 Ready for Procedure     0741 Anesthesia Start     1210 Anesthesia Stop        Responsible Staff  09/29/22    Name Role Begin End    Yaniv Montenegro D.O. Anesth 0741 1210        Overtime Reason:  no overtime (within assigned shift)    Comments:

## 2022-09-29 NOTE — OP REPORT
DATE OF SERVICE:  09/29/2022     SERVICE:  Orthopedic Spine Surgery.     PREOPERATIVE DIAGNOSES:  1.  L3-L4 and L4-L5 degenerative spondylolisthesis.  2.  L2-L5 lumbar spinal stenosis.  3.  L2-L5 herniated nucleus pulposus.  4.  L2-L5 lumbar radiculopathy.     POSTOPERATIVE DIAGNOSES:  1.  L3-L4 and L4-L5 degenerative spondylolisthesis.  2.  L2-L5 lumbar spinal stenosis.  3.  L2-L5 herniated nucleus pulposus.  4.  L2-L5 lumbar radiculopathy.     PROCEDURES:  1.  L3-L4 and L4-L5 transforaminal lumbar interbody instrumented fusion using   Tensas spine titanium pedicle screw instrumentation and PEEK interbody bone   cages filled with local autograft bone and allograft bone.  2.  Left-sided L3-L4 and L4-L5 transforaminal transpedicular approach to the   interbody space.  3.  L2-L5 decompressive laminectomy with foraminotomies.  4.  L3-L4 and L4-L5 complete facetectomies.  5.  Use of local autograft bone.  6.  Use of allograft bone.  7.  Aspiration of right posterior iliac crest bone marrow for bone marrow   aspirate concentration.  8.  Greater than one hour use of operating room fluoroscopy.  9.  Use of NMA spinal cord monitoring with running electromyography and   somatosensory evoked potentials and pedicle screw testing.     SURGEON:  Jama Werner MD     ASSISTANT SURGEON:  Edward Denney MD     ANESTHESIOLOGIST:  Yaniv Montenegro DO     ANESTHESIA TYPE:  General endotracheal.     COMPLICATIONS:  None.     ESTIMATED BLOOD LOSS:  250 mL.     MEASURES USED TO DECREASE THE PROBABILITY OF INFECTION:  1.  Twenty four-hour Hibiclens body wash.  2.  Five-day mupirocin nasal ointment.  3.  Preincisional IV Ancef and vancomycin.  4.  We irrigated the wound with copious amounts of pulsatile lavage at least 4   times during the operation.  5.  We placed 1000 mg of vancomycin powder deep in the wound on closure.  6.  I personally washed and scrubbed the patient's entire lumbar spine myself   four times before the routine  scrub.  7.  We used Prineo Dermabond mesh glue closure, which has been shown to   decrease the probability of infection.     PROCEDURE:  After informed consent was obtained, the patient was brought to   the operating room and given general endotracheal anesthetic.  NMA spinal cord   monitoring needles were placed along with a Burch catheter and the patient   was turned into the prone position and prepped and draped in the usual sterile   fashion after Ancef, vancomycin, and tranexamic acid had been given.  After   the field was draped, a time-out was called correctly identifying the patient   and the procedure to be done.  We then used C-arm fluoroscopy to localize our   incision.  We injected 10 mL of 0.5% Marcaine with epinephrine.  We then made   a longitudinal midline incision and carefully dissected down through   subcutaneous tissue down to the fascial layer.  The fascial layer was then   incised and we dissected subperiosteally on both the left and the right side,   exposing the lamina of L2-L5.  We then dissected laterally at L3-L5, exposing   the transverse processes.  Pedicle screws were then placed in standard fashion   using fluoroscopic guidance.  With each screw, the same sequence of events   was used.  First, a Midas Eduardo was used to create a starting hole, then a Sonic   gearshift was used.  Then, a tap was used and then the appropriate length   screw was placed.  All screws were noted to be in excellent position and had   excellent purchase.  Screws were tested and all of them were noted to be in   the highest level of impedance 20 milliamps or above.  The Midas Eduardo was then   used to decorticate the transverse processes of L3, L4 and L5 bilaterally.     We then irrigated the wound with copious amounts of pulsatile lavage.    Attention was then turned to doing the decompression part of the operation.    We removed the spinous processes of L2-L5.  We completely removed the lamina   and the ligamentum  flavum, completely decompressing the central canal.  Then,   we decompressed the lateral gutters.  Then, foraminotomies were done from   L2-L5 bilaterally.  We also completely removed the facet joints at L3-L4 and   L4-L5 bilaterally.     We then irrigated the wound with copious amounts of irrigation.  Attention was   then turned to doing the interbody fusion portion of the operation.  We first   approached at L4-L5.  A Left-sided transforaminal transpedicular approach was   done to the disk space.  The dura mater was gently protected with a nerve   root retractor.  We then incised the disk, evacuated the disk, decorticated   the endplates, but maintained the subcortical structure.  We then placed bone   graft and a cage in the interbody space.  The entrance hole was plugged using   Nu-Knit Surgicel to prevent any retropulsion of the cage.     The same sequence of events was then done at L3-L4.  Again, a Left-sided   transforaminal transpedicular approach was done to the disk space.  We incised   the disk, evacuated the disk, decorticated the endplates and then placed bone   graft and a cage in the interbody space.  Cage position was checked using   C-arm fluoroscopy.  Again, the entrance hole was plugged with Nu-Knit unit   L3-L4 as well.     We then once again irrigated the wound with copious amounts of irrigation.  We   placed the rest of the hardware.  Two rods were selected and secured down to   the set screws.  We did slightly distract off the set screws to increase the   superior inferior diameter.  The depth of the screws was also used to reduce   the spondylolisthesis.  All set screws were tightened down securely and   checked twice with a torque wrench.  A crosslink was placed and all 3 fixtures   of the crosslink were tightened securely with the crosslink screwdriver.    Digital traction was placed on the crosslink to make sure it was secure.  The   Gerardo screws were then broken off as recommended by  .     We then irrigated the wound with copious amounts of irrigation and placed   Evicel glue over the dura mater to prevent any postoperative CSF leaks.  Bone   graft was placed in the lateral gutters consisting of local autograft bone,   allograft bone, and beta tricalcium phosphate bone graft extender.  The wound   was then closed in layers over 1000 mg of vancomycin powder.  The fascial   layer was closed with a #2 Vicryl suture.  Subcutaneous fat layer was closed   with #1 Vicryl, subcutaneous tissue was closed with 2-0 Vicryl and skin   closure was completed with staples and Prineo Dermabond mesh glue.  We   injected 20 mL of 0.5% Marcaine with epinephrine into the wound, which was   then dressed.  The procedure was terminated.  No complications were   experienced.  Blood loss was 250 mL.  NMA spinal cord monitoring signals were   stable throughout the entire operation.        ______________________________  MD DREA SHAH/BRANDON    DD:  09/29/2022 11:49  DT:  09/29/2022 12:41    Job#:  [id number]

## 2022-09-29 NOTE — OR SURGEON
Immediate Post OP Note    PreOp Diagnosis: L3-5 spondylolisthesis L2-5 stenosis      PostOp Diagnosis: same      Procedure(s):  L3-L5 TRANSFORAMINAL LUMBAR INTERBODY FUSION, L2-5 DECOMPRESSION - Wound Class: Clean  DECOMPRESSION, SPINE, LUMBAR - Wound Class: Clean    Surgeon(s):  ALBERT Sutherland M.D.    Anesthesiologist/Type of Anesthesia:  Anesthesiologist: Yaniv Montenegro D.O./General    Surgical Staff:  Circulator: Jori Sage R.N.  Relief Circulator: Kenia Simpson R.N.; Asya Dudley R.N.  Relief Scrub: Vita Cardona  Scrub Person: Steve Clayton  Radiology Technologist: Leonel Jay; Nicci Bailey    Specimens removed if any:  * No specimens in log *    Estimated Blood Loss: 250cc  Findings: none    Complications: none        9/29/2022 11:44 AM Jama Werner M.D.

## 2022-09-29 NOTE — OR NURSING
Patient recovered well post op. A&Ox4. VSS, 3L oxymask. Surgical sites CDI. Surgical pain managed. Patient able to drink fluids without Nausea and vomiting. PT belongings on her bed. Spouse updated and discussed plan of care. Report called to Dariela PHILLIPS.

## 2022-09-30 LAB — HGB BLD-MCNC: 11.7 G/DL (ref 12–16)

## 2022-09-30 PROCEDURE — 700102 HCHG RX REV CODE 250 W/ 637 OVERRIDE(OP): Performed by: ORTHOPAEDIC SURGERY

## 2022-09-30 PROCEDURE — 770001 HCHG ROOM/CARE - MED/SURG/GYN PRIV*

## 2022-09-30 PROCEDURE — 700111 HCHG RX REV CODE 636 W/ 250 OVERRIDE (IP): Performed by: ORTHOPAEDIC SURGERY

## 2022-09-30 PROCEDURE — 700105 HCHG RX REV CODE 258: Performed by: ORTHOPAEDIC SURGERY

## 2022-09-30 PROCEDURE — A9270 NON-COVERED ITEM OR SERVICE: HCPCS | Performed by: ORTHOPAEDIC SURGERY

## 2022-09-30 PROCEDURE — 700101 HCHG RX REV CODE 250: Performed by: ORTHOPAEDIC SURGERY

## 2022-09-30 PROCEDURE — 97166 OT EVAL MOD COMPLEX 45 MIN: CPT

## 2022-09-30 PROCEDURE — 97162 PT EVAL MOD COMPLEX 30 MIN: CPT

## 2022-09-30 PROCEDURE — 36415 COLL VENOUS BLD VENIPUNCTURE: CPT

## 2022-09-30 PROCEDURE — 85018 HEMOGLOBIN: CPT

## 2022-09-30 RX ORDER — PROPRANOLOL HYDROCHLORIDE 40 MG/1
80 TABLET ORAL NIGHTLY
Status: DISCONTINUED | OUTPATIENT
Start: 2022-09-30 | End: 2022-10-04 | Stop reason: HOSPADM

## 2022-09-30 RX ORDER — SERTRALINE HYDROCHLORIDE 100 MG/1
100 TABLET, FILM COATED ORAL NIGHTLY
Status: DISCONTINUED | OUTPATIENT
Start: 2022-09-30 | End: 2022-10-04 | Stop reason: HOSPADM

## 2022-09-30 RX ORDER — ATORVASTATIN CALCIUM 10 MG/1
10 TABLET, FILM COATED ORAL EVERY EVENING
Status: DISCONTINUED | OUTPATIENT
Start: 2022-09-30 | End: 2022-10-04 | Stop reason: HOSPADM

## 2022-09-30 RX ORDER — QUETIAPINE FUMARATE 25 MG/1
50 TABLET, FILM COATED ORAL NIGHTLY
Status: DISCONTINUED | OUTPATIENT
Start: 2022-09-30 | End: 2022-10-04 | Stop reason: HOSPADM

## 2022-09-30 RX ORDER — BUPROPION HYDROCHLORIDE 150 MG/1
150 TABLET, EXTENDED RELEASE ORAL 2 TIMES DAILY
Status: DISCONTINUED | OUTPATIENT
Start: 2022-09-30 | End: 2022-10-04 | Stop reason: HOSPADM

## 2022-09-30 RX ADMIN — DEXAMETHASONE SODIUM PHOSPHATE 4 MG: 4 INJECTION, SOLUTION INTRA-ARTICULAR; INTRALESIONAL; INTRAMUSCULAR; INTRAVENOUS; SOFT TISSUE at 05:12

## 2022-09-30 RX ADMIN — CALCIUM CARBONATE 500 MG: 500 TABLET, CHEWABLE ORAL at 16:41

## 2022-09-30 RX ADMIN — POTASSIUM CHLORIDE, DEXTROSE MONOHYDRATE AND SODIUM CHLORIDE: 150; 5; 450 INJECTION, SOLUTION INTRAVENOUS at 10:12

## 2022-09-30 RX ADMIN — DOCUSATE SODIUM 100 MG: 100 CAPSULE, LIQUID FILLED ORAL at 05:11

## 2022-09-30 RX ADMIN — KETOROLAC TROMETHAMINE 30 MG: 30 INJECTION, SOLUTION INTRAMUSCULAR; INTRAVENOUS at 16:41

## 2022-09-30 RX ADMIN — KETOROLAC TROMETHAMINE 30 MG: 30 INJECTION, SOLUTION INTRAMUSCULAR; INTRAVENOUS at 05:12

## 2022-09-30 RX ADMIN — KETOROLAC TROMETHAMINE 30 MG: 30 INJECTION, SOLUTION INTRAMUSCULAR; INTRAVENOUS at 11:33

## 2022-09-30 RX ADMIN — ATORVASTATIN CALCIUM 10 MG: 10 TABLET, FILM COATED ORAL at 17:13

## 2022-09-30 RX ADMIN — ACETAMINOPHEN 1000 MG: 500 TABLET ORAL at 16:41

## 2022-09-30 RX ADMIN — CEFAZOLIN 2 G: 2 INJECTION, POWDER, FOR SOLUTION INTRAMUSCULAR; INTRAVENOUS at 16:41

## 2022-09-30 RX ADMIN — CEFAZOLIN 2 G: 2 INJECTION, POWDER, FOR SOLUTION INTRAMUSCULAR; INTRAVENOUS at 02:07

## 2022-09-30 RX ADMIN — POTASSIUM CHLORIDE, DEXTROSE MONOHYDRATE AND SODIUM CHLORIDE: 150; 5; 450 INJECTION, SOLUTION INTRAVENOUS at 20:28

## 2022-09-30 RX ADMIN — BUPROPION HYDROCHLORIDE 150 MG: 150 TABLET, EXTENDED RELEASE ORAL at 17:13

## 2022-09-30 RX ADMIN — DOCUSATE SODIUM 100 MG: 100 CAPSULE, LIQUID FILLED ORAL at 16:42

## 2022-09-30 RX ADMIN — SERTRALINE 100 MG: 100 TABLET, FILM COATED ORAL at 20:22

## 2022-09-30 RX ADMIN — CALCIUM CARBONATE 500 MG: 500 TABLET, CHEWABLE ORAL at 05:11

## 2022-09-30 RX ADMIN — CEFAZOLIN 2 G: 2 INJECTION, POWDER, FOR SOLUTION INTRAMUSCULAR; INTRAVENOUS at 23:26

## 2022-09-30 RX ADMIN — ACETAMINOPHEN 1000 MG: 500 TABLET ORAL at 23:25

## 2022-09-30 RX ADMIN — DOCUSATE SODIUM 50 MG AND SENNOSIDES 8.6 MG 1 TABLET: 8.6; 5 TABLET, FILM COATED ORAL at 20:22

## 2022-09-30 RX ADMIN — QUETIAPINE FUMARATE 50 MG: 25 TABLET ORAL at 20:21

## 2022-09-30 RX ADMIN — KETOROLAC TROMETHAMINE 30 MG: 30 INJECTION, SOLUTION INTRAMUSCULAR; INTRAVENOUS at 23:25

## 2022-09-30 RX ADMIN — CEFAZOLIN 2 G: 2 INJECTION, POWDER, FOR SOLUTION INTRAMUSCULAR; INTRAVENOUS at 08:01

## 2022-09-30 ASSESSMENT — ACTIVITIES OF DAILY LIVING (ADL): TOILETING: INDEPENDENT

## 2022-09-30 ASSESSMENT — LIFESTYLE VARIABLES
HAVE YOU EVER FELT YOU SHOULD CUT DOWN ON YOUR DRINKING: NO
ALCOHOL_USE: NO
DOES PATIENT WANT TO STOP DRINKING: NO
TOTAL SCORE: 0
ON A TYPICAL DAY WHEN YOU DRINK ALCOHOL HOW MANY DRINKS DO YOU HAVE: 0
HOW MANY TIMES IN THE PAST YEAR HAVE YOU HAD 5 OR MORE DRINKS IN A DAY: 0
EVER FELT BAD OR GUILTY ABOUT YOUR DRINKING: NO
EVER HAD A DRINK FIRST THING IN THE MORNING TO STEADY YOUR NERVES TO GET RID OF A HANGOVER: NO
AVERAGE NUMBER OF DAYS PER WEEK YOU HAVE A DRINK CONTAINING ALCOHOL: 0
TOTAL SCORE: 0
HAVE PEOPLE ANNOYED YOU BY CRITICIZING YOUR DRINKING: NO
CONSUMPTION TOTAL: NEGATIVE
TOTAL SCORE: 0

## 2022-09-30 ASSESSMENT — COGNITIVE AND FUNCTIONAL STATUS - GENERAL
DRESSING REGULAR UPPER BODY CLOTHING: A LOT
SUGGESTED CMS G CODE MODIFIER DAILY ACTIVITY: CK
HELP NEEDED FOR BATHING: A LITTLE
STANDING UP FROM CHAIR USING ARMS: A LITTLE
CLIMB 3 TO 5 STEPS WITH RAILING: A LITTLE
MOVING FROM LYING ON BACK TO SITTING ON SIDE OF FLAT BED: A LITTLE
PERSONAL GROOMING: A LITTLE
DRESSING REGULAR UPPER BODY CLOTHING: A LITTLE
TURNING FROM BACK TO SIDE WHILE IN FLAT BAD: A LITTLE
TOILETING: A LITTLE
TURNING FROM BACK TO SIDE WHILE IN FLAT BAD: A LITTLE
SUGGESTED CMS G CODE MODIFIER MOBILITY: CK
CLIMB 3 TO 5 STEPS WITH RAILING: A LITTLE
STANDING UP FROM CHAIR USING ARMS: A LITTLE
WALKING IN HOSPITAL ROOM: A LITTLE
MOVING FROM LYING ON BACK TO SITTING ON SIDE OF FLAT BED: A LITTLE
DRESSING REGULAR LOWER BODY CLOTHING: A LOT
HELP NEEDED FOR BATHING: A LOT
MOVING TO AND FROM BED TO CHAIR: A LITTLE
SUGGESTED CMS G CODE MODIFIER DAILY ACTIVITY: CK
DRESSING REGULAR LOWER BODY CLOTHING: A LOT
MOVING TO AND FROM BED TO CHAIR: A LITTLE
WALKING IN HOSPITAL ROOM: A LITTLE
SUGGESTED CMS G CODE MODIFIER MOBILITY: CK
DAILY ACTIVITIY SCORE: 16
DAILY ACTIVITIY SCORE: 18
MOBILITY SCORE: 18
TOILETING: A LOT
MOBILITY SCORE: 18

## 2022-09-30 ASSESSMENT — GAIT ASSESSMENTS
DISTANCE (FEET): 50
GAIT LEVEL OF ASSIST: SUPERVISED
ASSISTIVE DEVICE: FRONT WHEEL WALKER
DEVIATION: BRADYKINETIC

## 2022-09-30 ASSESSMENT — PAIN DESCRIPTION - PAIN TYPE
TYPE: SURGICAL PAIN

## 2022-09-30 ASSESSMENT — PATIENT HEALTH QUESTIONNAIRE - PHQ9
1. LITTLE INTEREST OR PLEASURE IN DOING THINGS: NOT AT ALL
2. FEELING DOWN, DEPRESSED, IRRITABLE, OR HOPELESS: NOT AT ALL
SUM OF ALL RESPONSES TO PHQ9 QUESTIONS 1 AND 2: 0

## 2022-09-30 NOTE — CARE PLAN
The patient is Stable - Low risk of patient condition declining or worsening    Shift Goals  Clinical Goals: pain control  Patient Goals: pain control    Progress made toward(s) clinical / shift goals:      Problem: Pain - Standard  Goal: Alleviation of pain or a reduction in pain to the patient’s comfort goal  Outcome: Progressing  Note: Pt pain tolerable with fentanyl PCA. Scheduled medications administered per MAR.     Problem: Fall Risk  Goal: Patient will remain free from falls  Outcome: Progressing  Note: Bed is locked and in lowest position, treaded socks on, bed alarm on, DME out of sight, call light and belongings within reach, pt calls appropriately for assistance, hourly rounding in place.       Problem: Knowledge Deficit - Standard  Goal: Patient and family/care givers will demonstrate understanding of plan of care, disease process/condition, diagnostic tests and medications  Outcome: Progressing       Patient is not progressing towards the following goals:

## 2022-09-30 NOTE — THERAPY
Occupational Therapy   Initial Evaluation     Patient Name: Yolanda Reed  Age:  61 y.o., Sex:  female  Medical Record #: [id number]  Today's Date: 9/30/2022     Precautions  Precautions: Fall Risk, Spinal / Back Precautions , TLSO (Thoracolumbosacral orthosis)  Comments: TLSO; no speciificity on wear schedule, RN to get clarification    Assessment  Patient is 61 y.o. female s/p L3-5 fusion and decompression. PMhx of previous neck surgery and HTN. Educated on spinal precautions, adaptive techniques for ADL/txfs, don/doff/mgmt of TLSO, and home safety. Pleasant and cooperative, but questionable retention; recommend reinforcement. Completed ADLs/txfs with mod A-SPV and functional ambulation w/FWW to toilet with CGA. Reports lives with boyfriend who has the next 2 weeks off and can assist PRN 24/7, after he can assist when not working. Currently limited by decreased functional mobility, activity tolerance, cognition, strength, balance, adherence to precautions, and pain which are currently affecting pt's ability to complete ADLs/IADLs at baseline. Will continue to follow.     Plan    Recommend Occupational Therapy 4 times per week until therapy goals are met for the following treatments:  Adaptive Equipment, Neuro Re-Education / Balance, Self Care/Activities of Daily Living, Therapeutic Activities, and Therapeutic Exercises.    DC Equipment Recommendations: None  Discharge Recommendations: Recommend home health for continued occupational therapy services (as long as boyfriend can assist PRN)      Objective     09/30/22 1358   Prior Living Situation   Prior Services Home-Independent   Housing / Facility 1 Story House   Steps Into Home 4   Bathroom Set up Bathtub / Shower Combination;Shower Chair   Equipment Owned Front-Wheel Walker;Tub / Shower Seat   Lives with - Patient's Self Care Capacity Significant Other   Comments Reports lives with boyfriend who has the next 2 weeks off and can assist PRN 24/7, after he can  assist when not working   Prior Level of ADL Function   Self Feeding Independent   Grooming / Hygiene Independent   Bathing Independent   Dressing Independent   Toileting Independent   Prior Level of IADL Function   Medication Management Independent   Laundry Independent   Kitchen Mobility Independent   Finances Independent   Home Management Independent   Shopping Independent   Prior Level Of Mobility Independent With Device in Community;Independent Without Device in Home   Precautions   Precautions Fall Risk;Spinal / Back Precautions ;TLSO (Thoracolumbosacral orthosis)   Comments TLSO; no speciificity on wear schedule, RN to get clarification   Vitals   O2 Delivery Device None - Room Air   Pain 0 - 10 Group   Location Back   Location Orientation Lower   Therapist Pain Assessment Post Activity;During Activity;Nurse Notified  (not quantified)   Cognition    Cognition / Consciousness X   Level of Consciousness Alert   New Learning Impaired   Attention Impaired  (req redirection for attention)   Comments pleasant and cooperative; receptive to education, but questionable retention, possibly d/t meds   Passive ROM Upper Body   Passive ROM Upper Body WDL   Active ROM Upper Body   Active ROM Upper Body  WDL   Strength Upper Body   Upper Body Strength  WDL   Comments functional for ADLs   Sensation Upper Body   Upper Extremity Sensation  X   Comments reports changes in sensation and strength in RUE since prior neck sx   Balance Assessment   Sitting Balance (Static) Fair +   Sitting Balance (Dynamic) Fair   Standing Balance (Static) Fair   Standing Balance (Dynamic) Fair -   Weight Shift Sitting Good   Weight Shift Standing Fair   Comments w/FWW   Bed Mobility    Supine to Sit Contact Guard Assist   Sit to Supine Supervised   Scooting Supervised   Comments edu on logroll, difficulty maintaining when retuned to supine   ADL Assessment   Grooming   (declined need)   Upper Body Dressing Moderate Assist  (TLSO)   Lower Body  Dressing Moderate Assist  (socks)   Toileting Moderate Assist  (wynn)   Comments Educated on spinal precautions, adaptive techniques for ADL/txfs, don/doff/mgmt of TLSO, and home safety. Questionable retention.   Functional Mobility   Sit to Stand Contact Guard Assist   Bed, Chair, Wheelchair Transfer Contact Guard Assist   Toilet Transfers Standby Assist  (heavy use of GB; reports has a washing machine she can use for stability)   Transfer Method Stand Step   Mobility w/FWW; bed<>toilet   Edema / Skin Assessment   Edema / Skin  Not Assessed   Activity Tolerance   Comments limited by fatigue, volition, and pain   Patient / Family Goals   Patient / Family Goal #1 to go home   Short Term Goals   Short Term Goal # 1 LB dressing with min A   Short Term Goal # 2 standing g/h with SPV using adaptive techniques   Short Term Goal # 3 don/doff TLSO with min A   Short Term Goal # 4 toilet txf with SPV   Education Group   Education Provided Role of Occupational Therapist;Transfers;Home Safety;Back Safety;Brace Wear and Care;Activities of Daily Living;Adaptive Equipment;Pathology of bedrest   Role of Occupational Therapist Patient Response Patient;Acceptance;Explanation;Verbal Demonstration;Reinforcement Needed   Back Safety Patient Response Patient;Acceptance;Demonstration;Handout;Verbal Demonstration;Reinforcement Needed;Explanation   Brace Wear & Care Patient Response Patient;Acceptance;Explanation;Reinforcement Needed;Verbal Demonstration;Action Demonstration;Demonstration;Handout   Home Safety Patient Response Patient;Acceptance;Explanation;Verbal Demonstration;Reinforcement Needed   Transfers Patient Response Patient;Acceptance;Explanation;Reinforcement Needed;Action Demonstration;Handout   ADL Patient Response Patient;Acceptance;Explanation;Verbal Demonstration;Reinforcement Needed;Handout   Adaptive Equipment Patient Response Patient;Acceptance;Explanation;Handout;Verbal Demonstration;Reinforcement Needed   Pathology  of Bedrest Patient Response Patient;Acceptance;Explanation;Reinforcement Needed;No Learning Evidence   Problem List   Problem List Decreased Homemaking Skills;Decreased Functional Mobility;Decreased Activity Tolerance;Decreased Upper Extremity Strength Right;Impaired Postural Control / Balance;Impaired Cognitive Function;Limited Knowledge of Post Op Precautions

## 2022-09-30 NOTE — DISCHARGE PLANNING
Met with pt. She lives with BF in a one level home with 4 steps to get in. Normally if inside the home she does not use a walker but when she leaves the home then she uses a walker. She also has a shower chair. BF can help her at home.     PCP is Dr. Kasper. Uses [facility].     Care Transition Team Assessment    Information Source  Orientation Level: Oriented X4  Information Given By: Patient  Who is responsible for making decisions for patient? : Patient    Readmission Evaluation  Is this a readmission?: No    Elopement Risk  Legal Hold: No  Ambulatory or Self Mobile in Wheelchair: No-Not an Elopement Risk  Elopement Risk: Not at Risk for Elopement    Interdisciplinary Discharge Planning  Does Admitting Nurse Feel This Could be a Complex Discharge?: No  Primary Care Physician: Dr. Kasper  Lives with - Patient's Self Care Capacity: Significant Other (Lives with BF)  Patient or legal guardian wants to designate a caregiver: No  Support Systems: Spouse / Significant Other  Housing / Facility: 1 Story House (has 4 steps)  Do You Take your Prescribed Medications Regularly: Yes  Able to Return to Previous ADL's: Yes  Mobility Issues: Yes  Prior Services: Home-Independent  Patient Prefers to be Discharged to:: Home  Assistance Needed: Yes  Durable Medical Equipment: Walker (has a shower chair)    Discharge Preparedness  What is your plan after discharge?: Home with help  What are your discharge supports?: Spouse  Prior Functional Level: Ambulatory, Needs Assist with Activities of Daily Living, Independent with Medication Management, Uses Walker  Difficulity with ADLs: None  Difficulity with IADLs: Driving    Functional Assesment  Prior Functional Level: Ambulatory, Needs Assist with Activities of Daily Living, Independent with Medication Management, Uses Walker    Finances  Financial Barriers to Discharge: No  Prescription Coverage: Yes (uses [facility])    Vision / Hearing Impairment  Vision Impairment :  Yes  Right Eye Vision: Wears Glasses, Impaired  Left Eye Vision: Wears Glasses, Impaired  Hearing Impairment : No    Values / Beliefs / Concerns  Values / Beliefs Concerns : No    Advance Directive  Advance Directive?: Living Will    Domestic Abuse  Have you ever been the victim of abuse or violence?: No  Physical Abuse or Sexual Abuse: No  Verbal Abuse or Emotional Abuse: No  Possible Abuse/Neglect Reported to:: Not Applicable    Psychological Assessment  History of Substance Abuse: None    Discharge Risks or Barriers  Discharge risks or barriers?: No  Patient risk factors: Vulnerable adult    Anticipated Discharge Information  Discharge Disposition: Discharged to home/self care (01)

## 2022-09-30 NOTE — PROGRESS NOTES
Pt was requesting pain medication after already refusing oxycodone from dayshift nurse. RN offered to pull oxy, however orders were changed and oxy was discontinued because pt was going to be on fentanyl PCA pump. Orders had yet to be verified and pharmacy had yet to deliver fentanyl. RN tried to explain this while offering a muscle relaxer in the meantime because that is all that was available as an active order. Pt was verbally aggressive and threatened to get up and walk to get her narcotic home meds out of her purse. RN tried to explain that pt cannot take home medications while in the hospital because pt still falls under her care. RN refused to risk her license if pt was to accidentally overdose. RN had to confiscate pt bags and called charge for assistance to deescalate the situation. Pt was refusing all care from this RN, making multiple false accusations regarding lack of care.Therefore, Charge had to take over pt assignment.

## 2022-09-30 NOTE — THERAPY
Physical Therapy   Initial Evaluation     Patient Name: Yolanda Reed  Age:  61 y.o., Sex:  female  Medical Record #: [id number]  Today's Date: 9/30/2022     Precautions  Precautions: Fall Risk;Spinal / Back Precautions ;TLSO (Thoracolumbosacral orthosis)  Comments: TLSO, no donning schedule. Per patient is PRN    Assessment  Ms. Reed is a 62 y/o female who presents to acute secondary to L3-L5 TLIF and L2-5 decompression. Provided pt with spinal precaution handout and reviewed, pt demonstrated understanding. Pt able to perform log roll without assist. No new sensory impairments and LE strength equal bilaterally. OT assisted in donning TLSO. Pt able to perform bed mobility, transfers, and gait without physical assist. Slow gait with FWW, however steady with no LOB. Pt politely declined stair training, able to verbalize to therapist how she performs it and her SO is always present. Based on balance and strength demonstrated at [facility] pt is capable of stairs.     Reviewed activity recommendations and importance of frequent short bouts of gait. Also reviewed glute sets and LAQ for strengthening and decreasing inflammation. Pt demonstrated understanding. Reviewed positioning with pillow between knees in sidelying for neutral spine while sleeping. Pt grateful for tips and reporting felling encouraged after mobilizing. Patient will not be actively followed for physical therapy services at this time, however may be seen if requested by physician for 1 more visit within 30 days to address any discharge or equipment needs.     Plan    Recommend Physical Therapy Patient will not be actively followed for physical therapy services at this time, however may be seen if requested by physician for 1 more visit within 30 days to address any discharge or equipment needs.                  Objective       09/30/22 6129   Prior Living Situation   Prior Services None   Housing / Facility 1 Story House   Steps Into Home 4   Rail Both Rail  (Steps into Home)   Equipment Owned Front-Wheel Walker   Lives with - Patient's Self Care Capacity Significant Other   Comments Reports boyfriend is taking off next 2 weeks to assist, also has a woman who comes to assist as needed   Prior Level of Functional Mobility   Bed Mobility Independent   Transfer Status Independent   Ambulation Independent   Distance Ambulation (Feet)   (community ambulator)   Assistive Devices Used Front-Wheel Walker   Stairs Independent   Comments no AD in home, FWW out of home   Cognition    Level of Consciousness Alert   Comments Pleasant and motivated, hyperverbose   Passive ROM Lower Body   Passive ROM Lower Body WDL   Active ROM Lower Body    Active ROM Lower Body  WDL   Strength Lower Body   Lower Body Strength  WDL   Sensation Lower Body   Lower Extremity Sensation   WDL   Balance Assessment   Sitting Balance (Static) Good   Sitting Balance (Dynamic) Fair +   Standing Balance (Static) Fair +   Standing Balance (Dynamic) Fair   Weight Shift Sitting Good   Weight Shift Standing Fair   Comments FWW   Gait Analysis   Gait Level Of Assist Supervised   Assistive Device Front Wheel Walker   Distance (Feet) 50   # of Times Distance was Traveled 2   Deviation Bradykinetic   Weight Bearing Status no restrictions   Bed Mobility    Supine to Sit Supervised   Sit to Supine Supervised   Functional Mobility   Sit to Stand Supervised   Bed, Chair, Wheelchair Transfer Supervised   Toilet Transfers Supervised

## 2022-09-30 NOTE — PROGRESS NOTES
2110  De-escalation between pt and primary RN. NAM aware. Assignment changed, charge RN assumed care of this pt. Pt became amenable to muscle relaxer while pharmacy tech stocked fentanyl PCA bag to floor.     2245  Pt is A&Ox4, on 3L n/c, vital signs taken and stable, medications administered per MAR with fentanyl PCA. Pain controlled with current regimen, pillows in place for comfort and repositioning, resting comfortably with call light within reach, pt calls appropriately for assistance, hourly rounding in place.

## 2022-09-30 NOTE — PROGRESS NOTES
4 Eyes Skin Assessment Completed by ALAN Sánchez and ALAN Hagen.    Head WDL  Ears WDL  Nose WDL  Mouth WDL  Neck WDL  Breast/Chest WDL  Shoulder Blades WDL  Spine Incision  (R) Arm/Elbow/Hand Redness, Blanching, and Abrasion  (L) Arm/Elbow/Hand Bruising  Abdomen WDL  Groin WDL  Scrotum/Coccyx/Buttocks Redness and Blanching  (R) Leg Bruising and Swelling  (L) Leg Bruising and Swelling  (R) Heel/Foot/Toe WDL  (L) Heel/Foot/Toe WDL          Devices In Places Blood Pressure Cuff, Pulse Ox, Burch, SCD's, and Nasal Cannula      Interventions In Place Gray Ear Foams, Pillows, and Pressure Redistribution Mattress    Possible Skin Injury No    Pictures Uploaded Into Epic N/A  Wound Consult Placed N/A  RN Wound Prevention Protocol Ordered No

## 2022-09-30 NOTE — PROGRESS NOTES
Patient verbally abusive towards staff on admission to this unit. Patient refused post-op vital signs to be taken. She refused all of her scheduled medications this evening, as well as any PRN pain management medications. Patient started aggressively pulling at lines, O2 monitor, oxygen and when I attempted to educate patient and offer her pain meds she told me to get out of the room. Charge RN notified.

## 2022-09-30 NOTE — PROGRESS NOTES
Progress Note               Author: Jama Werner M.D. Date & Time created: 2022  10:53 AM     Interval History:  Pt doing extremely well. Reports L foot numbness improved from preop    Review of Systems:  ROS    Physical Exam:  Physical Exam  Neurological:      Comments: I examined entire wound myself. Very little drainage. Dressing completely dry  Sensation at preop baseline except L foot L4-5 improved sensation       Labs:          No results for input(s): SODIUM, POTASSIUM, CHLORIDE, CO2, BUN, CREATININE, MAGNESIUM, PHOSPHORUS, CALCIUM in the last 72 hours.  No results for input(s): ALTSGPT, ASTSGOT, ALKPHOSPHAT, TBILIRUBIN, DBILIRUBIN, GAMMAGT, AMYLASE, LIPASE, ALB, PREALBUMIN, GLUCOSE in the last 72 hours.  Recent Labs     22  0539   HEMOGLOBIN 11.7*         Hemodynamics:  Temp (24hrs), Av.4 °C (97.6 °F), Min:36.1 °C (96.9 °F), Max:37.8 °C (100 °F)  Temperature: 36.2 °C (97.2 °F)  Pulse  Av.4  Min: 66  Max: 92   Blood Pressure: 113/76     Respiratory:    Respiration: 16, Pulse Oximetry: 95 %           Fluids:    Intake/Output Summary (Last 24 hours) at 2022 1053  Last data filed at 2022 0400  Gross per 24 hour   Intake 1040 ml   Output 1800 ml   Net -760 ml        GI/Nutrition:  Orders Placed This Encounter   Procedures    Diet Order Diet: Regular     Standing Status:   Standing     Number of Occurrences:   1     Order Specific Question:   Diet:     Answer:   Regular [1]     Medical Decision Making, by Problem:  Active Hospital Problems    Diagnosis     *S/P lumbar fusion [Z98.1]        Plan:  Keep pca for today  Will dc wynn today  Pt/ot  Hemoglobin 11.7  Scd for dvt prophylaxis  Toradol for pain control along with pca  Afib vss  Anticipate dc home maybe monday if she passes physical therapy      Quality-Core Measures

## 2022-10-01 LAB — HGB BLD-MCNC: 9.8 G/DL (ref 12–16)

## 2022-10-01 PROCEDURE — 36415 COLL VENOUS BLD VENIPUNCTURE: CPT

## 2022-10-01 PROCEDURE — 700102 HCHG RX REV CODE 250 W/ 637 OVERRIDE(OP): Performed by: ORTHOPAEDIC SURGERY

## 2022-10-01 PROCEDURE — 770001 HCHG ROOM/CARE - MED/SURG/GYN PRIV*

## 2022-10-01 PROCEDURE — 700111 HCHG RX REV CODE 636 W/ 250 OVERRIDE (IP): Performed by: ORTHOPAEDIC SURGERY

## 2022-10-01 PROCEDURE — A9270 NON-COVERED ITEM OR SERVICE: HCPCS | Performed by: ORTHOPAEDIC SURGERY

## 2022-10-01 PROCEDURE — 85018 HEMOGLOBIN: CPT

## 2022-10-01 PROCEDURE — 700105 HCHG RX REV CODE 258: Performed by: ORTHOPAEDIC SURGERY

## 2022-10-01 PROCEDURE — 700101 HCHG RX REV CODE 250: Performed by: ORTHOPAEDIC SURGERY

## 2022-10-01 RX ORDER — OXYCODONE HYDROCHLORIDE 10 MG/1
10 TABLET ORAL
Status: DISCONTINUED | OUTPATIENT
Start: 2022-10-01 | End: 2022-10-04 | Stop reason: HOSPADM

## 2022-10-01 RX ORDER — OXYCODONE HYDROCHLORIDE 5 MG/1
5 TABLET ORAL
Status: DISCONTINUED | OUTPATIENT
Start: 2022-10-01 | End: 2022-10-04 | Stop reason: HOSPADM

## 2022-10-01 RX ADMIN — DOCUSATE SODIUM 100 MG: 100 CAPSULE, LIQUID FILLED ORAL at 06:12

## 2022-10-01 RX ADMIN — ONDANSETRON 4 MG: 4 TABLET, ORALLY DISINTEGRATING ORAL at 22:48

## 2022-10-01 RX ADMIN — CYCLOBENZAPRINE 10 MG: 10 TABLET, FILM COATED ORAL at 17:18

## 2022-10-01 RX ADMIN — KETOROLAC TROMETHAMINE 30 MG: 30 INJECTION, SOLUTION INTRAMUSCULAR; INTRAVENOUS at 06:12

## 2022-10-01 RX ADMIN — KETOROLAC TROMETHAMINE 30 MG: 30 INJECTION, SOLUTION INTRAMUSCULAR; INTRAVENOUS at 12:37

## 2022-10-01 RX ADMIN — QUETIAPINE FUMARATE 50 MG: 25 TABLET ORAL at 22:42

## 2022-10-01 RX ADMIN — CEFAZOLIN 2 G: 2 INJECTION, POWDER, FOR SOLUTION INTRAMUSCULAR; INTRAVENOUS at 17:19

## 2022-10-01 RX ADMIN — DOCUSATE SODIUM 50 MG AND SENNOSIDES 8.6 MG 1 TABLET: 8.6; 5 TABLET, FILM COATED ORAL at 22:41

## 2022-10-01 RX ADMIN — ACETAMINOPHEN 1000 MG: 500 TABLET ORAL at 12:37

## 2022-10-01 RX ADMIN — DOCUSATE SODIUM 100 MG: 100 CAPSULE, LIQUID FILLED ORAL at 17:18

## 2022-10-01 RX ADMIN — PROPRANOLOL HYDROCHLORIDE 80 MG: 40 TABLET ORAL at 22:41

## 2022-10-01 RX ADMIN — OXYCODONE HYDROCHLORIDE 15 MG: 10 TABLET ORAL at 18:05

## 2022-10-01 RX ADMIN — OXYCODONE HYDROCHLORIDE 10 MG: 10 TABLET ORAL at 14:56

## 2022-10-01 RX ADMIN — ONDANSETRON 4 MG: 2 INJECTION INTRAMUSCULAR; INTRAVENOUS at 14:58

## 2022-10-01 RX ADMIN — ATORVASTATIN CALCIUM 10 MG: 10 TABLET, FILM COATED ORAL at 17:18

## 2022-10-01 RX ADMIN — ACETAMINOPHEN 1000 MG: 500 TABLET ORAL at 17:17

## 2022-10-01 RX ADMIN — OXYCODONE HYDROCHLORIDE 10 MG: 10 TABLET ORAL at 22:42

## 2022-10-01 RX ADMIN — ACETAMINOPHEN 1000 MG: 500 TABLET ORAL at 06:12

## 2022-10-01 RX ADMIN — CALCIUM CARBONATE 500 MG: 500 TABLET, CHEWABLE ORAL at 17:19

## 2022-10-01 RX ADMIN — KETOROLAC TROMETHAMINE 30 MG: 30 INJECTION, SOLUTION INTRAMUSCULAR; INTRAVENOUS at 17:19

## 2022-10-01 RX ADMIN — BUPROPION HYDROCHLORIDE 150 MG: 150 TABLET, EXTENDED RELEASE ORAL at 06:12

## 2022-10-01 RX ADMIN — CALCIUM CARBONATE 500 MG: 500 TABLET, CHEWABLE ORAL at 09:21

## 2022-10-01 RX ADMIN — CEFAZOLIN 2 G: 2 INJECTION, POWDER, FOR SOLUTION INTRAMUSCULAR; INTRAVENOUS at 09:22

## 2022-10-01 RX ADMIN — SERTRALINE 100 MG: 100 TABLET, FILM COATED ORAL at 22:42

## 2022-10-01 RX ADMIN — BUPROPION HYDROCHLORIDE 150 MG: 150 TABLET, EXTENDED RELEASE ORAL at 17:18

## 2022-10-01 RX ADMIN — POTASSIUM CHLORIDE, DEXTROSE MONOHYDRATE AND SODIUM CHLORIDE: 150; 5; 450 INJECTION, SOLUTION INTRAVENOUS at 06:20

## 2022-10-01 ASSESSMENT — PAIN DESCRIPTION - PAIN TYPE
TYPE: SURGICAL PAIN
TYPE: ACUTE PAIN;SURGICAL PAIN
TYPE: SURGICAL PAIN
TYPE: ACUTE PAIN;SURGICAL PAIN
TYPE: SURGICAL PAIN
TYPE: ACUTE PAIN;SURGICAL PAIN

## 2022-10-01 NOTE — CARE PLAN
The patient is Watcher - Medium risk of patient condition declining or worsening    Shift Goals  Clinical Goals: Pain control, void  Patient Goals: Pain control    Progress made toward(s) clinical / shift goals:    Problem: Pain - Standard  Goal: Alleviation of pain or a reduction in pain to the patient’s comfort goal  Outcome: Progressing  Note: Pain assessed Q4h. Pt receiving Fentanyl PCA, with good pain control.

## 2022-10-01 NOTE — PROGRESS NOTES
Progress Note               Author: Jama Werner M.D. Date & Time created: 10/1/2022  9:13 AM     Interval History:  Pt doing well today. Pain well controlled    Review of Systems:  ROS    Physical Exam:  Physical Exam  Neurological:      Comments: Numbness L le improving compared to preoperatively  R le still with baseline preoperative numbness  Motor 5/5 b le  I examined wound myself mild drainage       Labs:          No results for input(s): SODIUM, POTASSIUM, CHLORIDE, CO2, BUN, CREATININE, MAGNESIUM, PHOSPHORUS, CALCIUM in the last 72 hours.  No results for input(s): ALTSGPT, ASTSGOT, ALKPHOSPHAT, TBILIRUBIN, DBILIRUBIN, GAMMAGT, AMYLASE, LIPASE, ALB, PREALBUMIN, GLUCOSE in the last 72 hours.  Recent Labs     22  0539 10/01/22  0641   HEMOGLOBIN 11.7* 9.8*         Hemodynamics:  Temp (24hrs), Av.4 °C (97.5 °F), Min:36.1 °C (96.9 °F), Max:36.7 °C (98 °F)  Temperature: 36.3 °C (97.4 °F)  Pulse  Av.4  Min: 66  Max: 111   Blood Pressure: (!) 84/50 (Rn notified )     Respiratory:    Respiration: 16, Pulse Oximetry: 95 %           Fluids:    Intake/Output Summary (Last 24 hours) at 10/1/2022 0913  Last data filed at 10/1/2022 0700  Gross per 24 hour   Intake 1207.8 ml   Output 3050 ml   Net -1842.2 ml        GI/Nutrition:  Orders Placed This Encounter   Procedures    Diet Order Diet: Regular     Standing Status:   Standing     Number of Occurrences:   1     Order Specific Question:   Diet:     Answer:   Regular [1]     Medical Decision Making, by Problem:  Active Hospital Problems    Diagnosis     *S/P lumbar fusion [Z98.1]        Plan:  Wean and dc pca today  Start oxycodone po  Bp low at times orders written for bolus and midodrene if needed  Hold her usual htn meds whil bp is low  Mild tachycardia  Pt today  Change dressing today    Quality-Core Measures

## 2022-10-01 NOTE — DIETARY
Nutrition services: Day 2 of admit.  Yolanda Reed is a 61 y.o. female with admitting DX of spondylolisthesis of lumbar region, s/p lumbar fusion    Consult received for MST 2. Per nutrition screening, pt with 14-23 lb weight loss in 3 months. RD met with pt at bedside. Per pt, she has lost an unknown exact amount of weight since January d/t being in the hospital. She reports her UBW is 180 lbs. Per pt, she has a great appetite and is eating well since admit. She did not have any nutrition related concerns at this time. Pt appears nourished.    Assessment:  Height: 152.4 cm (5')  Weight: 66.9 kg (147 lb 7.8 oz)- stand up scale  Body mass index is 28.8 kg/m²., BMI classification: overweight  Diet/Intake: Regular Diet. PO intake >50% for meals per ADL's.     Evaluation:   Pt admitted for spondylolisthesis of lumbar region, s/p lumbar fusion on 9/29  Based on weight history in chart, pt appears to have a weight loss of 13.5 lbs/ 8.4% in >6 months which is not considered severe,  Labs: reviewed  Meds: Tums, Ancef, Colace, Seroquel, senna-docusate, bowel regimen, phenergan, compazine  Skin: incision on back noted, no staged wounds per chart review  GI: Last BM PTA    Malnutrition Risk: Does not meet ASPEN criteria at this time.     Recommendations/Plan:  Continue Regular Diet  Encourage intake of meals  Document intake of all meals as % taken in ADL's to provide interdisciplinary communication across all shifts.   Monitor weight.  Nutrition rep will continue to see patient for ongoing meal and snack preferences.     RD following.

## 2022-10-02 PROCEDURE — 700111 HCHG RX REV CODE 636 W/ 250 OVERRIDE (IP): Performed by: ORTHOPAEDIC SURGERY

## 2022-10-02 PROCEDURE — A9270 NON-COVERED ITEM OR SERVICE: HCPCS | Performed by: ORTHOPAEDIC SURGERY

## 2022-10-02 PROCEDURE — 700102 HCHG RX REV CODE 250 W/ 637 OVERRIDE(OP): Performed by: ORTHOPAEDIC SURGERY

## 2022-10-02 PROCEDURE — 700101 HCHG RX REV CODE 250: Performed by: ORTHOPAEDIC SURGERY

## 2022-10-02 PROCEDURE — 770001 HCHG ROOM/CARE - MED/SURG/GYN PRIV*

## 2022-10-02 PROCEDURE — 700105 HCHG RX REV CODE 258: Performed by: ORTHOPAEDIC SURGERY

## 2022-10-02 RX ORDER — SODIUM CHLORIDE 9 MG/ML
1000 INJECTION, SOLUTION INTRAVENOUS ONCE
Status: COMPLETED | OUTPATIENT
Start: 2022-10-02 | End: 2022-10-02

## 2022-10-02 RX ADMIN — ONDANSETRON 4 MG: 4 TABLET, ORALLY DISINTEGRATING ORAL at 05:50

## 2022-10-02 RX ADMIN — KETOROLAC TROMETHAMINE 30 MG: 30 INJECTION, SOLUTION INTRAMUSCULAR; INTRAVENOUS at 05:50

## 2022-10-02 RX ADMIN — BUPROPION HYDROCHLORIDE 150 MG: 150 TABLET, EXTENDED RELEASE ORAL at 17:44

## 2022-10-02 RX ADMIN — ATORVASTATIN CALCIUM 10 MG: 10 TABLET, FILM COATED ORAL at 17:43

## 2022-10-02 RX ADMIN — CEFAZOLIN 2 G: 2 INJECTION, POWDER, FOR SOLUTION INTRAMUSCULAR; INTRAVENOUS at 00:42

## 2022-10-02 RX ADMIN — QUETIAPINE FUMARATE 50 MG: 25 TABLET ORAL at 20:35

## 2022-10-02 RX ADMIN — ACETAMINOPHEN 1000 MG: 500 TABLET ORAL at 05:50

## 2022-10-02 RX ADMIN — SERTRALINE 100 MG: 100 TABLET, FILM COATED ORAL at 20:35

## 2022-10-02 RX ADMIN — KETOROLAC TROMETHAMINE 30 MG: 30 INJECTION, SOLUTION INTRAMUSCULAR; INTRAVENOUS at 12:32

## 2022-10-02 RX ADMIN — OXYCODONE HYDROCHLORIDE 10 MG: 10 TABLET ORAL at 15:07

## 2022-10-02 RX ADMIN — OXYCODONE HYDROCHLORIDE 10 MG: 10 TABLET ORAL at 09:39

## 2022-10-02 RX ADMIN — ACETAMINOPHEN 1000 MG: 500 TABLET ORAL at 00:25

## 2022-10-02 RX ADMIN — POTASSIUM CHLORIDE, DEXTROSE MONOHYDRATE AND SODIUM CHLORIDE: 150; 5; 450 INJECTION, SOLUTION INTRAVENOUS at 00:42

## 2022-10-02 RX ADMIN — OXYCODONE 5 MG: 5 TABLET ORAL at 05:50

## 2022-10-02 RX ADMIN — SODIUM CHLORIDE 1000 ML: 9 INJECTION, SOLUTION INTRAVENOUS at 08:21

## 2022-10-02 RX ADMIN — CEFAZOLIN 2 G: 2 INJECTION, POWDER, FOR SOLUTION INTRAMUSCULAR; INTRAVENOUS at 17:48

## 2022-10-02 RX ADMIN — CALCIUM CARBONATE 500 MG: 500 TABLET, CHEWABLE ORAL at 05:50

## 2022-10-02 RX ADMIN — CYCLOBENZAPRINE 10 MG: 10 TABLET, FILM COATED ORAL at 20:35

## 2022-10-02 RX ADMIN — ONDANSETRON 4 MG: 4 TABLET, ORALLY DISINTEGRATING ORAL at 20:35

## 2022-10-02 RX ADMIN — ACETAMINOPHEN 1000 MG: 500 TABLET ORAL at 17:43

## 2022-10-02 RX ADMIN — KETOROLAC TROMETHAMINE 30 MG: 30 INJECTION, SOLUTION INTRAMUSCULAR; INTRAVENOUS at 00:26

## 2022-10-02 RX ADMIN — DOCUSATE SODIUM 100 MG: 100 CAPSULE, LIQUID FILLED ORAL at 05:50

## 2022-10-02 RX ADMIN — CEFAZOLIN 2 G: 2 INJECTION, POWDER, FOR SOLUTION INTRAMUSCULAR; INTRAVENOUS at 09:44

## 2022-10-02 RX ADMIN — ACETAMINOPHEN 1000 MG: 500 TABLET ORAL at 23:44

## 2022-10-02 RX ADMIN — ACETAMINOPHEN 1000 MG: 500 TABLET ORAL at 12:32

## 2022-10-02 RX ADMIN — OXYCODONE HYDROCHLORIDE 10 MG: 10 TABLET ORAL at 20:35

## 2022-10-02 RX ADMIN — CALCIUM CARBONATE 500 MG: 500 TABLET, CHEWABLE ORAL at 17:44

## 2022-10-02 RX ADMIN — BUPROPION HYDROCHLORIDE 150 MG: 150 TABLET, EXTENDED RELEASE ORAL at 05:50

## 2022-10-02 RX ADMIN — ONDANSETRON 4 MG: 2 INJECTION INTRAMUSCULAR; INTRAVENOUS at 09:42

## 2022-10-02 ASSESSMENT — PAIN DESCRIPTION - PAIN TYPE
TYPE: ACUTE PAIN;SURGICAL PAIN

## 2022-10-02 NOTE — PROGRESS NOTES
Progress Note               Author: Jama Werner M.D. Date & Time created: 10/2/2022  12:44 PM     Interval History:  Pt doing well. Pain well controlled    Review of Systems:  ROS    Physical Exam:  Physical Exam  Neurological:      Comments: Neurological function at preop status r le but significantly improved L le    Wound with very mild drainage       Labs:          No results for input(s): SODIUM, POTASSIUM, CHLORIDE, CO2, BUN, CREATININE, MAGNESIUM, PHOSPHORUS, CALCIUM in the last 72 hours.  No results for input(s): ALTSGPT, ASTSGOT, ALKPHOSPHAT, TBILIRUBIN, DBILIRUBIN, GAMMAGT, AMYLASE, LIPASE, ALB, PREALBUMIN, GLUCOSE in the last 72 hours.  Recent Labs     22  0539 10/01/22  0641   HEMOGLOBIN 11.7* 9.8*         Hemodynamics:  Temp (24hrs), Av.1 °C (97 °F), Min:35.9 °C (96.7 °F), Max:36.4 °C (97.6 °F)  Temperature: 36.1 °C (97 °F)  Pulse  Av.5  Min: 65  Max: 114   Blood Pressure: 101/63     Respiratory:    Respiration: 18, Pulse Oximetry: 92 %           Fluids:    Intake/Output Summary (Last 24 hours) at 10/2/2022 1244  Last data filed at 10/1/2022 2000  Gross per 24 hour   Intake --   Output 0 ml   Net 0 ml        GI/Nutrition:  Orders Placed This Encounter   Procedures    Diet Order Diet: Regular     Standing Status:   Standing     Number of Occurrences:   1     Order Specific Question:   Diet:     Answer:   Regular [1]     Medical Decision Making, by Problem:  Active Hospital Problems    Diagnosis     *S/P lumbar fusion [Z98.1]        Plan:  Cont scd for dvt prophylaxis  Pt/ot  Toradol and oxycodone for pain  Brace prn  Pt with some hypotension that responded well to a bolus of ns  Did not have to use midodrine   Pca dced  Possibly dc home within the next 2 days if she passes physical therapy    Quality-Core Measures

## 2022-10-02 NOTE — CARE PLAN
The patient is Stable - Low risk of patient condition declining or worsening    Shift Goals  Clinical Goals: pain control, mobility, safety, monitor weight, monitor BP/ HR, BM  Patient Goals: pain control, comfort, rest  Family Goals: not present at bedside    Progress made toward(s) clinical / shift goals:      Problem: Pain - Standard  Goal: Alleviation of pain or a reduction in pain to the patient’s comfort goal  Outcome: Progressing  Note: Pt states pain is being managed by current pain medication regimen. Medications administered per MAR, ice pack applied, pillows for comfort and repositioning.      Problem: Fall Risk  Goal: Patient will remain free from falls  Outcome: Progressing  Note: Bed is locked and in lowest position, treaded socks on, bed alarm on, DME out of sight, call light and belongings within reach, pt calls appropriately for assistance, hourly rounding in place.

## 2022-10-02 NOTE — PROGRESS NOTES
SBP below 95. Patient complaining of severe fatigue and decreased ability to communicte. Dr. Werner updated. 1L NS bolus started per order. Will continue to monitor.    Repeat BP at 0500 98/50. Additional bolus not required at this time. Will recheck with morning vitals.

## 2022-10-03 PROCEDURE — RXMED WILLOW AMBULATORY MEDICATION CHARGE: Performed by: ORTHOPAEDIC SURGERY

## 2022-10-03 PROCEDURE — A9270 NON-COVERED ITEM OR SERVICE: HCPCS | Performed by: ORTHOPAEDIC SURGERY

## 2022-10-03 PROCEDURE — 770001 HCHG ROOM/CARE - MED/SURG/GYN PRIV*

## 2022-10-03 PROCEDURE — 700105 HCHG RX REV CODE 258: Performed by: ORTHOPAEDIC SURGERY

## 2022-10-03 PROCEDURE — 700102 HCHG RX REV CODE 250 W/ 637 OVERRIDE(OP): Performed by: ORTHOPAEDIC SURGERY

## 2022-10-03 PROCEDURE — 700101 HCHG RX REV CODE 250: Performed by: ORTHOPAEDIC SURGERY

## 2022-10-03 PROCEDURE — 700111 HCHG RX REV CODE 636 W/ 250 OVERRIDE (IP): Performed by: ORTHOPAEDIC SURGERY

## 2022-10-03 RX ORDER — OXYCODONE HYDROCHLORIDE 10 MG/1
10 TABLET ORAL
Qty: 56 TABLET | Refills: 0 | Status: SHIPPED | OUTPATIENT
Start: 2022-10-04 | End: 2022-10-11

## 2022-10-03 RX ORDER — CYCLOBENZAPRINE HCL 10 MG
10 TABLET ORAL EVERY 8 HOURS PRN
Qty: 30 TABLET | Refills: 2 | Status: SHIPPED | OUTPATIENT
Start: 2022-10-03 | End: 2023-01-17

## 2022-10-03 RX ORDER — SULFAMETHOXAZOLE AND TRIMETHOPRIM 800; 160 MG/1; MG/1
1 TABLET ORAL 2 TIMES DAILY
Qty: 60 TABLET | Refills: 4 | Status: ON HOLD | OUTPATIENT
Start: 2022-10-03 | End: 2023-01-19

## 2022-10-03 RX ORDER — MIDODRINE HYDROCHLORIDE 5 MG/1
10 TABLET ORAL
Status: DISCONTINUED | OUTPATIENT
Start: 2022-10-03 | End: 2022-10-04 | Stop reason: HOSPADM

## 2022-10-03 RX ORDER — MIDODRINE HYDROCHLORIDE 5 MG/1
10 TABLET ORAL EVERY 8 HOURS PRN
Status: DISCONTINUED | OUTPATIENT
Start: 2022-10-03 | End: 2022-10-03

## 2022-10-03 RX ORDER — ONDANSETRON 4 MG/1
4 TABLET, ORALLY DISINTEGRATING ORAL EVERY 4 HOURS PRN
Qty: 30 TABLET | Refills: 2 | Status: SHIPPED | OUTPATIENT
Start: 2022-10-03 | End: 2023-01-17

## 2022-10-03 RX ADMIN — ACETAMINOPHEN 1000 MG: 500 TABLET ORAL at 12:27

## 2022-10-03 RX ADMIN — CEFAZOLIN 2 G: 2 INJECTION, POWDER, FOR SOLUTION INTRAMUSCULAR; INTRAVENOUS at 16:59

## 2022-10-03 RX ADMIN — POTASSIUM CHLORIDE, DEXTROSE MONOHYDRATE AND SODIUM CHLORIDE 1000 ML: 150; 5; 450 INJECTION, SOLUTION INTRAVENOUS at 10:37

## 2022-10-03 RX ADMIN — SERTRALINE 100 MG: 100 TABLET, FILM COATED ORAL at 21:11

## 2022-10-03 RX ADMIN — ONDANSETRON 4 MG: 2 INJECTION INTRAMUSCULAR; INTRAVENOUS at 22:57

## 2022-10-03 RX ADMIN — ACETAMINOPHEN 1000 MG: 500 TABLET ORAL at 08:43

## 2022-10-03 RX ADMIN — CALCIUM CARBONATE 500 MG: 500 TABLET, CHEWABLE ORAL at 16:56

## 2022-10-03 RX ADMIN — QUETIAPINE FUMARATE 50 MG: 25 TABLET ORAL at 21:11

## 2022-10-03 RX ADMIN — MIDODRINE HYDROCHLORIDE 10 MG: 5 TABLET ORAL at 16:56

## 2022-10-03 RX ADMIN — ONDANSETRON 4 MG: 4 TABLET, ORALLY DISINTEGRATING ORAL at 05:46

## 2022-10-03 RX ADMIN — BUPROPION HYDROCHLORIDE 150 MG: 150 TABLET, EXTENDED RELEASE ORAL at 05:46

## 2022-10-03 RX ADMIN — OXYCODONE HYDROCHLORIDE 10 MG: 10 TABLET ORAL at 22:57

## 2022-10-03 RX ADMIN — PROPRANOLOL HYDROCHLORIDE 80 MG: 40 TABLET ORAL at 21:11

## 2022-10-03 RX ADMIN — OXYCODONE HYDROCHLORIDE 10 MG: 10 TABLET ORAL at 03:01

## 2022-10-03 RX ADMIN — CALCIUM CARBONATE 500 MG: 500 TABLET, CHEWABLE ORAL at 05:46

## 2022-10-03 RX ADMIN — ACETAMINOPHEN 1000 MG: 500 TABLET ORAL at 16:57

## 2022-10-03 RX ADMIN — MIDODRINE HYDROCHLORIDE 10 MG: 5 TABLET ORAL at 05:46

## 2022-10-03 RX ADMIN — CEFAZOLIN 2 G: 2 INJECTION, POWDER, FOR SOLUTION INTRAMUSCULAR; INTRAVENOUS at 10:31

## 2022-10-03 RX ADMIN — CYCLOBENZAPRINE 10 MG: 10 TABLET, FILM COATED ORAL at 09:07

## 2022-10-03 RX ADMIN — CEFAZOLIN 2 G: 2 INJECTION, POWDER, FOR SOLUTION INTRAMUSCULAR; INTRAVENOUS at 01:15

## 2022-10-03 RX ADMIN — OXYCODONE HYDROCHLORIDE 10 MG: 10 TABLET ORAL at 16:56

## 2022-10-03 RX ADMIN — ATORVASTATIN CALCIUM 10 MG: 10 TABLET, FILM COATED ORAL at 16:55

## 2022-10-03 RX ADMIN — ONDANSETRON 4 MG: 2 INJECTION INTRAMUSCULAR; INTRAVENOUS at 12:29

## 2022-10-03 RX ADMIN — BUPROPION HYDROCHLORIDE 150 MG: 150 TABLET, EXTENDED RELEASE ORAL at 16:56

## 2022-10-03 RX ADMIN — ONDANSETRON 4 MG: 2 INJECTION INTRAMUSCULAR; INTRAVENOUS at 17:04

## 2022-10-03 RX ADMIN — OXYCODONE HYDROCHLORIDE 10 MG: 10 TABLET ORAL at 12:26

## 2022-10-03 ASSESSMENT — PAIN DESCRIPTION - PAIN TYPE
TYPE: ACUTE PAIN;SURGICAL PAIN

## 2022-10-03 NOTE — PROGRESS NOTES
Progress Note               Author: Jama Werner M.D. Date & Time created: 10/3/2022  1:16 PM     Interval History:  No new complaints    Review of Systems:  ROS    Physical Exam:  Physical Exam  Neurological:      Comments: Some drainage on dressing . No evidence of purulence  Sensation and motor exam unchanged       Labs:          No results for input(s): SODIUM, POTASSIUM, CHLORIDE, CO2, BUN, CREATININE, MAGNESIUM, PHOSPHORUS, CALCIUM in the last 72 hours.  No results for input(s): ALTSGPT, ASTSGOT, ALKPHOSPHAT, TBILIRUBIN, DBILIRUBIN, GAMMAGT, AMYLASE, LIPASE, ALB, PREALBUMIN, GLUCOSE in the last 72 hours.  Recent Labs     10/01/22  0641   HEMOGLOBIN 9.8*         Hemodynamics:  Temp (24hrs), Av.6 °C (97.9 °F), Min:36.1 °C (97 °F), Max:37.2 °C (98.9 °F)  Temperature: 37.2 °C (98.9 °F)  Pulse  Av.8  Min: 65  Max: 114   Blood Pressure: 100/60     Respiratory:    Respiration: 16, Pulse Oximetry: 91 %           Fluids:    Intake/Output Summary (Last 24 hours) at 10/3/2022 1316  Last data filed at 10/3/2022 1200  Gross per 24 hour   Intake 100 ml   Output 250 ml   Net -150 ml        GI/Nutrition:  Orders Placed This Encounter   Procedures    Diet Order Diet: Regular     Standing Status:   Standing     Number of Occurrences:   1     Order Specific Question:   Diet:     Answer:   Regular [1]     Medical Decision Making, by Problem:  Active Hospital Problems    Diagnosis     *S/P lumbar fusion [Z98.1]        Plan:  Change dressing  Scd for dvt prophylaxis  Doing well  Dc home tomorrow    Quality-Core Measures

## 2022-10-03 NOTE — CARE PLAN
The patient is Stable - Low risk of patient condition declining or worsening    Shift Goals  Clinical Goals: pain control  Patient Goals: rest  Family Goals: not present at bedside    Progress made toward(s) clinical / shift goals:  prn meds     Patient is not progressing towards the following goals:

## 2022-10-03 NOTE — CARE PLAN
The patient is Stable - Low risk of patient condition declining or worsening    Shift Goals  Clinical Goals: pain control, mobility, safety, monitor BP/HR, therapy  Patient Goals: pain control, comfort, rest  Family Goals: not present at bedside    Progress made toward(s) clinical / shift goals:      Problem: Pain - Standard  Goal: Alleviation of pain or a reduction in pain to the patient’s comfort goal  Outcome: Progressing  Note: Pt states pain is being managed by current pain medication regimen. Medications administered per MAR, ice pack applied, pillows for comfort and repositioning.      Problem: Fall Risk  Goal: Patient will remain free from falls  Outcome: Progressing  Note: Bed is locked and in lowest position, treaded socks on, bed alarm on, DME out of sight, call light and belongings within reach, pt calls appropriately for assistance, hourly rounding in place.

## 2022-10-03 NOTE — PROGRESS NOTES
Notifed by CNA that BP was 90/48. First 1L NS bolus initiated at 0000. Will continue to monitor.    Repeat BP still 94/ 55 . Will initiate second bolus once scheduled antibiotic is finished and monitor BP.    0300 attempted to take BP when fluids were completed on bilateral arms and right calf but vitals machine would not take accurate reading. Will reattempt manual reading once patient is ready.    0330 Manual reading 92/50. Beginning midodrine as ordered through nursing communication.

## 2022-10-04 ENCOUNTER — PHARMACY VISIT (OUTPATIENT)
Dept: PHARMACY | Facility: MEDICAL CENTER | Age: 62
End: 2022-10-04
Payer: COMMERCIAL

## 2022-10-04 VITALS
HEIGHT: 60 IN | SYSTOLIC BLOOD PRESSURE: 107 MMHG | HEART RATE: 74 BPM | RESPIRATION RATE: 16 BRPM | BODY MASS INDEX: 28.96 KG/M2 | WEIGHT: 147.49 LBS | DIASTOLIC BLOOD PRESSURE: 51 MMHG | TEMPERATURE: 97.8 F | OXYGEN SATURATION: 90 %

## 2022-10-04 PROCEDURE — 700102 HCHG RX REV CODE 250 W/ 637 OVERRIDE(OP): Performed by: ORTHOPAEDIC SURGERY

## 2022-10-04 PROCEDURE — 700111 HCHG RX REV CODE 636 W/ 250 OVERRIDE (IP): Performed by: ORTHOPAEDIC SURGERY

## 2022-10-04 PROCEDURE — 700105 HCHG RX REV CODE 258: Performed by: ORTHOPAEDIC SURGERY

## 2022-10-04 PROCEDURE — A9270 NON-COVERED ITEM OR SERVICE: HCPCS | Performed by: ORTHOPAEDIC SURGERY

## 2022-10-04 PROCEDURE — RXMED WILLOW AMBULATORY MEDICATION CHARGE: Performed by: ORTHOPAEDIC SURGERY

## 2022-10-04 RX ADMIN — ACETAMINOPHEN 1000 MG: 500 TABLET ORAL at 08:10

## 2022-10-04 RX ADMIN — CEFAZOLIN 2 G: 2 INJECTION, POWDER, FOR SOLUTION INTRAMUSCULAR; INTRAVENOUS at 00:28

## 2022-10-04 RX ADMIN — BUPROPION HYDROCHLORIDE 150 MG: 150 TABLET, EXTENDED RELEASE ORAL at 05:59

## 2022-10-04 RX ADMIN — OXYCODONE HYDROCHLORIDE 10 MG: 10 TABLET ORAL at 09:26

## 2022-10-04 RX ADMIN — MIDODRINE HYDROCHLORIDE 10 MG: 5 TABLET ORAL at 08:11

## 2022-10-04 RX ADMIN — ACETAMINOPHEN 1000 MG: 500 TABLET ORAL at 00:28

## 2022-10-04 RX ADMIN — OXYCODONE HYDROCHLORIDE 10 MG: 10 TABLET ORAL at 05:59

## 2022-10-04 RX ADMIN — CYCLOBENZAPRINE 10 MG: 10 TABLET, FILM COATED ORAL at 08:17

## 2022-10-04 RX ADMIN — CALCIUM CARBONATE 500 MG: 500 TABLET, CHEWABLE ORAL at 05:59

## 2022-10-04 ASSESSMENT — PAIN DESCRIPTION - PAIN TYPE
TYPE: ACUTE PAIN
TYPE: ACUTE PAIN;SURGICAL PAIN

## 2022-10-04 NOTE — DISCHARGE INSTRUCTIONS
Discharge Instructions    Discharged to home by car with relative. Discharged via wheelchair, hospital escort: Yes.  Special equipment needed: Not Applicable    Be sure to schedule a follow-up appointment with your primary care doctor or any specialists as instructed.     Discharge Plan:   Diet Plan: Discussed  Activity Level: Discussed  Confirmed Follow up Appointment: Patient to Call and Schedule Appointment  Confirmed Symptoms Management: Discussed  Medication Reconciliation Updated: Yes  Influenza Vaccine Indication: Patient Refuses    I understand that a diet low in cholesterol, fat, and sodium is recommended for good health. Unless I have been given specific instructions below for another diet, I accept this instruction as my diet prescription.   Other diet:  regular    Special Instructions: None    -Is this patient being discharged with medication to prevent blood clots?  No    Is patient discharged on Warfarin / Coumadin?   No

## 2022-10-04 NOTE — DISCHARGE SUMMARY
DATE OF ADMISSION:  09/29/2022   DATE OF DISCHARGE:  10/04/2022     DISCHARGE DIAGNOSES:  1.  Status post L2-L5 decompressive laminectomy with foraminotomies for lumbar   spinal stenosis, herniated nucleus pulposus and radiculopathy.  2.  Status post L3-L4 and L4-L5 transforaminal lumbar interbody instrumented   fusion for spondylolisthesis.     DISCHARGE MEDICATIONS:  Include oxycodone 10 one p.o. q. 3 hours p.r.n. pain,   Zofran 4 mg 1 p.o. q.6 hours p.r.n. nausea, Bactrim-DS 1 p.o. b.i.d. and   cyclobenzaprine 10 mg 1 p.o. q.8 hours p.r.n. muscle spasm.     FOLLOWUP:  She is to follow up with me, Dr. Mary Rosa, [phone] in   approximately 2 weeks.     HISTORY OF PRESENT ILLNESS AND HOSPITAL COURSE:  The patient is a 61-year-old   female who I previously did anterior cervical diskectomy and fusion.  She   actually experienced an infection after this operation, which is the first one   I have ever seen in 21 years.  However, she was placed on antibiotics and is   continuing on antibiotics for this, specifically Bactrim double strength 1   every day.     Her lumbar spine became extremely symptomatic and failed to respond to all   conservative treatment including epidural steroid injections, physical   therapy, activity modifications and oral anti-inflammatories.  Because of   this, she was scheduled for surgery.  Surgery took place the day of admission   and went without complication.  Postoperatively, she has had no postoperative   complications.  She has had several episodes of hypotension, which have   responded to a bolus of normal saline.  She has participated in physical   therapy and has passed all criteria to be discharged home.  We will see her   back in approximately 2 weeks.        ______________________________  MARY ROSA MD    [facility]    DD:  10/03/2022 13:36  DT:  10/03/2022 18:54    Job#:  [id number]

## 2022-10-04 NOTE — PROGRESS NOTES
Pt being dc'd to home with LSO brace and meds to beds. Dressing to posterior back incision changed with dry island dressing. Pt provided island dressing supplies to do dressing changes. IV dc'd. Dc instructions discussed with patient at bedside. All questions answered.  Patient  agreeable to dc plan. Awaiting meds to beds and pt transport.

## 2022-10-04 NOTE — CARE PLAN
The patient is Stable - Low risk of patient condition declining or worsening    Shift Goals  Clinical Goals: Safety  Patient Goals: Rest    Progress made toward(s) clinical / shift goals:      Problem: Pain - Standard  Goal: Alleviation of pain or a reduction in pain to the patient’s comfort goal  Outcome: Progressing  Flowsheets (Taken 10/3/2022 2100)  Pain Rating Scale (NPRS): 5  Note: Medicated per MAR, provide extra pillows and blankets for support and repositioning. Will continue to assess and treat accordingly.     Problem: Knowledge Deficit - Standard  Goal: Patient and family/care givers will demonstrate understanding of plan of care, disease process/condition, diagnostic tests and medications  Outcome: Progressing  Note: Educated patient on POC, pain management, medication administration, ambulation, safety, diet, rest, usage of call light, interventions in place to maintain/ improve skin integrity, dressing care, monitoring input/ output, discharge planning. Will continue to assess and treat accordingly.

## 2022-10-04 NOTE — THERAPY
Missed Therapy     Patient Name: Yolanda Reed  Age:  61 y.o., Sex:  female  Medical Record #: [id number]  Today's Date: 10/4/2022    Discussed missed therapy with RN and SW. OT attempted to see pt for OT treatment. Pt in the process of d/cing home just left room and according to RN pt is refusing Home Health services.        10/04/22 1035   Anticipated Discharge Equipment and Recommendations   DC Equipment Recommendations None   Discharge Recommendations Recommend home health for continued occupational therapy services   Interdisciplinary Plan of Care Collaboration   IDT Collaboration with  Nursing;   Collaboration Comments RN informed of OT attempt to see pt for tx. Pt is d/cing now to home and per RN pt is refusing HH services.   Session Information   Date / Session Number  9/30 #1 (1/4, 10/6) Attempted OT tx 10/4. Pt d/cing now for home per RN.   Priority 2  (will continue OT POC if pt does not d/c today as planned.)

## 2022-10-05 NOTE — DISCHARGE PLANNING
Meds-to-Beds: Discharge prescription orders listed below delivered to patient in discharge [facility]. RN Ranjith and Kiarra notified. Written information regarding the dispensed prescriptions was provided to the patient including the phone number of the pharmacy to call for any questions. Patient elected to have co-payment billed to patient account.      Current Outpatient Medications   Medication Sig Dispense Refill    sulfamethoxazole-trimethoprim (BACTRIM DS) 800-160 MG tablet Take 1 Tablet by mouth 2 times a day. 60 Tablet 4    oxyCODONE immediate release (ROXICODONE) 10 MG immediate release tablet Take 1 Tablet by mouth every 3 hours as needed for Moderate Pain for up to 56 doses. 56 Tablet 0    cyclobenzaprine (FLEXERIL) 10 mg Tab Take 1 Tablet by mouth every 8 hours as needed for Muscle Spasms. 30 Tablet 2    ondansetron (ZOFRAN ODT) 4 MG TABLET DISPERSIBLE Dissolve 1 Tablet by mouth every four hours as needed for Nausea for up to 30 doses. 30 Tablet 2      Niyah Little, PharmD

## 2023-01-17 ENCOUNTER — PRE-ADMISSION TESTING (OUTPATIENT)
Dept: ADMISSIONS | Facility: MEDICAL CENTER | Age: 63
DRG: 472 | End: 2023-01-17
Attending: ORTHOPAEDIC SURGERY
Payer: MEDICAID

## 2023-01-17 ENCOUNTER — HOSPITAL ENCOUNTER (OUTPATIENT)
Dept: RADIOLOGY | Facility: MEDICAL CENTER | Age: 63
DRG: 472 | End: 2023-01-17
Attending: ORTHOPAEDIC SURGERY | Admitting: ORTHOPAEDIC SURGERY
Payer: MEDICAID

## 2023-01-17 DIAGNOSIS — Z01.812 PRE-OPERATIVE LABORATORY EXAMINATION: ICD-10-CM

## 2023-01-17 DIAGNOSIS — Z01.810 PRE-OPERATIVE CARDIOVASCULAR EXAMINATION: ICD-10-CM

## 2023-01-17 DIAGNOSIS — Z01.811 PRE-OPERATIVE RESPIRATORY EXAMINATION: ICD-10-CM

## 2023-01-17 LAB
ABO GROUP BLD: NORMAL
ANION GAP SERPL CALC-SCNC: 10 MMOL/L (ref 7–16)
APTT PPP: 34.8 SEC (ref 24.7–36)
BASOPHILS # BLD AUTO: 0.4 % (ref 0–1.8)
BASOPHILS # BLD: 0.03 K/UL (ref 0–0.12)
BLD GP AB SCN SERPL QL: NORMAL
BUN SERPL-MCNC: 14 MG/DL (ref 8–22)
CALCIUM SERPL-MCNC: 9.5 MG/DL (ref 8.5–10.5)
CHLORIDE SERPL-SCNC: 100 MMOL/L (ref 96–112)
CO2 SERPL-SCNC: 27 MMOL/L (ref 20–33)
CREAT SERPL-MCNC: 0.72 MG/DL (ref 0.5–1.4)
EKG IMPRESSION: NORMAL
EOSINOPHIL # BLD AUTO: 0.34 K/UL (ref 0–0.51)
EOSINOPHIL NFR BLD: 4.3 % (ref 0–6.9)
ERYTHROCYTE [DISTWIDTH] IN BLOOD BY AUTOMATED COUNT: 51.2 FL (ref 35.9–50)
EST. AVERAGE GLUCOSE BLD GHB EST-MCNC: 111 MG/DL
GFR SERPLBLD CREATININE-BSD FMLA CKD-EPI: 95 ML/MIN/1.73 M 2
GLUCOSE SERPL-MCNC: 93 MG/DL (ref 65–99)
HBA1C MFR BLD: 5.5 % (ref 4–5.6)
HCT VFR BLD AUTO: 44.9 % (ref 37–47)
HGB BLD-MCNC: 14.2 G/DL (ref 12–16)
IMM GRANULOCYTES # BLD AUTO: 0.02 K/UL (ref 0–0.11)
IMM GRANULOCYTES NFR BLD AUTO: 0.3 % (ref 0–0.9)
INR PPP: 0.94 (ref 0.87–1.13)
LYMPHOCYTES # BLD AUTO: 2.37 K/UL (ref 1–4.8)
LYMPHOCYTES NFR BLD: 30 % (ref 22–41)
MCH RBC QN AUTO: 26.4 PG (ref 27–33)
MCHC RBC AUTO-ENTMCNC: 31.6 G/DL (ref 33.6–35)
MCV RBC AUTO: 83.6 FL (ref 81.4–97.8)
MONOCYTES # BLD AUTO: 0.66 K/UL (ref 0–0.85)
MONOCYTES NFR BLD AUTO: 8.4 % (ref 0–13.4)
NEUTROPHILS # BLD AUTO: 4.48 K/UL (ref 2–7.15)
NEUTROPHILS NFR BLD: 56.6 % (ref 44–72)
NRBC # BLD AUTO: 0 K/UL
NRBC BLD-RTO: 0 /100 WBC
PLATELET # BLD AUTO: 316 K/UL (ref 164–446)
PMV BLD AUTO: 9.5 FL (ref 9–12.9)
POTASSIUM SERPL-SCNC: 4.1 MMOL/L (ref 3.6–5.5)
PROTHROMBIN TIME: 12.5 SEC (ref 12–14.6)
RBC # BLD AUTO: 5.37 M/UL (ref 4.2–5.4)
RH BLD: NORMAL
SODIUM SERPL-SCNC: 137 MMOL/L (ref 135–145)
WBC # BLD AUTO: 7.9 K/UL (ref 4.8–10.8)

## 2023-01-17 PROCEDURE — 80048 BASIC METABOLIC PNL TOTAL CA: CPT

## 2023-01-17 PROCEDURE — 86901 BLOOD TYPING SEROLOGIC RH(D): CPT

## 2023-01-17 PROCEDURE — 86850 RBC ANTIBODY SCREEN: CPT

## 2023-01-17 PROCEDURE — 85652 RBC SED RATE AUTOMATED: CPT

## 2023-01-17 PROCEDURE — 36415 COLL VENOUS BLD VENIPUNCTURE: CPT

## 2023-01-17 PROCEDURE — 85025 COMPLETE CBC W/AUTO DIFF WBC: CPT

## 2023-01-17 PROCEDURE — 86900 BLOOD TYPING SEROLOGIC ABO: CPT

## 2023-01-17 PROCEDURE — 93005 ELECTROCARDIOGRAM TRACING: CPT

## 2023-01-17 PROCEDURE — 93010 ELECTROCARDIOGRAM REPORT: CPT | Performed by: INTERNAL MEDICINE

## 2023-01-17 PROCEDURE — 71046 X-RAY EXAM CHEST 2 VIEWS: CPT

## 2023-01-17 PROCEDURE — 83036 HEMOGLOBIN GLYCOSYLATED A1C: CPT

## 2023-01-17 PROCEDURE — 85730 THROMBOPLASTIN TIME PARTIAL: CPT

## 2023-01-17 PROCEDURE — 85610 PROTHROMBIN TIME: CPT

## 2023-01-17 RX ORDER — OXYCODONE HYDROCHLORIDE 5 MG/1
10 TABLET ORAL
Status: ON HOLD | COMMUNITY
End: 2023-01-23

## 2023-01-17 ASSESSMENT — FIBROSIS 4 INDEX: FIB4 SCORE: 0.8

## 2023-01-18 ENCOUNTER — TELEPHONE (OUTPATIENT)
Dept: NEUROLOGY | Facility: MEDICAL CENTER | Age: 63
End: 2023-01-18
Payer: MEDICAID

## 2023-01-19 ENCOUNTER — HOSPITAL ENCOUNTER (INPATIENT)
Facility: MEDICAL CENTER | Age: 63
LOS: 5 days | DRG: 472 | End: 2023-01-24
Attending: ORTHOPAEDIC SURGERY | Admitting: ORTHOPAEDIC SURGERY
Payer: MEDICAID

## 2023-01-19 ENCOUNTER — ANESTHESIA EVENT (OUTPATIENT)
Dept: SURGERY | Facility: MEDICAL CENTER | Age: 63
DRG: 472 | End: 2023-01-19
Payer: MEDICAID

## 2023-01-19 ENCOUNTER — APPOINTMENT (OUTPATIENT)
Dept: RADIOLOGY | Facility: MEDICAL CENTER | Age: 63
DRG: 472 | End: 2023-01-19
Attending: ORTHOPAEDIC SURGERY
Payer: MEDICAID

## 2023-01-19 ENCOUNTER — ANESTHESIA (OUTPATIENT)
Dept: SURGERY | Facility: MEDICAL CENTER | Age: 63
DRG: 472 | End: 2023-01-19
Payer: MEDICAID

## 2023-01-19 DIAGNOSIS — R11.2 NAUSEA AND VOMITING, UNSPECIFIED VOMITING TYPE: ICD-10-CM

## 2023-01-19 DIAGNOSIS — M62.838 MUSCLE SPASM: ICD-10-CM

## 2023-01-19 DIAGNOSIS — G89.18 POSTOPERATIVE PAIN: ICD-10-CM

## 2023-01-19 DIAGNOSIS — Z98.1 S/P CERVICAL SPINAL FUSION: ICD-10-CM

## 2023-01-19 LAB — ERYTHROCYTE [SEDIMENTATION RATE] IN BLOOD BY WESTERGREN METHOD: 9 MM/HOUR (ref 0–25)

## 2023-01-19 PROCEDURE — 95940 IONM IN OPERATNG ROOM 15 MIN: CPT | Performed by: ORTHOPAEDIC SURGERY

## 2023-01-19 PROCEDURE — 01N80ZZ RELEASE THORACIC NERVE, OPEN APPROACH: ICD-10-PCS | Performed by: ORTHOPAEDIC SURGERY

## 2023-01-19 PROCEDURE — 770001 HCHG ROOM/CARE - MED/SURG/GYN PRIV*

## 2023-01-19 PROCEDURE — 160048 HCHG OR STATISTICAL LEVEL 1-5: Performed by: ORTHOPAEDIC SURGERY

## 2023-01-19 PROCEDURE — 36620 INSERTION CATHETER ARTERY: CPT | Performed by: ANESTHESIOLOGY

## 2023-01-19 PROCEDURE — 700102 HCHG RX REV CODE 250 W/ 637 OVERRIDE(OP): Performed by: ANESTHESIOLOGY

## 2023-01-19 PROCEDURE — 0RG2071 FUSION OF 2 OR MORE CERVICAL VERTEBRAL JOINTS WITH AUTOLOGOUS TISSUE SUBSTITUTE, POSTERIOR APPROACH, POSTERIOR COLUMN, OPEN APPROACH: ICD-10-PCS | Performed by: ORTHOPAEDIC SURGERY

## 2023-01-19 PROCEDURE — 160002 HCHG RECOVERY MINUTES (STAT): Performed by: ORTHOPAEDIC SURGERY

## 2023-01-19 PROCEDURE — A9270 NON-COVERED ITEM OR SERVICE: HCPCS | Performed by: ANESTHESIOLOGY

## 2023-01-19 PROCEDURE — 01N10ZZ RELEASE CERVICAL NERVE, OPEN APPROACH: ICD-10-PCS | Performed by: ORTHOPAEDIC SURGERY

## 2023-01-19 PROCEDURE — 95939 C MOTOR EVOKED UPR&LWR LIMBS: CPT | Performed by: ORTHOPAEDIC SURGERY

## 2023-01-19 PROCEDURE — 700101 HCHG RX REV CODE 250: Performed by: ORTHOPAEDIC SURGERY

## 2023-01-19 PROCEDURE — 110371 HCHG SHELL REV 272: Performed by: ORTHOPAEDIC SURGERY

## 2023-01-19 PROCEDURE — 700101 HCHG RX REV CODE 250: Performed by: ANESTHESIOLOGY

## 2023-01-19 PROCEDURE — A9270 NON-COVERED ITEM OR SERVICE: HCPCS | Performed by: ORTHOPAEDIC SURGERY

## 2023-01-19 PROCEDURE — 00670 ANES XTNSV SP&SPI CORD PX: CPT | Performed by: ANESTHESIOLOGY

## 2023-01-19 PROCEDURE — 85652 RBC SED RATE AUTOMATED: CPT

## 2023-01-19 PROCEDURE — 95868 NDL EMG CRANIAL NRV MUSC BI: CPT | Performed by: ORTHOPAEDIC SURGERY

## 2023-01-19 PROCEDURE — 0RG4071 FUSION OF CERVICOTHORACIC VERTEBRAL JOINT WITH AUTOLOGOUS TISSUE SUBSTITUTE, POSTERIOR APPROACH, POSTERIOR COLUMN, OPEN APPROACH: ICD-10-PCS | Performed by: ORTHOPAEDIC SURGERY

## 2023-01-19 PROCEDURE — 160029 HCHG SURGERY MINUTES - 1ST 30 MINS LEVEL 4: Performed by: ORTHOPAEDIC SURGERY

## 2023-01-19 PROCEDURE — 95955 EEG DURING SURGERY: CPT | Performed by: ORTHOPAEDIC SURGERY

## 2023-01-19 PROCEDURE — 160041 HCHG SURGERY MINUTES - EA ADDL 1 MIN LEVEL 4: Performed by: ORTHOPAEDIC SURGERY

## 2023-01-19 PROCEDURE — 160009 HCHG ANES TIME/MIN: Performed by: ORTHOPAEDIC SURGERY

## 2023-01-19 PROCEDURE — C1713 ANCHOR/SCREW BN/BN,TIS/BN: HCPCS | Performed by: ORTHOPAEDIC SURGERY

## 2023-01-19 PROCEDURE — 72040 X-RAY EXAM NECK SPINE 2-3 VW: CPT

## 2023-01-19 PROCEDURE — 700105 HCHG RX REV CODE 258: Performed by: ANESTHESIOLOGY

## 2023-01-19 PROCEDURE — 700102 HCHG RX REV CODE 250 W/ 637 OVERRIDE(OP): Performed by: ORTHOPAEDIC SURGERY

## 2023-01-19 PROCEDURE — 95861 NEEDLE EMG 2 EXTREMITIES: CPT | Performed by: ORTHOPAEDIC SURGERY

## 2023-01-19 PROCEDURE — 95938 SOMATOSENSORY TESTING: CPT | Performed by: ORTHOPAEDIC SURGERY

## 2023-01-19 PROCEDURE — 700105 HCHG RX REV CODE 258: Performed by: ORTHOPAEDIC SURGERY

## 2023-01-19 PROCEDURE — 700111 HCHG RX REV CODE 636 W/ 250 OVERRIDE (IP): Performed by: ANESTHESIOLOGY

## 2023-01-19 PROCEDURE — 95937 NEUROMUSCULAR JUNCTION TEST: CPT | Performed by: ORTHOPAEDIC SURGERY

## 2023-01-19 PROCEDURE — 502000 HCHG MISC OR IMPLANTS RC 0278: Performed by: ORTHOPAEDIC SURGERY

## 2023-01-19 PROCEDURE — 160035 HCHG PACU - 1ST 60 MINS PHASE I: Performed by: ORTHOPAEDIC SURGERY

## 2023-01-19 PROCEDURE — 160036 HCHG PACU - EA ADDL 30 MINS PHASE I: Performed by: ORTHOPAEDIC SURGERY

## 2023-01-19 PROCEDURE — 110454 HCHG SHELL REV 250: Performed by: ORTHOPAEDIC SURGERY

## 2023-01-19 PROCEDURE — 700111 HCHG RX REV CODE 636 W/ 250 OVERRIDE (IP): Performed by: ORTHOPAEDIC SURGERY

## 2023-01-19 DEVICE — IMPLANTABLE DEVICE: Type: IMPLANTABLE DEVICE | Site: NECK | Status: FUNCTIONAL

## 2023-01-19 RX ORDER — METHOCARBAMOL 750 MG/1
750 TABLET, FILM COATED ORAL EVERY 8 HOURS PRN
Status: DISCONTINUED | OUTPATIENT
Start: 2023-01-19 | End: 2023-01-24 | Stop reason: HOSPADM

## 2023-01-19 RX ORDER — IBUPROFEN 800 MG/1
800 TABLET ORAL 3 TIMES DAILY PRN
Status: DISCONTINUED | OUTPATIENT
Start: 2023-01-23 | End: 2023-01-24 | Stop reason: HOSPADM

## 2023-01-19 RX ORDER — MIDAZOLAM HYDROCHLORIDE 1 MG/ML
INJECTION INTRAMUSCULAR; INTRAVENOUS PRN
Status: DISCONTINUED | OUTPATIENT
Start: 2023-01-19 | End: 2023-01-19 | Stop reason: SURG

## 2023-01-19 RX ORDER — ROCURONIUM BROMIDE 10 MG/ML
INJECTION, SOLUTION INTRAVENOUS PRN
Status: DISCONTINUED | OUTPATIENT
Start: 2023-01-19 | End: 2023-01-19 | Stop reason: SURG

## 2023-01-19 RX ORDER — AMOXICILLIN 250 MG
1 CAPSULE ORAL NIGHTLY
Status: DISCONTINUED | OUTPATIENT
Start: 2023-01-19 | End: 2023-01-24 | Stop reason: HOSPADM

## 2023-01-19 RX ORDER — KETOROLAC TROMETHAMINE 30 MG/ML
30 INJECTION, SOLUTION INTRAMUSCULAR; INTRAVENOUS EVERY 8 HOURS
Status: COMPLETED | OUTPATIENT
Start: 2023-01-19 | End: 2023-01-23

## 2023-01-19 RX ORDER — OXYCODONE HCL 5 MG/5 ML
5 SOLUTION, ORAL ORAL
Status: COMPLETED | OUTPATIENT
Start: 2023-01-19 | End: 2023-01-19

## 2023-01-19 RX ORDER — ACETAMINOPHEN 500 MG
1000 TABLET ORAL EVERY 6 HOURS PRN
Status: DISCONTINUED | OUTPATIENT
Start: 2023-01-24 | End: 2023-01-24 | Stop reason: HOSPADM

## 2023-01-19 RX ORDER — TRANEXAMIC ACID 100 MG/ML
INJECTION, SOLUTION INTRAVENOUS PRN
Status: DISCONTINUED | OUTPATIENT
Start: 2023-01-19 | End: 2023-01-19 | Stop reason: SURG

## 2023-01-19 RX ORDER — DIPHENHYDRAMINE HYDROCHLORIDE 50 MG/ML
25 INJECTION INTRAMUSCULAR; INTRAVENOUS EVERY 6 HOURS PRN
Status: DISCONTINUED | OUTPATIENT
Start: 2023-01-19 | End: 2023-01-24 | Stop reason: HOSPADM

## 2023-01-19 RX ORDER — ENEMA 19; 7 G/133ML; G/133ML
1 ENEMA RECTAL
Status: DISCONTINUED | OUTPATIENT
Start: 2023-01-19 | End: 2023-01-24 | Stop reason: HOSPADM

## 2023-01-19 RX ORDER — PROCHLORPERAZINE EDISYLATE 5 MG/ML
5-10 INJECTION INTRAMUSCULAR; INTRAVENOUS EVERY 4 HOURS PRN
Status: DISCONTINUED | OUTPATIENT
Start: 2023-01-19 | End: 2023-01-24 | Stop reason: HOSPADM

## 2023-01-19 RX ORDER — DEXTROSE MONOHYDRATE, SODIUM CHLORIDE, AND POTASSIUM CHLORIDE 50; 1.49; 4.5 G/1000ML; G/1000ML; G/1000ML
INJECTION, SOLUTION INTRAVENOUS CONTINUOUS
Status: DISCONTINUED | OUTPATIENT
Start: 2023-01-19 | End: 2023-01-24 | Stop reason: HOSPADM

## 2023-01-19 RX ORDER — HYDROMORPHONE HYDROCHLORIDE 1 MG/ML
0.1 INJECTION, SOLUTION INTRAMUSCULAR; INTRAVENOUS; SUBCUTANEOUS
Status: DISCONTINUED | OUTPATIENT
Start: 2023-01-19 | End: 2023-01-19 | Stop reason: HOSPADM

## 2023-01-19 RX ORDER — BACITRACIN ZINC 500 [USP'U]/G
OINTMENT TOPICAL
Status: DISCONTINUED | OUTPATIENT
Start: 2023-01-19 | End: 2023-01-19 | Stop reason: HOSPADM

## 2023-01-19 RX ORDER — QUETIAPINE FUMARATE 25 MG/1
50 TABLET, FILM COATED ORAL NIGHTLY
Status: DISCONTINUED | OUTPATIENT
Start: 2023-01-19 | End: 2023-01-24 | Stop reason: HOSPADM

## 2023-01-19 RX ORDER — PROMETHAZINE HYDROCHLORIDE 25 MG/1
12.5-25 SUPPOSITORY RECTAL EVERY 4 HOURS PRN
Status: DISCONTINUED | OUTPATIENT
Start: 2023-01-19 | End: 2023-01-24 | Stop reason: HOSPADM

## 2023-01-19 RX ORDER — CEFAZOLIN SODIUM 1 G/3ML
2 INJECTION, POWDER, FOR SOLUTION INTRAMUSCULAR; INTRAVENOUS ONCE
Status: DISCONTINUED | OUTPATIENT
Start: 2023-01-19 | End: 2023-01-19 | Stop reason: HOSPADM

## 2023-01-19 RX ORDER — DIPHENHYDRAMINE HYDROCHLORIDE 50 MG/ML
12.5 INJECTION INTRAMUSCULAR; INTRAVENOUS
Status: DISCONTINUED | OUTPATIENT
Start: 2023-01-19 | End: 2023-01-19 | Stop reason: HOSPADM

## 2023-01-19 RX ORDER — MAGNESIUM HYDROXIDE 1200 MG/15ML
LIQUID ORAL
Status: COMPLETED | OUTPATIENT
Start: 2023-01-19 | End: 2023-01-19

## 2023-01-19 RX ORDER — PROMETHAZINE HYDROCHLORIDE 25 MG/1
12.5-25 TABLET ORAL EVERY 4 HOURS PRN
Status: DISCONTINUED | OUTPATIENT
Start: 2023-01-19 | End: 2023-01-24 | Stop reason: HOSPADM

## 2023-01-19 RX ORDER — HYDROMORPHONE HYDROCHLORIDE 1 MG/ML
0.2 INJECTION, SOLUTION INTRAMUSCULAR; INTRAVENOUS; SUBCUTANEOUS
Status: DISCONTINUED | OUTPATIENT
Start: 2023-01-19 | End: 2023-01-19 | Stop reason: HOSPADM

## 2023-01-19 RX ORDER — DIPHENHYDRAMINE HCL 25 MG
25 TABLET ORAL EVERY 6 HOURS PRN
Status: DISCONTINUED | OUTPATIENT
Start: 2023-01-19 | End: 2023-01-24 | Stop reason: HOSPADM

## 2023-01-19 RX ORDER — SODIUM CHLORIDE, SODIUM LACTATE, POTASSIUM CHLORIDE, CALCIUM CHLORIDE 600; 310; 30; 20 MG/100ML; MG/100ML; MG/100ML; MG/100ML
INJECTION, SOLUTION INTRAVENOUS CONTINUOUS
Status: DISCONTINUED | OUTPATIENT
Start: 2023-01-19 | End: 2023-01-19 | Stop reason: HOSPADM

## 2023-01-19 RX ORDER — HYDROMORPHONE HYDROCHLORIDE 1 MG/ML
0.4 INJECTION, SOLUTION INTRAMUSCULAR; INTRAVENOUS; SUBCUTANEOUS
Status: DISCONTINUED | OUTPATIENT
Start: 2023-01-19 | End: 2023-01-19 | Stop reason: HOSPADM

## 2023-01-19 RX ORDER — SULFAMETHOXAZOLE AND TRIMETHOPRIM 800; 160 MG/1; MG/1
1 TABLET ORAL
COMMUNITY

## 2023-01-19 RX ORDER — SODIUM CHLORIDE, SODIUM LACTATE, POTASSIUM CHLORIDE, CALCIUM CHLORIDE 600; 310; 30; 20 MG/100ML; MG/100ML; MG/100ML; MG/100ML
INJECTION, SOLUTION INTRAVENOUS CONTINUOUS
Status: ACTIVE | OUTPATIENT
Start: 2023-01-19 | End: 2023-01-19

## 2023-01-19 RX ORDER — BISACODYL 10 MG
10 SUPPOSITORY, RECTAL RECTAL
Status: DISCONTINUED | OUTPATIENT
Start: 2023-01-19 | End: 2023-01-24 | Stop reason: HOSPADM

## 2023-01-19 RX ORDER — SODIUM CHLORIDE, SODIUM LACTATE, POTASSIUM CHLORIDE, CALCIUM CHLORIDE 600; 310; 30; 20 MG/100ML; MG/100ML; MG/100ML; MG/100ML
INJECTION, SOLUTION INTRAVENOUS
Status: DISCONTINUED | OUTPATIENT
Start: 2023-01-19 | End: 2023-01-19 | Stop reason: SURG

## 2023-01-19 RX ORDER — HYDROMORPHONE HYDROCHLORIDE 2 MG/ML
INJECTION, SOLUTION INTRAMUSCULAR; INTRAVENOUS; SUBCUTANEOUS PRN
Status: DISCONTINUED | OUTPATIENT
Start: 2023-01-19 | End: 2023-01-19 | Stop reason: SURG

## 2023-01-19 RX ORDER — DEXAMETHASONE SODIUM PHOSPHATE 4 MG/ML
INJECTION, SOLUTION INTRA-ARTICULAR; INTRALESIONAL; INTRAMUSCULAR; INTRAVENOUS; SOFT TISSUE PRN
Status: DISCONTINUED | OUTPATIENT
Start: 2023-01-19 | End: 2023-01-19 | Stop reason: SURG

## 2023-01-19 RX ORDER — ONDANSETRON 2 MG/ML
INJECTION INTRAMUSCULAR; INTRAVENOUS PRN
Status: DISCONTINUED | OUTPATIENT
Start: 2023-01-19 | End: 2023-01-19 | Stop reason: SURG

## 2023-01-19 RX ORDER — CEFAZOLIN SODIUM 1 G/3ML
INJECTION, POWDER, FOR SOLUTION INTRAMUSCULAR; INTRAVENOUS PRN
Status: DISCONTINUED | OUTPATIENT
Start: 2023-01-19 | End: 2023-01-19 | Stop reason: SURG

## 2023-01-19 RX ORDER — LABETALOL HYDROCHLORIDE 5 MG/ML
10 INJECTION, SOLUTION INTRAVENOUS
Status: DISCONTINUED | OUTPATIENT
Start: 2023-01-19 | End: 2023-01-24 | Stop reason: HOSPADM

## 2023-01-19 RX ORDER — OXYCODONE HCL 10 MG/1
10 TABLET, FILM COATED, EXTENDED RELEASE ORAL ONCE
Status: DISCONTINUED | OUTPATIENT
Start: 2023-01-19 | End: 2023-01-19 | Stop reason: HOSPADM

## 2023-01-19 RX ORDER — ZOLPIDEM TARTRATE 5 MG/1
5 TABLET ORAL
Status: DISCONTINUED | OUTPATIENT
Start: 2023-01-20 | End: 2023-01-24 | Stop reason: HOSPADM

## 2023-01-19 RX ORDER — POLYETHYLENE GLYCOL 3350 17 G/17G
1 POWDER, FOR SOLUTION ORAL 2 TIMES DAILY PRN
Status: DISCONTINUED | OUTPATIENT
Start: 2023-01-19 | End: 2023-01-24 | Stop reason: HOSPADM

## 2023-01-19 RX ORDER — VANCOMYCIN HYDROCHLORIDE 1 G/20ML
INJECTION, POWDER, LYOPHILIZED, FOR SOLUTION INTRAVENOUS
Status: COMPLETED | OUTPATIENT
Start: 2023-01-19 | End: 2023-01-19

## 2023-01-19 RX ORDER — HALOPERIDOL 5 MG/ML
1 INJECTION INTRAMUSCULAR
Status: COMPLETED | OUTPATIENT
Start: 2023-01-19 | End: 2023-01-19

## 2023-01-19 RX ORDER — DOCUSATE SODIUM 100 MG/1
100 CAPSULE, LIQUID FILLED ORAL 2 TIMES DAILY
Status: DISCONTINUED | OUTPATIENT
Start: 2023-01-19 | End: 2023-01-24 | Stop reason: HOSPADM

## 2023-01-19 RX ORDER — ACETAMINOPHEN 500 MG
1000 TABLET ORAL ONCE
Status: COMPLETED | OUTPATIENT
Start: 2023-01-19 | End: 2023-01-19

## 2023-01-19 RX ORDER — BUPIVACAINE HYDROCHLORIDE AND EPINEPHRINE 5; 5 MG/ML; UG/ML
INJECTION, SOLUTION EPIDURAL; INTRACAUDAL; PERINEURAL
Status: DISCONTINUED | OUTPATIENT
Start: 2023-01-19 | End: 2023-01-19 | Stop reason: HOSPADM

## 2023-01-19 RX ORDER — ONDANSETRON 4 MG/1
4 TABLET, ORALLY DISINTEGRATING ORAL EVERY 4 HOURS PRN
Status: DISCONTINUED | OUTPATIENT
Start: 2023-01-19 | End: 2023-01-24 | Stop reason: HOSPADM

## 2023-01-19 RX ORDER — CALCIUM CARBONATE 500 MG/1
500 TABLET, CHEWABLE ORAL 2 TIMES DAILY
Status: DISCONTINUED | OUTPATIENT
Start: 2023-01-19 | End: 2023-01-24 | Stop reason: HOSPADM

## 2023-01-19 RX ORDER — LIDOCAINE HYDROCHLORIDE 20 MG/ML
INJECTION, SOLUTION EPIDURAL; INFILTRATION; INTRACAUDAL; PERINEURAL PRN
Status: DISCONTINUED | OUTPATIENT
Start: 2023-01-19 | End: 2023-01-19 | Stop reason: SURG

## 2023-01-19 RX ORDER — NOREPINEPHRINE BITARTRATE 0.03 MG/ML
0-1 INJECTION, SOLUTION INTRAVENOUS CONTINUOUS
Status: DISCONTINUED | OUTPATIENT
Start: 2023-01-19 | End: 2023-01-24 | Stop reason: HOSPADM

## 2023-01-19 RX ORDER — ACETAMINOPHEN 500 MG
1000 TABLET ORAL EVERY 6 HOURS
Status: DISCONTINUED | OUTPATIENT
Start: 2023-01-19 | End: 2023-01-24 | Stop reason: HOSPADM

## 2023-01-19 RX ORDER — OXYCODONE HCL 5 MG/5 ML
10 SOLUTION, ORAL ORAL
Status: COMPLETED | OUTPATIENT
Start: 2023-01-19 | End: 2023-01-19

## 2023-01-19 RX ORDER — ONDANSETRON 2 MG/ML
4 INJECTION INTRAMUSCULAR; INTRAVENOUS EVERY 4 HOURS PRN
Status: DISCONTINUED | OUTPATIENT
Start: 2023-01-19 | End: 2023-01-24 | Stop reason: HOSPADM

## 2023-01-19 RX ORDER — ONDANSETRON 2 MG/ML
4 INJECTION INTRAMUSCULAR; INTRAVENOUS
Status: DISCONTINUED | OUTPATIENT
Start: 2023-01-19 | End: 2023-01-19 | Stop reason: HOSPADM

## 2023-01-19 RX ORDER — AMOXICILLIN 250 MG
1 CAPSULE ORAL
Status: DISCONTINUED | OUTPATIENT
Start: 2023-01-19 | End: 2023-01-24 | Stop reason: HOSPADM

## 2023-01-19 RX ADMIN — POTASSIUM CHLORIDE, DEXTROSE MONOHYDRATE AND SODIUM CHLORIDE: 150; 5; 450 INJECTION, SOLUTION INTRAVENOUS at 16:09

## 2023-01-19 RX ADMIN — VANCOMYCIN HYDROCHLORIDE 1000 MG: 1 INJECTION, POWDER, LYOPHILIZED, FOR SOLUTION INTRAVENOUS at 07:35

## 2023-01-19 RX ADMIN — OXYCODONE HYDROCHLORIDE 10 MG: 5 SOLUTION ORAL at 12:47

## 2023-01-19 RX ADMIN — LIDOCAINE HYDROCHLORIDE 100 MG: 20 INJECTION, SOLUTION EPIDURAL; INFILTRATION; INTRACAUDAL at 08:10

## 2023-01-19 RX ADMIN — EPHEDRINE SULFATE 10 MG: 50 INJECTION INTRAMUSCULAR; INTRAVENOUS; SUBCUTANEOUS at 08:48

## 2023-01-19 RX ADMIN — Medication 140 MG: at 08:10

## 2023-01-19 RX ADMIN — DEXAMETHASONE SODIUM PHOSPHATE 4 MG: 4 INJECTION, SOLUTION INTRA-ARTICULAR; INTRALESIONAL; INTRAMUSCULAR; INTRAVENOUS; SOFT TISSUE at 08:10

## 2023-01-19 RX ADMIN — SODIUM CHLORIDE, POTASSIUM CHLORIDE, SODIUM LACTATE AND CALCIUM CHLORIDE: 600; 310; 30; 20 INJECTION, SOLUTION INTRAVENOUS at 07:41

## 2023-01-19 RX ADMIN — PROPOFOL 150 MG: 10 INJECTION, EMULSION INTRAVENOUS at 08:10

## 2023-01-19 RX ADMIN — PROPOFOL 120 MCG/KG/MIN: 10 INJECTION, EMULSION INTRAVENOUS at 09:03

## 2023-01-19 RX ADMIN — EPHEDRINE SULFATE 10 MG: 50 INJECTION INTRAMUSCULAR; INTRAVENOUS; SUBCUTANEOUS at 09:52

## 2023-01-19 RX ADMIN — DOCUSATE SODIUM 100 MG: 100 CAPSULE, LIQUID FILLED ORAL at 17:12

## 2023-01-19 RX ADMIN — SODIUM CHLORIDE, POTASSIUM CHLORIDE, SODIUM LACTATE AND CALCIUM CHLORIDE: 600; 310; 30; 20 INJECTION, SOLUTION INTRAVENOUS at 11:39

## 2023-01-19 RX ADMIN — ACETAMINOPHEN 1000 MG: 500 TABLET ORAL at 07:28

## 2023-01-19 RX ADMIN — HYDROMORPHONE HYDROCHLORIDE 1 MG: 2 INJECTION INTRAMUSCULAR; INTRAVENOUS; SUBCUTANEOUS at 12:30

## 2023-01-19 RX ADMIN — HALOPERIDOL LACTATE 1 MG: 5 INJECTION, SOLUTION INTRAMUSCULAR at 12:36

## 2023-01-19 RX ADMIN — SODIUM CHLORIDE, POTASSIUM CHLORIDE, SODIUM LACTATE AND CALCIUM CHLORIDE: 600; 310; 30; 20 INJECTION, SOLUTION INTRAVENOUS at 09:09

## 2023-01-19 RX ADMIN — EPHEDRINE SULFATE 10 MG: 50 INJECTION INTRAMUSCULAR; INTRAVENOUS; SUBCUTANEOUS at 09:38

## 2023-01-19 RX ADMIN — FENTANYL CITRATE 100 MCG: 50 INJECTION, SOLUTION INTRAMUSCULAR; INTRAVENOUS at 08:10

## 2023-01-19 RX ADMIN — ONDANSETRON 4 MG: 2 INJECTION INTRAMUSCULAR; INTRAVENOUS at 08:10

## 2023-01-19 RX ADMIN — HALOPERIDOL LACTATE 1 MG: 5 INJECTION, SOLUTION INTRAMUSCULAR at 12:56

## 2023-01-19 RX ADMIN — MIDAZOLAM HYDROCHLORIDE 2 MG: 1 INJECTION, SOLUTION INTRAMUSCULAR; INTRAVENOUS at 07:50

## 2023-01-19 RX ADMIN — SERTRALINE 150 MG: 100 TABLET, FILM COATED ORAL at 17:12

## 2023-01-19 RX ADMIN — KETOROLAC TROMETHAMINE 30 MG: 30 INJECTION, SOLUTION INTRAMUSCULAR; INTRAVENOUS at 22:06

## 2023-01-19 RX ADMIN — QUETIAPINE FUMARATE 50 MG: 25 TABLET ORAL at 22:06

## 2023-01-19 RX ADMIN — TRANEXAMIC ACID 1000 MG: 100 INJECTION, SOLUTION INTRAVENOUS at 08:45

## 2023-01-19 RX ADMIN — KETOROLAC TROMETHAMINE 30 MG: 30 INJECTION, SOLUTION INTRAMUSCULAR; INTRAVENOUS at 15:54

## 2023-01-19 RX ADMIN — CEFAZOLIN 2 G: 2 INJECTION, POWDER, FOR SOLUTION INTRAMUSCULAR; INTRAVENOUS at 16:14

## 2023-01-19 RX ADMIN — CEFAZOLIN 2 G: 2 INJECTION, POWDER, FOR SOLUTION INTRAMUSCULAR; INTRAVENOUS at 23:27

## 2023-01-19 RX ADMIN — ACETAMINOPHEN 1000 MG: 500 TABLET ORAL at 17:12

## 2023-01-19 RX ADMIN — HYDROMORPHONE HYDROCHLORIDE 1 MG: 2 INJECTION INTRAMUSCULAR; INTRAVENOUS; SUBCUTANEOUS at 12:21

## 2023-01-19 RX ADMIN — SODIUM CHLORIDE, POTASSIUM CHLORIDE, SODIUM LACTATE AND CALCIUM CHLORIDE: 600; 310; 30; 20 INJECTION, SOLUTION INTRAVENOUS at 12:15

## 2023-01-19 RX ADMIN — Medication: at 16:03

## 2023-01-19 RX ADMIN — FENTANYL CITRATE 100 MCG: 50 INJECTION, SOLUTION INTRAMUSCULAR; INTRAVENOUS at 08:55

## 2023-01-19 RX ADMIN — EPHEDRINE SULFATE 10 MG: 50 INJECTION INTRAMUSCULAR; INTRAVENOUS; SUBCUTANEOUS at 10:20

## 2023-01-19 RX ADMIN — EPHEDRINE SULFATE 10 MG: 50 INJECTION INTRAMUSCULAR; INTRAVENOUS; SUBCUTANEOUS at 08:49

## 2023-01-19 RX ADMIN — ROCURONIUM BROMIDE 2.5 MG: 10 INJECTION, SOLUTION INTRAVENOUS at 08:10

## 2023-01-19 RX ADMIN — FENTANYL CITRATE 50 MCG: 50 INJECTION, SOLUTION INTRAMUSCULAR; INTRAVENOUS at 09:06

## 2023-01-19 RX ADMIN — CALCIUM CARBONATE 500 MG: 500 TABLET, CHEWABLE ORAL at 17:12

## 2023-01-19 RX ADMIN — SODIUM CHLORIDE, POTASSIUM CHLORIDE, SODIUM LACTATE AND CALCIUM CHLORIDE: 600; 310; 30; 20 INJECTION, SOLUTION INTRAVENOUS at 07:28

## 2023-01-19 RX ADMIN — CEFAZOLIN 2 G: 330 INJECTION, POWDER, FOR SOLUTION INTRAMUSCULAR; INTRAVENOUS at 07:41

## 2023-01-19 RX ADMIN — SENNOSIDES AND DOCUSATE SODIUM 1 TABLET: 50; 8.6 TABLET ORAL at 22:06

## 2023-01-19 RX ADMIN — EPHEDRINE SULFATE 10 MG: 50 INJECTION INTRAMUSCULAR; INTRAVENOUS; SUBCUTANEOUS at 11:18

## 2023-01-19 ASSESSMENT — COGNITIVE AND FUNCTIONAL STATUS - GENERAL
PERSONAL GROOMING: A LITTLE
DRESSING REGULAR LOWER BODY CLOTHING: A LITTLE
MOVING TO AND FROM BED TO CHAIR: A LITTLE
DRESSING REGULAR UPPER BODY CLOTHING: A LITTLE
SUGGESTED CMS G CODE MODIFIER DAILY ACTIVITY: CK
DAILY ACTIVITIY SCORE: 19
SUGGESTED CMS G CODE MODIFIER MOBILITY: CK
WALKING IN HOSPITAL ROOM: A LITTLE
MOBILITY SCORE: 18
HELP NEEDED FOR BATHING: A LITTLE
TOILETING: A LITTLE
CLIMB 3 TO 5 STEPS WITH RAILING: A LITTLE
MOVING FROM LYING ON BACK TO SITTING ON SIDE OF FLAT BED: A LITTLE
STANDING UP FROM CHAIR USING ARMS: A LITTLE
TURNING FROM BACK TO SIDE WHILE IN FLAT BAD: A LITTLE

## 2023-01-19 ASSESSMENT — LIFESTYLE VARIABLES
EVER FELT BAD OR GUILTY ABOUT YOUR DRINKING: NO
HAVE PEOPLE ANNOYED YOU BY CRITICIZING YOUR DRINKING: NO
AVERAGE NUMBER OF DAYS PER WEEK YOU HAVE A DRINK CONTAINING ALCOHOL: 1
TOTAL SCORE: 0
ALCOHOL_USE: YES
CONSUMPTION TOTAL: NEGATIVE
EVER HAD A DRINK FIRST THING IN THE MORNING TO STEADY YOUR NERVES TO GET RID OF A HANGOVER: NO
HOW MANY TIMES IN THE PAST YEAR HAVE YOU HAD 5 OR MORE DRINKS IN A DAY: 0
HAVE YOU EVER FELT YOU SHOULD CUT DOWN ON YOUR DRINKING: NO
ON A TYPICAL DAY WHEN YOU DRINK ALCOHOL HOW MANY DRINKS DO YOU HAVE: 1
DOES PATIENT WANT TO STOP DRINKING: NO

## 2023-01-19 ASSESSMENT — PAIN DESCRIPTION - PAIN TYPE
TYPE: ACUTE PAIN;SURGICAL PAIN
TYPE: ACUTE PAIN;SURGICAL PAIN
TYPE: SURGICAL PAIN

## 2023-01-19 ASSESSMENT — PATIENT HEALTH QUESTIONNAIRE - PHQ9
9. THOUGHTS THAT YOU WOULD BE BETTER OFF DEAD, OR OF HURTING YOURSELF: NOT AT ALL
4. FEELING TIRED OR HAVING LITTLE ENERGY: SEVERAL DAYS
5. POOR APPETITE OR OVEREATING: NOT AT ALL
7. TROUBLE CONCENTRATING ON THINGS, SUCH AS READING THE NEWSPAPER OR WATCHING TELEVISION: SEVERAL DAYS
3. TROUBLE FALLING OR STAYING ASLEEP OR SLEEPING TOO MUCH: NOT AT ALL
SUM OF ALL RESPONSES TO PHQ9 QUESTIONS 1 AND 2: 4
6. FEELING BAD ABOUT YOURSELF - OR THAT YOU ARE A FAILURE OR HAVE LET YOURSELF OR YOUR FAMILY DOWN: NOT AL ALL
2. FEELING DOWN, DEPRESSED, IRRITABLE, OR HOPELESS: NEARLY EVERY DAY
8. MOVING OR SPEAKING SO SLOWLY THAT OTHER PEOPLE COULD HAVE NOTICED. OR THE OPPOSITE, BEING SO FIGETY OR RESTLESS THAT YOU HAVE BEEN MOVING AROUND A LOT MORE THAN USUAL: NOT AT ALL
SUM OF ALL RESPONSES TO PHQ QUESTIONS 1-9: 6
1. LITTLE INTEREST OR PLEASURE IN DOING THINGS: SEVERAL DAYS

## 2023-01-19 ASSESSMENT — FIBROSIS 4 INDEX
FIB4 SCORE: 0.93
FIB4 SCORE: 0.93

## 2023-01-19 ASSESSMENT — PAIN SCALES - GENERAL: PAIN_LEVEL: 0

## 2023-01-19 NOTE — ANESTHESIA PREPROCEDURE EVALUATION
Case: [id number] Date/Time: 01/19/23 0715    Procedures:       C3 LAMINOPLASTY, C4-T1 DECOMPRESSIVE LAMINECTOMY, C4-T1 POSTERIOR SPINAL FUSION      DECOMPRESSION, SPINE, CERVICAL, POSTERIOR APPROACH      LAMINECTOMY, SPINE, CERVICAL, POSTERIOR APPROACH      FUSION, SPINE, CERVICAL, POSTERIOR APPROACH    Pre-op diagnosis: STENOSIS, MYELOPATHY, HERNIATED NUCLEUS PULPOSUS, RADICULOPATHY    Location: [facility] OR 06 / SURGERY [facility]    Surgeons: Jama Werner M.D.          Relevant Problems   ANESTHESIA   (positive) Delayed emergence from anesthesia   (positive) Obstructive sleep apnea syndrome      CARDIAC   (positive) Essential hypertension       Physical Exam    Airway   Mallampati: II  TM distance: >3 FB  Neck ROM: full       Cardiovascular - normal exam  Rhythm: regular  Rate: normal  (-) murmur     Dental - normal exam           Pulmonary - normal exam  Breath sounds clear to auscultation     Abdominal    Neurological - normal exam                 Anesthesia Plan    ASA 2       Plan - general       Airway plan will be ETT          Induction: intravenous    Postoperative Plan: Postoperative administration of opioids is intended.    Pertinent diagnostic labs and testing reviewed    Informed Consent:    Anesthetic plan and risks discussed with patient.    Use of blood products discussed with: patient whom consented to blood products.

## 2023-01-19 NOTE — OP REPORT
DATE OF SERVICE:  01/19/2023     SERVICE:  Orthopedic Spine Surgery.     PREOPERATIVE DIAGNOSES:  1.  C3-T1 cervical spinal stenosis.  2.  C3-T1 herniated nucleus pulposus, radiculopathy and myelopathy.  3.  Status post anterior C4-C7 fusion.     POSTOPERATIVE DIAGNOSES:  1.  C3-T1 cervical spinal stenosis.  2.  C3-T1 herniated nucleus pulposus, radiculopathy and myelopathy.  3.  Status post anterior C4-C7 fusion.     PROCEDURES:  1.  C4-T1 posterior spinal instrumented fusion using [place] spine titanium   lateral mass screws at C4, C5, and C6 and pedicle screws at T1.  2.  Decompressive laminectomy with decompression of the lateral gutters at   C4-T1.  3.  C3 laminoplasty.  4.  Use of local autograft bone.  5.  Use of allograft bone.  6.  Use of NMA spinal cord monitoring with running electromyography,   somatosensory evoked potentials and motor evoked potentials.  7.  Greater than one hour use of operating room fluoroscopy.  8.  Placement of Langston head plata and then removal of Langston head   plata.     SURGEON:  Jama Werner MD     ASSISTANT SURGEON:  CESAR Lino and certified first assist.     ANESTHESIA:  General.     ANESTHESIOLOGIST:  Leonel Cheng MD     COMPLICATIONS:  None.     ESTIMATED BLOOD LOSS:  50 mL.     MEASURES USED TO DECREASE THE PROBABILITY OF INFECTION:  1.  Twenty four-hour Hibiclens body wash.  2.  Five-day mupirocin nasal ointment.  3.  Preincisional IV Ancef and vancomycin.  4.  We irrigated the wound with copious amounts of irrigation, 3 times during   the course of the operation.  5.  We placed 1000 mg of vancomycin powder deep in the wound on closure.  6.  I personally washed and scrubbed the patient's entire posterior spine   myself four times with isopropyl alcohol before the routine scrub.  7.  We used Prineo Dermabond mesh glue closure, which has been shown to   decrease the probability of infection.     DESCRIPTION OF PROCEDURE:  After informed consent was  obtained, the patient   was brought to the operating room and given general endotracheal anesthetic.    NMA spinal cord monitoring needles were placed along with a Burch catheter.    The Langston head plata was then placed by me.  We then turned the patient   into the prone position and refined her positioning to make sure that all   areas were padded and C-arm fluoroscopy would be affected during the case.    After the field was draped, a time-out was called, correctly identifying the   patient and the procedure to be done.  We then made a longitudinal midline   incision from C3-T1.  We carefully dissected down through subcutaneous tissue   down to the fascial layer.  The fascial layer was then carefully cleaned off   with a Gil elevator.  We then incised the fascia and dissected   subperiosteally on both the left and the right side, exposing the inferior   aspect of C3 lamina and down to the T1 lamina.  Once the deep exposure was   achieved, we then proceeded to place lateral mass screws in C4, C5 and C6.    This was done in standard fashion.  First, the lateral square was completely   dissected out.  We then used a 14 mm drill to drill in the center of that   square with the trajectory of 25 degrees superiorly and approximately 20   degrees laterally.  The 14 screws were placed in the lateral masses of C4, C5   and C6.  We then proceeded to place pedicle screws in T1 using fluoroscopic   guidance.  We did not place screws in C7 because of the difficulty with   visualization of the pedicles.  We simply could not get a perfect AP   visualization view of the pedicles; therefore, we did not place screws in C7.    The T1 screws were placed using fluoroscopic guidance.  With each screw, we   first used the Midas Eduardo.  This was followed by use of a gearshift.  Then, a   tap and then the appropriate length of screw was placed.  All screws were   noted to be in good position and have good purchase.  We then irrigated  the   wound with copious amounts of irrigation.     We then turned our attention to doing the decompression.  We removed the   spinous processes of C4-T1.  We then completely decompressed the posterior   aspect, removing the lamina completely and the ligamentum flavum.  We   discussed decompress the lateral gutters as well.  We then performed a   laminoplasty of C3.  Preoperative planning revealed that there was some   stenosis under the lamina at C3.  However, we wanted to preserve as many   motion segments as possible.  Therefore, we did a laminoplasty of the anterior   aspect of the lamina, decompressing the dura mater.     We completed our decompression.  The entire central canal and lateral gutters   were widely patent.     We then irrigated the wound with copious amounts of irrigation.  The rest of   the hardware was then placed.  Two rods were selected and secured down to the   screws.  We did slightly distract by approximately 2 mm at each segment to   increase the superior inferior diameter of the foramina.  The torque wrench   was used twice on every set screw.  Excellent fixation was achieved.  We then   once again irrigated with copious amounts of irrigation.  We then decorticated   the lateral aspects of C4-T1.  Bone graft was then placed in the lateral   gutters consisting of local autograft bone and allograft bone.  All bleeding   vessels were cauterized.  We then closed the wound over 1000 mg of vancomycin   powder.  The fascial layer was closed with a #2 Vicryl suture.  The   subcutaneous tissue was closed with 2-0 Vicryl.  The skin closure was   completed with staples and Prineo Dermabond mesh glue.  Wound dressing was   applied and the procedure was terminated.  No complications were experienced.    Blood loss was approximately 50 mL.  NMA spinal cord monitoring signals were   stable throughout the entire operation including running electromyography,   somatosensory evoked potentials and motor  evoked potentials.  The Carver   headholder was removed.  The patient was turned into the supine position and   the procedure was terminated.  The patient was then aroused from general   anesthesia and brought in stable condition to recovery room.        ______________________________  MD DREA SHAH/BRANDON    DD:  01/19/2023 12:13  DT:  01/19/2023 13:48    Job#:  [id number]

## 2023-01-19 NOTE — ANESTHESIA PROCEDURE NOTES
Airway    Date/Time: 1/19/2023 8:11 AM  Performed by: Leonel Cheng M.D.  Authorized by: Leonel Cheng M.D.     Location:  OR  Urgency:  Elective  Indications for Airway Management:  Anesthesia      Spontaneous Ventilation: absent    Sedation Level:  Deep  Preoxygenated: Yes    Patient Position:  Sniffing  Final Airway Type:  Endotracheal airway  Final Endotracheal Airway:  ETT  Cuffed: Yes    Technique Used for Successful ETT Placement:  Direct laryngoscopy    Insertion Site:  Oral  Blade Type:  Bailey  Laryngoscope Blade/Videolaryngoscope Blade Size:  2  ETT Size (mm):  7.0  Measured from:  Teeth  ETT to Teeth (cm):  22  Placement Verified by: auscultation and capnometry    Cormack-Lehane Classification:  Grade I - full view of glottis  Number of Attempts at Approach:  1

## 2023-01-19 NOTE — ANESTHESIA PROCEDURE NOTES
Peripheral IV    Date/Time: 1/19/2023 8:48 AM  Performed by: Leonel Cheng M.D.  Authorized by: Leonel Cheng M.D.     Size:  18 G  Laterality:  Left  Peripheral IV Location:  Forearm  Site Prep:  Alcohol  Technique:  Direct puncture  Attempts:  3

## 2023-01-19 NOTE — ANESTHESIA POSTPROCEDURE EVALUATION
Patient: Yolanda Reed    Procedure Summary     Date: 01/19/23 Room / Location: [facility] 06 / SURGERY [facility]    Anesthesia Start: 0741 Anesthesia Stop: 1236    Procedures:       C3 LAMINOPLASTY, C4-T1 DECOMPRESSIVE LAMINECTOMY, C4-T1 POSTERIOR SPINAL FUSION (Neck)      DECOMPRESSION, SPINE, CERVICAL, POSTERIOR APPROACH (Neck)      LAMINECTOMY, SPINE, CERVICAL, POSTERIOR APPROACH (Neck)      FUSION, SPINE, CERVICAL, POSTERIOR APPROACH (Neck) Diagnosis: (STENOSIS, MYELOPATHY, HERNIATED NUCLEUS PULPOSUS, RADICULOPATHY)    Surgeons: Jama Werner M.D. Responsible Provider: Leonel Cheng M.D.    Anesthesia Type: general ASA Status: 2          Final Anesthesia Type: general  Last vitals  BP   Blood Pressure: 129/69    Temp   36.3 °C (97.3 °F)    Pulse   (!) 107   Resp   13    SpO2   95 %      Anesthesia Post Evaluation    Patient location during evaluation: PACU  Patient participation: complete - patient participated  Level of consciousness: awake and alert  Pain score: 0    Airway patency: patent  Anesthetic complications: no  Cardiovascular status: hemodynamically stable  Respiratory status: acceptable  Hydration status: euvolemic    PONV: none          There were no known notable events for this encounter.     Nurse Pain Score: 8 (NPRS)

## 2023-01-19 NOTE — OR NURSING
Patient AAOx4, FLORES, anxious and restless post op in 10/10 posterior neck pain and nausea. Patient medicated per MAR, pain down to 4-5/10 and more tolerable per patient. Patient c/o neuropathy in BUE (baseline), 5/5 strength in all extremities. VSS, afebrile, 4L oxymask breathing even and unlabored. Tolerating water, no nausea. [place] J collar in place per order. Posterior neck dressing CDI. Patients friend/contact updated on status and plan of care. All belongings with patient.

## 2023-01-19 NOTE — OR SURGEON
Immediate Post OP Note    PreOp Diagnosis: c3-t1 cervical spinal stenosis, hnp radiculopathy myelopathy, kyphotic deformity      PostOp Diagnosis: same      Procedure(s):  C3 LAMINOPLASTY, C4-T1 DECOMPRESSIVE LAMINECTOMY, C4-T1 POSTERIOR SPINAL FUSION - Wound Class: Clean  DECOMPRESSION, SPINE, CERVICAL, POSTERIOR APPROACH - Wound Class: Clean  LAMINECTOMY, SPINE, CERVICAL, POSTERIOR APPROACH - Wound Class: Clean  FUSION, SPINE, CERVICAL, POSTERIOR APPROACH - Wound Class: Clean    Surgeon(s):  Jama Werner M.D.    Anesthesiologist/Type of Anesthesia:  Anesthesiologist: Leonel Cheng M.D./General    Surgical Staff:  Assistant: MAGDY Lino  Circulator: Caryn Ventura R.N.; Jennie Brown R.N.  Relief Scrub: Isael Greenberg  Scrub Person: Steve Clayton  Radiology Technologist: Liza Clark    Specimens removed if any:  * No specimens in log *    Estimated Blood Loss: 50cc    Findings: none    Complications: non        1/19/2023 12:02 PM Jama Werner M.D.

## 2023-01-19 NOTE — ANESTHESIA PROCEDURE NOTES
Arterial Line  Performed by: Leonel Cheng M.D.  Authorized by: Leonel Cheng M.D.     Start Time:  1/19/2023 8:15 AM  End Time:  1/19/2023 8:22 AM  Localization: surface landmarks    Patient Location:  OR  Indication: continuous blood pressure monitoring        Catheter Size:  20 G  Seldinger Technique?: Yes    Laterality:  Left  Site:  Radial artery  Line Secured:  Transparent dressing and antimicrobial disc  Events: patient tolerated procedure well with no complications

## 2023-01-19 NOTE — ANESTHESIA PROCEDURE NOTES
Peripheral IV    Date/Time: 1/19/2023 8:45 AM  Performed by: Leonel hCeng M.D.  Authorized by: Leonel Cheng M.D.     Size:  18 G  Laterality:  Left  Peripheral IV Location:  Forearm  Site Prep:  Alcohol  Technique:  Direct puncture  Attempts:  4  Difficult IV necessitating physician skill: IV access difficult     Difficult IV start requiring ultrasound

## 2023-01-19 NOTE — ANESTHESIA TIME REPORT
Anesthesia Start and Stop Event Times     Date Time Event    1/19/2023 0741 Anesthesia Start     1236 Anesthesia Stop        Responsible Staff  01/19/23    Name Role Begin End    Leonel Cheng M.D. Anesth 0741 1236        Overtime Reason:  no overtime (within assigned shift)    Comments:

## 2023-01-20 LAB — HGB BLD-MCNC: 12.8 G/DL (ref 12–16)

## 2023-01-20 PROCEDURE — 36415 COLL VENOUS BLD VENIPUNCTURE: CPT

## 2023-01-20 PROCEDURE — 97162 PT EVAL MOD COMPLEX 30 MIN: CPT

## 2023-01-20 PROCEDURE — 700101 HCHG RX REV CODE 250: Performed by: ORTHOPAEDIC SURGERY

## 2023-01-20 PROCEDURE — 700102 HCHG RX REV CODE 250 W/ 637 OVERRIDE(OP): Performed by: ORTHOPAEDIC SURGERY

## 2023-01-20 PROCEDURE — 700105 HCHG RX REV CODE 258: Performed by: ORTHOPAEDIC SURGERY

## 2023-01-20 PROCEDURE — A9270 NON-COVERED ITEM OR SERVICE: HCPCS | Performed by: ORTHOPAEDIC SURGERY

## 2023-01-20 PROCEDURE — 770001 HCHG ROOM/CARE - MED/SURG/GYN PRIV*

## 2023-01-20 PROCEDURE — 97166 OT EVAL MOD COMPLEX 45 MIN: CPT

## 2023-01-20 PROCEDURE — 700111 HCHG RX REV CODE 636 W/ 250 OVERRIDE (IP): Performed by: ORTHOPAEDIC SURGERY

## 2023-01-20 PROCEDURE — 85018 HEMOGLOBIN: CPT

## 2023-01-20 RX ADMIN — KETOROLAC TROMETHAMINE 30 MG: 30 INJECTION, SOLUTION INTRAMUSCULAR; INTRAVENOUS at 05:41

## 2023-01-20 RX ADMIN — SERTRALINE 150 MG: 100 TABLET, FILM COATED ORAL at 17:37

## 2023-01-20 RX ADMIN — POTASSIUM CHLORIDE, DEXTROSE MONOHYDRATE AND SODIUM CHLORIDE: 150; 5; 450 INJECTION, SOLUTION INTRAVENOUS at 02:48

## 2023-01-20 RX ADMIN — KETOROLAC TROMETHAMINE 30 MG: 30 INJECTION, SOLUTION INTRAMUSCULAR; INTRAVENOUS at 14:26

## 2023-01-20 RX ADMIN — SENNOSIDES AND DOCUSATE SODIUM 1 TABLET: 50; 8.6 TABLET ORAL at 20:20

## 2023-01-20 RX ADMIN — QUETIAPINE FUMARATE 50 MG: 25 TABLET ORAL at 20:20

## 2023-01-20 RX ADMIN — CEFAZOLIN 2 G: 2 INJECTION, POWDER, FOR SOLUTION INTRAMUSCULAR; INTRAVENOUS at 05:43

## 2023-01-20 RX ADMIN — KETOROLAC TROMETHAMINE 30 MG: 30 INJECTION, SOLUTION INTRAMUSCULAR; INTRAVENOUS at 23:38

## 2023-01-20 RX ADMIN — ACETAMINOPHEN 1000 MG: 500 TABLET ORAL at 05:41

## 2023-01-20 RX ADMIN — ACETAMINOPHEN 1000 MG: 500 TABLET ORAL at 23:38

## 2023-01-20 RX ADMIN — Medication: at 19:23

## 2023-01-20 RX ADMIN — CALCIUM CARBONATE 500 MG: 500 TABLET, CHEWABLE ORAL at 05:41

## 2023-01-20 RX ADMIN — DOCUSATE SODIUM 100 MG: 100 CAPSULE, LIQUID FILLED ORAL at 05:41

## 2023-01-20 RX ADMIN — CEFAZOLIN 2 G: 2 INJECTION, POWDER, FOR SOLUTION INTRAMUSCULAR; INTRAVENOUS at 14:30

## 2023-01-20 RX ADMIN — CALCIUM CARBONATE 500 MG: 500 TABLET, CHEWABLE ORAL at 17:37

## 2023-01-20 RX ADMIN — CEFAZOLIN 2 G: 2 INJECTION, POWDER, FOR SOLUTION INTRAMUSCULAR; INTRAVENOUS at 23:38

## 2023-01-20 RX ADMIN — METHOCARBAMOL 750 MG: 750 TABLET, FILM COATED ORAL at 20:20

## 2023-01-20 RX ADMIN — ACETAMINOPHEN 1000 MG: 500 TABLET ORAL at 12:16

## 2023-01-20 RX ADMIN — ACETAMINOPHEN 1000 MG: 500 TABLET ORAL at 17:37

## 2023-01-20 ASSESSMENT — COGNITIVE AND FUNCTIONAL STATUS - GENERAL
DRESSING REGULAR UPPER BODY CLOTHING: A LITTLE
MOBILITY SCORE: 18
SUGGESTED CMS G CODE MODIFIER DAILY ACTIVITY: CK
TURNING FROM BACK TO SIDE WHILE IN FLAT BAD: A LITTLE
MOVING TO AND FROM BED TO CHAIR: A LITTLE
WALKING IN HOSPITAL ROOM: A LITTLE
DRESSING REGULAR LOWER BODY CLOTHING: A LOT
DAILY ACTIVITIY SCORE: 16
CLIMB 3 TO 5 STEPS WITH RAILING: A LITTLE
PERSONAL GROOMING: A LITTLE
HELP NEEDED FOR BATHING: A LOT
MOVING FROM LYING ON BACK TO SITTING ON SIDE OF FLAT BED: A LITTLE
TOILETING: A LOT
STANDING UP FROM CHAIR USING ARMS: A LITTLE
SUGGESTED CMS G CODE MODIFIER MOBILITY: CK

## 2023-01-20 ASSESSMENT — PAIN DESCRIPTION - PAIN TYPE
TYPE: ACUTE PAIN;SURGICAL PAIN

## 2023-01-20 ASSESSMENT — GAIT ASSESSMENTS
DISTANCE (FEET): 15
GAIT LEVEL OF ASSIST: SUPERVISED

## 2023-01-20 ASSESSMENT — ACTIVITIES OF DAILY LIVING (ADL): TOILETING: INDEPENDENT

## 2023-01-20 NOTE — THERAPY
Occupational Therapy   Initial Evaluation     Patient Name: Yolanda Reed  Age:  62 y.o., Sex:  female  Medical Record #: [id number]  Today's Date: 1/20/2023          Assessment  Patient is 62 y.o. female with a diagnosis of C4-T1 fusion.  Pt currently limited by decreased functional mobility, activity tolerance, balance, and pain which are affecting pt's ability to complete ADLs/IADLs at baseline. Pt would benefit from OT services in the acute care setting to maximize functional recovery.      Plan    Occupational Therapy Initial Treatment Plan   Treatment Interventions: (P) Self Care / Activities of Daily Living, Therapeutic Activity  Treatment Frequency: (P) 4 Times per Week  Duration: (P) Until Therapy Goals Met       Discharge Recommendations: (P) Recommend post-acute placement for additional occupational therapy services prior to discharge home (Most likely will be able to D/C, but currently limited by pn.)        01/20/23 0750   Prior Living Situation   Prior Services None   Housing / Facility 1 Story Apartment / Condo   Steps Into Home 4   Equipment Owned Single Point Cane   Lives with - Patient's Self Care Capacity Significant Other   Prior Level of ADL Function   Self Feeding Independent   Grooming / Hygiene Independent   Bathing Independent   Dressing Independent   Toileting Independent   ADL Assessment   Grooming Supervision   Upper Body Dressing Supervision   Lower Body Dressing Maximal Assist   Functional Mobility   Sit to Stand Standby Assist   Bed, Chair, Wheelchair Transfer Unable to Participate   Comments limited by pn   Short Term Goals   Short Term Goal # 1 supervised with LB dressing   Short Term Goal # 2 supervised with ADL txfs   Occupational Therapy Initial Treatment Plan    Treatment Interventions Self Care / Activities of Daily Living;Therapeutic Activity   Treatment Frequency 4 Times per Week   Duration Until Therapy Goals Met   Anticipated Discharge Equipment and Recommendations    Discharge Recommendations Recommend post-acute placement for additional occupational therapy services prior to discharge home  (Most likely will be able to D/C, but currently limited by pn.)

## 2023-01-20 NOTE — PROGRESS NOTES
Pt arrives to T330 from PACU with C3 LAMINOPLASTY, C4-T1 DECOMPRESSIVE LAMINECTOMY, C4-T1 POSTERIOR SPINAL FUSION. VSS on 3L oxymask, GCS 14 (arouses to voice). Fentanyl PCA started. [place] J collar in place.    4 Eyes Skin Assessment Completed by Otf Olmstead, RN and Daxa Washburn, ALAN.    Head WDL  Ears WDL  Nose WDL  Mouth WDL  Neck Incision  Breast/Chest WDL  Shoulder Blades WDL  Spine WDL  (R) Arm/Elbow/Hand Abrasion  (L) Arm/Elbow/Hand Abrasion  Abdomen WDL  Groin WDL  Scrotum/Coccyx/Buttocks WDL  (R) Leg Bruising  (L) Leg Bruising  (R) Heel/Foot/Toe WDL  (L) Heel/Foot/Toe WDL          Devices In Places Pulse Ox and SCD's      Interventions In Place Pillows    Possible Skin Injury No    Pictures Uploaded Into Epic N/A  Wound Consult Placed N/A  RN Wound Prevention Protocol Ordered Yes et

## 2023-01-20 NOTE — CARE PLAN
The patient is Stable - Low risk of patient condition declining or worsening    Shift Goals  Clinical Goals: pain control, mobility, safety, monitor vitals/ PCA/ wound, IV abx/fluids, therapy  Patient Goals: pain control, comfort, rest  Family Goals: not present at bedside    Progress made toward(s) clinical / shift goals:      Problem: Pain - Standard  Goal: Alleviation of pain or a reduction in pain to the patient’s comfort goal  Outcome: Progressing  Note: Pt states pain is being managed by current medication regimen. Medications administered per MAR, ice pack applied, pillows for comfort and repositioning.      Problem: Fall Risk  Goal: Patient will remain free from falls  Outcome: Progressing  Note: Bed is locked and in lowest position, treaded socks on, bed alarm on, DME out of sight, call light and belongings within reach, pt calls appropriately for assistance, hourly rounding in place.

## 2023-01-20 NOTE — DISCHARGE PLANNING
Received Choice form at 0345  Agency/Facility Name: [facility]  Referral sent per Choice form @ [phone]

## 2023-01-20 NOTE — PROGRESS NOTES
Progress Note               Author: Jama Werner M.D. Date & Time created: 2023  11:37 AM     Interval History:  Pt doing well. No improvement in neurological funstion yet    Review of Systems:  ROS    Physical Exam:  Physical Exam  Neurological:      Comments: Sensation and motor at preop baseline.  Decreased  sensation and motor in c4-c8  Dressing in  place        Labs:          Recent Labs     23  1424   SODIUM 137   POTASSIUM 4.1   CHLORIDE 100   CO2 27   BUN 14   CREATININE 0.72   CALCIUM 9.5     Recent Labs     23  1424   GLUCOSE 93     Recent Labs     23  1424   RBC 5.37   HEMOGLOBIN 14.2   HEMATOCRIT 44.9   PLATELETCT 316   PROTHROMBTM 12.5   APTT 34.8   INR 0.94     Recent Labs     23  1424   WBC 7.9   NEUTSPOLYS 56.60   LYMPHOCYTES 30.00   MONOCYTES 8.40   EOSINOPHILS 4.30   BASOPHILS 0.40     Hemodynamics:  Temp (24hrs), Av.4 °C (97.6 °F), Min:36.2 °C (97.2 °F), Max:36.7 °C (98.1 °F)  Temperature: 36.4 °C (97.5 °F)  Pulse  Av.4  Min: 91  Max: 118   Blood Pressure: 131/68     Respiratory:    Respiration: 16, Pulse Oximetry: 92 %           Fluids:    Intake/Output Summary (Last 24 hours) at 2023 1137  Last data filed at 2023 0938  Gross per 24 hour   Intake 2607.27 ml   Output 3050 ml   Net -442.73 ml     Weight: 66.8 kg (147 lb 4.3 oz)  GI/Nutrition:  Orders Placed This Encounter   Procedures    Diet Order Diet: Regular     Standing Status:   Standing     Number of Occurrences:   1     Order Specific Question:   Diet:     Answer:   Regular [1]     Medical Decision Making, by Problem:  Active Hospital Problems    Diagnosis     *S/P cervical spinal fusion [Z98.1]        Plan:  Continue fentanyl pca for now  Has had some episodes of tachycardia  Dvt prophylaxis scd   Pt/ot  Hopefully can dc pca tomorrow    Quality-Core Measures

## 2023-01-21 LAB — HGB BLD-MCNC: 11.3 G/DL (ref 12–16)

## 2023-01-21 PROCEDURE — 700111 HCHG RX REV CODE 636 W/ 250 OVERRIDE (IP): Performed by: ORTHOPAEDIC SURGERY

## 2023-01-21 PROCEDURE — 700105 HCHG RX REV CODE 258: Performed by: ORTHOPAEDIC SURGERY

## 2023-01-21 PROCEDURE — 700102 HCHG RX REV CODE 250 W/ 637 OVERRIDE(OP): Performed by: ORTHOPAEDIC SURGERY

## 2023-01-21 PROCEDURE — 770001 HCHG ROOM/CARE - MED/SURG/GYN PRIV*

## 2023-01-21 PROCEDURE — A9270 NON-COVERED ITEM OR SERVICE: HCPCS | Performed by: ORTHOPAEDIC SURGERY

## 2023-01-21 PROCEDURE — 36415 COLL VENOUS BLD VENIPUNCTURE: CPT

## 2023-01-21 PROCEDURE — 85018 HEMOGLOBIN: CPT

## 2023-01-21 RX ORDER — MEPERIDINE HYDROCHLORIDE 50 MG/ML
25 INJECTION INTRAMUSCULAR; INTRAVENOUS; SUBCUTANEOUS
Status: DISCONTINUED | OUTPATIENT
Start: 2023-01-21 | End: 2023-01-24 | Stop reason: HOSPADM

## 2023-01-21 RX ORDER — OXYCODONE HYDROCHLORIDE 10 MG/1
10 TABLET ORAL
Status: DISCONTINUED | OUTPATIENT
Start: 2023-01-21 | End: 2023-01-24 | Stop reason: HOSPADM

## 2023-01-21 RX ORDER — OXYCODONE HYDROCHLORIDE 15 MG/1
15 TABLET ORAL
Status: DISCONTINUED | OUTPATIENT
Start: 2023-01-21 | End: 2023-01-24 | Stop reason: HOSPADM

## 2023-01-21 RX ADMIN — KETOROLAC TROMETHAMINE 30 MG: 30 INJECTION, SOLUTION INTRAMUSCULAR; INTRAVENOUS at 14:55

## 2023-01-21 RX ADMIN — KETOROLAC TROMETHAMINE 30 MG: 30 INJECTION, SOLUTION INTRAMUSCULAR; INTRAVENOUS at 05:21

## 2023-01-21 RX ADMIN — OXYCODONE HYDROCHLORIDE 15 MG: 15 TABLET ORAL at 12:08

## 2023-01-21 RX ADMIN — ACETAMINOPHEN 1000 MG: 500 TABLET ORAL at 11:34

## 2023-01-21 RX ADMIN — METHOCARBAMOL 750 MG: 750 TABLET, FILM COATED ORAL at 05:21

## 2023-01-21 RX ADMIN — QUETIAPINE FUMARATE 50 MG: 25 TABLET ORAL at 21:47

## 2023-01-21 RX ADMIN — OXYCODONE HYDROCHLORIDE 15 MG: 15 TABLET ORAL at 21:47

## 2023-01-21 RX ADMIN — SERTRALINE 150 MG: 100 TABLET, FILM COATED ORAL at 16:44

## 2023-01-21 RX ADMIN — CEFAZOLIN 2 G: 2 INJECTION, POWDER, FOR SOLUTION INTRAMUSCULAR; INTRAVENOUS at 08:02

## 2023-01-21 RX ADMIN — POLYETHYLENE GLYCOL 3350 1 PACKET: 17 POWDER, FOR SOLUTION ORAL at 05:20

## 2023-01-21 RX ADMIN — DOCUSATE SODIUM 100 MG: 100 CAPSULE, LIQUID FILLED ORAL at 05:21

## 2023-01-21 RX ADMIN — OXYCODONE HYDROCHLORIDE 15 MG: 15 TABLET ORAL at 14:59

## 2023-01-21 RX ADMIN — KETOROLAC TROMETHAMINE 30 MG: 30 INJECTION, SOLUTION INTRAMUSCULAR; INTRAVENOUS at 21:47

## 2023-01-21 RX ADMIN — OXYCODONE HYDROCHLORIDE 15 MG: 15 TABLET ORAL at 18:35

## 2023-01-21 RX ADMIN — ACETAMINOPHEN 1000 MG: 500 TABLET ORAL at 16:44

## 2023-01-21 RX ADMIN — CALCIUM CARBONATE 500 MG: 500 TABLET, CHEWABLE ORAL at 05:21

## 2023-01-21 RX ADMIN — CEFAZOLIN 2 G: 2 INJECTION, POWDER, FOR SOLUTION INTRAMUSCULAR; INTRAVENOUS at 21:55

## 2023-01-21 RX ADMIN — ACETAMINOPHEN 1000 MG: 500 TABLET ORAL at 05:21

## 2023-01-21 RX ADMIN — CALCIUM CARBONATE 500 MG: 500 TABLET, CHEWABLE ORAL at 16:44

## 2023-01-21 RX ADMIN — CEFAZOLIN 2 G: 2 INJECTION, POWDER, FOR SOLUTION INTRAMUSCULAR; INTRAVENOUS at 14:59

## 2023-01-21 RX ADMIN — ACETAMINOPHEN 1000 MG: 500 TABLET ORAL at 22:39

## 2023-01-21 ASSESSMENT — PAIN DESCRIPTION - PAIN TYPE
TYPE: ACUTE PAIN;SURGICAL PAIN

## 2023-01-21 NOTE — CARE PLAN
The patient is Stable - Low risk of patient condition declining or worsening    Shift Goals  Clinical Goals: pain control, mobility, safety, monitor vitals/ PCA, ween oxygen  Patient Goals: pain control, comfort, rest  Family Goals: not present at bedside    Progress made toward(s) clinical / shift goals:      Problem: Pain - Standard  Goal: Alleviation of pain or a reduction in pain to the patient’s comfort goal  Outcome: Progressing  Note: Pt states pain is being managed by current medication regimen. Medications administered per MAR, ice pack applied, pillows for comfort and repositioning.      Problem: Fall Risk  Goal: Patient will remain free from falls  Outcome: Progressing  Note: Bed is locked and in lowest position, treaded socks on, bed alarm on, DME out of sight, call light and belongings within reach, pt calls appropriately for assistance, hourly rounding in place.

## 2023-01-21 NOTE — PROGRESS NOTES
Progress Note               Author: Jama Werner M.D. Date & Time created: 2023  10:25 AM     Interval History:  Pt had a fall which was witnessed by his nurse    Review of Systems:  ROS    Physical Exam:  Physical Exam  Musculoskeletal:      Comments: Cervical wound dressing completely dry  Sensation and motor at preoperative baseline  With c4 to c8 sensory and motor deficits unchanged       Labs:          No results for input(s): SODIUM, POTASSIUM, CHLORIDE, CO2, BUN, CREATININE, MAGNESIUM, PHOSPHORUS, CALCIUM in the last 72 hours.  No results for input(s): ALTSGPT, ASTSGOT, ALKPHOSPHAT, TBILIRUBIN, DBILIRUBIN, GAMMAGT, AMYLASE, LIPASE, ALB, PREALBUMIN, GLUCOSE in the last 72 hours.  Recent Labs     23  1347 23  0821   HEMOGLOBIN 12.8 11.3*         Hemodynamics:  Temp (24hrs), Av.6 °C (97.8 °F), Min:36.3 °C (97.3 °F), Max:36.9 °C (98.4 °F)  Temperature: 36.4 °C (97.5 °F)  Pulse  Av.6  Min: 90  Max: 118   Blood Pressure: (!) 133/90     Respiratory:    Respiration: 20, Pulse Oximetry: 91 %           Fluids:    Intake/Output Summary (Last 24 hours) at 2023 1025  Last data filed at 2023 0700  Gross per 24 hour   Intake 402.1 ml   Output 0 ml   Net 402.1 ml        GI/Nutrition:  Orders Placed This Encounter   Procedures    Diet Order Diet: Regular     Standing Status:   Standing     Number of Occurrences:   1     Order Specific Question:   Diet:     Answer:   Regular [1]     Medical Decision Making, by Problem:  Active Hospital Problems    Diagnosis     *S/P cervical spinal fusion [Z98.1]        Plan:  Pt  reports no anatomical areas with persistent pain after fall. No focal pain so no xrays necessary    Dc pca and start po oxycodone with breakthrough demerol for pain. I discussed this pain regimen with inpatient pharmacist.    Scd for dvt prophylaxis  Cont pt/ot  No dressing change yet as pt has had no drainage on dressing    Once pt has passed physical therapy and pain  controlled on po pain medications will dc home.            Quality-Core Measures

## 2023-01-21 NOTE — CARE PLAN
The patient is Stable - Low risk of patient condition declining or worsening    Shift Goals  Clinical Goals: pain control, mobility, safety,  Patient Goals: pain control  Family Goals: not present at bedside    Progress made toward(s) clinical / shift goals:      Problem: Pain - Standard  Goal: Alleviation of pain or a reduction in pain to the patient’s comfort goal  Note: Rates pain 7/10. PCA in use      Problem: Fall Risk  Goal: Patient will remain free from falls  Note: Rated low fall risk at start of shift. Patient ambulated to bathroom when patients legs gave out and went to the ground. Jernigan Franco score re-evaluated, and rated as high risk. Bed alarm and fall precautions in place. Educated patient to call prior to ambulating.      Problem: Knowledge Deficit - Standard  Goal: Patient and family/care givers will demonstrate understanding of plan of care, disease process/condition, diagnostic tests and medications  Note: POC discussed with patient. Patient alert and oriented x4, c/o pain.        Patient is not progressing towards the following goals:

## 2023-01-21 NOTE — THERAPY
Physical Therapy   Initial Evaluation     Patient Name: Yolanda Reed  Age:  62 y.o., Sex:  female  Medical Record #: [id number]  Today's Date: 1/20/2023     Precautions  Precautions: Spinal / Back Precautions  (elvira OTERO PRN)    Assessment  Patient is 62 y.o. female presenting to physical therapy s/p C4-T1 spinal fusion with C3 laminoplasty; [place] PRN. Pt receptive to PT evaluation as she needed to use the restroom. Very impulsive with mobility and required cues to slow mobility for safety. Did not perform log roll OOB, utilized bed features and reiterated neutral spine precautions. Pt tolerated ambulation x12 ft in room w/o AD, no overt LOB and refused ambulation outside room at this time. Pt will benefit from acute PT interventions to address impairments noted below.       Plan    Physical Therapy Initial Treatment Plan   Treatment Plan : Bed Mobility, Equipment, Gait Training, Neuro Re-Education / Balance, Self Care / Home Evaluation, Stair Training, Therapeutic Activities, Therapeutic Exercise  Treatment Frequency: 5 Times per Week  Duration: Until Therapy Goals Met       Discharge Recommendations: Recommend home health for continued physical therapy services (as of today with PCA in use. Disposition may change based on pain.        01/20/23 1613   Pain 0 - 10 Group   Therapist Pain Assessment During Activity;Nurse Notified  (pain not rated, has PCA in use)   Prior Living Situation   Prior Services None   Housing / Facility 1 Story Apartment / Condo   Steps Into Home 4   Steps In Home 0   Rail Both Rail (Steps into Home)   Equipment Owned Front-Wheel Walker;Single Point Cane   Lives with - Patient's Self Care Capacity Significant Other   Comments lives in converted garage apt with SO who works graveyard shift   Prior Level of Functional Mobility   Bed Mobility Independent   Transfer Status Independent   Ambulation Independent   Distance Ambulation (Feet)   (community)   Assistive Devices Used Single Point Cane    Stairs Independent   Cognition    Cognition / Consciousness X   Level of Consciousness Alert   Safety Awareness Impaired;Impulsive   Attention Impaired   Comments very impulsive with mobility, poor awareness of spinal precautions   Active ROM Lower Body    Active ROM Lower Body  WDL   Strength Lower Body   Lower Body Strength  WDL   Comments adequate for functional mobility   Balance Assessment   Sitting Balance (Static) Fair   Sitting Balance (Dynamic) Fair   Standing Balance (Static) Fair   Standing Balance (Dynamic) Fair   Weight Shift Sitting Fair   Weight Shift Standing Fair   Comments w/o AD   Bed Mobility    Supine to Sit Supervised   Sit to Supine Supervised   Scooting Supervised   Comments HOB elevated, railing used. impulsive due to need to use restroom   Gait Analysis   Gait Level Of Assist Supervised   Assistive Device None   Distance (Feet) 15  (EOB <> bathroom)   Deviation   (quick gait speed, impulsive to reach bathroom)   # of Stairs Climbed 0   Weight Bearing Status no restrictions   Functional Mobility   Sit to Stand Supervised   Bed, Chair, Wheelchair Transfer Supervised   Toilet Transfers Supervised   Transfer Method Stand Step   How much difficulty does the patient currently have...   Turning over in bed (including adjusting bedclothes, sheets and blankets)? 3   Sitting down on and standing up from a chair with arms (e.g., wheelchair, bedside commode, etc.) 3   Moving from lying on back to sitting on the side of the bed? 3   How much help from another person does the patient currently need...   Moving to and from a bed to a chair (including a wheelchair)? 3   Need to walk in a hospital room? 3   Climbing 3-5 steps with a railing? 3   6 clicks Mobility Score 18   Activity Tolerance   Sitting in Chair refused 2/2 pain   Sitting Edge of Bed < 2 min   Standing 3 min total   Patient / Family Goals    Patient / Family Goal #1 to return home   Short Term Goals    Short Term Goal # 1 pt will perform  bed mobility with log roll and no bed features with SPV in 6 visits   Short Term Goal # 2 pt will ambulate > 150 ft with LRAD and SPV to improve mobility independence in 6 visits   Short Term Goal # 3 pt will negotiate 4 steps with railing and LRAD with SPV to access home environment in 6 visits   Education Group   Education Provided Role of Physical Therapist;Cervical Precautions   Cervical Precautions Patient Response Patient;Acceptance;Explanation;Reinforcement Needed   Role of Physical Therapist Patient Response Patient;Acceptance;Explanation;Verbal Demonstration   Physical Therapy Initial Treatment Plan    Treatment Plan  Bed Mobility;Equipment;Gait Training;Neuro Re-Education / Balance;Self Care / Home Evaluation;Stair Training;Therapeutic Activities;Therapeutic Exercise   Treatment Frequency 5 Times per Week   Duration Until Therapy Goals Met   Problem List    Problems Pain;Impaired Bed Mobility;Impaired Transfers;Impaired Ambulation;Functional Strength Deficit;Impaired Balance;Decreased Activity Tolerance;Limited Knowledge of Post-Op Precautions   Anticipated Discharge Equipment and Recommendations   Discharge Recommendations Recommend home health for continued physical therapy services   Interdisciplinary Plan of Care Collaboration   IDT Collaboration with  Nursing   Patient Position at End of Therapy In Bed;Bed Alarm On;Call Light within Reach;Tray Table within Reach;Phone within Reach   Collaboration Comments aware of limited PT eval   Session Information   Date / Session Number  1/20-1 (1/5, 1/26)

## 2023-01-21 NOTE — PROGRESS NOTES
"0730- Ivette RN and this RN completed shift change report at bedside. During report, patient stated she had to use bathroom. Assisted patient to bathroom, gait steady. No DME needed.     0800- Patient needed to use bathroom. This RN and nursing student in room assisting patient to bathroom. When walking back from bathroom, patients \"legs gave out\" and she landed on the ground to her knees. Patient got up and stated that she was \"fine\". Patient denies any injury or pain from fall. MD and pharmacy notified.   "

## 2023-01-22 PROCEDURE — 700105 HCHG RX REV CODE 258: Performed by: ORTHOPAEDIC SURGERY

## 2023-01-22 PROCEDURE — 700111 HCHG RX REV CODE 636 W/ 250 OVERRIDE (IP): Performed by: ORTHOPAEDIC SURGERY

## 2023-01-22 PROCEDURE — 770001 HCHG ROOM/CARE - MED/SURG/GYN PRIV*

## 2023-01-22 PROCEDURE — A9270 NON-COVERED ITEM OR SERVICE: HCPCS | Performed by: ORTHOPAEDIC SURGERY

## 2023-01-22 PROCEDURE — 700102 HCHG RX REV CODE 250 W/ 637 OVERRIDE(OP): Performed by: ORTHOPAEDIC SURGERY

## 2023-01-22 RX ORDER — CALCIUM CARBONATE 500 MG/1
1000 TABLET, CHEWABLE ORAL 3 TIMES DAILY PRN
Status: DISCONTINUED | OUTPATIENT
Start: 2023-01-22 | End: 2023-01-24 | Stop reason: HOSPADM

## 2023-01-22 RX ADMIN — OXYCODONE HYDROCHLORIDE 15 MG: 15 TABLET ORAL at 08:27

## 2023-01-22 RX ADMIN — CALCIUM CARBONATE 500 MG: 500 TABLET, CHEWABLE ORAL at 04:10

## 2023-01-22 RX ADMIN — QUETIAPINE FUMARATE 50 MG: 25 TABLET ORAL at 21:46

## 2023-01-22 RX ADMIN — CEFAZOLIN 2 G: 2 INJECTION, POWDER, FOR SOLUTION INTRAMUSCULAR; INTRAVENOUS at 06:30

## 2023-01-22 RX ADMIN — CALCIUM CARBONATE 500 MG: 500 TABLET, CHEWABLE ORAL at 16:41

## 2023-01-22 RX ADMIN — ACETAMINOPHEN 1000 MG: 500 TABLET ORAL at 16:41

## 2023-01-22 RX ADMIN — ACETAMINOPHEN 1000 MG: 500 TABLET ORAL at 23:52

## 2023-01-22 RX ADMIN — KETOROLAC TROMETHAMINE 30 MG: 30 INJECTION, SOLUTION INTRAMUSCULAR; INTRAVENOUS at 21:46

## 2023-01-22 RX ADMIN — CEFAZOLIN 2 G: 2 INJECTION, POWDER, FOR SOLUTION INTRAMUSCULAR; INTRAVENOUS at 23:43

## 2023-01-22 RX ADMIN — ACETAMINOPHEN 1000 MG: 500 TABLET ORAL at 12:10

## 2023-01-22 RX ADMIN — SERTRALINE 150 MG: 100 TABLET, FILM COATED ORAL at 16:40

## 2023-01-22 RX ADMIN — ONDANSETRON 4 MG: 2 INJECTION INTRAMUSCULAR; INTRAVENOUS at 21:53

## 2023-01-22 RX ADMIN — CALCIUM CARBONATE 1000 MG: 500 TABLET, CHEWABLE ORAL at 14:08

## 2023-01-22 RX ADMIN — OXYCODONE HYDROCHLORIDE 15 MG: 15 TABLET ORAL at 16:41

## 2023-01-22 RX ADMIN — OXYCODONE HYDROCHLORIDE 15 MG: 15 TABLET ORAL at 12:10

## 2023-01-22 RX ADMIN — OXYCODONE HYDROCHLORIDE 15 MG: 15 TABLET ORAL at 04:11

## 2023-01-22 RX ADMIN — KETOROLAC TROMETHAMINE 30 MG: 30 INJECTION, SOLUTION INTRAMUSCULAR; INTRAVENOUS at 12:10

## 2023-01-22 RX ADMIN — CEFAZOLIN 2 G: 2 INJECTION, POWDER, FOR SOLUTION INTRAMUSCULAR; INTRAVENOUS at 14:08

## 2023-01-22 RX ADMIN — OXYCODONE HYDROCHLORIDE 15 MG: 15 TABLET ORAL at 20:03

## 2023-01-22 RX ADMIN — KETOROLAC TROMETHAMINE 30 MG: 30 INJECTION, SOLUTION INTRAMUSCULAR; INTRAVENOUS at 04:15

## 2023-01-22 ASSESSMENT — PAIN DESCRIPTION - PAIN TYPE
TYPE: ACUTE PAIN;SURGICAL PAIN
TYPE: ACUTE PAIN;SURGICAL PAIN
TYPE: ACUTE PAIN
TYPE: ACUTE PAIN
TYPE: ACUTE PAIN;SURGICAL PAIN

## 2023-01-22 NOTE — CARE PLAN
The patient is Stable - Low risk of patient condition declining or worsening    Shift Goals  Clinical Goals: Pain control, Mobility, Safety  Patient Goals: Pain control  Family Goals: Not present    Progress made toward(s) clinical / shift goals:    Problem: Pain - Standard  Goal: Alleviation of pain or a reduction in pain to the patient’s comfort goal  Outcome: Progressing  Note: Patient educated on pain scale and to notify RN of pain. Medicated per MAR and repositioned       Problem: Fall Risk  Goal: Patient will remain free from falls  Outcome: Progressing     Problem: Knowledge Deficit - Standard  Goal: Patient and family/care givers will demonstrate understanding of plan of care, disease process/condition, diagnostic tests and medications  Outcome: Progressing  Note: Plan of care discussed, verbalized understanding. Questions answered          Patient is not progressing towards the following goals:

## 2023-01-22 NOTE — PROGRESS NOTES
Pt complains of dyspepsiaProgress Note               Author: Jama Werner M.D. Date & Time created: 2023  12:57 PM     Interval History:  Pt complains of dyspepsia    Review of Systems:  ROS    Physical Exam:  Physical Exam  Neurological:      Comments: Sensation and motor at baseline no change  Dressing dry but tattered       Labs:          No results for input(s): SODIUM, POTASSIUM, CHLORIDE, CO2, BUN, CREATININE, MAGNESIUM, PHOSPHORUS, CALCIUM in the last 72 hours.  No results for input(s): ALTSGPT, ASTSGOT, ALKPHOSPHAT, TBILIRUBIN, DBILIRUBIN, GAMMAGT, AMYLASE, LIPASE, ALB, PREALBUMIN, GLUCOSE in the last 72 hours.  Recent Labs     23  1347 23  0821   HEMOGLOBIN 12.8 11.3*         Hemodynamics:  Temp (24hrs), Av.7 °C (98.1 °F), Min:36.4 °C (97.5 °F), Max:37.1 °C (98.8 °F)  Temperature: 37 °C (98.6 °F)  Pulse  Av.5  Min: 87  Max: 118   Blood Pressure: (!) 148/79     Respiratory:    Respiration: 15, Pulse Oximetry: 95 %           Fluids:    Intake/Output Summary (Last 24 hours) at 2023 1257  Last data filed at 2023 2239  Gross per 24 hour   Intake 914.44 ml   Output --   Net 914.44 ml        GI/Nutrition:  Orders Placed This Encounter   Procedures    Diet Order Diet: Regular     Standing Status:   Standing     Number of Occurrences:   1     Order Specific Question:   Diet:     Answer:   Regular [1]     Medical Decision Making, by Problem:  Active Hospital Problems    Diagnosis     *S/P cervical spinal fusion [Z98.1]        Plan:  Tums prn dyspepsia  Change dressing  I did face to face and order for home health physical therapy  Scd for dvt prophylaxis  Possibly dc home tuesday      Quality-Core Measures

## 2023-01-22 NOTE — FACE TO FACE
Face to Face Supporting Documentation - Home Health    The encounter with this patient was in whole or in part the primary reason for home health admission.    Date of encounter:   Patient:                    MRN:                       YOB: 2022  Yolanda Reed  [id number]  1960     Home health to see patient for:  Physical Therapy evaluation and treatment    Skilled need for:  Surgical Aftercare s/p cervical fusion    Skilled nursing interventions to include:  Comment: physical therapy    Homebound status evidenced by:  Need the aid of supportive devices such as crutches, canes, wheelchairs or walkers. Leaving home requires a considerable and taxing effort. There is a normal inability to leave the home.    Community Physician to provide follow up care: Suhas Kasper M.D.     Optional Interventions? No      I certify the face to face encounter for this home health care referral meets the CMS requirements and the encounter/clinical assessment with the patient was, in whole, or in part, for the medical condition(s) listed above, which is the primary reason for home health care. Based on my clinical findings: the service(s) are medically necessary, support the need for home health care, and the homebound criteria are met.  I certify that this patient has had a face to face encounter by myself.  Jama Werner M.D. - NPI: [id number]

## 2023-01-22 NOTE — CARE PLAN
The patient is Stable - Low risk of patient condition declining or worsening    Shift Goals  Clinical Goals: Pain Control, Mobility, Safety  Patient Goals: Pain Control  Family Goals: Not present    Progress made toward(s) clinical / shift goals:      Problem: Pain - Standard  Goal: Alleviation of pain or a reduction in pain to the patient’s comfort goal  Note: Rating pain 7/10. PRN oxy 15 given per MAR.      Problem: Fall Risk  Goal: Patient will remain free from falls  Note: High fall risk. Patient shuffles while ambulating. Educated patient to use call light to alert staff of needs. Patient verbalized understanding.      Problem: Knowledge Deficit - Standard  Goal: Patient and family/care givers will demonstrate understanding of plan of care, disease process/condition, diagnostic tests and medications  Note: POC discussed with patient. Attempting to wean patient off 1L of oxygen. Educated on use of incentive spirometer.        Patient is not progressing towards the following goals:

## 2023-01-23 PROCEDURE — 770001 HCHG ROOM/CARE - MED/SURG/GYN PRIV*

## 2023-01-23 PROCEDURE — 700102 HCHG RX REV CODE 250 W/ 637 OVERRIDE(OP): Performed by: ORTHOPAEDIC SURGERY

## 2023-01-23 PROCEDURE — 97530 THERAPEUTIC ACTIVITIES: CPT

## 2023-01-23 PROCEDURE — 700111 HCHG RX REV CODE 636 W/ 250 OVERRIDE (IP): Performed by: ORTHOPAEDIC SURGERY

## 2023-01-23 PROCEDURE — A9270 NON-COVERED ITEM OR SERVICE: HCPCS | Performed by: ORTHOPAEDIC SURGERY

## 2023-01-23 PROCEDURE — 700105 HCHG RX REV CODE 258: Performed by: ORTHOPAEDIC SURGERY

## 2023-01-23 RX ORDER — ONDANSETRON 4 MG/1
4 TABLET, ORALLY DISINTEGRATING ORAL EVERY 4 HOURS PRN
Qty: 60 TABLET | Refills: 1 | Status: SHIPPED | OUTPATIENT
Start: 2023-01-23 | End: 2023-02-08

## 2023-01-23 RX ORDER — OXYCODONE HYDROCHLORIDE 15 MG/1
15 TABLET ORAL
Qty: 56 TABLET | Refills: 0 | Status: SHIPPED | OUTPATIENT
Start: 2023-01-23 | End: 2023-01-31

## 2023-01-23 RX ORDER — METHOCARBAMOL 750 MG/1
750 TABLET, FILM COATED ORAL EVERY 8 HOURS PRN
Qty: 42 TABLET | Refills: 1 | Status: SHIPPED | OUTPATIENT
Start: 2023-01-23 | End: 2023-02-07

## 2023-01-23 RX ADMIN — OXYCODONE HYDROCHLORIDE 15 MG: 15 TABLET ORAL at 13:19

## 2023-01-23 RX ADMIN — DOCUSATE SODIUM 100 MG: 100 CAPSULE, LIQUID FILLED ORAL at 16:56

## 2023-01-23 RX ADMIN — QUETIAPINE FUMARATE 50 MG: 25 TABLET ORAL at 20:39

## 2023-01-23 RX ADMIN — OXYCODONE HYDROCHLORIDE 15 MG: 15 TABLET ORAL at 16:56

## 2023-01-23 RX ADMIN — ONDANSETRON 4 MG: 4 TABLET, ORALLY DISINTEGRATING ORAL at 15:27

## 2023-01-23 RX ADMIN — CALCIUM CARBONATE 1000 MG: 500 TABLET, CHEWABLE ORAL at 20:46

## 2023-01-23 RX ADMIN — SERTRALINE 150 MG: 100 TABLET, FILM COATED ORAL at 16:55

## 2023-01-23 RX ADMIN — CALCIUM CARBONATE 500 MG: 500 TABLET, CHEWABLE ORAL at 04:45

## 2023-01-23 RX ADMIN — CEFAZOLIN 2 G: 2 INJECTION, POWDER, FOR SOLUTION INTRAMUSCULAR; INTRAVENOUS at 06:31

## 2023-01-23 RX ADMIN — ACETAMINOPHEN 1000 MG: 500 TABLET ORAL at 04:45

## 2023-01-23 RX ADMIN — OXYCODONE HYDROCHLORIDE 15 MG: 15 TABLET ORAL at 04:45

## 2023-01-23 RX ADMIN — OXYCODONE HYDROCHLORIDE 15 MG: 15 TABLET ORAL at 20:39

## 2023-01-23 RX ADMIN — ACETAMINOPHEN 1000 MG: 500 TABLET ORAL at 16:55

## 2023-01-23 RX ADMIN — KETOROLAC TROMETHAMINE 30 MG: 30 INJECTION, SOLUTION INTRAMUSCULAR; INTRAVENOUS at 04:45

## 2023-01-23 RX ADMIN — SENNOSIDES AND DOCUSATE SODIUM 1 TABLET: 50; 8.6 TABLET ORAL at 20:39

## 2023-01-23 RX ADMIN — ONDANSETRON 4 MG: 4 TABLET, ORALLY DISINTEGRATING ORAL at 20:46

## 2023-01-23 RX ADMIN — CALCIUM CARBONATE 500 MG: 500 TABLET, CHEWABLE ORAL at 16:55

## 2023-01-23 ASSESSMENT — GAIT ASSESSMENTS
DISTANCE (FEET): 100
ASSISTIVE DEVICE: FRONT WHEEL WALKER
GAIT LEVEL OF ASSIST: STANDBY ASSIST

## 2023-01-23 ASSESSMENT — COGNITIVE AND FUNCTIONAL STATUS - GENERAL
MOBILITY SCORE: 18
STANDING UP FROM CHAIR USING ARMS: A LITTLE
MOVING FROM LYING ON BACK TO SITTING ON SIDE OF FLAT BED: A LITTLE
CLIMB 3 TO 5 STEPS WITH RAILING: A LITTLE
SUGGESTED CMS G CODE MODIFIER MOBILITY: CK
WALKING IN HOSPITAL ROOM: A LITTLE
MOVING TO AND FROM BED TO CHAIR: A LITTLE
TURNING FROM BACK TO SIDE WHILE IN FLAT BAD: A LITTLE

## 2023-01-23 ASSESSMENT — PAIN DESCRIPTION - PAIN TYPE: TYPE: ACUTE PAIN

## 2023-01-23 NOTE — CARE PLAN
The patient is Stable - Low risk of patient condition declining or worsening    Shift Goals  Clinical Goals: pain control. mobilize  Patient Goals: Pain Control  Family Goals: Not present    Progress made toward(s) clinical / shift goals:  c/o back pain. Medicated with oxy 15mg prn. Ambulating frequently    Patient is not progressing towards the following goals:    Problem: Pain - Standard  Goal: Alleviation of pain or a reduction in pain to the patient’s comfort goal  Outcome: Progressing     Problem: Fall Risk  Goal: Patient will remain free from falls  Outcome: Progressing

## 2023-01-23 NOTE — THERAPY
Physical Therapy   Daily Treatment     Patient Name: Yolanda Reed  Age:  62 y.o., Sex:  female  Medical Record #: [id number]  Today's Date: 1/23/2023     Precautions: Spinal / Back Precautions   Comments: Linda lyn, impulsive    Assessment    Pt seen for PT tx session. Per chart review, pt had fall over the weekend. Pt demonstrates improved fxnl mob compared to initial evaluation however remains impulsive. She is quick to transition from supine>sit EOB via sitting straight up vs utilizing log roll technique. Today she was able to progress distance x100ft and negotiate steps with use of HR. Pt encouraged to utilize walker upon return home. Pt unable to sit up in chair at end of session 2/2 increase UE pain from being in upright position. PT to cont to follow.     Plan    Physical Therapy Treatment Plan: Continue Current Treatment Plan    DC Equipment Recommendations: None  Discharge Recommendations: Recommend home health for continued physical therapy services     Objective    Cognition    Cognition / Consciousness X   Level of Consciousness Alert   Safety Awareness Impaired;Impulsive   New Learning Impaired   Attention Impaired   Comments pt remains impulsive with quick transitional mvts, decreased understanding/awareness of c-spine precautions   Active ROM Lower Body    Active ROM Lower Body  WDL   Strength Lower Body   Lower Body Strength  WDL   Other Treatments   Other Treatments Provided Educated on trialing support from pillows while sitting to improve tolerance   Balance   Sitting Balance (Static) Fair   Sitting Balance (Dynamic) Fair   Standing Balance (Static) Fair -   Standing Balance (Dynamic) Fair -   Weight Shift Sitting Fair   Weight Shift Standing Fair   Skilled Intervention Verbal Cuing;Sequencing;Postural Facilitation   Comments w/ FWW   Bed Mobility    Supine to Sit Standby Assist   Sit to Supine Standby Assist   Rolling Standby Assist   Skilled Intervention Verbal  Cuing;Sequencing;Postural Facilitation   Comments pt not sequencing log roll for bed mob, sitting straight up frequently   Gait Analysis   Gait Level Of Assist Standby Assist   Assistive Device Front Wheel Walker   Distance (Feet) 100   # of Times Distance was Traveled 1   Deviation   (fast carine)   # of Stairs Climbed 2   Level of Assist with Stairs Standby Assist  (single HR support)   Weight Bearing Status no restrictions   Skilled Intervention Verbal Cuing;Sequencing;Postural Facilitation   Functional Mobility   Sit to Stand Supervised   Bed, Chair, Wheelchair Transfer Supervised   Transfer Method Stand Step   Skilled Intervention Verbal Cuing   Short Term Goals    Short Term Goal # 1 pt will perform bed mobility with log roll and no bed features with SPV in 6 visits   Goal Outcome # 1 Progressing slower than expected   Short Term Goal # 2 pt will ambulate > 150 ft with LRAD and SPV to improve mobility independence in 6 visits   Goal Outcome # 2 Progressing as expected   Short Term Goal # 3 pt will negotiate 4 steps with railing and LRAD with SPV to access home environment in 6 visits   Goal Outcome # 3 Progressing as expected

## 2023-01-24 ENCOUNTER — PHARMACY VISIT (OUTPATIENT)
Dept: PHARMACY | Facility: MEDICAL CENTER | Age: 63
End: 2023-01-24
Payer: COMMERCIAL

## 2023-01-24 VITALS
WEIGHT: 147.27 LBS | RESPIRATION RATE: 17 BRPM | HEART RATE: 96 BPM | BODY MASS INDEX: 28.91 KG/M2 | SYSTOLIC BLOOD PRESSURE: 109 MMHG | HEIGHT: 60 IN | TEMPERATURE: 99 F | DIASTOLIC BLOOD PRESSURE: 62 MMHG | OXYGEN SATURATION: 96 %

## 2023-01-24 PROCEDURE — A9270 NON-COVERED ITEM OR SERVICE: HCPCS | Performed by: ORTHOPAEDIC SURGERY

## 2023-01-24 PROCEDURE — 97530 THERAPEUTIC ACTIVITIES: CPT

## 2023-01-24 PROCEDURE — RXMED WILLOW AMBULATORY MEDICATION CHARGE: Performed by: ORTHOPAEDIC SURGERY

## 2023-01-24 PROCEDURE — 700111 HCHG RX REV CODE 636 W/ 250 OVERRIDE (IP): Performed by: ORTHOPAEDIC SURGERY

## 2023-01-24 PROCEDURE — 700102 HCHG RX REV CODE 250 W/ 637 OVERRIDE(OP): Performed by: ORTHOPAEDIC SURGERY

## 2023-01-24 RX ADMIN — ACETAMINOPHEN 1000 MG: 500 TABLET ORAL at 04:18

## 2023-01-24 RX ADMIN — ONDANSETRON 4 MG: 4 TABLET, ORALLY DISINTEGRATING ORAL at 03:44

## 2023-01-24 RX ADMIN — OXYCODONE HYDROCHLORIDE 15 MG: 15 TABLET ORAL at 11:09

## 2023-01-24 RX ADMIN — OXYCODONE HYDROCHLORIDE 15 MG: 15 TABLET ORAL at 14:59

## 2023-01-24 RX ADMIN — CALCIUM CARBONATE 1000 MG: 500 TABLET, CHEWABLE ORAL at 03:45

## 2023-01-24 RX ADMIN — DOCUSATE SODIUM 100 MG: 100 CAPSULE, LIQUID FILLED ORAL at 04:19

## 2023-01-24 RX ADMIN — METHOCARBAMOL 750 MG: 750 TABLET, FILM COATED ORAL at 00:04

## 2023-01-24 RX ADMIN — OXYCODONE HYDROCHLORIDE 15 MG: 15 TABLET ORAL at 03:45

## 2023-01-24 RX ADMIN — METHOCARBAMOL 750 MG: 750 TABLET, FILM COATED ORAL at 14:05

## 2023-01-24 RX ADMIN — OXYCODONE HYDROCHLORIDE 15 MG: 15 TABLET ORAL at 07:55

## 2023-01-24 RX ADMIN — ACETAMINOPHEN 1000 MG: 500 TABLET ORAL at 11:09

## 2023-01-24 RX ADMIN — ACETAMINOPHEN 1000 MG: 500 TABLET ORAL at 00:02

## 2023-01-24 RX ADMIN — ONDANSETRON 4 MG: 4 TABLET, ORALLY DISINTEGRATING ORAL at 07:55

## 2023-01-24 ASSESSMENT — COGNITIVE AND FUNCTIONAL STATUS - GENERAL
TURNING FROM BACK TO SIDE WHILE IN FLAT BAD: A LITTLE
MOBILITY SCORE: 18
WALKING IN HOSPITAL ROOM: A LITTLE
MOVING TO AND FROM BED TO CHAIR: A LITTLE
SUGGESTED CMS G CODE MODIFIER MOBILITY: CK
CLIMB 3 TO 5 STEPS WITH RAILING: A LITTLE
MOVING FROM LYING ON BACK TO SITTING ON SIDE OF FLAT BED: A LITTLE
STANDING UP FROM CHAIR USING ARMS: A LITTLE

## 2023-01-24 ASSESSMENT — GAIT ASSESSMENTS
DEVIATION: ATAXIC;INCREASED BASE OF SUPPORT
GAIT LEVEL OF ASSIST: STANDBY ASSIST
ASSISTIVE DEVICE: FRONT WHEEL WALKER
DISTANCE (FEET): 100

## 2023-01-24 ASSESSMENT — PAIN DESCRIPTION - PAIN TYPE
TYPE: SURGICAL PAIN
TYPE: SURGICAL PAIN

## 2023-01-24 NOTE — DISCHARGE PLANNING
Case Management Discharge Planning    Admission Date: 1/19/2023  GMLOS: 2.6  ALOS: 5    6-Clicks ADL Score: 16  6-Clicks Mobility Score: 18  PT and/or OT Eval ordered: Yes  Post-acute Referrals Ordered: Yes  Post-acute Choice Obtained: Yes  Has referral(s) been sent to post-acute provider:  Yes      Anticipated Discharge Dispo: Discharge Disposition: D/T to SNF with Medicare cert in anticipation of skilled care (03)    DME Needed: No    Action(s) Taken: Updated Provider/Nurse on Discharge Plan    Pt has medicaid FFS. Per [facility] pt cannot obtain  services due to insurance coverage.     Pt accepted at [facility].     Escalations Completed: None    Medically Clear: No    Next Steps: LSW will follow and assist with any additional dc needs.     Barriers to Discharge: Medical clearance    Is the patient up for discharge tomorrow: No

## 2023-01-24 NOTE — DISCHARGE INSTRUCTIONS
Incision Care, Adult  An incision is a surgical cut that is made through your skin. Most incisions are closed after surgery. Your incision may be closed with stitches (sutures), staples, skin glue, or adhesive strips. You may need to return to your health care provider to have sutures or staples removed. This may occur several days to several weeks after your surgery. The incision needs to be cared for properly to prevent infection.  How to care for your incision  Incision care    Follow instructions from your health care provider about how to take care of your incision. Make sure you:  Wash your hands with soap and water before you change the bandage (dressing). If soap and water are not available, use hand .  Change your dressing as told by your health care provider.  Leave sutures, skin glue, or adhesive strips in place. These skin closures may need to stay in place for 2 weeks or longer. If adhesive strip edges start to loosen and curl up, you may trim the loose edges. Do not remove adhesive strips completely unless your health care provider tells you to do that.  Check your incision area every day for signs of infection. Check for:  More redness, swelling, or pain.  More fluid or blood.  Warmth.  Pus or a bad smell.  Ask your health care provider how to clean the incision. This may include:  Using mild soap and water.  Using a clean towel to pat the incision dry after cleaning it.  Applying a cream or ointment. Do this only as told by your health care provider.  Covering the incision with a clean dressing.  Ask your health care provider when you can leave the incision uncovered.  Do not take baths, swim, or use a hot tub until your health care provider approves. Ask your health care provider if you can take showers. You may only be allowed to take sponge baths for bathing.  Medicines  If you were prescribed an antibiotic medicine, cream, or ointment, take or apply the antibiotic as told by your health  care provider. Do not stop taking or applying the antibiotic even if your condition improves.  Take over-the-counter and prescription medicines only as told by your health care provider.  General instructions  Limit movement around your incision to improve healing.  Avoid straining, lifting, or exercise for the first month, or for as long as told by your health care provider.  Follow instructions from your health care provider about returning to your normal activities.  Ask your health care provider what activities are safe.  Protect your incision from the sun when you are outside for the first 6 months, or for as long as told by your health care provider. Apply sunscreen around the scar or cover it up.  Keep all follow-up visits as told by your health care provider. This is important.  Contact a health care provider if:  Your have more redness, swelling, or pain around the incision.  You have more fluid or blood coming from the incision.  Your incision feels warm to the touch.  You have pus or a bad smell coming from the incision.  You have a fever or shaking chills.  You are nauseous or you vomit.  You are dizzy.  Your sutures or staples come undone.  Get help right away if:  You have a red streak coming from your incision.  Your incision bleeds through the dressing and the bleeding does not stop with gentle pressure.  The edges of your incision open up and separate.  You have severe pain.  You have a rash.  You are confused.  You faint.  You have trouble breathing and a fast heartbeat.  This information is not intended to replace advice given to you by your health care provider. Make sure you discuss any questions you have with your health care provider.  Document Released: 07/07/2006 Document Revised: 12/20/2019 Document Reviewed: 07/05/2017  Elsevier Patient Education © 2020 [employer]

## 2023-01-24 NOTE — PROGRESS NOTES
Paged [facility] to notify that pt cannot be DC'd with HH due to insurance. Per Darin Ortiz NP, pt is ok to DC home without HH.

## 2023-01-24 NOTE — PROGRESS NOTES
Meds-to-Beds: Discharge prescription orders listed below delivered to patient's bedside. ALAN Barroso notified. Patient counseled.      Current Outpatient Medications   Medication Sig Dispense Refill    oxycodone (OXY-IR) 15 MG immediate release tablet Take 1 Tablet by mouth every 3 hours as needed for Severe Pain for up to 7 days. 56 Tablet 0    methocarbamol (ROBAXIN) 750 MG Tab Take 1 Tablet by mouth every 8 hours as needed (muscle spasm) for up to 14 days. 42 Tablet 1    ondansetron (ZOFRAN ODT) 4 MG TABLET DISPERSIBLE Dissolve 1 Tablet by mouth every four hours as needed for Nausea/Vomiting  for up to 10 days. 60 Tablet 1      Cheko Hunter, Pharmacy Intern

## 2023-01-24 NOTE — DISCHARGE PLANNING
Case Management Discharge Planning    Admission Date: 1/19/2023  GMLOS: 2.6  ALOS: 5    6-Clicks ADL Score: 16  6-Clicks Mobility Score: 18  PT and/or OT Eval ordered: Yes  Post-acute Referrals Ordered: Yes  Post-acute Choice Obtained: Yes  Has referral(s) been sent to post-acute provider:  Yes      Anticipated Discharge Dispo: Discharge Disposition: Discharged to home/self care (01)  Discharge Address: [address] [place] [address]  Discharge Contact Phone Number: [phone]    DME Needed: No    Action(s) Taken: Updated Provider/Nurse on Discharge Plan, DC Assessment Complete (See below), and Transport Arranged     LSW met with pt at bedside. Pt alert and oriented. LSW confirmed the demographic information on the face sheet and completed a assessment. Prior to this hospitalization pt was independent with all IADL/ADLS. Pt does use a walker or cane when ambulating. Pt resides with her significant other Reese who will be transporting pt home today. Pt stated she is aware of the HH order. Pt stated her insurance will not cover HH and she did communicate this to Dr. Werner. RN Josafat updated.     Escalations Completed: None    Medically Clear: Yes    Next Steps: LSW will follow and assist with any additional dc needs.     Barriers to Discharge: None    Is the patient up for discharge tomorrow: No    Care Transition Team Assessment    Information Source  Orientation Level: Oriented X4  Information Given By: Patient  Who is responsible for making decisions for patient? : Patient    Readmission Evaluation  Is this a readmission?: No    Elopement Risk  Legal Hold: No  Ambulatory or Self Mobile in Wheelchair: Yes  Disoriented: No  Psychiatric Symptoms: None  History of Wandering: No  Elopement this Admit: No  Vocalizing Wanting to Leave: No  Displays Behaviors, Body Language Wanting to Leave: No-Not at Risk for Elopement  Elopement Risk: Not at Risk for Elopement    Interdisciplinary Discharge Planning  Primary Care  Physician: Suhas Kasper MD  Lives with - Patient's Self Care Capacity: Significant Other  Patient or legal guardian wants to designate a caregiver: No  Support Systems: Family Member(s), Friends / Neighbors  Housing / Facility: 1 Story House (4 steps into the home.)  Prior Services: None  Durable Medical Equipment: Other - Specify (cane)    Discharge Preparedness  What is your plan after discharge?: Home with help  What are your discharge supports?: Spouse  Prior Functional Level: Independent with Activities of Daily Living, Independent with Medication Management, Uses Cane, Uses Walker, Drives Self  Difficulity with ADLs: Walking  Difficulity with IADLs: None    Functional Assesment  Prior Functional Level: Independent with Activities of Daily Living, Independent with Medication Management, Uses Cane, Uses Walker, Drives Self    Finances  Financial Barriers to Discharge: No  Prescription Coverage: Yes    Vision / Hearing Impairment  Vision Impairment : Yes  Right Eye Vision: Impaired, Wears Glasses  Left Eye Vision: Impaired, Wears Glasses  Hearing Impairment : No    Advance Directive  Advance Directive?: None  Advance Directive offered?: AD Booklet refused    Domestic Abuse  Have you ever been the victim of abuse or violence?: No  Physical Abuse or Sexual Abuse: No  Verbal Abuse or Emotional Abuse: No  Possible Abuse/Neglect Reported to:: Not Applicable    Psychological Assessment  History of Substance Abuse: None  Newly Diagnosed Illness: Yes    Discharge Risks or Barriers  Discharge risks or barriers?: No  Patient risk factors: Vulnerable adult    Anticipated Discharge Information  Discharge Disposition: Discharged to home/self care (01)  Discharge Address: [address] [place] [place] [address]  Discharge Contact Phone Number: [phone]

## 2023-01-24 NOTE — THERAPY
Physical Therapy   Daily Treatment     Patient Name: Yolanda Reed  Age:  62 y.o., Sex:  female  Medical Record #: [id number]  Today's Date: 1/24/2023     Precautions  Precautions: Spinal / Back Precautions   Comments: Linda best prn, impulsive    Assessment    Pt seen for PT treatment session. Continues to demonstrate mobility at SPV to SBA, does not regard spinal precautions during mobility and is unable to tolerate c-collar for support due to increased pain. Pt ambulated 100 ft with FWW support, did acknowledge need for FWW and demonstrated understanding and carryover of safe stair negotiation. Pt overall progressing, DC recommendations below.     Plan    Physical Therapy Treatment Plan  Physical Therapy Treatment Plan: Continue Current Treatment Plan    DC Equipment Recommendations: None  Discharge Recommendations:  Per EMR,  not available. Pt would prefer to DC home vs SNF due to poor experience in past. Functionally pt is capable of DC home; however, would benefit from continued PT to address gait, balance, and strength       01/24/23 1440   Precautions   Precautions Spinal / Back Precautions    Comments Linda best prn, impulsive   Vitals   O2 Delivery Device Room air w/o2 available   Pain 0 - 10 Group   Therapist Pain Assessment During Activity  (upper trap pain, not rated)   Cognition    Level of Consciousness Alert   Safety Awareness Impulsive   New Learning Impaired   Attention Impaired   Comments remains impulsive with poor awareness of spinal precautions for pain management. unable to tolerate donning c-collar for support due to increased pain   Balance   Sitting Balance (Static) Fair   Sitting Balance (Dynamic) Fair   Standing Balance (Static) Fair   Standing Balance (Dynamic) Fair -   Weight Shift Sitting Fair   Weight Shift Standing Fair   Skilled Intervention Verbal Cuing;Compensatory Strategies   Comments w/ FWW   Bed Mobility    Supine to Sit Supervised   Sit to Supine Supervised    Scooting Supervised   Rolling Supervised   Skilled Intervention Verbal Cuing   Comments not utilizing log roll, sits straight up and leans back/fwd or will lie down to support during dressing   Gait Analysis   Gait Level Of Assist Standby Assist   Assistive Device Front Wheel Walker   Distance (Feet) 100   # of Times Distance was Traveled 1   Deviation Ataxic;Increased Base Of Support  (fast carine)   # of Stairs Climbed 2  (side step approach with railing support)   Level of Assist with Stairs Standby Assist   Weight Bearing Status no restrictions   Skilled Intervention Verbal Cuing   Comments reports SO will be able to manage FWW in/out of home   Functional Mobility   Sit to Stand Supervised   Bed, Chair, Wheelchair Transfer Supervised   Transfer Method Stand Step   Skilled Intervention Verbal Cuing   How much difficulty does the patient currently have...   Turning over in bed (including adjusting bedclothes, sheets and blankets)? 3   Sitting down on and standing up from a chair with arms (e.g., wheelchair, bedside commode, etc.) 3   Moving from lying on back to sitting on the side of the bed? 3   How much help from another person does the patient currently need...   Moving to and from a bed to a chair (including a wheelchair)? 3   Need to walk in a hospital room? 3   Climbing 3-5 steps with a railing? 3   6 clicks Mobility Score 18   Short Term Goals    Short Term Goal # 1 pt will perform bed mobility with log roll and no bed features with SPV in 6 visits   Goal Outcome # 1 goal not met   Short Term Goal # 2 pt will ambulate > 150 ft with LRAD and SPV to improve mobility independence in 6 visits   Goal Outcome # 2 Progressing as expected   Short Term Goal # 3 pt will negotiate 4 steps with railing and LRAD with SPV to access home environment in 6 visits   Goal Outcome # 3 Progressing as expected   Physical Therapy Treatment Plan   Physical Therapy Treatment Plan Continue Current Treatment Plan   Anticipated  Discharge Equipment and Recommendations   DC Equipment Recommendations None   Discharge Recommendations   (Per EMR, HH not available. Pt would prefer to DC home vs SNF due to poor experience in past. Functionally pt is capable of DC home; however, would benefit from continued PT to address gait, balance, and strength)   Interdisciplinary Plan of Care Collaboration   IDT Collaboration with  Nursing   Patient Position at End of Therapy In Bed;Call Light within Reach;Tray Table within Reach;Phone within Reach;Bed Alarm On   Collaboration Comments aware of PT session   Session Information   Date / Session Number  1/24- 3 (3/5, 1/26)

## 2023-01-24 NOTE — PROGRESS NOTES
"Assumed care of patient at approx 15:00 after patient fired previous RN for letting her sleep through PRN pain meds being due. Patient agitated in room stating \"you can blame your other nurse for my attitude.\" Pt states she angry because she was not woken up to take her PRN pain medications and now she is in pain. Pt angry and unwilling to let staff talk to educate on narcotic usage and how PRN medications are given. Education attempted, however, pt remains agitated with this RN stating \"you blew your chance of a fresh start.\"   "

## 2023-01-24 NOTE — PROGRESS NOTES
Progress Note               Author: Jama Werner M.D. Date & Time created: 2023  5:47 PM     Interval History:  Pt would like to go home tomorrow    Review of Systems:  ROS    Physical Exam:  Physical Exam  Neurological:      Comments: Sensation and motor at baseline with c4-c8 sensory and motor deficits  Wound dressing dry. Virtually no drainage       Labs:          No results for input(s): SODIUM, POTASSIUM, CHLORIDE, CO2, BUN, CREATININE, MAGNESIUM, PHOSPHORUS, CALCIUM in the last 72 hours.  No results for input(s): ALTSGPT, ASTSGOT, ALKPHOSPHAT, TBILIRUBIN, DBILIRUBIN, GAMMAGT, AMYLASE, LIPASE, ALB, PREALBUMIN, GLUCOSE in the last 72 hours.  Recent Labs     23  0821   HEMOGLOBIN 11.3*         Hemodynamics:  Temp (24hrs), Av.6 °C (97.9 °F), Min:36.5 °C (97.7 °F), Max:36.8 °C (98.2 °F)  Temperature: 36.8 °C (98.2 °F)  Pulse  Av.3  Min: 82  Max: 118   Blood Pressure: 121/63     Respiratory:    Respiration: 15, Pulse Oximetry: 96 %     Work Of Breathing / Effort: Mild  RUL Breath Sounds: Clear, RML Breath Sounds: Diminished, RLL Breath Sounds: Diminished, DARIEN Breath Sounds: Clear, LLL Breath Sounds: Diminished  Fluids:  No intake or output data in the 24 hours ending 23 1747     GI/Nutrition:  Orders Placed This Encounter   Procedures    Diet Order Diet: Regular     Standing Status:   Standing     Number of Occurrences:   1     Order Specific Question:   Diet:     Answer:   Regular [1]     Medical Decision Making, by Problem:  Active Hospital Problems    Diagnosis     *S/P cervical spinal fusion [Z98.1]        Plan:  Cont po pain meds  Scd for dvt prophylaxis  Dc to home tomorrow    Quality-Core Measures

## 2023-01-24 NOTE — CARE PLAN
The patient is Stable - Low risk of patient condition declining or worsening    Shift Goals  Clinical Goals: Pain control  Patient Goals: Rest, comfort, pain control  Family Goals: GA    Progress made toward(s) clinical / shift goals:    Problem: Pain - Standard  Goal: Alleviation of pain or a reduction in pain to the patient’s comfort goal  Outcome: Progressing  Note: PRN pain medications in use for pain control.       Problem: Knowledge Deficit - Standard  Goal: Patient and family/care givers will demonstrate understanding of plan of care, disease process/condition, diagnostic tests and medications  Outcome: Progressing  Note: Pt actively participates in POC. Pt and board updated, all questions and concerns answered. Pt encouraged to voice all questions and concerns.        Patient is not progressing towards the following goals:

## 2023-01-24 NOTE — PROGRESS NOTES
"Arrive to dcl via wheelchair. Unable to review d/c instructions w/ pt as she is hysterical. Stated \"give me what I needed to sign!\". Copy of avs given to pt, meds to bed delivered to pt while still in floor. DC home with relative.   "

## 2023-01-24 NOTE — CARE PLAN
The patient is Stable - Low risk of patient condition declining or worsening    Shift Goals  Clinical Goals: Pain Control, DC home?  Patient Goals: Pain control  Family Goals: GA    Progress made toward(s) clinical / shift goals:    Problem: Communication  Goal: The ability to communicate needs accurately and effectively will improve  Outcome: Progressing  Note: Plan of Care discussed, all questions answered. Pt effectively communicates needs to staff.       Problem: Discharge Barriers/Planning  Goal: Patient's continuum of care needs are met  Outcome: Progressing  Note: Pt to DC home without HH.        Patient is not progressing towards the following goals: NA

## 2023-01-29 ENCOUNTER — APPOINTMENT (OUTPATIENT)
Dept: RADIOLOGY | Facility: MEDICAL CENTER | Age: 63
End: 2023-01-29
Attending: EMERGENCY MEDICINE
Payer: MEDICAID

## 2023-01-29 ENCOUNTER — HOSPITAL ENCOUNTER (EMERGENCY)
Facility: MEDICAL CENTER | Age: 63
End: 2023-01-29
Attending: EMERGENCY MEDICINE
Payer: MEDICAID

## 2023-01-29 VITALS
DIASTOLIC BLOOD PRESSURE: 70 MMHG | SYSTOLIC BLOOD PRESSURE: 140 MMHG | HEART RATE: 77 BPM | RESPIRATION RATE: 16 BRPM | TEMPERATURE: 98 F | OXYGEN SATURATION: 93 %

## 2023-01-29 DIAGNOSIS — K59.03 DRUG-INDUCED CONSTIPATION: ICD-10-CM

## 2023-01-29 DIAGNOSIS — R10.84 GENERALIZED ABDOMINAL PAIN: ICD-10-CM

## 2023-01-29 DIAGNOSIS — R11.0 NAUSEA: ICD-10-CM

## 2023-01-29 LAB
ALBUMIN SERPL BCP-MCNC: 3.5 G/DL (ref 3.2–4.9)
ALBUMIN/GLOB SERPL: 1.2 G/DL
ALP SERPL-CCNC: 92 U/L (ref 30–99)
ALT SERPL-CCNC: 8 U/L (ref 2–50)
ANION GAP SERPL CALC-SCNC: 10 MMOL/L (ref 7–16)
AST SERPL-CCNC: 17 U/L (ref 12–45)
BASOPHILS # BLD AUTO: 0.3 % (ref 0–1.8)
BASOPHILS # BLD: 0.04 K/UL (ref 0–0.12)
BILIRUB SERPL-MCNC: 0.2 MG/DL (ref 0.1–1.5)
BUN SERPL-MCNC: 8 MG/DL (ref 8–22)
CALCIUM ALBUM COR SERPL-MCNC: 9.3 MG/DL (ref 8.5–10.5)
CALCIUM SERPL-MCNC: 8.9 MG/DL (ref 8.5–10.5)
CHLORIDE SERPL-SCNC: 98 MMOL/L (ref 96–112)
CO2 SERPL-SCNC: 30 MMOL/L (ref 20–33)
CREAT SERPL-MCNC: 0.31 MG/DL (ref 0.5–1.4)
CRP SERPL HS-MCNC: 3.35 MG/DL (ref 0–0.75)
EOSINOPHIL # BLD AUTO: 0.31 K/UL (ref 0–0.51)
EOSINOPHIL NFR BLD: 2.5 % (ref 0–6.9)
ERYTHROCYTE [DISTWIDTH] IN BLOOD BY AUTOMATED COUNT: 50.7 FL (ref 35.9–50)
GFR SERPLBLD CREATININE-BSD FMLA CKD-EPI: 120 ML/MIN/1.73 M 2
GLOBULIN SER CALC-MCNC: 3 G/DL (ref 1.9–3.5)
GLUCOSE SERPL-MCNC: 98 MG/DL (ref 65–99)
HCT VFR BLD AUTO: 35 % (ref 37–47)
HGB BLD-MCNC: 11 G/DL (ref 12–16)
IMM GRANULOCYTES # BLD AUTO: 0.1 K/UL (ref 0–0.11)
IMM GRANULOCYTES NFR BLD AUTO: 0.8 % (ref 0–0.9)
LACTATE SERPL-SCNC: 1.1 MMOL/L (ref 0.5–2)
LYMPHOCYTES # BLD AUTO: 2.11 K/UL (ref 1–4.8)
LYMPHOCYTES NFR BLD: 17 % (ref 22–41)
MCH RBC QN AUTO: 26 PG (ref 27–33)
MCHC RBC AUTO-ENTMCNC: 31.4 G/DL (ref 33.6–35)
MCV RBC AUTO: 82.7 FL (ref 81.4–97.8)
MONOCYTES # BLD AUTO: 0.91 K/UL (ref 0–0.85)
MONOCYTES NFR BLD AUTO: 7.3 % (ref 0–13.4)
NEUTROPHILS # BLD AUTO: 8.94 K/UL (ref 2–7.15)
NEUTROPHILS NFR BLD: 72.1 % (ref 44–72)
NRBC # BLD AUTO: 0 K/UL
NRBC BLD-RTO: 0 /100 WBC
PLATELET # BLD AUTO: 544 K/UL (ref 164–446)
PMV BLD AUTO: 9 FL (ref 9–12.9)
POTASSIUM SERPL-SCNC: 3.6 MMOL/L (ref 3.6–5.5)
PROT SERPL-MCNC: 6.5 G/DL (ref 6–8.2)
RBC # BLD AUTO: 4.23 M/UL (ref 4.2–5.4)
SODIUM SERPL-SCNC: 138 MMOL/L (ref 135–145)
WBC # BLD AUTO: 12.4 K/UL (ref 4.8–10.8)

## 2023-01-29 PROCEDURE — 99284 EMERGENCY DEPT VISIT MOD MDM: CPT

## 2023-01-29 PROCEDURE — 83605 ASSAY OF LACTIC ACID: CPT

## 2023-01-29 PROCEDURE — 96374 THER/PROPH/DIAG INJ IV PUSH: CPT

## 2023-01-29 PROCEDURE — 74018 RADEX ABDOMEN 1 VIEW: CPT

## 2023-01-29 PROCEDURE — 700105 HCHG RX REV CODE 258: Performed by: EMERGENCY MEDICINE

## 2023-01-29 PROCEDURE — 36415 COLL VENOUS BLD VENIPUNCTURE: CPT

## 2023-01-29 PROCEDURE — 86140 C-REACTIVE PROTEIN: CPT

## 2023-01-29 PROCEDURE — 700111 HCHG RX REV CODE 636 W/ 250 OVERRIDE (IP): Performed by: EMERGENCY MEDICINE

## 2023-01-29 PROCEDURE — 80053 COMPREHEN METABOLIC PANEL: CPT

## 2023-01-29 PROCEDURE — 85025 COMPLETE CBC W/AUTO DIFF WBC: CPT

## 2023-01-29 RX ORDER — HALOPERIDOL 5 MG/ML
2.5 INJECTION INTRAMUSCULAR ONCE
Status: DISCONTINUED | OUTPATIENT
Start: 2023-01-29 | End: 2023-01-29 | Stop reason: HOSPADM

## 2023-01-29 RX ORDER — DIPHENHYDRAMINE HYDROCHLORIDE 50 MG/ML
25 INJECTION INTRAMUSCULAR; INTRAVENOUS ONCE
Status: DISCONTINUED | OUTPATIENT
Start: 2023-01-29 | End: 2023-01-29 | Stop reason: HOSPADM

## 2023-01-29 RX ORDER — DOCUSATE SODIUM 100 MG/1
100 CAPSULE, LIQUID FILLED ORAL 2 TIMES DAILY
Qty: 60 CAPSULE | Refills: 0 | Status: SHIPPED | OUTPATIENT
Start: 2023-01-29 | End: 2023-02-28

## 2023-01-29 RX ORDER — POLYETHYLENE GLYCOL 3350 17 G/17G
17 POWDER, FOR SOLUTION ORAL DAILY
Qty: 3 EACH | Refills: 0 | Status: SHIPPED | OUTPATIENT
Start: 2023-01-29 | End: 2023-02-01

## 2023-01-29 RX ORDER — SODIUM CHLORIDE, SODIUM LACTATE, POTASSIUM CHLORIDE, CALCIUM CHLORIDE 600; 310; 30; 20 MG/100ML; MG/100ML; MG/100ML; MG/100ML
1000 INJECTION, SOLUTION INTRAVENOUS ONCE
Status: COMPLETED | OUTPATIENT
Start: 2023-01-29 | End: 2023-01-29

## 2023-01-29 RX ORDER — DOCUSATE SODIUM 100 MG/1
100 CAPSULE, LIQUID FILLED ORAL ONCE
Status: DISCONTINUED | OUTPATIENT
Start: 2023-01-29 | End: 2023-01-29 | Stop reason: HOSPADM

## 2023-01-29 RX ADMIN — FENTANYL CITRATE 50 MCG: 50 INJECTION, SOLUTION INTRAMUSCULAR; INTRAVENOUS at 02:39

## 2023-01-29 RX ADMIN — SODIUM CHLORIDE, POTASSIUM CHLORIDE, SODIUM LACTATE AND CALCIUM CHLORIDE 1000 ML: 600; 310; 30; 20 INJECTION, SOLUTION INTRAVENOUS at 02:42

## 2023-01-29 NOTE — ED NOTES
Patient given discharge instructions and wheeled out to ER lobby with all belonigns accounted for. Friend Marni called and states she would come and get patient. Marni: [phone]

## 2023-01-29 NOTE — ED PROVIDER NOTES
"ED Provider Note    CHIEF COMPLAINT  Chief Complaint   Patient presents with    Constipation    Abdominal Pain    Back Pain     Had back surgery \"a couple weeks ago\" since surgery \"not doing good\" \"I cant eat, I haven't had BM in 1 week, I'm hot and cold, and I haven't showered\"     EXTERNAL RECORDS REVIEWED  Inpatient Notes patient's prior admission on 1/19 with Dr. Werner she orthopedic surgery for status post C4-T1 posterior spinal instrumented fusion and decompression and C3 laminoplasty for C3-T1 cervical spinal stenosis.  Of history of herniated nucleus pulposus, myelopathy and radiculopathy.   Outpatient follow-up chest patient is on oxycodone, Robaxin, Zofran  HPI/ROS  LIMITATION TO HISTORY   Select: : None  OUTSIDE HISTORIAN(S):  Friend at bedside    Yolanda Reed is a 62 y.o. female who presents to the emergency room for concerns regarding worsening abdominal pain and decreased p.o. intake.  The patient states since her surgery and discharge from approximately the 19th of this month for her cervical and thoracic spinal fusions she has been having worsening abdominal discomfort.  She has been taking her chronic pain medications, has not had a bowel movement in the last week, she says that she is urinating somewhat frequently but has not had any fevers chills and has had persistent nausea.  Additionally the patient states there has been no fevers, no new or evolving neck pain from her chronic debilitation and says she feels somewhat dehydrated because she does not feel like she can eat or drink anything because of how her stomach feels.  She has not been on a bowel regiment.  He is pointing to the entirety of her abdomen says it is throughout, no radiation to the back or legs, no shortness of breath or chest pain.    PAST MEDICAL HISTORY   has a past medical history of Anesthesia (2006), Anxiety, Arthritis, Breath shortness (09/23/2022), Delayed emergence from general anesthesia, Dental disorder, Frequent " "falls, High cholesterol, Hypertension (2022), Infectious disease, Low blood sugar, MRSA (methicillin resistant Staphylococcus aureus), BRIGITTE (obstructive sleep apnea) (2023), Pain (2022), Pneumonia, PONV (postoperative nausea and vomiting), Psychiatric problem, PTSD (post-traumatic stress disorder), Sleep apnea, Snoring, and Urinary bladder disorder.    SURGICAL HISTORY   has a past surgical history that includes tonsillectomy; breast biopsy (2010); knee scope,aid ant cruciate repair (Right, 2019); bladder sling female (N/A, 2021); gyn surgery (,); other orthopedic surgery; other orthopedic surgery; corpectomy (N/A, 2022); cervical disk and fusion anterior (N/A, 2022); fusion, spine, lumbar, plif (Bilateral, 2022); lumbar decompression (Bilateral, 2022); laminotomy (2023); cervical decompression posterior (2023); cervical laminectomy posterior (2023); and cervical fusion posterior (2023).    FAMILY HISTORY  Family History   Problem Relation Age of Onset    Alcohol/Drug Mother     Anesthesia Mother     Anxiety disorder Mother     Bipolar disorder Mother     Cancer Mother     Lung Cancer Mother     Osteoporosis Mother     Allergies Father     Kidney stones Father     Lung Cancer Father     DVT Sister     Lung Cancer Sister        SOCIAL HISTORY  Social History     Tobacco Use    Smoking status: Former     Packs/day: 0.50     Years: 30.00     Pack years: 15.00     Types: Cigarettes     Quit date: 2013     Years since quittin.2     Passive exposure: Past    Smokeless tobacco: Never    Tobacco comments:     .5 ppd, 30 yrs   Vaping Use    Vaping Use: Some days    Substances: Nicotine, Flavoring    Devices: Pre-filled or refillable cartridge   Substance and Sexual Activity    Alcohol use: No    Drug use: Yes     Types: Inhaled     Comment: \"clean since \",  THC Use    Sexual activity: Not on file       CURRENT MEDICATIONS  Home " Medications       Reviewed by Asya Liu R.N. (Registered Nurse) on 01/29/23 at 0038  Med List Status: Not Addressed     Medication Last Dose Status   atorvastatin (LIPITOR) 10 MG Tab  Active   methocarbamol (ROBAXIN) 750 MG Tab  Active   ondansetron (ZOFRAN ODT) 4 MG TABLET DISPERSIBLE  Active   oxycodone (OXY-IR) 15 MG immediate release tablet  Active   QUEtiapine (SEROQUEL) 50 MG tablet  Active   sertraline (ZOLOFT) 100 MG Tab  Active   sulfamethoxazole-trimethoprim (BACTRIM DS) 800-160 MG tablet  Active   VITAMIN D PO  Active                    ALLERGIES  Allergies   Allergen Reactions    Morphine Anaphylaxis     Throat swelling  Tolerates oxycodone    Dilaudid [Hydromorphone] Anxiety     PHYSICAL EXAM  VITAL SIGNS: BP (!) 144/78   Pulse 98   Temp 36.5 °C (97.7 °F) (Temporal)   Resp 14   LMP 08/01/2010   SpO2 96%    Genl: F lying in gurney uncomfortably, speaking clearly, appears in mild distress   Head: NC/AT   ENT: Mucous membranes dry, posterior pharynx clear, uvula midline, nares patent bilaterally   Eyes: Normal sclera, pupils equal round reactive to light  Neck: Supple, FROM, no LAD appreciated.  Posterior wound is exposed, staples in place with regional erythema but no induration, no fluctuance, no appreciable fluid collection.  Pulmonary: Lungs are clear to auscultation bilaterally  Chest: No TTP  CV: Tachycardia, no murmur appreciated, pulses 2+ in both upper and lower extremities,  Abdomen: soft, audible areas of nonspecific tenderness in the periumbilical to left lower quadrant space. ND; no rebound/guarding, no masses palpated, no HSM   : no CVA tenderness, mild suprapubic discomfort, normal external female genitalia.  Patient is declining rectal exam.  Musculoskeletal: Pain free ROM of the neck. Moving upper and lower extremities and spontaneous in coordinated fashion  Neuro: A&Ox4 (person, place, time, situation), speech fluent, gait steady, no focal deficits appreciated  Skin: No rash  or lesions.  No pallor or jaundice.  No cyanosis.  Warm and dry.     DIAGNOSTIC STUDIES / PROCEDURES  LABS  Labs Reviewed   CBC WITH DIFFERENTIAL - Abnormal; Notable for the following components:       Result Value    WBC 12.4 (*)     Hemoglobin 11.0 (*)     Hematocrit 35.0 (*)     MCH 26.0 (*)     MCHC 31.4 (*)     RDW 50.7 (*)     Platelet Count 544 (*)     Neutrophils-Polys 72.10 (*)     Lymphocytes 17.00 (*)     Neutrophils (Absolute) 8.94 (*)     Monos (Absolute) 0.91 (*)     All other components within normal limits   COMP METABOLIC PANEL - Abnormal; Notable for the following components:    Creatinine 0.31 (*)     All other components within normal limits   LACTIC ACID   CORRECTED CALCIUM   ESTIMATED GFR   CRP QUANTITIVE (NON-CARDIAC)   URINALYSIS     RADIOLOGY  I have independently interpreted the diagnostic imaging associated with this visit and am waiting the final reading from the radiologist.   My preliminary interpretation is a follows: Increased stool burden in the abdomen.  Nonobstructive bowel gas pattern is noted.  Radiologist interpretation:     [id number] VIEW   Final Result      Probable mild colonic stool. Nonobstructive bowel gas pattern.      CT-ABDOMEN-PELVIS WITH    (Results Pending)     COURSE & MEDICAL DECISION MAKING    ED Observation Status? No; Patient does not meet criteria for ED Observation.     INITIAL ASSESSMENT, COURSE AND PLAN  Care Narrative: Seen and evaluated for symptoms as described above.  The patient is alert, oriented and did not appear in acute distress until he entered the room.  She then became somewhat writhing on the bed and was having nausea and specific periumbilical and left lower quadrant abdominal pain.  She has had a recent cervical spine surgery however the wound appears clean, dry and intact, she is afebrile, she is not acutely tachycardic nor does she have any localizing tenderness that is new or inappropriate.  There is no evidence of wound dehiscence and  the wound is redressed.  She reports significant decreased p.o. intake since the surgery and I do believe that if she was generally not eating or drinking she would have more significant vital sign abnormalities profound weakness.  Lab work is obtained for rule metabolic abnormalities, kidney injury possibility of intra-abdominal surgical emergency.    Patient has a very scant leukocytosis, stable and chronic anemia, no JEFFERY or LFT elevations, no inappropriate electrolyte abnormalities and a lactate that is 1.1.  I went back and reassessed the patient and she again was having some writing and irritation and was being verbally aggressive with staff members.  She had received some initial pain medications and do not believe that this is appropriate until CT scan can be further addressed.    CT scan came to the bedside the patient was flat refusing this.  I have now gone back to the bedside on multiple occurrences and she does not appear to be in any sort of extremis.  We had extensive talk regarding her chronic abdominal pain and abdominal film x-ray showing signs of increased stool burden.  She needs to be on a regiment of Colace, senna and would likely benefit from nausea medications to help with ongoing decreased p.o. intake.  She should follow-up with her surgeon and primary care doctor upon discharge.  With her scant leukocytosis and borderline tachycardia on arrival the consideration of intra-abdominal infectious etiology was my primary concern and have expressed this to her.  Serial exams have shown that when the patient is resting she has a nonperitoneal abdomen and upon awakening has more substantial pain discomfort.  She is offered alternative pain medications such as low-dose Haldol and Benadryl and is very verbally abusive with staff.  At this time I believe that there is a very low likelihood of intra-abdominal surgical emergency and she can be discharged with outpatient follow-up.    HYDRATION: Based on  the patient's presentation of Dehydration and Inability to take oral fluids the patient was given IV fluids. IV Hydration was used because oral hydration was not adequate alone. Upon recheck following hydration, the patient was improved.      ADDITIONAL PROBLEM LIST  Chronic pain  Chronic pain medication usage  Abdominal pain  Cervical fusion    FINAL DIAGNOSIS  1. Generalized abdominal pain    2. Drug-induced constipation    3. Nausea      Electronically signed by: Brody Erickson M.D., 1/29/2023 1:55 AM

## 2023-01-29 NOTE — ED NOTES
Pt wheeled back to G39, changed in to gown and on monitor. Chart up for ERP, agree with triage note.

## 2023-01-29 NOTE — ED NOTES
Pt refusing vital signs and continually using aggressive language towards this RN. Dr. Erickson notified.

## 2023-01-29 NOTE — ED TRIAGE NOTES
"Chief Complaint   Patient presents with    Constipation    Abdominal Pain    Back Pain     Had back surgery \"a couple weeks ago\" since surgery \"not doing good\" \"I cant eat, I haven't had BM in 1 week, I'm hot and cold, and I haven't showered\"      Pt throwing herself from wheelchair to chair, lying sideways on wheelchair, bizarre behavior noted in triage, very vague complaints, just rolling around stating \"Im not doing good\".   Just keeps repeating, \"Am I done, where is my friend\"  Wanted to be be triaged in waiting room      Pt to triage for above complaint in wheelchair.        Pt is alert and follows commands. Pt speaking in full sentences and responding to questions but with unclear answers.   some distress noted in triage related to behaviors respirations are even and unlabored.      Pt placed in lobby and educated on triage process. Pt encouraged to alert staff for any changes in condition   "

## 2023-02-24 ENCOUNTER — APPOINTMENT (OUTPATIENT)
Dept: NEUROLOGY | Facility: MEDICAL CENTER | Age: 63
End: 2023-02-24
Attending: PSYCHIATRY & NEUROLOGY
Payer: MEDICAID

## 2023-03-23 ENCOUNTER — OFFICE VISIT (OUTPATIENT)
Dept: NEUROLOGY | Facility: MEDICAL CENTER | Age: 63
End: 2023-03-23
Attending: PSYCHIATRY & NEUROLOGY
Payer: MEDICAID

## 2023-03-23 VITALS
OXYGEN SATURATION: 97 % | DIASTOLIC BLOOD PRESSURE: 74 MMHG | HEART RATE: 76 BPM | BODY MASS INDEX: 28.07 KG/M2 | SYSTOLIC BLOOD PRESSURE: 116 MMHG | TEMPERATURE: 97.5 F | WEIGHT: 143 LBS | HEIGHT: 60 IN

## 2023-03-23 DIAGNOSIS — M54.12 CERVICAL MYELOPATHY WITH CERVICAL RADICULOPATHY (HCC): ICD-10-CM

## 2023-03-23 DIAGNOSIS — Z98.1 S/P CERVICAL SPINAL FUSION: ICD-10-CM

## 2023-03-23 DIAGNOSIS — G95.9 CERVICAL MYELOPATHY WITH CERVICAL RADICULOPATHY (HCC): ICD-10-CM

## 2023-03-23 DIAGNOSIS — Z98.1 S/P LUMBAR FUSION: ICD-10-CM

## 2023-03-23 DIAGNOSIS — R29.898 RIGHT ARM WEAKNESS: ICD-10-CM

## 2023-03-23 PROCEDURE — 99211 OFF/OP EST MAY X REQ PHY/QHP: CPT

## 2023-03-23 PROCEDURE — 99214 OFFICE O/P EST MOD 30 MIN: CPT | Performed by: PSYCHIATRY & NEUROLOGY

## 2023-03-23 PROCEDURE — 99211 OFF/OP EST MAY X REQ PHY/QHP: CPT | Performed by: PSYCHIATRY & NEUROLOGY

## 2023-03-23 RX ORDER — OMEPRAZOLE 20 MG/1
CAPSULE, DELAYED RELEASE ORAL
COMMUNITY
Start: 2023-03-06

## 2023-03-23 RX ORDER — FENTANYL CITRATE 50 UG/ML
INJECTION, SOLUTION INTRAMUSCULAR; INTRAVENOUS
COMMUNITY
Start: 2023-01-29 | End: 2023-11-17

## 2023-03-23 RX ORDER — ATORVASTATIN CALCIUM 10 MG/1
TABLET, FILM COATED ORAL
COMMUNITY
Start: 2022-09-01

## 2023-03-23 ASSESSMENT — FIBROSIS 4 INDEX: FIB4 SCORE: 0.67

## 2023-03-23 NOTE — PROGRESS NOTES
Neuro follow up:    Last visit in 9/2022:    History of Cervical Myelopathy with prior C spine surgery and lumbar spinal stenosis with residual sensory disturbances of the upper limbs (and inside of both arms- let more than right) and radiculopathic features down either leg (left more than right) when bending down and arising up.     Yolanda Reed 62 y.o. female who lives with a boyfriend (common law) and is a Native of [place]. She has been on Disability for nearly 4 years duet to Bipolar/Anxiety issues. 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     Since prior visit with me in  September 2022- Yolanda had both lumbar spine surgery at the end of September 2022 (Jama Werner MD)> DATE OF ADMISSION:  09/29/2022   DATE OF DISCHARGE:  10/04/2022      DISCHARGE DIAGNOSES:  1.  Status post L2-L5 decompressive laminectomy with foraminotomies for lumbar   spinal stenosis, herniated nucleus pulposus and radiculopathy.  2.  Status post L3-L4 and L4-L5 transforaminal lumbar interbody instrumented   fusion for spondylolisthesis.    And then had cervical spine surgery in the middle of January 2023:    DATE OF ADMISSION:  01/19/2023      DISCHARGE DIAGNOSES:  Status post C4-T1 posterior spinal instrumented fusion   and decompression and C3 laminoplasty for C3-T1 cervical spinal stenosis,   herniated nucleus pulposus, myelopathy and radiculopathy.    She has numbness (painless) of the right inner more than left inner arms (sparing the hands) and sparing the arm pit areas and anterior check.    She also noticed that within 2-3 weeks that her left foot  (primarily the left sole of the foot and the great toe to the 5th toes)> ARE IMPROVED and improving since her Lumbar Spine surgery without relapses.     Dr. Doug Navarro also did electrical testing of her limbs at this was normal per report. She had a Thoracic MRI done in 12/2021 at [facility] showing normal  "thoracic spine for age and no significant stenosis or normal appearing cord.    Then atleast 1 month she had a Cervical MRI and this showed moderately severe central stenosis at C7-T1 from a disc extending cephalad 7 mm with hypertrophic facet joint changes of the left that extended towards the stenosis and moderately severe central stenosis at C6-7 and moderate severe central stenosis at C5-6 with bilateral bony neural foraminal narrowing right worse than left.         She has noticed improved feeling in the mid to lower thoracic and there was additional numbness (very low level degree and she even noticed difficulty buttoning clothes). The upper chest and abdominal areas feel 75% back to normal post surgery without worsening of sensory disturbances in those areas. She has persistent sensitivity (hyper) of there thighs- her hands and feet have remained \"freezing cold\" after the surgery- this cold feeling was not clearly present to her before the surgery.     There has has been no new or evolving numbness or other sensory disturbances of the face,tongue,scalp, upper shoulders bilaterally.     She is going to PT (2 times a  week) for about 1 hour at a time.  She had a constant low level dull ache across the lower lumbar region (sitting is worst) and laying down > back pain is gone but during the time she roles over (right to left or left to right) transiently increase back pain during these rolling movements. Yolanda has not had any radiculopathic pain(s) or sensory disturbances down either legs in the last 2-3 months or so.    She also has noticed considerable improvement in her prior anterior thigh pain(s)-tightness/soreness (since having her lumbar spine surgery).      There has been no  bowel incontinence issues.     Yolanda has not had any ysarthria,dysphagia,vertigo,headache(s),evolving neck pains in the last 2 to 3 months nor any lhermitte's sign.       Patient Active Problem List    Diagnosis Date Noted    S/P " cervical spinal fusion 01/19/2023    S/P lumbar fusion 09/29/2022    Acute respiratory failure with hypoxia (HCC) 02/26/2022    Falls frequently 02/16/2022    Electrolyte imbalance 02/16/2022    Wound infection after surgery 02/16/2022    Hyperlipidemia 01/17/2022    Pain in the chest 01/17/2022    Morbid obesity (HCC) 01/17/2022    Anxiety 01/17/2022    Chest pain, rule out acute myocardial infarction 01/17/2022    Cervical myelopathy with cervical radiculopathy (HCC) 01/06/2022    Nausea and vomiting 06/29/2021    Delayed emergence from anesthesia 06/29/2021    Essential hypertension 06/29/2021    Obstructive sleep apnea syndrome 06/29/2021       Past medical history:   Past Medical History:   Diagnosis Date    Anesthesia 2006    slow to wake up and PONV - no problems since per pt    Anxiety     Arthritis     shoulders    Breath shortness 09/23/2022    on exertion    Delayed emergence from general anesthesia     Per Problem List    Dental disorder     upper dentures    Frequent falls     Uses Cane    High cholesterol     Hypertension 01/05/2022    pt states well controlled on meds    Infectious disease     COVID Nov 2020 and hx of MRSA    Low blood sugar     MRSA (methicillin resistant Staphylococcus aureus)     H/O    BRIGITTE (obstructive sleep apnea) 01/17/2023    Has not been using CPAP at this time.    Pain 09/23/2022    low back and neck    Pneumonia     Nov 2020 + COVID    PONV (postoperative nausea and vomiting)     Psychiatric problem     depression    PTSD (post-traumatic stress disorder)     Sleep apnea     cpap    Snoring     Urinary bladder disorder     bladder sling put in 6/29/21       Past surgical history:   Past Surgical History:   Procedure Laterality Date    LAMINOTOMY  1/19/2023    Procedure: C3 LAMINOPLASTY, C4-T1 DECOMPRESSIVE LAMINECTOMY, C4-T1 POSTERIOR SPINAL FUSION;  Surgeon: Jama Werner M.D.;  Location: SURGERY [facility];  Service: Orthopedics    CERVICAL DECOMPRESSION POSTERIOR   2023    Procedure: DECOMPRESSION, SPINE, CERVICAL, POSTERIOR APPROACH;  Surgeon: Jama Werner M.D.;  Location: [facility];  Service: Orthopedics    CERVICAL LAMINECTOMY POSTERIOR  2023    Procedure: LAMINECTOMY, SPINE, CERVICAL, POSTERIOR APPROACH;  Surgeon: Jama Werner M.D.;  Location: [facility];  Service: Orthopedics    CERVICAL FUSION POSTERIOR  2023    Procedure: FUSION, SPINE, CERVICAL, POSTERIOR APPROACH;  Surgeon: Jama Werner M.D.;  Location: [facility];  Service: Orthopedics    FUSION, SPINE, LUMBAR, PLIF Bilateral 2022    Procedure: L3-L5 TRANSFORAMINAL LUMBAR INTERBODY FUSION, L2-5 DECOMPRESSION;  Surgeon: Jama Werner M.D.;  Location: [facility];  Service: Orthopedics    LUMBAR DECOMPRESSION Bilateral 2022    Procedure: DECOMPRESSION, SPINE, LUMBAR;  Surgeon: Jama Werner M.D.;  Location: [facility];  Service: Orthopedics    CORPECTOMY N/A 2022    Procedure: CORPECTOMY, SPINE - PARTIAL;  Surgeon: Jama Werner M.D.;  Location: [facility];  Service: Orthopedics    CERVICAL DISK AND FUSION ANTERIOR N/A 2022    Procedure: DISCECTOMY, SPINE, CERVICAL, ANTERIOR APPROACH, WITH FUSION - C5-T1;  Surgeon: Jama Werner M.D.;  Location: [facility];  Service: Orthopedics    BLADDER SLING FEMALE N/A 2021    Procedure: BLADDER SLING, FEMALE - SOLYX.;  Surgeon: Rocío Dewey M.D.;  Location: [facility];  Service: Gynecology    PB KNEE SCOPE,AID ANT CRUCIATE REPAIR Right 2019    Procedure: RT KNEE ARTHROSCOPY WITH ALLOGRAFT ACL RECONSTRUCTION AND PARTIAL MENISCECTOMY;  Surgeon: Onur Arriaza M.D.;  Location: [facility];  Service: Orthopedics    BREAST BIOPSY  2010    Performed by ROCÍO BARON at SURGERY SAME DAY [facility]    GYN SURGERY  ,        OTHER ORTHOPEDIC SURGERY      acl left knee    OTHER ORTHOPEDIC SURGERY      right shoulder     "TONSILLECTOMY           Social history:   Social History     Socioeconomic History    Marital status:      Spouse name: Not on file    Number of children: Not on file    Years of education: Not on file    Highest education level: Not on file   Occupational History    Not on file   Tobacco Use    Smoking status: Former     Packs/day: 0.50     Years: 30.00     Pack years: 15.00     Types: Cigarettes     Quit date: 2013     Years since quittin.3     Passive exposure: Past    Smokeless tobacco: Never    Tobacco comments:     .5 ppd, 30 yrs   Vaping Use    Vaping Use: Some days    Substances: Nicotine, Flavoring    Devices: Pre-filled or refillable cartridge   Substance and Sexual Activity    Alcohol use: No    Drug use: Yes     Types: Inhaled     Comment: \"clean since \",  THC Use    Sexual activity: Not on file   Other Topics Concern    Not on file   Social History Narrative    Not on file     Social Determinants of Health     Financial Resource Strain: Not on file   Food Insecurity: Not on file   Transportation Needs: Not on file   Physical Activity: Not on file   Stress: Not on file   Social Connections: Not on file   Intimate Partner Violence: Not on file   Housing Stability: Not on file       Family history:   Family History   Problem Relation Age of Onset    Alcohol/Drug Mother     Anesthesia Mother     Anxiety disorder Mother     Bipolar disorder Mother     Cancer Mother     Lung Cancer Mother     Osteoporosis Mother     Allergies Father     Kidney stones Father     Lung Cancer Father     DVT Sister     Lung Cancer Sister          Current medications:   Current Outpatient Medications   Medication    sulfamethoxazole-trimethoprim (BACTRIM DS) 800-160 MG tablet    VITAMIN D PO    atorvastatin (LIPITOR) 10 MG Tab    QUEtiapine (SEROQUEL) 50 MG tablet    sertraline (ZOLOFT) 100 MG Tab     No current facility-administered medications for this visit.       Medication Allergy:  Allergies   Allergen " Reactions    Morphine Anaphylaxis     Throat swelling  Tolerates oxycodone    Dilaudid [Hydromorphone] Anxiety           Physical examination:   Vitals:    03/23/23 1300   BP: 116/74   BP Location: Right arm   Patient Position: Sitting   BP Cuff Size: Adult   Pulse: 76   Temp: 36.4 °C (97.5 °F)   TempSrc: Temporal   SpO2: 97%   Weight: 64.9 kg (143 lb)   Height: 1.524 m (5')       Normal Cephalic Atraumatic.  General: Full Range of Movement around the Neck in all directions without restrictions or discrete pain evoked triggers.  No lower extremity edema.      Neurological  Exam:    Mental status: Awake, alert and fully oriented to person, place, time, and situation. Normal attention, concentration, and fund of knowledge for education level.  Did not appear/act combative,irritable,anxious,paranoid/delusional or aggressive to or with me.  Speech and language: Speech is fluent without errors, clear, intact to repetition, and intact to naming.     Follows 3 step motor commands in sequence without significant delay and correctly.    Cranial nerve exam:  II: Pupils are equally round and reactive to light. Visual fields are intact by confrontation.  III, IV, VI: EOMI, no diplopia, no ptosis.  V: Sensation to light touch is normal over V1-3 distributions bilaterally.  .  VII: Facial movements are symmetrical. There is no facial droop. .  VIII: Hearing intact to soft speech and finger rub bilaterally  IX: Palate elevates symmetrically, uvula is midline. Dysarthria is not present.  XI: Shoulder shrug are symmetrical and strong.   XII: Tongue protrudes midline.        Motor exam:  Muscle tone is normal in all 4 limbs. and No abnormal movements appreciated.    Muscle strength:    Neck Flexors/Extensors: 5/5       Right  Left  Deltoid   4+/5  5/5      Biceps   4+/5  5/5  Triceps              5-/5  5/5   Wrist extensors 5/5  5/5  Wrist flexors  5/5  5/5     4/5  5/5  Interossei  4/5  5/5  Thenar (APB)  4+/5  5/5   Hip  flexors  5/5  5/5  Quadriceps  5/5  5/5    Hamstrings  5/5  5/5  Dorsiflexors  5/5  5/5  Plantarflexors  5/5  5/5  Toe extension  5/5  5/5  Toe Flexors                5/5                   5/5    Sensory exam:    Vibratory: 6-8 seconds at the great toes, 8 to 10 seconds at the ankles, 10-12 seconds at the knees, 20 seconds at the index fingers and 22 seconds at the 5th fingers.    Negative Phalen's at Median Wrists.  Negative Tinel's at Median Wrists.      Reflexes:       Right  Left  Biceps   /4  2/4  Triceps              2/4  2/4  Brachioradialis  /4  2/4  Knee jerk  /4  2/4  Ankle jerk  2/4  2/4     Frontal release signs are normal.    bilaterally toes are downgoing to plantar stimulation..    Coordination (finger-to-nose, heel/knee/shin, rapid alternating movements) was normal.     There was no truncal ataxia, no tremors, and no dysmetria.     Station and gait - easily stands up from exam chair without retropulsion,veering,leaning,swaying (to either side).   Arm swing symmetrical.    No Rombergism.      Labs and Tests:    Glycohemoglobin 4.0 - 5.6 % 5.5  5.3 CM  6.1 High          [id number] 7:41 AM     HISTORY/REASON FOR EXAM:  Intraoperative images for procedural navigation.     TECHNIQUE/EXAM DESCRIPTION AND NUMBER OF VIEWS:  6 view(s) of the cervicothoracic spine.     IMPRESSION:     Fluoroscopic image(s) obtained during cervicothoracic instrumentation. Anterior fusion hardware projects appropriately. Posterior transpedicular screws also project appropriately.. Please see the patient's chart for full procedural details.     Fluoroscopy time 1 minute 30 seconds.     Fluoroscopy dose(DAP): 1.6 Gy*cm^2          Impression/Plans/Recommendations:     S/P Cervical Spine Fusion due to Cervical Myelopathy- prior  onset of lateral left abdominal numbness and tingling in 2021 with extension and progression gradually over the next 1-2 months to extend both towards upper chest and into the  "arms (partially) and hands and extending downwards into her legs. No associated cranial neuropathic findings.     Still has right arm and hand weakness since cervical spine surgery but improved and not worse.    She has persistent lower back pains without radiculopathic changes that she previously had occurring.    She had recently cspine Xrays this past week and fusion areas are stable.    Overall from Neurological standpoint improved with primary deficits being weakness of right arm-hand and residual numbness of inner arms (right more than left)> C8/T1 dermatomes.    Gait-balance also improving in last 2-3 months.    Has significant improvement in prior sensations of both feet since lumbar spine surgery (consistently over \"50% improved\" to date.    Plans:    A. Continue Rx for chronic lower back pain (recent RAFY) and does stretches at home.    B. I placed a referral for an OT evaluation given her right hand weakness (unclear how long this will last but likely related to cspine myelopathy).      I have performed  a history and physical exam and a directed /focused  ROS today.    Total time spent today or this patient's care was 30 minutes  and included reviewing diagnostic workup to date (labs and imaging that include interpreting such tests relevant to this patient's neurological condition),  reviewing/obtaining separately obtained history from Yolanda  for today's neurological problem(s) , ordering either/or medications and additional tests, giving advise and suggestions on the present neurological problem and  documenting the clinical information in the EMR.    Follow up: in 6 months or so.    Arturo Lu MD  [facility].   [facility]    "

## 2023-04-10 ENCOUNTER — OCCUPATIONAL THERAPY (OUTPATIENT)
Dept: OCCUPATIONAL THERAPY | Facility: REHABILITATION | Age: 63
End: 2023-04-10
Attending: PSYCHIATRY & NEUROLOGY
Payer: MEDICAID

## 2023-04-10 DIAGNOSIS — Z98.1 S/P CERVICAL SPINAL FUSION: ICD-10-CM

## 2023-04-10 DIAGNOSIS — R29.898 RIGHT ARM WEAKNESS: ICD-10-CM

## 2023-04-10 PROCEDURE — 97166 OT EVAL MOD COMPLEX 45 MIN: CPT

## 2023-04-10 SDOH — ECONOMIC STABILITY: GENERAL: QUALITY OF LIFE: GOOD

## 2023-04-10 ASSESSMENT — ENCOUNTER SYMPTOMS
PAIN TIMING: OCCASIONAL
PAIN SCALE: 0
PAIN LOCATION: BACK
PAIN SCALE AT HIGHEST: 1
ALLEVIATING FACTORS: REST
QUALITY: ACHING
ALLEVIATING FACTORS: PAIN MEDICATION
EXACERBATED BY: ACTIVITY
PAIN SCALE AT LOWEST: 0

## 2023-04-10 NOTE — OP THERAPY EVALUATION
Outpatient Occupational Therapy  INITIAL EVALUATION    [facility] Occupational Therapy [address].  [address]  [place] [address]  Phone:  [phone]  Fax:  [phone]    Date of Evaluation: 04/10/2023    Patient: Yolanda Reed  YOB: 1960  MRN: [id number]     Referring Provider: Arturo Lu M.D.  [address] [facility] [address]  [place] [address]   Referring Diagnosis S/P cervical spinal fusion [Z98.1];Right arm weakness [R29.898]     Time Calculation    Start time: 1015  Stop time: 1100 Time Calculation (min): 45 minutes             Chief Complaint: Extremity Weakness and Self Care Duties    Visit Diagnoses     ICD-10-CM   1. Right arm weakness  R29.898   2. S/P cervical spinal fusion  Z98.1       Subjective:   History of Present Illness:     Date of onset:  9/29/2022    Date of surgery:  1/19/2023    Mechanism of injury:  As per Dr. Lu' office visit on 3/23/23:  Yolanda Reed 62 y.o. female who lives with a boyfriend (common law) and is a Native of [place]. She has been on Disability for nearly 4 years due to Bipolar/Anxiety issues. 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      Since prior visit with me in  September 2022- Yolanda had both lumbar spine surgery at the end of September 2022 (Jama Werner MD)> DATE OF ADMISSION:  09/29/2022   DATE OF DISCHARGE:  10/04/2022      DISCHARGE DIAGNOSES:  1.  Status post L2-L5 decompressive laminectomy with foraminotomies for lumbar   spinal stenosis, herniated nucleus pulposus and radiculopathy.  2.  Status post L3-L4 and L4-L5 transforaminal lumbar interbody instrumented   fusion for spondylolisthesis.     And then had cervical spine surgery in the middle of January 2023:     DATE OF ADMISSION:  01/19/2023      DISCHARGE DIAGNOSES:  Status post C4-T1 posterior spinal instrumented fusion   and decompression and C3 laminoplasty for C3-T1 cervical spinal  stenosis,   herniated nucleus pulposus, myelopathy and radiculopathy.  Quality of life:  Good  Prior level of function:  Was independent until stenosis in spine became worse.  Now on disability and needs some assistance from partner at home  Headaches:  no headaches  Ear problems: none  Sleep disturbance:  Not disrupted  Pain:     Current pain ratin    At best pain ratin    At worst pain ratin    Location:  Back    Quality:  Aching    Pain timing:  Occasional    Relieving factors:  Rest and pain medication    Aggravating factors:  Activity    Progression:  Improving  Social Support:     Lives in:  Apartment    Lives with:  Significant other  Hand dominance:  Right  Diagnostic Tests:     X-ray: abnormal      Diagnostic Tests Comments:  24:  Prior to surgery     1. Postsurgical changes from ACDF of C5-T1.  2. Severe spinal canal narrowing at C5-6 and C6-C7, especially posterior to the C6 vertebral body.  3. Severe bilateral neuroforaminal narrowing at C5-6 and C6-C7  Treatments:     Previous treatment:  Physical therapy    Current treatment:  Physical therapy and occupational therapy  Patient Goals:     Patient goals for therapy:  Decreased pain, increased strength, independence with ADLs/IADLs and return to sport/leisure activities    Past Medical History:   Diagnosis Date    Anesthesia 2006    slow to wake up and PONV - no problems since per pt    Anxiety     Arthritis     shoulders    Breath shortness 2022    on exertion    Delayed emergence from general anesthesia     Per Problem List    Dental disorder     upper dentures    Frequent falls     Uses Cane    High cholesterol     Hypertension 2022    pt states well controlled on meds    Infectious disease     COVID 2020 and hx of MRSA    Low blood sugar     MRSA (methicillin resistant Staphylococcus aureus)     H/O    BRIGITTE (obstructive sleep apnea) 2023    Has not been using CPAP at this time.    Pain 2022    low back and  neck    Pneumonia     Nov 2020 + COVID    PONV (postoperative nausea and vomiting)     Psychiatric problem     depression    PTSD (post-traumatic stress disorder)     Sleep apnea     cpap    Snoring     Urinary bladder disorder     bladder sling put in 6/29/21     Past Surgical History:   Procedure Laterality Date    LAMINOTOMY  1/19/2023    Procedure: C3 LAMINOPLASTY, C4-T1 DECOMPRESSIVE LAMINECTOMY, C4-T1 POSTERIOR SPINAL FUSION;  Surgeon: Jama Werner M.D.;  Location: [facility];  Service: Orthopedics    CERVICAL DECOMPRESSION POSTERIOR  1/19/2023    Procedure: DECOMPRESSION, SPINE, CERVICAL, POSTERIOR APPROACH;  Surgeon: Jama Werner M.D.;  Location: [facility];  Service: Orthopedics    CERVICAL LAMINECTOMY POSTERIOR  1/19/2023    Procedure: LAMINECTOMY, SPINE, CERVICAL, POSTERIOR APPROACH;  Surgeon: Jama Werner M.D.;  Location: [facility];  Service: Orthopedics    CERVICAL FUSION POSTERIOR  1/19/2023    Procedure: FUSION, SPINE, CERVICAL, POSTERIOR APPROACH;  Surgeon: Jama Werner M.D.;  Location: [facility];  Service: Orthopedics    FUSION, SPINE, LUMBAR, PLIF Bilateral 9/29/2022    Procedure: L3-L5 TRANSFORAMINAL LUMBAR INTERBODY FUSION, L2-5 DECOMPRESSION;  Surgeon: Jama Werner M.D.;  Location: [facility];  Service: Orthopedics    LUMBAR DECOMPRESSION Bilateral 9/29/2022    Procedure: DECOMPRESSION, SPINE, LUMBAR;  Surgeon: Jama Werner M.D.;  Location: [facility];  Service: Orthopedics    CORPECTOMY N/A 1/6/2022    Procedure: CORPECTOMY, SPINE - PARTIAL;  Surgeon: Jama Werner M.D.;  Location: [facility];  Service: Orthopedics    CERVICAL DISK AND FUSION ANTERIOR N/A 1/6/2022    Procedure: DISCECTOMY, SPINE, CERVICAL, ANTERIOR APPROACH, WITH FUSION - C5-T1;  Surgeon: Jama Wrener M.D.;  Location: [facility];  Service: Orthopedics    BLADDER SLING FEMALE N/A 6/29/2021    Procedure: BLADDER SLING, FEMALE - SOLYX.;   Surgeon: Rocío Dewey M.D.;  Location: SURGERY [facility];  Service: Gynecology    PB KNEE SCOPE,AID ANT CRUCIATE REPAIR Right 2019    Procedure: RT KNEE ARTHROSCOPY WITH ALLOGRAFT ACL RECONSTRUCTION AND PARTIAL MENISCECTOMY;  Surgeon: Onur Arriaza M.D.;  Location: SURGERY [facility];  Service: Orthopedics    BREAST BIOPSY  2010    Performed by ROCÍO BARON at SURGERY SAME DAY [facility]    GYN SURGERY  ,        OTHER ORTHOPEDIC SURGERY      acl left knee    OTHER ORTHOPEDIC SURGERY      right shoulder    TONSILLECTOMY       Social History     Tobacco Use    Smoking status: Former     Packs/day: 0.50     Years: 30.00     Pack years: 15.00     Types: Cigarettes     Quit date: 2013     Years since quittin.4     Passive exposure: Past    Smokeless tobacco: Never    Tobacco comments:     .5 ppd, 30 yrs   Substance Use Topics    Alcohol use: No     Family and Occupational History     Socioeconomic History    Marital status:      Spouse name: Not on file    Number of children: Not on file    Years of education: Not on file    Highest education level: Not on file   Occupational History    Not on file       Objective     Neurological Testing     Sensation     Wrist/Hand   Left   Diminished: hot/cold discrimination  Paresthesia: light touch    Right   Diminished: hot/cold discrimination  Paresthesia: light touch    Active Range of Motion   Left Shoulder   Normal active range of motion    Right Shoulder   Normal active range of motion    Left Wrist   Normal active range of motion    Right Wrist   Normal active range of motion    Left Thumb     Normal active range of motion    Right Thumb     Normal active range of motion  Opposition: Only able to oppose  to tip of middle finger due to weak opponens pollicis strength    Left Digits    Normal active range of motion    Right Digits   Normal active range of motion    Strength:      Left Wrist/Hand    (2nd hand  position)     Trial 1: 26    Right Wrist/Hand    (2nd hand position)     Trial 1: 5    Tests     Left Shoulder   Positive ULTT2.     Right Shoulder   Positive ULTT2.     Additional Tests Details  Minimal tightness of median nerves and moderate tightness of ulnar nerves in both upper extremities    General Comments     Wrist/Hand Comments  9 hole peg test:  R= 59 seconds  L= 35 seconds.      Activities of Daily Living:     ADL Comments:  Difficulty with fine motor dressing, hygiene, meal prep and housework due to weakness and fatigue.       Therapeutic Exercises (CPT 43476):     1. median and ulnar nerve stretches    2. light resistance theraputty    3. AROM/strengthening of right thumb    4. fine motor manipulation/dexterity activities to complete at home.    Therapeutic Exercise Summary:  Initiated HEP with written, verbal and visual instruction.  To complete 3 x a day    No charge      Time-based treatments/modalities:           Assessment, Response and Plan:   Impairments: abnormal coordination, activity intolerance, fine motor function, impaired physical strength, lacks appropriate home exercise program and limited ADL's    Assessment details:  Patient is a 63 y/o with multiple spinal issues and s/p lumbar surgeries and recently had cervical surgery in January of this year.  Patient presenting with impaired sensation up ulnar border of both arms, impaired gross and fine strength, impaired fine motor manipulation/dexterity and tightness in both median and ulnar nerves in both arms.  Patient would benefit from OT intervention to enhance functional use of B upper extremities and regain function.    Barriers to therapy:  None  Prognosis: good    Goals:   Short Term Goals:   Patient will be independent with HEP  Patient will improve  strength by at least 20 pounds to enhance functional use  Patient will be able to legibly sign her name  Patient will score 40 seconds or less on the 9 hole peg test with right  hand due to improved fine motor manipulation and dexterity  Short term goal timespan:  2-4 weeks    Long Term Goals:   Patient will improve  strength by at least 35 pounds to enhance functional use  Patient will be able to legibly print a short sentance  Patient will score 30 seconds or less on the 9 hole peg test with right hand due to improved fine motor manipulation and dexterity  Patient will score at least 55/80 on the UEFI  Long term goal timespan:  6-8 weeks    Plan:   Therapy options:  Occupational therapy treatment to continue  Planned therapy interventions:  Therapeutic Exercise (CPT 03648)  Other planned therapy interventions:  97110 x 3  Frequency:  2x week  Duration in weeks:  8  Duration in visits:  16  Discussed with:  Patient    Functional Assessment Used:  UEFI = 34/80        Referring provider co-signature:  I have reviewed this plan of care and my co-signature certifies the need for services.    Certification Period: 04/10/2023 to  06/05/23    Physician Signature: ________________________________ Date: ______________

## 2023-05-01 ENCOUNTER — OCCUPATIONAL THERAPY (OUTPATIENT)
Dept: OCCUPATIONAL THERAPY | Facility: REHABILITATION | Age: 63
End: 2023-05-01
Attending: PSYCHIATRY & NEUROLOGY
Payer: MEDICAID

## 2023-05-01 DIAGNOSIS — R29.898 RIGHT ARM WEAKNESS: ICD-10-CM

## 2023-05-01 PROCEDURE — 97110 THERAPEUTIC EXERCISES: CPT

## 2023-05-01 PROCEDURE — 97140 MANUAL THERAPY 1/> REGIONS: CPT

## 2023-05-01 NOTE — OP THERAPY DAILY TREATMENT
Outpatient Occupational Therapy  DAILY TREATMENT     [facility] Occupational [address].  [address]  [place] [address]  Phone:  [phone]  Fax:  [phone]    Date: 05/01/2023    Patient: Yolanda Reed  YOB: 1960  MRN: [id number]     Time Calculation  Start time: 1430  Stop time: 1515 Time Calculation (min): 45 minutes         Chief Complaint: Hand Problem and Arm Problem    Visit #: 2    SUBJECTIVE:  Patient reports frequent fluctuations in R hand strength    OBJECTIVE:  Current objective measures:         Therapeutic Exercises (CPT 07562):     1. isometric thumb adduction, 6x10 second holds, R hand    2. isometric thumb abduction, 6x10 second holds, R hand    3. isometric thumb retropulsion, 6x10 second holds, R hand    4. gravity assist thumb to pink opposition, 2x5 reps, R hand    5. theraputty light resistance, gross grasp x10 reps, R hand    6. theraputty light resistance, removed 10 coins of varying sizes with R hand, L hand as stabilizer    Therapeutic Treatments and Modalities:    1. Manual Therapy (CPT 39375), soft tissue mobilization R hand thenar eminence, adductor release  Time-based treatments/modalities:  Therapeutic exercise minutes (CPT 37587): 30 minutes  Manual therapy joint mobilization minutes (CPT 54299): 15 minutes        ASSESSMENT:   Response to treatment: patient tolerated session well, continues to be limited by significant R hand weakness, particularly with digit adduction, pinky opposition, and thumb opposition, thumb has tendency to internally rotate when attempting functional gross grasp, patient with tendency to rely on lateral pinch for fine motor movements, demos significant CMC collapse with tip to tip pinch. Patient motivated and participatory throughout, receptive to strategies to strengthen thumb stabilizers and to facilitate active opposition.     PLAN/RECOMMENDATIONS:   Plan for treatment: therapy treatment to continue next  visit.  Planned interventions for next visit: continue with current treatment

## 2023-05-04 ENCOUNTER — OCCUPATIONAL THERAPY (OUTPATIENT)
Dept: OCCUPATIONAL THERAPY | Facility: REHABILITATION | Age: 63
End: 2023-05-04
Attending: PSYCHIATRY & NEUROLOGY
Payer: MEDICAID

## 2023-05-04 DIAGNOSIS — Z98.1 S/P CERVICAL SPINAL FUSION: ICD-10-CM

## 2023-05-04 DIAGNOSIS — R29.898 RIGHT ARM WEAKNESS: ICD-10-CM

## 2023-05-04 PROCEDURE — 97110 THERAPEUTIC EXERCISES: CPT

## 2023-05-04 NOTE — OP THERAPY DAILY TREATMENT
Outpatient Occupational Therapy  DAILY TREATMENT     [facility] Occupational Therapy [address].  [address]  [place] [address]  Phone:  [phone]  Fax:  [phone]    Date: 05/04/2023    Patient: Yolanda Reed  YOB: 1960  MRN: [id number]     Time Calculation  Start time: 0315  Stop time: 0400 Time Calculation (min): 45 minutes         Chief Complaint: Extremity Weakness and Self Care Duties    Visit #: 3    SUBJECTIVE:  My hand can go into a cramp at any time.      OBJECTIVE:  Current objective measures:   Sensation      Wrist/Hand   Left   Diminished: hot/cold discrimination  Paresthesia: light touch     Right   Diminished: hot/cold discrimination  Paresthesia: light touch     Active Range of Motion   Left Shoulder   Normal active range of motion     Right Shoulder   Normal active range of motion     Left Wrist   Normal active range of motion     Right Wrist   Normal active range of motion     Left Thumb     Normal active range of motion     Right Thumb     Normal active range of motion  Opposition: Only able to oppose  to tip of middle finger due to weak opponens pollicis strength     Left Digits    Normal active range of motion     Right Digits   Normal active range of motion     Strength:       Left Wrist/Hand    (2nd hand position)     Trial 1: 33     Right Wrist/Hand    (2nd hand position)     Trial 1: 8     Tests      Left Shoulder   Positive ULTT2.      Right Shoulder   Positive ULTT2.      Additional Tests Details  Minimal tightness of median nerves and moderate tightness of ulnar nerves in both upper extremities     General Comments      Wrist/Hand Comments  9 hole peg test:  R= 45 seconds  L= 27 seconds.       Activities of Daily Living:      ADL Comments:  Difficulty with fine motor dressing, hygiene, meal prep and housework due to weakness and fatigue.         Therapeutic Exercises (CPT 32738):     1. isometric thumb adduction, 10x10 second holds, R  hand    2. isometric thumb abduction, 10x10 second holds, R hand    3. isometric thumb retropulsion, 10x10 second holds, R hand    4. gravity assist thumb to pink opposition, 2x5 reps, R hand    5. isometric thumb extension, 10x10 second holds, R hand    6. fine motor manipulation    7. theraputty light resistance, gross grasp x10 reps    Therapeutic Exercise Summary:  Demonstrated use of button hook for fastening buttons and printed out information to purchase.      Time-based treatments/modalities:  Therapeutic exercise minutes (CPT 34820): 45 minutes        Pain rating before treatment: 3  Pain rating after treatment: 3    ASSESSMENT:   Response to treatment: improved  strength and manipulation/dexterity since initial evaluation.  Focus on placing thumb in mid opposition during grasp activities.  Reviewed HEP with emphasis on thumb extension, abduction and opposition  Patient shared EMG results with OT showing impaired motor involvement from C8.     PLAN/RECOMMENDATIONS:   Plan for treatment: therapy treatment to continue next visit.  Planned interventions for next visit: continue with current treatment and therapeutic exercise (CPT 87870)

## 2023-05-08 ENCOUNTER — APPOINTMENT (OUTPATIENT)
Dept: OCCUPATIONAL THERAPY | Facility: REHABILITATION | Age: 63
End: 2023-05-08
Payer: MEDICAID

## 2023-05-09 ENCOUNTER — OCCUPATIONAL THERAPY (OUTPATIENT)
Dept: OCCUPATIONAL THERAPY | Facility: REHABILITATION | Age: 63
End: 2023-05-09
Attending: PSYCHIATRY & NEUROLOGY
Payer: MEDICAID

## 2023-05-09 DIAGNOSIS — Z98.1 S/P CERVICAL SPINAL FUSION: ICD-10-CM

## 2023-05-09 DIAGNOSIS — R29.898 RIGHT ARM WEAKNESS: ICD-10-CM

## 2023-05-09 PROCEDURE — 97110 THERAPEUTIC EXERCISES: CPT

## 2023-05-09 NOTE — OP THERAPY DAILY TREATMENT
Outpatient Occupational Therapy  DAILY TREATMENT     [facility] Occupational Therapy [address].  [address]  [place] [address]  Phone:  [phone]  Fax:  [phone]    Date: 05/09/2023    Patient: Yolanda Reed  YOB: 1960  MRN: [id number]     Time Calculation  Start time: 0230  Stop time: 0315 Time Calculation (min): 45 minutes         Chief Complaint: Extremity Weakness and Self Care Duties    Visit #: 4    SUBJECTIVE:  I have been doing my exercises    OBJECTIVE:  Current objective measures:   Sensation      Wrist/Hand   Left   Diminished: hot/cold discrimination  Paresthesia: light touch     Right   Diminished: hot/cold discrimination  Paresthesia: light touch     Active Range of Motion   Left Shoulder   Normal active range of motion     Right Shoulder   Normal active range of motion     Left Wrist   Normal active range of motion     Right Wrist   Normal active range of motion     Left Thumb     Normal active range of motion     Right Thumb     Normal active range of motion  Opposition: Only able to oppose  to tip of middle finger due to weak opponens pollicis strength     Left Digits    Normal active range of motion     Right Digits   Normal active range of motion     Strength:       Left Wrist/Hand    (2nd hand position)     Trial 1: 33     Right Wrist/Hand    (2nd hand position)     Trial 1: 8     Tests      Left Shoulder   Positive ULTT2.      Right Shoulder   Positive ULTT2.      Additional Tests Details  Minimal tightness of median nerves and moderate tightness of ulnar nerves in both upper extremities     General Comments      Wrist/Hand Comments  9 hole peg test:  R= 45 seconds  L= 27 seconds.       Activities of Daily Living:      ADL Comments:  Difficulty with fine motor dressing, hygiene, meal prep and housework due to weakness and fatigue.         Therapeutic Exercises (CPT 57564):     1. isometric thumb adduction, 10x10 second holds, R hand    2.  isometric thumb abduction, 10x10 second holds, R hand    3. isometric thumb retropulsion, 10x10 second holds, R hand    4. gravity assist thumb to pink opposition, 2x5 reps, R hand    5. isometric thumb extension, 10x10 second holds, R hand    6. fine motor manipulation, with and without splint on, 20 seconds faster with splint on    Therapeutic Exercise Summary:  OTR fabricated a static hand based splint placing thumb in mid-opposition and blocking MCP extension with palmar arch in place to enhance prehension and functional use.  To wear during functional tasks to improve use of right dominant hand.  Mod I donning and doffig    Time-based treatments/modalities:  Therapeutic exercise minutes (CPT 82676): 45 minutes        Pain rating before treatment: 2  Pain rating after treatment: 2    ASSESSMENT:   Response to treatment: Highly motivated to participate in therapy.      PLAN/RECOMMENDATIONS:   Plan for treatment: therapy treatment to continue next visit.  Planned interventions for next visit: continue with current treatment and therapeutic exercise (CPT 15332)

## 2023-05-11 ENCOUNTER — OCCUPATIONAL THERAPY (OUTPATIENT)
Dept: OCCUPATIONAL THERAPY | Facility: REHABILITATION | Age: 63
End: 2023-05-11
Attending: PSYCHIATRY & NEUROLOGY
Payer: MEDICAID

## 2023-05-11 DIAGNOSIS — Z98.1 S/P CERVICAL SPINAL FUSION: ICD-10-CM

## 2023-05-11 DIAGNOSIS — R29.898 RIGHT ARM WEAKNESS: ICD-10-CM

## 2023-05-11 PROCEDURE — 97110 THERAPEUTIC EXERCISES: CPT

## 2023-05-11 NOTE — OP THERAPY DAILY TREATMENT
Outpatient Occupational Therapy  DAILY TREATMENT     [facility] Occupational Therapy [address].  [address]  [place] [address]  Phone:  [phone]  Fax:  [phone]    Date: 05/11/2023    Patient: Yolanda Reed  YOB: 1960  MRN: [id number]     Time Calculation  Start time: 0930  Stop time: 1015 Time Calculation (min): 45 minutes         Chief Complaint: Extremity Weakness and Hand Problem    Visit #: 5    SUBJECTIVE:  I really like the splint it puts my thumb in a good position to pinch and hold onto stuff     OBJECTIVE:  Current objective measures:   Sensation      Wrist/Hand   Left   Diminished: hot/cold discrimination  Paresthesia: light touch     Right   Diminished: hot/cold discrimination  Paresthesia: light touch     Active Range of Motion   Left Shoulder   Normal active range of motion     Right Shoulder   Normal active range of motion     Left Wrist   Normal active range of motion     Right Wrist   Normal active range of motion     Left Thumb     Normal active range of motion     Right Thumb     Normal active range of motion  Opposition: Only able to oppose  to tip of middle finger due to weak opponens pollicis strength     Left Digits    Normal active range of motion     Right Digits   Normal active range of motion     Strength:       Left Wrist/Hand    (2nd hand position)     Trial 1: 33     Right Wrist/Hand    (2nd hand position)     Trial 1: 8     Tests      Left Shoulder   Positive ULTT2.      Right Shoulder   Positive ULTT2.      Additional Tests Details  Minimal tightness of median nerves and moderate tightness of ulnar nerves in both upper extremities     General Comments      Wrist/Hand Comments  9 hole peg test:  R= 45 seconds  L= 27 seconds.       Activities of Daily Living:      ADL Comments:  Difficulty with fine motor dressing, hygiene, meal prep and housework due to weakness and fatigue.         Therapeutic Exercises (CPT 63757):     1. isometric  thumb adduction, 10x10 second holds, R hand    2. isometric thumb abduction, 10x10 second holds, R hand    3. isometric thumb retropulsion, 10x10 second holds, R hand    4. gravity assist thumb to small finger opposition, 2x5 reps, R hand    5. isometric thumb extension/flexion, 10x10 second holds, R hand    6. light resistance theraputty, pinching, grasping, rolling    7. 9# digi-flex for gross grasp, 10 x    Therapeutic Exercise Summary:  Patient tolerating splint and find it is helping with improved prehension during functional tasks.  Some padding added to dorsal bar for comfort and extra straps provided      Time-based treatments/modalities:  Therapeutic exercise minutes (CPT 84582): 45 minutes        Pain rating before treatment: 2  Pain rating after treatment: 2    ASSESSMENT:   Response to treatment: Tolerating hand splint during functional tasks.  Motivated to participate in therapy with focus on thumb and digit strengthening to enhance prehension and grasp    PLAN/RECOMMENDATIONS:   Plan for treatment: therapy treatment to continue next visit.  Planned interventions for next visit: continue with current treatment and therapeutic exercise (CPT 28807)

## 2023-05-15 ENCOUNTER — OCCUPATIONAL THERAPY (OUTPATIENT)
Dept: OCCUPATIONAL THERAPY | Facility: REHABILITATION | Age: 63
End: 2023-05-15
Attending: PSYCHIATRY & NEUROLOGY
Payer: MEDICAID

## 2023-05-15 DIAGNOSIS — Z98.1 S/P CERVICAL SPINAL FUSION: ICD-10-CM

## 2023-05-15 DIAGNOSIS — R29.898 RIGHT ARM WEAKNESS: ICD-10-CM

## 2023-05-15 PROCEDURE — 97110 THERAPEUTIC EXERCISES: CPT

## 2023-05-15 NOTE — OP THERAPY DAILY TREATMENT
Outpatient Occupational Therapy  DAILY TREATMENT     [facility] Occupational Therapy [address].  [address]  [place] [address]  Phone:  [phone]  Fax:  [phone]    Date: 05/15/2023    Patient: Yolanda Reed  YOB: 1960  MRN: [id number]     Time Calculation  Start time: 0230  Stop time: 0315 Time Calculation (min): 45 minutes         Chief Complaint: Hand Weakness and Hand Problem    Visit #: 6    SUBJECTIVE:  Can you fix my splint for me.  It is digging in at the thumb area.    OBJECTIVE:  Current objective measures:   Sensation      Wrist/Hand   Left   Diminished: hot/cold discrimination  Paresthesia: light touch     Right   Diminished: hot/cold discrimination  Paresthesia: light touch     Active Range of Motion   Left Shoulder   Normal active range of motion     Right Shoulder   Normal active range of motion     Left Wrist   Normal active range of motion     Right Wrist   Normal active range of motion     Left Thumb     Normal active range of motion     Right Thumb     Normal active range of motion  Opposition: Only able to oppose  to tip of middle finger due to weak opponens pollicis strength     Left Digits    Normal active range of motion     Right Digits   Normal active range of motion     Strength:       Left Wrist/Hand    (2nd hand position)     Trial 1: 34     Right Wrist/Hand    (2nd hand position)     Trial 1: 15     Tests      Left Shoulder   Positive ULTT2.      Right Shoulder   Positive ULTT2.      Additional Tests Details  Minimal tightness of median nerves and moderate tightness of ulnar nerves in both upper extremities     General Comments      Wrist/Hand Comments  9 hole peg test:  R= 45 seconds  L= 27 seconds.       Activities of Daily Living:      ADL Comments:  Difficulty with fine motor dressing, hygiene, meal prep and housework due to weakness and fatigue.         Therapeutic Exercises (CPT 81182):     1. isometric thumb adduction, 10x10  second holds, R hand    2. isometric thumb abduction, 10x10 second holds, R hand    3. isometric thumb retropulsion, 10x10 second holds, R hand    4. gravity assist thumb to small finger opposition, 2x5 reps, R hand    5. isometric thumb extension/flexion, 10x10 second holds, R hand    6. light resistance theraputty, pinching, grasping, rolling    7. 9# digi-flex for gross grasp, 10 x    Therapeutic Exercise Summary:  Patient tolerating splint and find it is helping with improved prehension during functional tasks.  Some padding added to dorsal bar for comfort and extra straps provided      Time-based treatments/modalities:  Therapeutic exercise minutes (CPT 19006): 45 minutes        Pain rating before treatment: 0  Pain rating after treatment: 0    ASSESSMENT:   Response to treatment: Progressing with therapy and utilizing the splint for functional tasks.      PLAN/RECOMMENDATIONS:   Plan for treatment: therapy treatment to continue next visit.  Planned interventions for next visit: continue with current treatment and therapeutic exercise (CPT 71920)

## 2023-05-15 NOTE — OP THERAPY PROGRESS SUMMARY
Outpatient Occupational Therapy  PROGRESS SUMMARY NOTE    [facility] Occupational Therapy [address].  [address]  [place] [address]  Phone:  [phone]  Fax:  [phone]    Date of Visit: 05/15/2023    Patient: Yolanda Reed  YOB: 1960  MRN: [id number]     Referring Provider: Arturo Lu M.D.  [address] [place] [id number]   Referring Diagnosis No admission diagnoses are documented for this encounter.     Visit Diagnoses     ICD-10-CM   1. Right arm weakness  R29.898   2. S/P cervical spinal fusion  Z98.1       Rehab Potential: good    Progress Report Period: 4/10/23-5/15/23    Functional Assessment Used:  UEFI = 40/80          Objective Findings and Assessment:   Patient progression towards goals: Patient has been actively attending therapy 2 x a week for the last month and progressing in therapy with increased  strength and dexterity.  Patient also utilizing a hand splint to maintain mid opposition of thumb to enhance functional use.  Patient would benefit with continued OT intervention to further enhance functional use of right dominant hand.      Objective findings and assessment details: Current objective measures:   Sensation      Wrist/Hand   Left   Diminished: hot/cold discrimination  Paresthesia: light touch     Right   Diminished: hot/cold discrimination  Paresthesia: light touch     Active Range of Motion   Left Shoulder   Normal active range of motion     Right Shoulder   Normal active range of motion     Left Wrist   Normal active range of motion     Right Wrist   Normal active range of motion     Left Thumb     Normal active range of motion     Right Thumb     Normal active range of motion  Opposition: Only able to oppose  to tip of middle finger due to weak opponens pollicis strength     Left Digits    Normal active range of motion     Right Digits   Normal active range of motion     Strength:       Left Wrist/Hand    (2nd hand  position)     Trial 1: 34     Right Wrist/Hand    (2nd hand position)     Trial 1: 15     Tests      Left Shoulder   Positive ULTT2.      Right Shoulder   Positive ULTT2.      Additional Tests Details  Minimal tightness of median nerves and moderate tightness of ulnar nerves in both upper extremities     General Comments      Wrist/Hand Comments  9 hole peg test:  R= 45 seconds  L= 27 seconds.       Activities of Daily Living:      ADL Comments:  Difficulty with fine motor dressing, hygiene, meal prep and housework due to weakness and fatigue.    Goals:   Short Term Goals:   Patient will be independent with HEP  Patient will improve  strength by at least 20 pounds to enhance functional use  Patient will be able to legibly sign her name  Patient will score 40 seconds or less on the 9 hole peg test with right hand due to improved fine motor manipulation and dexterity  Short term goal timespan:  2-4 weeks    Long Term Goals:   Patient will improve  strength by at least 35 pounds to enhance functional use  Patient will be able to legibly print a short sentance  Patient will score 30 seconds or less on the 9 hole peg test with right hand due to improved fine motor manipulation and dexterity  Patient will score at least 55/80 on the UEFI  Long term goal timespan:  4-6 weeks    Plan:   Planned therapy interventions:  Therapeutic Exercise (CPT 08360)  Other planned therapy interventions:  97110 x 3  Frequency:  2x week  Duration in weeks:  6  Duration in visits:  12      Referring provider co-signature:  I have reviewed this plan of care and my co-signature certifies the need for services.    Certification Period: 05/15/2023 to 07/05/23    Physician Signature: ________________________________ Date: ______________

## 2023-05-18 ENCOUNTER — APPOINTMENT (OUTPATIENT)
Dept: OCCUPATIONAL THERAPY | Facility: REHABILITATION | Age: 63
End: 2023-05-18
Attending: PSYCHIATRY & NEUROLOGY
Payer: MEDICAID

## 2023-05-18 NOTE — OP THERAPY DAILY TREATMENT
Outpatient Occupational Therapy  DAILY TREATMENT     [facility] Occupational [address].  [address]  [place] [address]  Phone:  [phone]  Fax:  [phone]    Date: 05/18/2023    Patient: Yolanda Reed  YOB: 1960  MRN: [id number]     Time Calculation                 Chief Complaint: No chief complaint on file.    Visit #: 7    SUBJECTIVE:  ***    OBJECTIVE:  Current objective measures:   Sensation      Wrist/Hand   Left   Diminished: hot/cold discrimination  Paresthesia: light touch     Right   Diminished: hot/cold discrimination  Paresthesia: light touch     Active Range of Motion   Left Shoulder   Normal active range of motion     Right Shoulder   Normal active range of motion     Left Wrist   Normal active range of motion     Right Wrist   Normal active range of motion     Left Thumb     Normal active range of motion     Right Thumb     Normal active range of motion  Opposition: Only able to oppose  to tip of middle finger due to weak opponens pollicis strength     Left Digits    Normal active range of motion     Right Digits   Normal active range of motion     Strength:       Left Wrist/Hand    (2nd hand position)     Trial 1: 34     Right Wrist/Hand    (2nd hand position)     Trial 1: 15     Tests      Left Shoulder   Positive ULTT2.      Right Shoulder   Positive ULTT2.      Additional Tests Details  Minimal tightness of median nerves and moderate tightness of ulnar nerves in both upper extremities     General Comments      Wrist/Hand Comments  9 hole peg test:  R= 45 seconds  L= 27 seconds.       Activities of Daily Living:      ADL Comments:  Difficulty with fine motor dressing, hygiene, meal prep and housework due to weakness and fatigue.        Therapeutic Exercises (CPT 87823):     1. isometric thumb adduction, 10x10 second holds, R hand    2. isometric thumb abduction, 10x10 second holds, R hand    3. isometric thumb retropulsion, 10x10 second  "holds, R hand    4. gravity assist thumb to small finger opposition, 2x5 reps, R hand    5. isometric thumb extension/flexion, 10x10 second holds, R hand    6. light resistance theraputty, pinching, grasping, rolling    7. 9# digi-flex for gross grasp, 10 x    Therapeutic Exercise Summary:  Patient tolerating splint and find it is helping with improved prehension during functional tasks.  Some padding added to dorsal bar for comfort and extra straps provided      Time-based treatments/modalities:           Pain rating before treatment: {PAIN NUMBERS_1-10:56026}  Pain rating after treatment: {PAIN NUMBERS_1-10:64075}    ASSESSMENT:   Response to treatment: ***    PLAN/RECOMMENDATIONS:   Plan for treatment: {Therapy Plan:[id number]::\"therapy treatment to continue next visit\"}.  Planned interventions for next visit: {planned OT Interventions:[id number]}         "

## 2023-05-23 ENCOUNTER — OCCUPATIONAL THERAPY (OUTPATIENT)
Dept: OCCUPATIONAL THERAPY | Facility: REHABILITATION | Age: 63
End: 2023-05-23
Attending: PSYCHIATRY & NEUROLOGY
Payer: MEDICAID

## 2023-05-23 DIAGNOSIS — R29.898 RIGHT ARM WEAKNESS: ICD-10-CM

## 2023-05-23 DIAGNOSIS — Z98.1 S/P CERVICAL SPINAL FUSION: ICD-10-CM

## 2023-05-23 PROCEDURE — 97110 THERAPEUTIC EXERCISES: CPT

## 2023-05-23 NOTE — OP THERAPY DAILY TREATMENT
Outpatient Occupational Therapy  DAILY TREATMENT     [facility] Occupational Therapy [address].  [address]  [place] [address]  Phone:  [phone]  Fax:  [phone]    Date: 05/23/2023    Patient: Yolanda Reed  YOB: 1960  MRN: [id number]     Time Calculation  Start time: 0315  Stop time: 0400 Time Calculation (min): 45 minutes         Chief Complaint: Hand Weakness and Hand Problem    Visit #: 7    SUBJECTIVE:  I really like this splint.  It helps me hold onto things better.      OBJECTIVE:  Current objective measures:   Sensation      Wrist/Hand   Left   Diminished: hot/cold discrimination  Paresthesia: light touch     Right   Diminished: hot/cold discrimination  Paresthesia: light touch     Active Range of Motion   Left Shoulder   Normal active range of motion     Right Shoulder   Normal active range of motion     Left Wrist   Normal active range of motion     Right Wrist   Normal active range of motion     Left Thumb     Normal active range of motion     Right Thumb     Normal active range of motion  Opposition: Only able to oppose  to tip of middle finger due to weak opponens pollicis strength     Left Digits    Normal active range of motion     Right Digits   Normal active range of motion     Strength:       Left Wrist/Hand    (2nd hand position)     Trial 1: 34     Right Wrist/Hand    (2nd hand position)     Trial 1: 15     Tests      Left Shoulder   Positive ULTT2.      Right Shoulder   Positive ULTT2.      Additional Tests Details  Minimal tightness of median nerves and moderate tightness of ulnar nerves in both upper extremities     General Comments      Wrist/Hand Comments  9 hole peg test:  R= 43 seconds  L= 27 seconds.       Activities of Daily Living:      ADL Comments:  Difficulty with fine motor dressing, hygiene, meal prep and housework due to weakness and fatigue.        Therapeutic Exercises (CPT 75210):     1. isometric thumb adduction, 10x10 second  holds, R hand    2. isometric thumb abduction, 10x10 second holds, R hand    3. isometric thumb retropulsion, 10x10 second holds, R hand    4. gravity assist thumb to small finger opposition, 2x5 reps, R hand    5. isometric thumb extension/flexion, 10x10 second holds, R hand    6. light resistance theraputty, pinching, grasping, rolling    7. 9# digi-flex for gross grasp, 10 x    Therapeutic Exercise Summary:  Patient tolerating splint and find it is helping with improved prehension during functional tasks.  Some padding added to dorsal bar for comfort and extra straps provided      Time-based treatments/modalities:  Therapeutic exercise minutes (CPT 32555): 45 minutes        Pain rating before treatment: 0  Pain rating after treatment: 0    ASSESSMENT:   Response to treatment: Improving dexterity with right hand.  Tolerating splint well and wearing daily.  Request put in for ore visits as patient requires authorization after 8 visits.    PLAN/RECOMMENDATIONS:   Plan for treatment: therapy treatment to continue next visit.  Planned interventions for next visit: continue with current treatment and therapeutic exercise (CPT 32031)

## 2023-05-30 ENCOUNTER — OCCUPATIONAL THERAPY (OUTPATIENT)
Dept: OCCUPATIONAL THERAPY | Facility: REHABILITATION | Age: 63
End: 2023-05-30
Attending: PSYCHIATRY & NEUROLOGY
Payer: MEDICAID

## 2023-05-30 DIAGNOSIS — R29.898 RIGHT ARM WEAKNESS: ICD-10-CM

## 2023-05-30 DIAGNOSIS — Z98.1 S/P CERVICAL SPINAL FUSION: ICD-10-CM

## 2023-05-30 PROCEDURE — 97110 THERAPEUTIC EXERCISES: CPT

## 2023-05-30 NOTE — OP THERAPY DAILY TREATMENT
Outpatient Occupational Therapy  DAILY TREATMENT     [facility] Occupational Therapy [address].  [address]  [place] [address]  Phone:  [phone]  Fax:  [phone]    Date: 05/30/2023    Patient: Yolanda Reed  YOB: 1960  MRN: [id number]     Time Calculation  Start time: 0315  Stop time: 0400 Time Calculation (min): 45 minutes         Chief Complaint: Hand Weakness and Self Care Duties    Visit #: 8    SUBJECTIVE:  I need to do my hand exercises more    OBJECTIVE:  Current objective measures:   Sensation      Wrist/Hand   Left   Diminished: hot/cold discrimination  Paresthesia: light touch     Right   Diminished: hot/cold discrimination  Paresthesia: light touch     Active Range of Motion   Left Shoulder   Normal active range of motion     Right Shoulder   Normal active range of motion     Left Wrist   Normal active range of motion     Right Wrist   Normal active range of motion     Left Thumb     Normal active range of motion     Right Thumb     Normal active range of motion  Opposition: Only able to oppose  to tip of ring finger due to weak opponens pollicis strength     Left Digits    Normal active range of motion     Right Digits   Normal active range of motion     Strength:       Left Wrist/Hand    (2nd hand position)     Trial 1: 34     Right Wrist/Hand    (2nd hand position)     Trial 1: 15     Tests      Left Shoulder   Positive ULTT2.      Right Shoulder   Positive ULTT2.      Additional Tests Details  Minimal tightness of median nerves and moderate tightness of ulnar nerves in both upper extremities     General Comments      Wrist/Hand Comments  9 hole peg test:  R= 43 seconds  L= 27 seconds.       Activities of Daily Living:      ADL Comments:  Difficulty with fine motor dressing, hygiene, meal prep and housework due to weakness and fatigue.        Therapeutic Exercises (CPT 03118):     1. isometric thumb adduction, 10x10 second holds, R hand    2.  isometric thumb abduction, 10x10 second holds, R hand    3. isometric thumb retropulsion, 10x10 second holds, R hand    4. gravity assist thumb to small finger opposition, 2x5 reps, R hand    5. isometric thumb extension/flexion, 10x10 second holds, R hand    6. light resistance theraputty, pinching, grasping, rolling    7. 9# digi-flex for gross grasp, 10 x    8. isolated intrinsic strengthening    Therapeutic Exercise Summary:  Patient tolerating splint and find it is helping with improved prehension during functional tasks.       Time-based treatments/modalities:  Therapeutic exercise minutes (CPT 75971): 45 minutes        Pain rating before treatment: 2  Pain rating after treatment: 3    ASSESSMENT:   Response to treatment: Encouraged patient to complete HEP at least once a day and increase from 3 x a week.    PLAN/RECOMMENDATIONS:   Plan for treatment: therapy treatment to continue next visit.  Planned interventions for next visit: continue with current treatment and therapeutic exercise (CPT 91731)

## 2023-06-01 ENCOUNTER — OCCUPATIONAL THERAPY (OUTPATIENT)
Dept: OCCUPATIONAL THERAPY | Facility: REHABILITATION | Age: 63
End: 2023-06-01
Attending: PSYCHIATRY & NEUROLOGY
Payer: MEDICAID

## 2023-06-01 DIAGNOSIS — R29.898 RIGHT ARM WEAKNESS: ICD-10-CM

## 2023-06-01 DIAGNOSIS — Z98.1 S/P CERVICAL SPINAL FUSION: ICD-10-CM

## 2023-06-01 PROCEDURE — 97110 THERAPEUTIC EXERCISES: CPT

## 2023-06-01 NOTE — OP THERAPY DAILY TREATMENT
Outpatient Occupational Therapy  DAILY TREATMENT     [facility] Occupational Therapy [address]  [address]  [place] [address]  Phone:  [phone]  Fax:  [phone]    Date: 06/01/2023    Patient: Yolanda Reed  YOB: 1960  MRN: [id number]     Time Calculation  Start time: 0230  Stop time: 0315 Time Calculation (min): 45 minutes         Chief Complaint: Extremity Weakness and Self Care Duties    Visit #: 9    SUBJECTIVE:  I think I am having a reaction to my medication and I am so itchy and I can't stop scratching. I am going to call my doctor after this visit.      OBJECTIVE:  Current objective measures:   Sensation      Wrist/Hand   Left   Diminished: hot/cold discrimination  Paresthesia: light touch     Right   Diminished: hot/cold discrimination  Paresthesia: light touch     Active Range of Motion   Left Shoulder   Normal active range of motion     Right Shoulder   Normal active range of motion     Left Wrist   Normal active range of motion     Right Wrist   Normal active range of motion     Left Thumb     Normal active range of motion     Right Thumb     Normal active range of motion  Opposition: Only able to oppose  to tip of ring finger due to weak opponens pollicis strength     Left Digits    Normal active range of motion     Right Digits   Normal active range of motion     Strength:       Left Wrist/Hand    (2nd hand position)     Trial 1: 34     Right Wrist/Hand    (2nd hand position)     Trial 1: 15     Tests      Left Shoulder   Positive ULTT2.      Right Shoulder   Positive ULTT2.      Additional Tests Details  Minimal tightness of median nerves and moderate tightness of ulnar nerves in both upper extremities     General Comments      Wrist/Hand Comments  9 hole peg test:  R= 43 seconds  L= 27 seconds.       Activities of Daily Living:      ADL Comments:  Difficulty with fine motor dressing, hygiene, meal prep and housework due to weakness and fatigue.         Therapeutic Exercises (CPT 29957):     1. isometric thumb adduction, 10x10 second holds, R hand    2. isometric thumb abduction, 10x10 second holds, R hand    3. isometric thumb retropulsion, 10x10 second holds, R hand    4. gravity assist thumb to small finger opposition, 2x5 reps, R hand    5. isometric thumb extension/flexion, 10x10 second holds, R hand    6. light resistance theraputty, pinching, grasping, rolling    7. 9# digi-flex for gross grasp, 10 x    8. isolated intrinsic strengthening    9. ilsolated digit extension, 10 x    10. resisted wrist flexion and extension strengthening, 10 x    Therapeutic Exercise Summary:  Patient tolerating splint and find it is helping with improved prehension during functional tasks.       Time-based treatments/modalities:  Therapeutic exercise minutes (CPT 41068): 45 minutes        Pain rating before treatment: 1  Pain rating after treatment: 1    ASSESSMENT:   Response to treatment: Highly motivated to participate in therapy and wearing splint during functional tasks.    PLAN/RECOMMENDATIONS:   Plan for treatment: therapy treatment to continue next visit.  Planned interventions for next visit: continue with current treatment and therapeutic exercise (CPT 58696)

## 2023-06-05 ENCOUNTER — APPOINTMENT (OUTPATIENT)
Dept: OCCUPATIONAL THERAPY | Facility: REHABILITATION | Age: 63
End: 2023-06-05
Attending: PSYCHIATRY & NEUROLOGY
Payer: MEDICAID

## 2023-06-08 ENCOUNTER — OCCUPATIONAL THERAPY (OUTPATIENT)
Dept: OCCUPATIONAL THERAPY | Facility: REHABILITATION | Age: 63
End: 2023-06-08
Attending: PSYCHIATRY & NEUROLOGY
Payer: MEDICAID

## 2023-06-08 DIAGNOSIS — Z98.1 S/P CERVICAL SPINAL FUSION: ICD-10-CM

## 2023-06-08 DIAGNOSIS — R29.898 RIGHT ARM WEAKNESS: ICD-10-CM

## 2023-06-08 PROCEDURE — 97110 THERAPEUTIC EXERCISES: CPT

## 2023-06-08 NOTE — OP THERAPY DAILY TREATMENT
Outpatient Occupational Therapy  DAILY TREATMENT     [facility] Occupational Therapy [address].  [address]  [place] [address]  Phone:  [phone]  Fax:  [phone]    Date: 06/08/2023    Patient: Yolanda Reed  YOB: 1960  MRN: [id number]     Time Calculation  Start time: 0315  Stop time: 0400 Time Calculation (min): 45 minutes         Chief Complaint: Hand Weakness    Visit #: 10    SUBJECTIVE:  I got my lab results back and my glucose level was 167  and my sodium is low.      OBJECTIVE:  Current objective measures:   Sensation      Wrist/Hand   Left   Diminished: hot/cold discrimination  Paresthesia: light touch     Right   Diminished: hot/cold discrimination  Paresthesia: light touch     Active Range of Motion   Left Shoulder   Normal active range of motion     Right Shoulder   Normal active range of motion     Left Wrist   Normal active range of motion     Right Wrist   Normal active range of motion     Left Thumb     Normal active range of motion     Right Thumb     Normal active range of motion  Opposition: Only able to oppose  to tip of ring finger due to weak opponens pollicis strength     Left Digits    Normal active range of motion     Right Digits   Normal active range of motion     Strength:       Left Wrist/Hand    (2nd hand position)     Trial 1: 34     Right Wrist/Hand    (2nd hand position)     Trial 1: 15     Tests      Left Shoulder   Positive ULTT2.      Right Shoulder   Positive ULTT2.      Additional Tests Details  Minimal tightness of median nerves and moderate tightness of ulnar nerves in both upper extremities     General Comments      Wrist/Hand Comments  9 hole peg test:  R= 43 seconds  L= 27 seconds.       Activities of Daily Living:      ADL Comments:  Difficulty with fine motor dressing, hygiene, meal prep and housework due to weakness and fatigue.        Therapeutic Exercises (CPT 67305):     1. isometric thumb adduction, 10x10 second  holds, R hand    2. isometric thumb abduction, 10x10 second holds, R hand    3. isometric thumb retropulsion, 10x10 second holds, R hand    4. gravity assist thumb to small finger opposition, 2x5 reps, R hand    5. isometric thumb extension/flexion, 10x10 second holds, R hand    6. light resistance theraputty, pinching, grasping, rolling    7. 9# digi-flex for gross grasp, 10 x    8. isolated intrinsic strengthening    9. ilsolated digit extension, 10 x    10. resisted wrist flexion and extension strengthening, 10 x    Therapeutic Exercise Summary:  Patient tolerating splint and find it is helping with improved prehension during functional tasks.       Time-based treatments/modalities:  Therapeutic exercise minutes (CPT 88199): 45 minutes        Pain rating before treatment: 1  Pain rating after treatment: 1    ASSESSMENT:   Response to treatment: increased shaking in hands today and patient stated she has numbness in left thigh now.  She had this in the past, but it had been fine for a while.  Patient to reach out to her PCP for help.    PLAN/RECOMMENDATIONS:   Plan for treatment: therapy treatment to continue next visit.  Planned interventions for next visit: continue with current treatment and therapeutic exercise (CPT 44671)

## 2023-06-12 ENCOUNTER — APPOINTMENT (OUTPATIENT)
Dept: OCCUPATIONAL THERAPY | Facility: REHABILITATION | Age: 63
End: 2023-06-12
Attending: PSYCHIATRY & NEUROLOGY
Payer: MEDICAID

## 2023-06-12 NOTE — OP THERAPY DAILY TREATMENT
Outpatient Occupational Therapy  DAILY TREATMENT     [facility] Occupational [address].  [address]  [place] [address]  Phone:  [phone]  Fax:  [phone]    Date: 06/12/2023    Patient: Yolanda Reed  YOB: 1960  MRN: [id number]     Time Calculation                 Chief Complaint: No chief complaint on file.    Visit #: 11    SUBJECTIVE:  ***    OBJECTIVE:  Current objective measures:   Sensation      Wrist/Hand   Left   Diminished: hot/cold discrimination  Paresthesia: light touch     Right   Diminished: hot/cold discrimination  Paresthesia: light touch     Active Range of Motion   Left Shoulder   Normal active range of motion     Right Shoulder   Normal active range of motion     Left Wrist   Normal active range of motion     Right Wrist   Normal active range of motion     Left Thumb     Normal active range of motion     Right Thumb     Normal active range of motion  Opposition: Only able to oppose  to tip of ring finger due to weak opponens pollicis strength     Left Digits    Normal active range of motion     Right Digits   Normal active range of motion     Strength:       Left Wrist/Hand    (2nd hand position)     Trial 1: 34     Right Wrist/Hand    (2nd hand position)     Trial 1: 15     Tests      Left Shoulder   Positive ULTT2.      Right Shoulder   Positive ULTT2.      Additional Tests Details  Minimal tightness of median nerves and moderate tightness of ulnar nerves in both upper extremities     General Comments      Wrist/Hand Comments  9 hole peg test:  R= 43 seconds  L= 27 seconds.       Activities of Daily Living:      ADL Comments:  Difficulty with fine motor dressing, hygiene, meal prep and housework due to weakness and fatigue.        Therapeutic Exercises (CPT 22203):     1. isometric thumb adduction, 10x10 second holds, R hand    2. isometric thumb abduction, 10x10 second holds, R hand    3. isometric thumb retropulsion, 10x10 second  "holds, R hand    4. gravity assist thumb to small finger opposition, 2x5 reps, R hand    5. isometric thumb extension/flexion, 10x10 second holds, R hand    6. light resistance theraputty, pinching, grasping, rolling    7. 9# digi-flex for gross grasp, 10 x    8. isolated intrinsic strengthening    9. ilsolated digit extension, 10 x    10. resisted wrist flexion and extension strengthening, 10 x    Therapeutic Exercise Summary:  Patient tolerating splint and find it is helping with improved prehension during functional tasks.       Time-based treatments/modalities:           Pain rating before treatment: {PAIN NUMBERS_1-10:93909}  Pain rating after treatment: {PAIN NUMBERS_1-10:78676}    ASSESSMENT:   Response to treatment: ***    PLAN/RECOMMENDATIONS:   Plan for treatment: {Therapy Plan:354418735::\"therapy treatment to continue next visit\"}.  Planned interventions for next visit: {planned OT Interventions:805096353}         "

## 2023-06-15 ENCOUNTER — OCCUPATIONAL THERAPY (OUTPATIENT)
Dept: OCCUPATIONAL THERAPY | Facility: REHABILITATION | Age: 63
End: 2023-06-15
Attending: PSYCHIATRY & NEUROLOGY
Payer: MEDICAID

## 2023-06-15 DIAGNOSIS — Z98.1 S/P CERVICAL SPINAL FUSION: ICD-10-CM

## 2023-06-15 DIAGNOSIS — R29.898 RIGHT ARM WEAKNESS: ICD-10-CM

## 2023-06-15 PROCEDURE — 97110 THERAPEUTIC EXERCISES: CPT

## 2023-06-15 NOTE — OP THERAPY PROGRESS SUMMARY
Outpatient Occupational Therapy  PROGRESS SUMMARY NOTE    [facility] Occupational Therapy [address].  [address]  [place] [address]  Phone:  [phone]  Fax:  [phone]    Date of Visit: 06/15/2023    Patient: Yolanda Reed  YOB: 1960  MRN: [id number]     Referring Provider: Arturo Lu M.D.  [address] [place] [address],  [place] [address]   Referring Diagnosis Arthrodesis status [Z98.1];Other symptoms and signs involving the musculoskeletal system [R29.898]     Visit Diagnoses     ICD-10-CM   1. S/P cervical spinal fusion  Z98.1   2. Right arm weakness  R29.898       Rehab Potential: good    Progress Report Period: 5/15/23-6/15/23    Functional Assessment Used:  UEFI = 42/80          Objective Findings and Assessment:   Patient progression towards goals: Patient has been attending OT 2 x a week to work on right hand strengthening and progressing in therapy.  Patient also tolerating hand based MCP extension block splint with thumb placed in mid opposition for better prehension during ADLs.  Patient would benefit with continued OT intervention to further enhance intrinsic and extrinsic strength and dexterity of right dominant hand to enhance functional use    Objective findings and assessment details: Sensation      Wrist/Hand   Left   Diminished: hot/cold discrimination  Paresthesia: light touch     Right   Diminished: hot/cold discrimination  Paresthesia: light touch     Active Range of Motion   Left Shoulder   Normal active range of motion     Right Shoulder   Normal active range of motion     Left Wrist   Normal active range of motion     Right Wrist   Normal active range of motion     Left Thumb     Normal active range of motion     Right Thumb     Normal active range of motion  Opposition: Only able to oppose  to tip of ring finger due to weak opponens pollicis strength     Left Digits    Normal active range of motion     Right Digits   Normal active range of motion      Strength:       Left Wrist/Hand    (2nd hand position)     Trial 1: 34     Right Wrist/Hand    (2nd hand position)     Trial 1: 15     Tests      Left Shoulder   Positive ULTT2.      Right Shoulder   Positive ULTT2.      Additional Tests Details  Minimal tightness of median nerves and moderate tightness of ulnar nerves in both upper extremities     General Comments      Wrist/Hand Comments  9 hole peg test:  R= 43 seconds  L= 27 seconds.       Activities of Daily Living:      ADL Comments:  Difficulty with fine motor dressing, hygiene, meal prep and housework due to weakness and fatigue    Goals:   Short Term Goals:   Patient will improve  strength by at least 20 pounds to enhance functional use  Patient will be able to legibly sign her name  Patient will score 40 seconds or less on the 9 hole peg test with right hand due to improved fine motor manipulation   Short term goal timespan:  2-4 weeks    Long Term Goals:   Patient will improve  strength by at least 35 pounds to enhance functional use  Patient will be able to legibly print a short sentance  Patient will score 30 seconds or less on the 9 hole peg test with right hand due to improved fine motor manipulation and dexterity  Patient will score at least 55/80 on the UEFI  Long term goal timespan:  4-6 weeks    Plan:   Planned therapy interventions:  Therapeutic Exercise (CPT 66364)  Other planned therapy interventions:  97110 x 3  Frequency:  2x week  Duration in weeks:  6  Duration in visits:  12      Referring provider co-signature:  I have reviewed this plan of care and my co-signature certifies the need for services.    Certification Period: 06/15/2023 to 08/10/23    Physician Signature: ________________________________ Date: ______________

## 2023-06-15 NOTE — OP THERAPY DAILY TREATMENT
Outpatient Occupational Therapy  DAILY TREATMENT     [facility] Occupational Therapy [address].  [address]  [place] [address]  Phone:  [phone]  Fax:  [phone]    Date: 06/15/2023    Patient: Yolanda Reed  YOB: 1960  MRN: [id number]     Time Calculation  Start time: 0325  Stop time: 0410 Time Calculation (min): 45 minutes         Chief Complaint: Extremity Weakness    Visit #: 11    SUBJECTIVE:  I saw my PCP and my labs aren't bad like I thought    OBJECTIVE:  Current objective measures:   Sensation      Wrist/Hand   Left   Diminished: hot/cold discrimination  Paresthesia: light touch     Right   Diminished: hot/cold discrimination  Paresthesia: light touch     Active Range of Motion   Left Shoulder   Normal active range of motion     Right Shoulder   Normal active range of motion     Left Wrist   Normal active range of motion     Right Wrist   Normal active range of motion     Left Thumb     Normal active range of motion     Right Thumb     Normal active range of motion  Opposition: Only able to oppose  to tip of ring finger due to weak opponens pollicis strength     Left Digits    Normal active range of motion     Right Digits   Normal active range of motion     Strength:       Left Wrist/Hand    (2nd hand position)     Trial 1: 34     Right Wrist/Hand    (2nd hand position)     Trial 1: 15     Tests      Left Shoulder   Positive ULTT2.      Right Shoulder   Positive ULTT2.      Additional Tests Details  Minimal tightness of median nerves and moderate tightness of ulnar nerves in both upper extremities     General Comments      Wrist/Hand Comments  9 hole peg test:  R= 43 seconds  L= 27 seconds.       Activities of Daily Living:      ADL Comments:  Difficulty with fine motor dressing, hygiene, meal prep and housework due to weakness and fatigue        Therapeutic Exercises (CPT 95813):     1. isometric thumb adduction, 10x10 second holds, R hand    2. isometric  thumb abduction, 10x10 second holds, R hand    3. isometric thumb retropulsion, 10x10 second holds, R hand    4. gravity assist thumb to small finger opposition, 2x5 reps, R hand    5. isometric thumb extension/flexion, 10x10 second holds, R hand    6. light resistance theraputty, pinching, grasping, rolling    7. 9# digi-flex for gross grasp, 10 x    8. isolated intrinsic strengthening    9. ilsolated digit extension, 10 x    10. resisted wrist flexion and extension strengthening, 10 x    Therapeutic Exercise Summary:  Patient tolerating splint and find it is helping with improved prehension during functional tasks.       Time-based treatments/modalities:  Therapeutic exercise minutes (CPT 75089): 45 minutes        Pain rating before treatment: 4  Pain rating after treatment: 5    ASSESSMENT:   Response to treatment: Able to tolerate therapy well today and feeling a lot better than last week.  Progress note completed.      PLAN/RECOMMENDATIONS:   Plan for treatment: therapy treatment to continue next visit.  Planned interventions for next visit: continue with current treatment and therapeutic exercise (CPT 46714)

## 2023-06-19 ENCOUNTER — HOSPITAL ENCOUNTER (OUTPATIENT)
Dept: RADIOLOGY | Facility: MEDICAL CENTER | Age: 63
End: 2023-06-19
Attending: FAMILY MEDICINE
Payer: MEDICAID

## 2023-06-19 ENCOUNTER — OCCUPATIONAL THERAPY (OUTPATIENT)
Dept: OCCUPATIONAL THERAPY | Facility: REHABILITATION | Age: 63
End: 2023-06-19
Attending: PSYCHIATRY & NEUROLOGY
Payer: MEDICAID

## 2023-06-19 DIAGNOSIS — Z12.31 VISIT FOR SCREENING MAMMOGRAM: ICD-10-CM

## 2023-06-19 DIAGNOSIS — R29.898 RIGHT ARM WEAKNESS: ICD-10-CM

## 2023-06-19 DIAGNOSIS — Z98.1 S/P CERVICAL SPINAL FUSION: ICD-10-CM

## 2023-06-19 PROCEDURE — 97110 THERAPEUTIC EXERCISES: CPT

## 2023-06-19 PROCEDURE — 77063 BREAST TOMOSYNTHESIS BI: CPT

## 2023-06-19 NOTE — OP THERAPY DAILY TREATMENT
Outpatient Occupational Therapy  DAILY TREATMENT     [facility] Occupational Therapy [address].  [address]  [place] [address]  Phone:  [phone]  Fax:  [phone]    Date: 06/19/2023    Patient: Yolanda Reed  YOB: 1960  MRN: [id number]     Time Calculation  Start time: 0230  Stop time: 0315 Time Calculation (min): 45 minutes         Chief Complaint: Extremity Weakness and Hand Problem    Visit #: 12    SUBJECTIVE:  I need to unlearn these bad habits I have with my hand.      OBJECTIVE:  Current objective measures:   Sensation      Wrist/Hand   Left   Diminished: hot/cold discrimination  Paresthesia: light touch     Right   Diminished: hot/cold discrimination  Paresthesia: light touch     Active Range of Motion   Left Shoulder   Normal active range of motion     Right Shoulder   Normal active range of motion     Left Wrist   Normal active range of motion     Right Wrist   Normal active range of motion     Left Thumb     Normal active range of motion     Right Thumb     Normal active range of motion  Opposition: Only able to oppose  to tip of ring finger due to weak opponens pollicis strength     Left Digits    Normal active range of motion     Right Digits   Normal active range of motion     Strength:       Left Wrist/Hand    (2nd hand position)     Trial 1: 34     Right Wrist/Hand    (2nd hand position)     Trial 1: 15     Tests      Left Shoulder   Positive ULTT2.      Right Shoulder   Positive ULTT2.      Additional Tests Details  Minimal tightness of median nerves and moderate tightness of ulnar nerves in both upper extremities     General Comments      Wrist/Hand Comments  9 hole peg test:  R= 43 seconds  L= 27 seconds.       Activities of Daily Living:      ADL Comments:  Difficulty with fine motor dressing, hygiene, meal prep and housework due to weakness and fatigue        Therapeutic Exercises (CPT 75299):     1. isometric thumb adduction, 10x10 second  holds, R hand    2. isometric thumb abduction, 10x10 second holds, R hand    3. isometric thumb retropulsion, 10x10 second holds, R hand    4. gravity assist thumb to small finger opposition, 2x5 reps, R hand    5. isometric thumb extension/flexion, 10x10 second holds, R hand    6. light resistance theraputty, pinching, grasping, rolling    7. 9# digi-flex for gross grasp, 10 x    8. isolated intrinsic strengthening    9. ilsolated digit extension, 10 x    10. resisted wrist flexion and extension strengthening, 10 x    11. fine motor manipulation, pulling coins from the    Therapeutic Exercise Summary:  Patient tolerating splint and find it is helping with improved prehension during functional tasks.       Time-based treatments/modalities:  Therapeutic exercise minutes (CPT 81902): 45 minutes        Pain rating before treatment: 2  Pain rating after treatment: 2    ASSESSMENT:   Response to treatment: Encouraged patient to incorporate right dominant hand in all activities at home and focus on 3 point prehension.      PLAN/RECOMMENDATIONS:   Plan for treatment: therapy treatment to continue next visit.  Planned interventions for next visit: continue with current treatment and therapeutic exercise (CPT 46010)

## 2023-06-20 ENCOUNTER — HOSPITAL ENCOUNTER (OUTPATIENT)
Dept: RADIOLOGY | Facility: MEDICAL CENTER | Age: 63
End: 2023-06-20
Payer: MEDICAID

## 2023-06-22 ENCOUNTER — OCCUPATIONAL THERAPY (OUTPATIENT)
Dept: OCCUPATIONAL THERAPY | Facility: REHABILITATION | Age: 63
End: 2023-06-22
Attending: PSYCHIATRY & NEUROLOGY
Payer: MEDICAID

## 2023-06-22 DIAGNOSIS — Z98.1 S/P CERVICAL SPINAL FUSION: ICD-10-CM

## 2023-06-22 DIAGNOSIS — R29.898 RIGHT ARM WEAKNESS: ICD-10-CM

## 2023-06-22 PROCEDURE — 97110 THERAPEUTIC EXERCISES: CPT

## 2023-06-22 NOTE — OP THERAPY DAILY TREATMENT
Outpatient Occupational Therapy  DAILY TREATMENT     [facility] Occupational Therapy [address].  [address]  [place] [address]  Phone:  [phone]  Fax:  [phone]    Date: 06/22/2023    Patient: Yolanda Reed  YOB: 1960  MRN: [id number]     Time Calculation  Start time: 0100  Stop time: 0145 Time Calculation (min): 45 minutes         Chief Complaint: Hand Weakness and Hand Problem    Visit #: 13    SUBJECTIVE:  No issues noted since last visit.    OBJECTIVE:  Current objective measures:   Sensation      Wrist/Hand   Left   Diminished: hot/cold discrimination  Paresthesia: light touch     Right   Diminished: hot/cold discrimination  Paresthesia: light touch     Active Range of Motion   Left Shoulder   Normal active range of motion     Right Shoulder   Normal active range of motion     Left Wrist   Normal active range of motion     Right Wrist   Normal active range of motion     Left Thumb     Normal active range of motion     Right Thumb     Normal active range of motion  Opposition: Only able to oppose  to tip of ring finger due to weak opponens pollicis strength     Left Digits    Normal active range of motion     Right Digits   Normal active range of motion     Strength:       Left Wrist/Hand    (2nd hand position)     Trial 1: 34     Right Wrist/Hand    (2nd hand position)     Trial 1: 15     Tests      Left Shoulder   Positive ULTT2.      Right Shoulder   Positive ULTT2.      Additional Tests Details  Minimal tightness of median nerves and moderate tightness of ulnar nerves in both upper extremities     General Comments      Wrist/Hand Comments  9 hole peg test:  R= 43 seconds  L= 27 seconds.       Activities of Daily Living:      ADL Comments:  Difficulty with fine motor dressing, hygiene, meal prep and housework due to weakness and fatigue        Therapeutic Exercises (CPT 12402):     1. isometric thumb adduction, 10x10 second holds, R hand    2. isometric  thumb abduction, 10x10 second holds, R hand    3. isometric thumb retropulsion, 10x10 second holds, R hand    4. gravity assist thumb to small finger opposition, 2x5 reps, R hand    5. isometric thumb extension/flexion, 10x10 second holds, R hand    6. light resistance theraputty, pinching, grasping, rolling    7. 9# digi-flex for gross grasp, 10 x    8. isolated intrinsic strengthening    9. ilsolated digit extension, 10 x    10. resisted wrist flexion and extension strengthening, 10 x    11. fine motor manipulation, pulling coins from the    Therapeutic Exercise Summary:  Patient tolerating splint and find it is helping with improved prehension during functional tasks.       Time-based treatments/modalities:  Therapeutic exercise minutes (CPT 78554): 45 minutes        Pain rating before treatment: 1  Pain rating after treatment: 1    ASSESSMENT:   Response to treatment: Remains highly motivated in therapy.  Wearing splint during functional tasks.     PLAN/RECOMMENDATIONS:   Plan for treatment: therapy treatment to continue next visit.  Planned interventions for next visit: continue with current treatment and therapeutic exercise (CPT 17620)

## 2023-06-26 ENCOUNTER — OCCUPATIONAL THERAPY (OUTPATIENT)
Dept: OCCUPATIONAL THERAPY | Facility: REHABILITATION | Age: 63
End: 2023-06-26
Attending: PSYCHIATRY & NEUROLOGY
Payer: MEDICAID

## 2023-06-26 DIAGNOSIS — Z98.1 S/P CERVICAL SPINAL FUSION: ICD-10-CM

## 2023-06-26 DIAGNOSIS — R29.898 RIGHT ARM WEAKNESS: ICD-10-CM

## 2023-06-26 PROCEDURE — 97110 THERAPEUTIC EXERCISES: CPT

## 2023-06-26 NOTE — OP THERAPY DAILY TREATMENT
Outpatient Occupational Therapy  DAILY TREATMENT     [facility] Occupational Therapy [address].  [address]  [place] [address]  Phone:  [phone]  Fax:  [phone]    Date: 06/26/2023    Patient: Yolanda Reed  YOB: 1960  MRN: [id number]     Time Calculation  Start time: 1120  Stop time: 1205 Time Calculation (min): 45 minutes         Chief Complaint: Extremity Weakness, Hand Weakness, and Self Care Duties    Visit #: 14    SUBJECTIVE:  No issues noted since last visit    OBJECTIVE:  Current objective measures:   Sensation      Wrist/Hand   Left   Diminished: hot/cold discrimination  Paresthesia: light touch     Right   Diminished: hot/cold discrimination  Paresthesia: light touch     Active Range of Motion   Left Shoulder   Normal active range of motion     Right Shoulder   Normal active range of motion     Left Wrist   Normal active range of motion     Right Wrist   Normal active range of motion     Left Thumb     Normal active range of motion     Right Thumb     Normal active range of motion  Opposition: Only able to oppose  to tip of ring finger due to weak opponens pollicis strength     Left Digits    Normal active range of motion     Right Digits   Normal active range of motion     Strength:       Left Wrist/Hand    (2nd hand position)     Trial 1: 34     Right Wrist/Hand    (2nd hand position)     Trial 1: 15     Tests      Left Shoulder   Positive ULTT2.      Right Shoulder   Positive ULTT2.      Additional Tests Details  Minimal tightness of median nerves and moderate tightness of ulnar nerves in both upper extremities     General Comments      Wrist/Hand Comments  9 hole peg test:  R= 43 seconds  L= 27 seconds.       Activities of Daily Living:      ADL Comments:  Difficulty with fine motor dressing, hygiene, meal prep and housework due to weakness and fatigue        Therapeutic Exercises (CPT 77786):     1. isometric thumb adduction, 10x10 second holds, R  hand    2. isometric thumb abduction, 10x10 second holds, R hand    3. isometric thumb retropulsion, 10x10 second holds, R hand    4. gravity assist thumb to small finger opposition, 2x5 reps, R hand    5. isometric thumb extension/flexion, 10x10 second holds, R hand    6. light resistance theraputty, pinching, grasping, rolling    7. 9# digi-flex for gross grasp, 10 x    8. isolated intrinsic strengthening    9. ilsolated digit extension, 10 x    10. resisted wrist flexion and extension strengthening, 10 x    11. fine motor manipulation, pulling coins from the putty    12. 2# graded clip with 3 point prehension, 10 x    Therapeutic Exercise Summary:  Patient tolerating splint and find it is helping with improved prehension during functional tasks.       Time-based treatments/modalities:  Therapeutic exercise minutes (CPT 06810): 45 minutes        Pain rating before treatment: 2  Pain rating after treatment: 2    ASSESSMENT:   Response to treatment: highly motivated to participate in therapy and adhering to her HEP    PLAN/RECOMMENDATIONS:   Plan for treatment: therapy treatment to continue next visit.  Planned interventions for next visit: continue with current treatment and therapeutic exercise (CPT 32817)

## 2023-06-29 ENCOUNTER — OCCUPATIONAL THERAPY (OUTPATIENT)
Dept: OCCUPATIONAL THERAPY | Facility: REHABILITATION | Age: 63
End: 2023-06-29
Attending: PSYCHIATRY & NEUROLOGY
Payer: MEDICAID

## 2023-06-29 DIAGNOSIS — R29.898 RIGHT ARM WEAKNESS: ICD-10-CM

## 2023-06-29 DIAGNOSIS — Z98.1 S/P CERVICAL SPINAL FUSION: ICD-10-CM

## 2023-06-29 PROCEDURE — 97110 THERAPEUTIC EXERCISES: CPT

## 2023-06-29 NOTE — OP THERAPY DAILY TREATMENT
Outpatient Occupational Therapy  DAILY TREATMENT     [facility] Occupational [address].  [address]  [place] [address]  Phone:  [phone]  Fax:  [phone]    Date: 06/29/2023    Patient: Yolanda Reed  YOB: 1960  MRN: [id number]     Time Calculation  Start time: 0230  Stop time: 0315 Time Calculation (min): 45 minutes         Chief Complaint: Hand Weakness, Hand Problem, and Self Care Duties    Visit #: 15    SUBJECTIVE:  My hand is less shaky today    OBJECTIVE:  Current objective measures:   Sensation      Wrist/Hand   Left   Diminished: hot/cold discrimination  Paresthesia: light touch     Right   Diminished: hot/cold discrimination  Paresthesia: light touch     Active Range of Motion   Left Shoulder   Normal active range of motion     Right Shoulder   Normal active range of motion     Left Wrist   Normal active range of motion     Right Wrist   Normal active range of motion     Left Thumb     Normal active range of motion     Right Thumb     Normal active range of motion  Opposition: Only able to oppose  to tip of ring finger due to weak opponens pollicis strength     Left Digits    Normal active range of motion     Right Digits   Normal active range of motion     Strength:       Left Wrist/Hand    (2nd hand position)     Trial 1: 34     Right Wrist/Hand    (2nd hand position)     Trial 1: 15     Tests      Left Shoulder   Positive ULTT2.      Right Shoulder   Positive ULTT2.      Additional Tests Details  Minimal tightness of median nerves and moderate tightness of ulnar nerves in both upper extremities     General Comments      Wrist/Hand Comments  9 hole peg test:  R= 43 seconds  L= 27 seconds.       Activities of Daily Living:      ADL Comments:  Difficulty with fine motor dressing, hygiene, meal prep and housework due to weakness and fatigue        Therapeutic Exercises (CPT 40794):     1. isometric thumb adduction, 10x10 second holds, R hand    2.  isometric thumb abduction, 10x10 second holds, R hand    3. isometric thumb retropulsion, 10x10 second holds, R hand    4. gravity assist thumb to small finger opposition, 2x5 reps, R hand    5. isometric thumb extension/flexion, 10x10 second holds, R hand    6. light resistance theraputty, pinching, grasping, rolling    7. 9# digi-flex for gross grasp, 10 x    8. isolated intrinsic strengthening    9. ilsolated digit extension, 10 x    10. resisted wrist flexion and extension strengthening, 10 x    11. fine motor manipulation, pulling coins from the putty    12. 4# graded clip with 3 point prehension, 10 x    Therapeutic Exercise Summary:  Patient tolerating splint and find it is helping with improved prehension during functional tasks.       Time-based treatments/modalities:  Therapeutic exercise minutes (CPT 87765): 45 minutes        Pain rating before treatment: 2  Pain rating after treatment: 2    ASSESSMENT:   Response to treatment: Strength and dexterity remain status quo today.  Increasing focus on 3 point prehension with right dominant hand.    PLAN/RECOMMENDATIONS:   Plan for treatment: therapy treatment to continue next visit.  Planned interventions for next visit: continue with current treatment and therapeutic exercise (CPT 60274)

## 2023-07-03 ENCOUNTER — APPOINTMENT (OUTPATIENT)
Dept: OCCUPATIONAL THERAPY | Facility: REHABILITATION | Age: 63
End: 2023-07-03
Attending: PSYCHIATRY & NEUROLOGY
Payer: MEDICAID

## 2023-07-03 NOTE — OP THERAPY DAILY TREATMENT
Outpatient Occupational Therapy  DAILY TREATMENT     [facility] Occupational [address].  [address]  [place] [address]  Phone:  [phone]  Fax:  [phone]    Date: 07/03/2023    Patient: Yolanda Reed  YOB: 1960  MRN: [id number]     Time Calculation                 Chief Complaint: No chief complaint on file.    Visit #: 16    SUBJECTIVE:  ***    OBJECTIVE:  Current objective measures:   Sensation      Wrist/Hand   Left   Diminished: hot/cold discrimination  Paresthesia: light touch     Right   Diminished: hot/cold discrimination  Paresthesia: light touch     Active Range of Motion   Left Shoulder   Normal active range of motion     Right Shoulder   Normal active range of motion     Left Wrist   Normal active range of motion     Right Wrist   Normal active range of motion     Left Thumb     Normal active range of motion     Right Thumb     Normal active range of motion  Opposition: Only able to oppose  to tip of ring finger due to weak opponens pollicis strength     Left Digits    Normal active range of motion     Right Digits   Normal active range of motion     Strength:       Left Wrist/Hand    (2nd hand position)     Trial 1: 34     Right Wrist/Hand    (2nd hand position)     Trial 1: 15     Tests      Left Shoulder   Positive ULTT2.      Right Shoulder   Positive ULTT2.      Additional Tests Details  Minimal tightness of median nerves and moderate tightness of ulnar nerves in both upper extremities     General Comments      Wrist/Hand Comments  9 hole peg test:  R= 43 seconds  L= 27 seconds.       Activities of Daily Living:      ADL Comments:  Difficulty with fine motor dressing, hygiene, meal prep and housework due to weakness and fatigue        Therapeutic Exercises (CPT 12390):     1. isometric thumb adduction, 10x10 second holds, R hand    2. isometric thumb abduction, 10x10 second holds, R hand    3. isometric thumb retropulsion, 10x10 second holds,  "R hand    4. gravity assist thumb to small finger opposition, 2x5 reps, R hand    5. isometric thumb extension/flexion, 10x10 second holds, R hand    6. light resistance theraputty, pinching, grasping, rolling    7. 9# digi-flex for gross grasp, 10 x    8. isolated intrinsic strengthening    9. ilsolated digit extension, 10 x    10. resisted wrist flexion and extension strengthening, 10 x    11. fine motor manipulation, pulling coins from the putty    12. 4# graded clip with 3 point prehension, 10 x    Therapeutic Exercise Summary:  Patient tolerating splint and find it is helping with improved prehension during functional tasks.       Time-based treatments/modalities:           Pain rating before treatment: {PAIN NUMBERS_1-10:35541}  Pain rating after treatment: {PAIN NUMBERS_1-10:28708}    ASSESSMENT:   Response to treatment: ***    PLAN/RECOMMENDATIONS:   Plan for treatment: {Therapy Plan:807202203::\"therapy treatment to continue next visit\"}.  Planned interventions for next visit: {planned OT Interventions:496876407}         "

## 2023-07-05 ENCOUNTER — APPOINTMENT (OUTPATIENT)
Dept: OCCUPATIONAL THERAPY | Facility: REHABILITATION | Age: 63
End: 2023-07-05
Attending: PSYCHIATRY & NEUROLOGY
Payer: MEDICAID

## 2023-07-12 ENCOUNTER — OCCUPATIONAL THERAPY (OUTPATIENT)
Dept: OCCUPATIONAL THERAPY | Facility: REHABILITATION | Age: 63
End: 2023-07-12
Attending: PSYCHIATRY & NEUROLOGY
Payer: MEDICAID

## 2023-07-12 DIAGNOSIS — R29.898 RIGHT ARM WEAKNESS: ICD-10-CM

## 2023-07-12 DIAGNOSIS — Z98.1 S/P CERVICAL SPINAL FUSION: ICD-10-CM

## 2023-07-12 PROCEDURE — 97110 THERAPEUTIC EXERCISES: CPT

## 2023-07-12 NOTE — OP THERAPY DAILY TREATMENT
Outpatient Occupational Therapy  DAILY TREATMENT     [facility] Occupational Therapy [address]  [address]  [place] [address]  Phone:  [phone]  Fax:  [phone]    Date: 07/12/2023    Patient: Yolanda Reed  YOB: 1960  MRN: [id number]     Time Calculation  Start time: 0315  Stop time: 0400 Time Calculation (min): 45 minutes         Chief Complaint: Hand Weakness and Hand Problem    Visit #: 16    SUBJECTIVE:  I saw my surgeon and he said there is not much more he can do for me.    OBJECTIVE:  Current objective measures:   Sensation      Wrist/Hand   Left   Diminished: hot/cold discrimination  Paresthesia: light touch     Right   Diminished: hot/cold discrimination  Paresthesia: light touch     Active Range of Motion   Left Shoulder   Normal active range of motion     Right Shoulder   Normal active range of motion     Left Wrist   Normal active range of motion     Right Wrist   Normal active range of motion     Left Thumb     Normal active range of motion     Right Thumb     Normal active range of motion  Opposition: Only able to oppose  to tip of ring finger due to weak opponens pollicis strength     Left Digits    Normal active range of motion     Right Digits   Normal active range of motion     Strength:       Left Wrist/Hand    (2nd hand position)     Trial 1: 34     Right Wrist/Hand    (2nd hand position)     Trial 1: 15     Tests      Left Shoulder   Positive ULTT2.      Right Shoulder   Positive ULTT2.      Additional Tests Details  Minimal tightness of median nerves and moderate tightness of ulnar nerves in both upper extremities     General Comments      Wrist/Hand Comments  9 hole peg test:  R= 43 seconds  L= 27 seconds.       Activities of Daily Living:      ADL Comments:  Difficulty with fine motor dressing, hygiene, meal prep and housework due to weakness and fatigue        Therapeutic Exercises (CPT 44693):     1. isometric thumb adduction, 10x10  second holds, R hand    2. isometric thumb abduction, 10x10 second holds, R hand    3. isometric thumb retropulsion, 10x10 second holds, R hand    4. gravity assist thumb to small finger opposition, 2x5 reps, R hand    5. isometric thumb extension/flexion, 10x10 second holds, R hand    6. light resistance theraputty, pinching, grasping, rolling    7. 9# digi-flex for gross grasp, 10 x    8. isolated intrinsic strengthening    9. ilsolated digit extension, 10 x    10. resisted wrist flexion and extension strengthening, 10 x    11. fine motor manipulation, pulling coins from the putty    12. 4# graded clip with 3 point prehension, 10 x    Therapeutic Exercise Summary:  Patient tolerating splint and find it is helping with improved prehension during functional tasks.       Time-based treatments/modalities:  Therapeutic exercise minutes (CPT 71794): 45 minutes        Pain rating before treatment: 0  Pain rating after treatment: 1    ASSESSMENT:   Response to treatment: Continues to wear splint for enhancing functional use of right hand.  Motivated to continue with therapy    PLAN/RECOMMENDATIONS:   Plan for treatment: therapy treatment to continue next visit.  Planned interventions for next visit: continue with current treatment and therapeutic exercise (CPT 49238)

## 2023-07-14 ENCOUNTER — OCCUPATIONAL THERAPY (OUTPATIENT)
Dept: OCCUPATIONAL THERAPY | Facility: REHABILITATION | Age: 63
End: 2023-07-14
Attending: PSYCHIATRY & NEUROLOGY
Payer: MEDICAID

## 2023-07-14 DIAGNOSIS — R29.898 RIGHT ARM WEAKNESS: ICD-10-CM

## 2023-07-14 DIAGNOSIS — Z98.1 S/P CERVICAL SPINAL FUSION: ICD-10-CM

## 2023-07-14 PROCEDURE — 97110 THERAPEUTIC EXERCISES: CPT

## 2023-07-14 NOTE — OP THERAPY PROGRESS SUMMARY
Outpatient Occupational Therapy  PROGRESS SUMMARY NOTE    [facility] Occupational Therapy [address]  [address]  [place] [address]  Phone:  [phone]  Fax:  [phone]    Date of Visit: 07/14/2023    Patient: Yolanda Reed  YOB: 1960  MRN: [id number]     Referring Provider: Arturo Lu M.D.  [address] [place] [address]  [place] [address]   Referring Diagnosis Arthrodesis status [Z98.1];Other symptoms and signs involving the musculoskeletal system [R29.898]     Visit Diagnoses     ICD-10-CM   1. S/P cervical spinal fusion  Z98.1   2. Right arm weakness  R29.898       Rehab Potential: good    Progress Report Period: 6/15/23 - 7/14/23    Functional Assessment Used:  UEFI = 45/80          Objective Findings and Assessment:   Patient progression towards goals: Patient continues to attend therapy 2 x a week to work on right hand function and continues to progress with improving thumb horizontal abduction/adduction, flexion/extension and opposition.  Patient continues to utilize Median nerve hand splint for functional use with thumb in mid-opposition and intact palmar arch.  Patient would benefit with continued OT intervention to maximize functional potential of right dominant hand    Objective findings and assessment details: Sensation      Wrist/Hand   Left   Diminished: hot/cold discrimination  Paresthesia: light touch     Right   Diminished: hot/cold discrimination  Paresthesia: light touch     Active Range of Motion   Left Shoulder   Normal active range of motion     Right Shoulder   Normal active range of motion     Left Wrist   Normal active range of motion     Right Wrist   Normal active range of motion     Left Thumb     Normal active range of motion     Right Thumb     Normal active range of motion  Opposition: Only able to oppose  to tip of little finger due to improving horizontal abduction and opponens pollicis strength     Left Digits    Normal active range of  motion     Right Digits   Normal active range of motion     Strength:       Left Wrist/Hand    (2nd hand position)     Trial 1: 34     Right Wrist/Hand    (2nd hand position)     Trial 1: 15     Tests      Left Shoulder   Positive ULTT2.      Right Shoulder   Positive ULTT2.      Additional Tests Details  Minimal tightness of median nerves and minimal tightness of ulnar nerves in both upper extremities     General Comments      Wrist/Hand Comments  9 hole peg test:  R= 43 seconds  L= 27 seconds.       Activities of Daily Living:      ADL Comments:  Difficulty with fine motor dressing, hygiene, meal prep and housework due to weakness and fatigue    Goals:   Short Term Goals:   Patient will improve  strength by at least 20 pounds to enhance functional use  Patient will be able to legibly sign her name  Patient will score 40 seconds or less on the 9 hole peg test with right hand due to improved fine motor manipulation   Short term goal timespan:  2-4 weeks    Long Term Goals:   Patient will improve  strength by at least 35 pounds to enhance functional use  Patient will be able to legibly print a short sentance  Patient will score 30 seconds or less on the 9 hole peg test with right hand due to improved fine motor manipulation and dexterity  Patient will score at least 55/80 on the UEFI  Long term goal timespan:  4-6 weeks    Plan:   Planned therapy interventions:  Therapeutic Exercise (CPT 24655)  Other planned therapy interventions:  97110 x 3  Frequency:  2x week  Duration in weeks:  4  Duration in visits:  8      Referring provider co-signature:  I have reviewed this plan of care and my co-signature certifies the need for services.    Certification Period: 07/14/2023 to 08/11/23    Physician Signature: ________________________________ Date: ______________

## 2023-07-14 NOTE — OP THERAPY DAILY TREATMENT
Outpatient Occupational Therapy  DAILY TREATMENT     [facility] Occupational Therapy [address].  [address]  [place] [address]  Phone:  [phone]  Fax:  [phone]    Date: 07/14/2023    Patient: Yolanda Reed  YOB: 1960  MRN: [id number]     Time Calculation  Start time: 0830  Stop time: 0915 Time Calculation (min): 45 minutes         Chief Complaint: Extremity Weakness, Hand Problem, and Self Care Duties    Visit #: 17    SUBJECTIVE:  I am trying to use my right hand so much    OBJECTIVE:  Current objective measures:   Sensation      Wrist/Hand   Left   Diminished: hot/cold discrimination  Paresthesia: light touch     Right   Diminished: hot/cold discrimination  Paresthesia: light touch     Active Range of Motion   Left Shoulder   Normal active range of motion     Right Shoulder   Normal active range of motion     Left Wrist   Normal active range of motion     Right Wrist   Normal active range of motion     Left Thumb     Normal active range of motion     Right Thumb     Normal active range of motion  Opposition: Only able to oppose  to tip of little finger due to improving horizontal abduction and opponens pollicis strength     Left Digits    Normal active range of motion     Right Digits   Normal active range of motion     Strength:       Left Wrist/Hand    (2nd hand position)     Trial 1: 34     Right Wrist/Hand    (2nd hand position)     Trial 1: 15     Tests      Left Shoulder   Positive ULTT2.      Right Shoulder   Positive ULTT2.      Additional Tests Details  Minimal tightness of median nerves and minimal tightness of ulnar nerves in both upper extremities     General Comments      Wrist/Hand Comments  9 hole peg test:  R= 43 seconds  L= 27 seconds.       Activities of Daily Living:      ADL Comments:  Difficulty with fine motor dressing, hygiene, meal prep and housework due to weakness and fatigue        Therapeutic Exercises (CPT 31552):     1. isometric  thumb adduction/abduction, 10x10 second holds    2. isometric thumb extension/flexion, 10x10 second holds    3. isometric thumb retropulsion, 10x10 second holds    4. gravity assist thumb to small finger opposition, 2x5 reps    5. fine motor manipulation, 9 hole peg test, grooved pegboard, shuffling cards    6. light resistance theraputty, pinching, grasping, rolling    7. 9# digi-flex for gross grasp, 10 x    8. isolated intrinsic strengthening, 10 x    9. ilsolated digit extension, 10 x    10. resisted wrist flexion/extension and supination strengthening, 10 x    11. flexion / extension exercise stick in horizontal and vertical positions, 10 x in each position    12. 4# graded clip with 3 point prehension, 10 x    Therapeutic Exercise Summary:  Patient tolerating splint and find it is helping with improved prehension during functional tasks.       Time-based treatments/modalities:  Therapeutic exercise minutes (CPT 07116): 45 minutes        Pain rating before treatment: 1  Pain rating after treatment: 1    ASSESSMENT:   Response to treatment: increasing isolated strength with thumb horizontal abduction and opponens today.  Progress note complete.     PLAN/RECOMMENDATIONS:   Plan for treatment: therapy treatment to continue next visit.  Planned interventions for next visit: continue with current treatment and therapeutic exercise (CPT 47291)

## 2023-07-21 ENCOUNTER — APPOINTMENT (OUTPATIENT)
Dept: OCCUPATIONAL THERAPY | Facility: REHABILITATION | Age: 63
End: 2023-07-21
Attending: PSYCHIATRY & NEUROLOGY
Payer: MEDICAID

## 2023-08-02 ENCOUNTER — OCCUPATIONAL THERAPY (OUTPATIENT)
Dept: OCCUPATIONAL THERAPY | Facility: REHABILITATION | Age: 63
End: 2023-08-02
Attending: PSYCHIATRY & NEUROLOGY
Payer: MEDICAID

## 2023-08-02 DIAGNOSIS — R29.898 RIGHT ARM WEAKNESS: ICD-10-CM

## 2023-08-02 DIAGNOSIS — Z98.1 S/P CERVICAL SPINAL FUSION: ICD-10-CM

## 2023-08-02 PROCEDURE — 97110 THERAPEUTIC EXERCISES: CPT

## 2023-08-02 NOTE — OP THERAPY DAILY TREATMENT
Outpatient Occupational Therapy  DAILY TREATMENT     [facility] Occupational Therapy [address]  [address]  [place] [address]  Phone:  [phone]  Fax:  [phone]    Date: 08/02/2023    Patient: Yolanda Reed  YOB: 1960  MRN: [id number]     Time Calculation  Start time: 0230  Stop time: 0315 Time Calculation (min): 45 minutes         Chief Complaint: Extremity Weakness and Hand Problem    Visit #: 18    SUBJECTIVE:  I am trying to use my right hand a lot more at home    OBJECTIVE:  Current objective measures:   Sensation      Wrist/Hand   Left   Diminished: hot/cold discrimination  Paresthesia: light touch     Right   Diminished: hot/cold discrimination  Paresthesia: light touch     Active Range of Motion   Left Shoulder   Normal active range of motion     Right Shoulder   Normal active range of motion     Left Wrist   Normal active range of motion     Right Wrist   Normal active range of motion     Left Thumb     Normal active range of motion     Right Thumb     Normal active range of motion  Opposition: Only able to oppose  to tip of little finger due to improving horizontal abduction and opponens pollicis strength     Left Digits    Normal active range of motion     Right Digits   Normal active range of motion     Strength:       Left Wrist/Hand    (2nd hand position)     Trial 1: 34     Right Wrist/Hand    (2nd hand position)     Trial 1: 15     Tests      Left Shoulder   Positive ULTT2.      Right Shoulder   Positive ULTT2.      Additional Tests Details  Minimal tightness of median nerves and minimal tightness of ulnar nerves in both upper extremities     General Comments      Wrist/Hand Comments  9 hole peg test:  R= 43 seconds  L= 27 seconds.       Activities of Daily Living:      ADL Comments:  Difficulty with fine motor dressing, hygiene, meal prep and housework due to weakness and fatigue        Therapeutic Exercises (CPT 33927):     1. isometric thumb  adduction/abduction, 10x10 second holds    2. isometric thumb extension/flexion, 10x10 second holds    3. isometric thumb retropulsion, 10x10 second holds    4. gravity assist thumb to small finger opposition, 2x5 reps    5. fine motor manipulation, 9 hole peg test, grooved pegboard, shuffling cards    6. light resistance theraputty, pinching, grasping, rolling    7. 9# digi-flex for gross grasp, 10 x    8. isolated intrinsic strengthening, 10 x    9. ilsolated digit extension, 10 x    10. resisted wrist flexion/extension and supination strengthening, 10 x    11. flexion / extension exercise stick in horizontal and vertical positions, 10 x in each position    12. 4# graded clip with 3 point prehension, 10 x    Therapeutic Exercise Summary:  Patient tolerating splint and find it is helping with improved prehension during functional tasks.       Time-based treatments/modalities:  Therapeutic exercise minutes (CPT 09688): 45 minutes        Pain rating before treatment: 0  Pain rating after treatment: 0    ASSESSMENT:   Response to treatment: Continues to be highly motivated to participate in therapy     PLAN/RECOMMENDATIONS:   Plan for treatment: therapy treatment to continue next visit.  Planned interventions for next visit: continue with current treatment and therapeutic exercise (CPT 33030)

## 2023-08-04 ENCOUNTER — APPOINTMENT (OUTPATIENT)
Dept: OCCUPATIONAL THERAPY | Facility: REHABILITATION | Age: 63
End: 2023-08-04
Attending: PSYCHIATRY & NEUROLOGY
Payer: MEDICAID

## 2023-08-04 NOTE — OP THERAPY DAILY TREATMENT
Outpatient Occupational Therapy  DAILY TREATMENT     [facility] Occupational [address].  [address]  [place] [address]  Phone:  [phone]  Fax:  [phone]    Date: 08/04/2023    Patient: Yolanda Reed  YOB: 1960  MRN: [id number]     Time Calculation                 Chief Complaint: No chief complaint on file.    Visit #: 19    SUBJECTIVE:  ***    OBJECTIVE:  Current objective measures:   Sensation      Wrist/Hand   Left   Diminished: hot/cold discrimination  Paresthesia: light touch     Right   Diminished: hot/cold discrimination  Paresthesia: light touch     Active Range of Motion   Left Shoulder   Normal active range of motion     Right Shoulder   Normal active range of motion     Left Wrist   Normal active range of motion     Right Wrist   Normal active range of motion     Left Thumb     Normal active range of motion     Right Thumb     Normal active range of motion  Opposition: Only able to oppose  to tip of little finger due to improving horizontal abduction and opponens pollicis strength     Left Digits    Normal active range of motion     Right Digits   Normal active range of motion     Strength:       Left Wrist/Hand    (2nd hand position)     Trial 1: 34     Right Wrist/Hand    (2nd hand position)     Trial 1: 15     Tests      Left Shoulder   Positive ULTT2.      Right Shoulder   Positive ULTT2.      Additional Tests Details  Minimal tightness of median nerves and minimal tightness of ulnar nerves in both upper extremities     General Comments      Wrist/Hand Comments  9 hole peg test:  R= 43 seconds  L= 27 seconds.       Activities of Daily Living:      ADL Comments:  Difficulty with fine motor dressing, hygiene, meal prep and housework due to weakness and fatigue        Therapeutic Exercises (CPT 95730):     1. isometric thumb adduction/abduction, 10x10 second holds    2. isometric thumb extension/flexion, 10x10 second holds    3. isometric thumb  "retropulsion, 10x10 second holds    4. gravity assist thumb to small finger opposition, 2x5 reps    5. fine motor manipulation, 9 hole peg test, grooved pegboard, shuffling cards    6. light resistance theraputty, pinching, grasping, rolling    7. 9# digi-flex for gross grasp, 10 x    8. isolated intrinsic strengthening, 10 x    9. ilsolated digit extension, 10 x    10. resisted wrist flexion/extension and supination strengthening, 10 x    11. flexion / extension exercise stick in horizontal and vertical positions, 10 x in each position    12. 4# graded clip with 3 point prehension, 10 x    Therapeutic Exercise Summary:  Patient tolerating splint and find it is helping with improved prehension during functional tasks.       Time-based treatments/modalities:           Pain rating before treatment: {PAIN NUMBERS_1-10:20048}  Pain rating after treatment: {PAIN NUMBERS_1-10:18085}    ASSESSMENT:   Response to treatment: ***    PLAN/RECOMMENDATIONS:   Plan for treatment: {Therapy Plan:[id number]::\"therapy treatment to continue next visit\"}.  Planned interventions for next visit: {planned OT Interventions:147720134}         "

## 2023-08-08 ENCOUNTER — OCCUPATIONAL THERAPY (OUTPATIENT)
Dept: OCCUPATIONAL THERAPY | Facility: REHABILITATION | Age: 63
End: 2023-08-08
Attending: PSYCHIATRY & NEUROLOGY
Payer: MEDICAID

## 2023-08-08 DIAGNOSIS — R29.898 RIGHT ARM WEAKNESS: ICD-10-CM

## 2023-08-08 DIAGNOSIS — Z98.1 S/P CERVICAL SPINAL FUSION: ICD-10-CM

## 2023-08-08 PROCEDURE — 97110 THERAPEUTIC EXERCISES: CPT

## 2023-08-08 NOTE — OP THERAPY DAILY TREATMENT
Outpatient Occupational Therapy  DAILY TREATMENT     [facility] Occupational Therapy [address]  [address]  [place] [address]  Phone:  [phone]  Fax:  [phone]    Date: 08/08/2023    Patient: Yolanda Reed  YOB: 1960  MRN: [id number]     Time Calculation  Start time: 0145  Stop time: 0230 Time Calculation (min): 45 minutes         Chief Complaint: Extremity Weakness and Self Care Duties    Visit #: 19    SUBJECTIVE:  I have an appointment see Dr. Lu - Neurologist as I am falling more and is feel both my arms are getting weaker    OBJECTIVE:  Current objective measures:   Sensation      Wrist/Hand   Left   Diminished: hot/cold discrimination  Paresthesia: light touch     Right   Diminished: hot/cold discrimination  Paresthesia: light touch     Active Range of Motion   Left Shoulder   Normal active range of motion     Right Shoulder   Normal active range of motion     Left Wrist   Normal active range of motion     Right Wrist   Normal active range of motion     Left Thumb     Normal active range of motion     Right Thumb     Normal active range of motion  Opposition: Only able to oppose  to tip of little finger due to improving horizontal abduction and opponens pollicis strength     Left Digits    Normal active range of motion     Right Digits   Normal active range of motion     Strength:       Left Wrist/Hand    (2nd hand position)     Trial 1: 24     Right Wrist/Hand    (2nd hand position)     Trial 1: 4     Tests      Left Shoulder   Positive ULTT2.      Right Shoulder   Positive ULTT2.      Additional Tests Details  Minimal tightness of median nerves and minimal tightness of ulnar nerves in both upper extremities     General Comments      Wrist/Hand Comments  9 hole peg test:  R= 56 seconds  L= 25 seconds.       Activities of Daily Living:      ADL Comments:  Difficulty with fine motor dressing, hygiene, meal prep and housework due to weakness and fatigue         Therapeutic Exercises (CPT 03240):     1. isometric thumb adduction/abduction, 10x10 second holds    2. isometric thumb extension/flexion, 10x10 second holds    3. isometric thumb retropulsion, 10x10 second holds    4. gravity assist thumb to small finger opposition, 2x5 reps    5. fine motor manipulation, 9 hole peg test, grooved pegboard, shuffling cards    6. light resistance theraputty, pinching, grasping, rolling    7. 9# digi-flex for gross grasp, 10 x    8. isolated intrinsic strengthening, 10 x    9. ilsolated digit extension, 10 x    10. resisted wrist flexion/extension and supination strengthening, 10 x    11. flexion / extension exercise stick in horizontal and vertical positions, 10 x in each position    12. 4# graded clip with 3 point prehension, 10 x    Therapeutic Exercise Summary:  Patient tolerating splint and find it is helping with improved prehension during functional tasks.       Time-based treatments/modalities:  Therapeutic exercise minutes (CPT 01725): 45 minutes        Pain rating before treatment: 0  Pain rating after treatment: 0    ASSESSMENT:   Response to treatment: Patient has noted decline in  strength and dexterity today,  Last authorized visit today and has a return visit with Dr William next month to review concerns     PLAN/RECOMMENDATIONS:   Plan for treatment: refer patient back to MD.  Planned interventions for next visit:  last therapy session today due to plateau

## 2023-08-08 NOTE — OP THERAPY DISCHARGE SUMMARY
Outpatient Occupational Therapy  DISCHARGE SUMMARY NOTE    [facility] Occupational Therapy [address]  [address]  [place] [address]  Phone:  [phone]  Fax:  [phone]    Date of Visit: 08/08/2023    Patient: Yolanda Reed  YOB: 1960  MRN: [id number]     Referring Provider: Arturo Lu M.D.  [address] [place] [address],  [place] [id number]   Referring Diagnosis: Arthrodesis status [Z98.1];Other symptoms and signs involving the musculoskeletal system [R29.898]       Your patient is being discharged from Occupational Therapy with the following comments:   Progress plateau    Comments:  Patient was doing well with therapy with some improved hand function; however has had a noticeable decline and now losing balance/falling more.       Limitations Remaining:  Sensation      Wrist/Hand   Left   Diminished: hot/cold discrimination  Paresthesia: light touch     Right   Diminished: hot/cold discrimination  Paresthesia: light touch     Active Range of Motion   Left Shoulder   Normal active range of motion     Right Shoulder   Normal active range of motion     Left Wrist   Normal active range of motion     Right Wrist   Normal active range of motion     Left Thumb     Normal active range of motion     Right Thumb     Normal active range of motion  Opposition: Only able to oppose  to tip of little finger due to improving horizontal abduction and opponens pollicis strength     Left Digits    Normal active range of motion     Right Digits   Normal active range of motion     Strength:       Left Wrist/Hand    (2nd hand position)     Trial 1: 24     Right Wrist/Hand    (2nd hand position)     Trial 1: 4     Tests      Left Shoulder   Positive ULTT2.      Right Shoulder   Positive ULTT2.      Additional Tests Details  Minimal tightness of median nerves and minimal tightness of ulnar nerves in both upper extremities     General Comments      Wrist/Hand Comments  9 hole peg test:  R=  56 seconds  L= 25 seconds.       Activities of Daily Living:      ADL Comments:  Difficulty with fine motor dressing, hygiene, meal prep and housework due to weakness and fatigue    Recommendations:  Continue with HEP and use of splint  Review with Dr. Lu in September    Allison Grace OTR/L    Date: 8/8/2023

## 2023-11-17 ENCOUNTER — OFFICE VISIT (OUTPATIENT)
Dept: NEUROLOGY | Facility: MEDICAL CENTER | Age: 63
End: 2023-11-17
Attending: PSYCHIATRY & NEUROLOGY
Payer: MEDICAID

## 2023-11-17 VITALS
DIASTOLIC BLOOD PRESSURE: 72 MMHG | BODY MASS INDEX: 27.4 KG/M2 | WEIGHT: 139.55 LBS | SYSTOLIC BLOOD PRESSURE: 122 MMHG | TEMPERATURE: 98.1 F | HEART RATE: 108 BPM | OXYGEN SATURATION: 98 % | HEIGHT: 60 IN

## 2023-11-17 DIAGNOSIS — M54.41 CHRONIC BILATERAL LOW BACK PAIN WITH RIGHT-SIDED SCIATICA: ICD-10-CM

## 2023-11-17 DIAGNOSIS — Z98.1 S/P LUMBAR SPINAL FUSION: ICD-10-CM

## 2023-11-17 DIAGNOSIS — G89.29 CHRONIC BILATERAL LOW BACK PAIN WITH RIGHT-SIDED SCIATICA: ICD-10-CM

## 2023-11-17 PROCEDURE — 99213 OFFICE O/P EST LOW 20 MIN: CPT | Performed by: PSYCHIATRY & NEUROLOGY

## 2023-11-17 PROCEDURE — 99212 OFFICE O/P EST SF 10 MIN: CPT | Performed by: PSYCHIATRY & NEUROLOGY

## 2023-11-17 PROCEDURE — 3078F DIAST BP <80 MM HG: CPT | Performed by: PSYCHIATRY & NEUROLOGY

## 2023-11-17 PROCEDURE — 3074F SYST BP LT 130 MM HG: CPT | Performed by: PSYCHIATRY & NEUROLOGY

## 2023-11-17 RX ORDER — BUPROPION HYDROCHLORIDE 150 MG/1
150 TABLET ORAL EVERY MORNING
COMMUNITY
Start: 2023-11-03

## 2023-11-17 RX ORDER — OXYCODONE HCL 10 MG/1
10 TABLET, FILM COATED, EXTENDED RELEASE ORAL EVERY 12 HOURS
COMMUNITY

## 2023-11-17 ASSESSMENT — FIBROSIS 4 INDEX: FIB4 SCORE: 0.67

## 2023-11-17 NOTE — PROGRESS NOTES
Neuro follow up:     Last visit with me in 3/2023.    Reason: History of Cervical Spine surgery.    Yolanda Burch Reed 62 y.o. female who lives with a boyfriend (common law) and is a Native of [place]. She has been on Disability for nearly 4 years duet to Bipolar/Anxiety issues. 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      History of Cervical Myelopathy with prior C spine surgery and lumbar spinal stenosis with residual sensory disturbances of the upper limbs (and inside of both arms- let more than right) and radiculopathic features down either leg (left more than right) when bending down and arising up.      Since prior visit with me and   September 2022- Yolanda had both lumbar spine surgery at the end of September 2022 (Jama Werner MD)> DATE OF ADMISSION:  09/29/2022   DATE OF DISCHARGE:  10/04/2022      DISCHARGE DIAGNOSES:  1.  Status post L2-L5 decompressive laminectomy with foraminotomies for lumbar   spinal stenosis, herniated nucleus pulposus and radiculopathy.  2.  Status post L3-L4 and L4-L5 transforaminal lumbar interbody instrumented   fusion for spondylolisthesis.     And then had cervical spine surgery in the middle of January 2023:     DATE OF ADMISSION:  01/19/2023      DISCHARGE DIAGNOSES:  Status post C4-T1 posterior spinal instrumented fusion   and decompression and C3 laminoplasty for C3-T1 cervical spinal stenosis,   herniated nucleus pulposus, myelopathy and radiculopathy.     She has numbness (painless) of the right inner more than left inner arms (sparing the hands) and sparing the arm pit areas and anterior check.     She also noticed that within 2-3 weeks that her left foot  (primarily the left sole of the foot and the great toe to the 5th toes)> ARE IMPROVED and improving since her Lumbar Spine surgery without relapses.     Dr. Doug Navarro also did electrical testing of her limbs at this was normal per report. She  "had a Thoracic MRI done in 12/2021 at [facility] showing normal thoracic spine for age and no significant stenosis or normal appearing cord.    Then atleast 1 month she had a Cervical MRI and this showed moderately severe central stenosis at C7-T1 from a disc extending cephalad 7 mm with hypertrophic facet joint changes of the left that extended towards the stenosis and moderately severe central stenosis at C6-7 and moderate severe central stenosis at C5-6 with bilateral bony neural foraminal narrowing right worse than left.          She has noticed improved feeling in the mid to lower thoracic and there was additional numbness (very low level degree and she even noticed difficulty buttoning clothes). The upper chest and abdominal areas feel 75% back to normal post surgery without worsening of sensory disturbances in those areas. She has persistent sensitivity (hyper) of there thighs- her hands and feet have remained \"freezing cold\" after the surgery- this cold feeling was not clearly present to her before the surgery.     There has has been no new or evolving numbness or other sensory disturbances of the face,tongue,scalp, upper shoulders bilaterally.     She is going to PT (2 times a  week) for about 1 hour at a time.  She had a constant low level dull ache across the lower lumbar region (sitting is worst) and laying down > back pain is gone but during the time she roles over (right to left or left to right) transiently increase back pain during these rolling movements. Yolanda has not had any radiculopathic pain(s) or sensory disturbances down either legs in the last 2-3 months or so.    She also has noticed considerable improvement in her prior anterior thigh pain(s)-tightness/soreness (since having her lumbar spine surgery).      There has been no  bowel incontinence issues.     Yolanda has not dysarthria,dysphagia,vertigo,headache(s),evolving neck pains in the last 2 to 3 months nor any lhermitte's sign.    Patient " Active Problem List    Diagnosis Date Noted    S/P cervical spinal fusion 01/19/2023    S/P lumbar fusion 09/29/2022    Acute respiratory failure with hypoxia (HCC) 02/26/2022    Falls frequently 02/16/2022    Electrolyte imbalance 02/16/2022    Wound infection after surgery 02/16/2022    Hyperlipidemia 01/17/2022    Pain in the chest 01/17/2022    Morbid obesity (HCC) 01/17/2022    Anxiety 01/17/2022    Chest pain, rule out acute myocardial infarction 01/17/2022    Cervical myelopathy with cervical radiculopathy (HCC) 01/06/2022    Nausea and vomiting 06/29/2021    Delayed emergence from anesthesia 06/29/2021    Essential hypertension 06/29/2021    Obstructive sleep apnea syndrome 06/29/2021       Past medical history:   Past Medical History:   Diagnosis Date    Anesthesia 2006    slow to wake up and PONV - no problems since per pt    Anxiety     Arthritis     shoulders    Breath shortness 09/23/2022    on exertion    Delayed emergence from general anesthesia     Per Problem List    Dental disorder     upper dentures    Frequent falls     Uses Cane    High cholesterol     Hypertension 01/05/2022    pt states well controlled on meds    Infectious disease     COVID Nov 2020 and hx of MRSA    Low blood sugar     MRSA (methicillin resistant Staphylococcus aureus)     H/O    BRIGITTE (obstructive sleep apnea) 01/17/2023    Has not been using CPAP at this time.    Pain 09/23/2022    low back and neck    Pneumonia     Nov 2020 + COVID    PONV (postoperative nausea and vomiting)     Psychiatric problem     depression    PTSD (post-traumatic stress disorder)     Sleep apnea     cpap    Snoring     Urinary bladder disorder     bladder sling put in 6/29/21       Past surgical history:   Past Surgical History:   Procedure Laterality Date    LAMINOTOMY  1/19/2023    Procedure: C3 LAMINOPLASTY, C4-T1 DECOMPRESSIVE LAMINECTOMY, C4-T1 POSTERIOR SPINAL FUSION;  Surgeon: Jama Werner M.D.;  Location: SURGERY [facility];  Service:  Orthopedics    CERVICAL DECOMPRESSION POSTERIOR  2023    Procedure: DECOMPRESSION, SPINE, CERVICAL, POSTERIOR APPROACH;  Surgeon: Jama Werner M.D.;  [facility];  Service: Orthopedics    CERVICAL LAMINECTOMY POSTERIOR  2023    Procedure: LAMINECTOMY, SPINE, CERVICAL, POSTERIOR APPROACH;  Surgeon: Jama Werner M.D.;  [facility];  Service: Orthopedics    CERVICAL FUSION POSTERIOR  2023    Procedure: FUSION, SPINE, CERVICAL, POSTERIOR APPROACH;  Surgeon: Jama Werner M.D.;  [facility];  Service: Orthopedics    FUSION, SPINE, LUMBAR, PLIF Bilateral 2022    Procedure: L3-L5 TRANSFORAMINAL LUMBAR INTERBODY FUSION, L2-5 DECOMPRESSION;  Surgeon: Jama Werner M.D.;  Location[facility];  Service: Orthopedics    LUMBAR DECOMPRESSION Bilateral 2022    Procedure: DECOMPRESSION, SPINE, LUMBAR;  Surgeon: Jama Werner M.D.;  [facility];  Service: Orthopedics    CORPECTOMY N/A 2022    Procedure: CORPECTOMY, SPINE - PARTIAL;  Surgeon: Jama Werner M.D.;  Location[facility];  Service: Orthopedics    CERVICAL DISK AND FUSION ANTERIOR N/A 2022    Procedure: DISCECTOMY, SPINE, CERVICAL, ANTERIOR APPROACH, WITH FUSION - C5-T1;  Surgeon: Jama Werner M.D.;  [facility];  Service: Orthopedics    BLADDER SLING FEMALE N/A 2021    Procedure: BLADDER SLING, FEMALE - SOLYX.;  Surgeon: Rocío Dewey M.D.;  Location[facility];  Service: Gynecology    PB KNEE SCOPE,AID ANT CRUCIATE REPAIR Right 2019    Procedure: RT KNEE ARTHROSCOPY WITH ALLOGRAFT ACL RECONSTRUCTION AND PARTIAL MENISCECTOMY;  Surgeon: Onur Arriaza M.D.;  Location: [facility];  Service: Orthopedics    BREAST BIOPSY  2010    Performed by ROCÍO BARON at SURGERY SAME DAY [facility]    GYN SURGERY  ,        OTHER ORTHOPEDIC SURGERY      acl left knee    OTHER  "ORTHOPEDIC SURGERY      right shoulder    TONSILLECTOMY           Social history:   Social History     Socioeconomic History    Marital status:      Spouse name: Not on file    Number of children: Not on file    Years of education: Not on file    Highest education level: Not on file   Occupational History    Not on file   Tobacco Use    Smoking status: Former     Current packs/day: 0.00     Average packs/day: 0.5 packs/day for 30.0 years (15.0 ttl pk-yrs)     Types: Cigarettes     Start date: 11/11/1983     Quit date: 11/11/2013     Years since quitting: 10.0     Passive exposure: Past    Smokeless tobacco: Never    Tobacco comments:     .5 ppd, 30 yrs   Vaping Use    Vaping Use: Some days    Substances: Nicotine, Flavoring    Devices: Pre-filled or refillable cartridge   Substance and Sexual Activity    Alcohol use: Yes     Comment: rarely    Drug use: Yes     Types: Inhaled, Marijuana, Oral     Comment: \"clean since 2015\",  THC Use    Sexual activity: Not on file   Other Topics Concern    Not on file   Social History Narrative    Not on file     Social Determinants of Health     Financial Resource Strain: Low Risk  (3/1/2022)    Overall Financial Resource Strain (CARDIA)     Difficulty of Paying Living Expenses: Not very hard   Food Insecurity: No Food Insecurity (3/1/2022)    Hunger Vital Sign     Worried About Running Out of Food in the Last Year: Never true     Ran Out of Food in the Last Year: Never true   Transportation Needs: No Transportation Needs (3/1/2022)    PRAPARE - Transportation     Lack of Transportation (Medical): No     Lack of Transportation (Non-Medical): No   Physical Activity: Not on file   Stress: Not on file   Social Connections: Not on file   Intimate Partner Violence: Not on file   Housing Stability: Not on file       Family history:   Family History   Problem Relation Age of Onset    Alcohol/Drug Mother     Anesthesia Mother     Anxiety disorder Mother     Bipolar disorder Mother "     Cancer Mother     Lung Cancer Mother     Osteoporosis Mother     Allergies Father     Kidney stones Father     Lung Cancer Father     DVT Sister     Lung Cancer Sister          Current medications:   Current Outpatient Medications   Medication    oxyCODONE CR (OXYCONTIN) 10 MG Tablet Extended Release 12 hour Abuse-Deterrent    sulfamethoxazole-trimethoprim (BACTRIM DS) 800-160 MG tablet    VITAMIN D PO    QUEtiapine (SEROQUEL) 50 MG tablet    sertraline (ZOLOFT) 100 MG Tab    atorvastatin (LIPITOR) 10 MG Tab    fentaNYL Citrate, PF, 100 MCG/2ML Solution    omeprazole (PRILOSEC) 20 MG delayed-release capsule    atorvastatin (LIPITOR) 10 MG Tab     No current facility-administered medications for this visit.       Medication Allergy:  Allergies   Allergen Reactions    Morphine Anaphylaxis     Throat swelling  Tolerates oxycodone    Dilaudid [Hydromorphone] Anxiety           Physical examination:   Vitals:    11/17/23 1101   BP: 122/72   BP Location: Left arm   Patient Position: Sitting   BP Cuff Size: Adult   Pulse: (!) 108   Temp: 36.7 °C (98.1 °F)   TempSrc: Temporal   SpO2: 98%   Weight: 63.3 kg (139 lb 8.8 oz)   Height: 1.524 m (5')       Normal Cephalic atraumatic.  Full Range of Movement around the Neck in all directions without restrictions or discrete pain evoked triggers.  No lower extremity edema.      Muscle tone is normal in all 4 limbs. and No abnormal movements appreciated.     Muscle strength:     Neck Flexors/Extensors: 5/5                                         Right               Left  Deltoid                         4+/5                 5/5                                             Biceps                         4+/5                 5/5  Triceps                       5-/5                   5/5         Wrist extensors           5/5                   5/5  Wrist flexors                5/5                   5/5                               4/5                   5/5  Interossei                     4 to 4+/5          5/5  Thenar (APB)              4+/5                 5/5         Hip flexors                   5/5                   5/5  Quadriceps                  5/5                   5/5                     Hamstrings                  5/5                   5/5  Dorsiflexors                 5/5                   5/5  Plantarflexors              5/5                   5/5  Toe extension             5/5                   5/5  Toe Flexors                5/5                   5/5     Sensory exam:     Vibratory: 6-8 seconds at the great toes, 8 to 10 seconds at the ankles, 10-12 seconds at the knees, 20 seconds at the index fingers and 22 seconds at the 5th fingers.     There is a Negative Phalen's at Median Wrists.    Negative Tinel's at Median Wrists.        Reflexes:                                        Right               Left  Biceps                         2/4                   2/4  Triceps                        2/4                   2/4  Brachioradialis            2/4                   2/4  Knee jerk                     2/4                   2/4  Ankle jerk                    2/4                   2/4            Frontal release signs are normal.     bilaterally toes are downgoing to plantar stimulation..     Coordination (finger-to-nose, heel/knee/shin, rapid alternating movements) was normal.      There was no truncal ataxia, no tremors, and no dysmetria.      Station and gait - easily stands up from exam chair without retropulsion,veering,leaning,swaying (to either side).   Arm swing symmetrical.     No Rombergism.        Reflexes:       Right  Left  Biceps   2/4  2/4  Triceps             2/4  2/4  Brachioradialis 2/4  2/4  Knee jerk  2/4  2/4  Ankle jerk  2/4  2/4     Frontal release signs are normal    bilaterally toes are downgoing to plantar stimulation..    Coordination (finger-to-nose, heel/knee/shin, rapid alternating movements) was normal.     There was no truncal ataxia, no tremors, and no  "dysmetria.     Station and gait - easily stands up from exam chair without retropulsion,veering,leaning,swaying (to either side).   Arm swing symmetrical.    No Rombergism.      Labs and Tests:     NEUROIMAGING:       Impression/Plans/Recommendations:     S/P Cervical Spine Fusion due to Cervical Myelopathy- prior  onset of lateral left abdominal numbness and tingling in October 2021 with extension and progression gradually over the next 1-2 months to extend both towards upper chest and into the arms (partially) and hands and extending downwards into her legs. No associated cranial neuropathic findings.    Saw Dr. Werner ([facility]) multiple times including about 1 month ago.  Nerve Conduction (Dr. Bacon) about 4 months ago     Still has right arm and hand weakness since cervical spine surgery but improved and not worse.    She has persistent lower back pains without radiculopathic changes that she previously had occurring.     She had recently cspine Xrays this past week and fusion areas are stable.     Overall from Neurological standpoint improved with primary deficits being weakness of right arm-hand and residual numbness of inner arms (right more than left)> C8/T1 dermatomes.    Gait-balance also improving in last 2-3 months and not relapsing.     She has significant improvement in prior sensations of both feet since lumbar spine surgery (consistently over \"50% improved\" to date.     Plans:     A. Continue Rx for chronic lower back pain (recent RAFY) and does stretches at home.    B. She takes Oxycodone (every 4-6 hours)> makes her chronic back pain bearable (the pain being in the lower paraspinal region).    C. She had Lower back Xrays done in Dr. Werner's office to confirm placement of hardware (this was done at her last visit with him).    D. I am reordering a Lumbar MRI to compare to her pre surgical lumbar MRI given her persisting lower back pain with infrequent right lowr leg radicular pain down to bottom " "of right foot. No fixed sensory deficits.     E. I am referring Yolanda to the [facility] for her chronic lower back pain issue. She is in agreement to pursue such an evaluation.     (She had several several facet joint  in the last 10 months; last time was last week done at Dr. Werner office which \"works much improved for up to 1 week\".      I have performed  a history and physical exam and a directed /focused  ROS today.     Total time spent today or this patient's care was 20 minutes  and included reviewing diagnostic workup to date (labs and imaging that include interpreting such tests relevant to this patient's neurological condition),  reviewing/obtaining separately obtained history from Yolanda  for today's neurological problem(s) , ordering either/or medications and additional tests, giving advise and suggestions on the present neurological problem and  documenting the clinical information in the EMR.       jose Lu MD  [facility].   [facility]    "

## 2023-12-07 ENCOUNTER — TELEPHONE (OUTPATIENT)
Dept: PHYSICAL MEDICINE AND REHAB | Facility: MEDICAL CENTER | Age: 63
End: 2023-12-07
Payer: MEDICAID

## 2023-12-07 NOTE — TELEPHONE ENCOUNTER
"Phone Number Called: [phone]    Call outcome: Left detailed message for patient. Informed to call back with any additional questions.    Message: Called pt to relay Dr. Ortiz message:    \"Yes to referral regarding pain management interventions.  No to narcotic management.  If the patient prefers to receive narcotics, recommend referral to a different pain management practice.\"    Requested call back if interested in scheduling.  "

## 2024-01-04 NOTE — PROGRESS NOTES
"New Patient Note    Interventional Pain and Spine  Physiatry (Physical Medicine and Rehabilitation)     Patient Name: Yolanda Reed  : 1960  Date of Service: 2024  PCP: Suhas Kasper M.D.  Referring Provider: Arturo Lu M.D.    Chief Complaint:   Chief Complaint   Patient presents with    New Patient     Chronic bilateral low back pain with right-sided sciatica       HPI  HISTORY FROM INITIAL VISIT (2024):  Yolanda Reed is a 63 y.o. female who presents today with low back pain radiating to bilateral glutes.  She was referred by neurology for further evaluation of low back pain.  She reports having multiple surgeries with Dr. Werner including in her low back including multilevel fusion in 2023.  She reports \"joint injections\" with Dr. Werner which helped in the past.  She reports epidural with Dr. Rothman which did not help.  She is interested in further injections and notes that she is not planning to return to Dr. Rothman at this time.     This pain began many years ago. Pain right now is 5/10 on the numeric pain scale. Her pain at its best-worse level during the course of the day is typically 2-8/10, respectively.  Pain worsens with sitting, standing, walking, bending forward, bending backwards, side bending or twisting, walking upstairs, and walking downstairs and improves with laying down. Her pain interferes somewhat with ADLs. The patient endorses numbness and tingling in her hands and impaired sensation in her feet. Endorses cramping and muscle spasms in legs and back and feet.     Also endorses numbness in bilateral arms from \"C8 nerve which is completely dead\"    The patient  has done provider driven physical therapy for this problem    Patient has tried the following medications with varied success (current meds in bold):   Oxycodone - prescribed by Dr. Werner    Therapeutic modalities and interventional therapies to date include:  - epidural - last did a couple of " "months ago, no relief  - \"Joint injection\" - relief    Medical history includes hypertension, hyperlipidemia.    Psychological testing for pain as depression and pain commonly coexist and need to both be treated.     Opioid Risk Score: 4      Interpretation of Opioid Risk Score   Score 0-3 = Low risk of abuse. Do UDS at least once per year.  Score 4-7 = Moderate risk of abuse. Do UDS 1-4 times per year.  Score 8+ = High risk of abuse. Refer to specialist.    PHQ      9/30/2022     8:27 AM 1/19/2023     5:21 PM 1/5/2024     3:00 PM   Depression Screen (PHQ-2/PHQ-9)   PHQ-2 Total Score 0 4    PHQ-2 Total Score   2   PHQ-9 Total Score  6    PHQ-9 Total Score   10       Interpretation of PHQ-9 Total Score   Score Severity   1-4 No Depression   5-9 Mild Depression   10-14 Moderate Depression   15-19 Moderately Severe Depression   20-27 Severe Depression      Medical records review:  I reviewed the note from the referring provider Arturo uL M.D. including the note dated 11/17/2023.    ROS:   Red Flags ROS:   Fever, Chills, Sweats: Denies  Involuntary Weight Loss: Denies  Bladder Incontinence: Denies  Bowel Incontinence: denies  Saddle Anesthesia: Denies    All other systems reviewed and negative.     PMHx:   Past Medical History:   Diagnosis Date    Anesthesia 2006    slow to wake up and PONV - no problems since per pt    Anxiety     Arthritis     shoulders    Breath shortness 09/23/2022    on exertion    Delayed emergence from general anesthesia     Per Problem List    Dental disorder     upper dentures    Frequent falls     Uses Cane    High cholesterol     Hypertension 01/05/2022    pt states well controlled on meds    Infectious disease     COVID Nov 2020 and hx of MRSA    Low blood sugar     MRSA (methicillin resistant Staphylococcus aureus)     H/O    BRIGITTE (obstructive sleep apnea) 01/17/2023    Has not been using CPAP at this time.    Pain 09/23/2022    low back and neck    Pneumonia     Nov 2020 + COVID    " PONV (postoperative nausea and vomiting)     Psychiatric problem     depression    PTSD (post-traumatic stress disorder)     Sleep apnea     cpap    Snoring     Urinary bladder disorder     bladder sling put in 6/29/21       PSHx:   Past Surgical History:   Procedure Laterality Date    LAMINOTOMY  1/19/2023    Procedure: C3 LAMINOPLASTY, C4-T1 DECOMPRESSIVE LAMINECTOMY, C4-T1 POSTERIOR SPINAL FUSION;  Surgeon: Jama Werner M.D.;  Location: [facility];  Service: Orthopedics    CERVICAL DECOMPRESSION POSTERIOR  1/19/2023    Procedure: DECOMPRESSION, SPINE, CERVICAL, POSTERIOR APPROACH;  Surgeon: Jama Werner M.D.;  Location: [facility];  Service: Orthopedics    CERVICAL LAMINECTOMY POSTERIOR  1/19/2023    Procedure: LAMINECTOMY, SPINE, CERVICAL, POSTERIOR APPROACH;  Surgeon: Jama Werner M.D.;  Location: [facility];  Service: Orthopedics    CERVICAL FUSION POSTERIOR  1/19/2023    Procedure: FUSION, SPINE, CERVICAL, POSTERIOR APPROACH;  Surgeon: Jama Werner M.D.;  Location: [facility];  Service: Orthopedics    FUSION, SPINE, LUMBAR, PLIF Bilateral 9/29/2022    Procedure: L3-L5 TRANSFORAMINAL LUMBAR INTERBODY FUSION, L2-5 DECOMPRESSION;  Surgeon: Jama Werner M.D.;  Location: [facility];  Service: Orthopedics    LUMBAR DECOMPRESSION Bilateral 9/29/2022    Procedure: DECOMPRESSION, SPINE, LUMBAR;  Surgeon: Jama Werner M.D.;  Location: [facility];  Service: Orthopedics    CORPECTOMY N/A 1/6/2022    Procedure: CORPECTOMY, SPINE - PARTIAL;  Surgeon: Jama Werner M.D.;  Location: [facility];  Service: Orthopedics    CERVICAL DISK AND FUSION ANTERIOR N/A 1/6/2022    Procedure: DISCECTOMY, SPINE, CERVICAL, ANTERIOR APPROACH, WITH FUSION - C5-T1;  Surgeon: Jama Werner M.D.;  Location: [facility];  Service: Orthopedics    BLADDER SLING FEMALE N/A 6/29/2021    Procedure: BLADDER SLING, FEMALE - SOLYX.;  Surgeon: Rocío Dewey M.D.;  Location:  [facility];  Service: Gynecology    PB KNEE SCOPE,AID ANT CRUCIATE REPAIR Right 2019    Procedure: RT KNEE ARTHROSCOPY WITH ALLOGRAFT ACL RECONSTRUCTION AND PARTIAL MENISCECTOMY;  Surgeon: Onur Arriaza M.D.;  Location: SURGERY [facility];  Service: Orthopedics    BREAST BIOPSY  2010    Performed by HANS BARON at SURGERY SAME DAY [facility]    GYN SURGERY  ,        OTHER ORTHOPEDIC SURGERY      acl left knee    OTHER ORTHOPEDIC SURGERY      right shoulder    TONSILLECTOMY         Family Hx:   Family History   Problem Relation Age of Onset    Alcohol/Drug Mother     Anesthesia Mother     Anxiety disorder Mother     Bipolar disorder Mother     Cancer Mother     Lung Cancer Mother     Osteoporosis Mother     Allergies Father     Kidney stones Father     Lung Cancer Father     DVT Sister     Lung Cancer Sister        Social Hx:  Social History     Socioeconomic History    Marital status:      Spouse name: Not on file    Number of children: Not on file    Years of education: Not on file    Highest education level: Not on file   Occupational History    Not on file   Tobacco Use    Smoking status: Former     Current packs/day: 0.00     Average packs/day: 0.5 packs/day for 30.0 years (15.0 ttl pk-yrs)     Types: Cigarettes     Start date: 1983     Quit date: 2013     Years since quitting: 10.1     Passive exposure: Past    Smokeless tobacco: Never    Tobacco comments:     .5 ppd, 30 yrs   Vaping Use    Vaping Use: Some days    Substances: Nicotine, Flavoring    Devices: Pre-filled or refillable cartridge   Substance and Sexual Activity    Alcohol use: Yes     Comment: rarely    Drug use: Yes     Types: Inhaled, Marijuana, Oral     Comment: \"clean since \",  THC Use    Sexual activity: Not on file   Other Topics Concern    Not on file   Social History Narrative    Not on file     Social Determinants of Health     Financial Resource Strain: Low Risk  " (3/1/2022)    Overall Financial Resource Strain (CARDIA)     Difficulty of Paying Living Expenses: Not very hard   Food Insecurity: No Food Insecurity (3/1/2022)    Hunger Vital Sign     Worried About Running Out of Food in the Last Year: Never true     Ran Out of Food in the Last Year: Never true   Transportation Needs: No Transportation Needs (3/1/2022)    PRAPARE - Transportation     Lack of Transportation (Medical): No     Lack of Transportation (Non-Medical): No   Physical Activity: Not on file   Stress: Not on file   Social Connections: Not on file   Intimate Partner Violence: Not on file   Housing Stability: Not on file       Allergies:  Allergies   Allergen Reactions    Morphine Anaphylaxis     Throat swelling  Tolerates oxycodone    Dilaudid [Hydromorphone] Anxiety       Medications: reviewed on epic.   Outpatient Medications Marked as Taking for the 1/5/24 encounter (Office Visit) with Zenobia Ortiz M.D.   Medication Sig Dispense Refill    gabapentin (NEURONTIN) 100 MG Cap Take 200 mg by mouth every day.      propranolol (INDERAL) 10 MG Tab Take 10 mg by mouth 3 times a day.      oxyCODONE CR (OXYCONTIN) 10 MG Tablet Extended Release 12 hour Abuse-Deterrent Take 10 mg by mouth every 12 hours.      sulfamethoxazole-trimethoprim (BACTRIM DS) 800-160 MG tablet Take 1 Tablet by mouth at bedtime.      VITAMIN D PO Take 1 Tablet by mouth every day.      QUEtiapine (SEROQUEL) 50 MG tablet Take 50 mg by mouth every evening.      sertraline (ZOLOFT) 100 MG Tab Take 150 mg by mouth every evening.          Current Outpatient Medications on File Prior to Visit   Medication Sig Dispense Refill    gabapentin (NEURONTIN) 100 MG Cap Take 200 mg by mouth every day.      propranolol (INDERAL) 10 MG Tab Take 10 mg by mouth 3 times a day.      oxyCODONE CR (OXYCONTIN) 10 MG Tablet Extended Release 12 hour Abuse-Deterrent Take 10 mg by mouth every 12 hours.      sulfamethoxazole-trimethoprim (BACTRIM DS) 800-160 MG tablet  Take 1 Tablet by mouth at bedtime.      VITAMIN D PO Take 1 Tablet by mouth every day.      QUEtiapine (SEROQUEL) 50 MG tablet Take 50 mg by mouth every evening.      sertraline (ZOLOFT) 100 MG Tab Take 150 mg by mouth every evening.      buPROPion (WELLBUTRIN XL) 150 MG XL tablet Take 150 mg by mouth every morning.      atorvastatin (LIPITOR) 10 MG Tab  (Patient not taking: Reported on 11/17/2023)      omeprazole (PRILOSEC) 20 MG delayed-release capsule        No current facility-administered medications on file prior to visit.         EXAMINATION     Physical Exam:   /76 (BP Location: Right arm, Patient Position: Sitting, BP Cuff Size: Adult)   Pulse (!) 104   Temp 36.4 °C (97.6 °F) (Temporal)   Ht 1.524 m (5')   Wt 66.3 kg (146 lb 2.6 oz)   SpO2 94%     Constitutional:   Body Habitus: Body mass index is 28.55 kg/m².  Cooperation: Fully cooperates with exam  Appearance: Well-groomed, well-nourished.    Eyes: No scleral icterus to suggest severe liver disease, no proptosis to suggest severe hyperthyroidism    ENT -no obvious auditory deficits, no noticeable facial droop     Skin -no rashes or lesions noted     Respiratory-  breathing comfortably on room air, no audible wheezing    Cardiovascular-distal extremities warm and well perfused.  No lower extremity edema is noted.     Gastrointestinal - no obvious abdominal masses, non-distended    Psychiatric- alert and oriented ×3. Normal affect.     Gait - normal gait, no use of ambulatory device, nonantalgic. Heel walking and toe walking intact.    Musculoskeletal and Neuro -     Thoracic/Lumbar Spine/Sacral Spine/Hips   Inspection: No evidence of atrophy in bilateral lower extremities throughout     There is limited active range of motion with lumbar extension    Facet loading maneuver negative bilaterally    Palpation:   Tenderness to palpation over the  midline surgical incision which is healed in her low back, and bilateral sacroiliac joints . No  tenderness to palpation elsewhere in the low back/hips including midline of lumbosacral spine, paraspinal muscles bilaterally, lumbar facets bilaterally spanning approximately L1-L5 levels, PSIS bilaterally, and greater trochanters bilaterally.    Lumbar spine /hip provocative exam maneuvers  Straight leg raise negative bilaterally  FADIR test negative bilaterally    SI joint tests  KIKI test negative bilaterally  Thigh thrust test negative bilaterally  SI joint compression negative bilaterally  SI joint distraction negative bilaterally  Sacral thrust test negative bilaterally  Gaenslens maneuver negative bilaterally  Declan finger sign positive bilaterally      Key points for the international standards for neurological classification of spinal cord injury (ISNCSCI) to light touch.   Dermatome R L   L2 2 2   L3 2 2   L4 2 2   L5 1* 1*   S1 1* 1*   S2 2 2   *in feet    Motor Exam Lower Extremities  ? Myotome R L   Hip flexion L2 5 5   Knee extension L3 5 5   Ankle dorsiflexion L4 5 5   Toe extension L5 5 5   Ankle plantarflexion S1 5 5       Reflexes  ?  R L   Patella  2+ 2+   Achilles   2+ 2+     Clonus of the ankle negative bilaterally       MEDICAL DECISION MAKING    Medical records review: see under HPI section.     DATA    Labs: Personally reviewed at today's visit:     Lab Results   Component Value Date/Time    SODIUM 138 01/29/2023 02:39 AM    POTASSIUM 3.6 01/29/2023 02:39 AM    CHLORIDE 98 01/29/2023 02:39 AM    CO2 30 01/29/2023 02:39 AM    ANION 10.0 01/29/2023 02:39 AM    GLUCOSE 98 01/29/2023 02:39 AM    BUN 8 01/29/2023 02:39 AM    CREATININE 0.31 (L) 01/29/2023 02:39 AM    CALCIUM 8.9 01/29/2023 02:39 AM    ASTSGOT 17 01/29/2023 02:39 AM    ALTSGPT 8 01/29/2023 02:39 AM    TBILIRUBIN 0.2 01/29/2023 02:39 AM    ALBUMIN 3.5 01/29/2023 02:39 AM    TOTPROTEIN 6.5 01/29/2023 02:39 AM    GLOBULIN 3.0 01/29/2023 02:39 AM    AGRATIO 1.2 01/29/2023 02:39 AM       Lab Results   Component Value Date/Time     PROTHROMBTM 12.5 01/17/2023 02:24 PM    INR 0.94 01/17/2023 02:24 PM        Lab Results   Component Value Date/Time    WBC 12.4 (H) 01/29/2023 02:39 AM    RBC 4.23 01/29/2023 02:39 AM    HEMOGLOBIN 11.0 (L) 01/29/2023 02:39 AM    HEMATOCRIT 35.0 (L) 01/29/2023 02:39 AM    MCV 82.7 01/29/2023 02:39 AM    MCH 26.0 (L) 01/29/2023 02:39 AM    MCHC 31.4 (L) 01/29/2023 02:39 AM    MPV 9.0 01/29/2023 02:39 AM    NEUTSPOLYS 72.10 (H) 01/29/2023 02:39 AM    LYMPHOCYTES 17.00 (L) 01/29/2023 02:39 AM    MONOCYTES 7.30 01/29/2023 02:39 AM    EOSINOPHILS 2.50 01/29/2023 02:39 AM    BASOPHILS 0.30 01/29/2023 02:39 AM        Lab Results   Component Value Date/Time    HBA1C 5.5 01/17/2023 02:24 PM        Imaging:   I personally reviewed following images, these are my reads  X-ray lumbar spine 9/29/2022  Appearance of fusion hardware at 3 levels. See formal radiology report for further details.          IMAGING radiology reads. I reviewed the following radiology reads                  Results for orders placed during the hospital encounter of 06/14/22    MR-CERVICAL SPINE-W/O    Impression  1. Postsurgical changes from ACDF of C5-T1.  2. Severe spinal canal narrowing at C5-6 and C6-C7, especially posterior to the C6 vertebral body.  3. Severe bilateral neuroforaminal narrowing at C5-6 and C6-C7      Results for orders placed during the hospital encounter of 06/14/22    MR-LUMBAR SPINE-W/O    Impression  1. Mild anterolisthesis of L4-5, likely degenerative.    2. Severe spinal canal narrowing at L4-5. Moderate spinal canal narrowing at L3-4.    3. Severe narrowing of the right neuroforamen at L4-5.    4. Narrowing of the left lateral recess at L2-3 and impingement of left L3 nerve root is possible.        Results for orders placed during the hospital encounter of 06/14/22    MR-LUMBAR SPINE-W/O    Impression  1. Mild anterolisthesis of L4-5, likely degenerative.    2. Severe spinal canal narrowing at L4-5. Moderate spinal canal  narrowing at L3-4.    3. Severe narrowing of the right neuroforamen at L4-5.    4. Narrowing of the left lateral recess at L2-3 and impingement of left L3 nerve root is possible.                                       Results for orders placed during the hospital encounter of 23    DX-CERVICAL SPINE-2 OR 3 VIEWS    Impression  Fluoroscopic image(s) obtained during cervicothoracic instrumentation. Anterior fusion hardware projects appropriately. Posterior transpedicular screws also project appropriately.. Please see the patient's chart for full procedural details.    Fluoroscopy time 1 minute 30 seconds.    Fluoroscopy dose(DAP): 1.6 Gy*cm^2     Results for orders placed in visit on 23    DX-CHEST-2 VIEWS    Impression  1. No active cardiopulmonary abnormalities are identified.                      Results for orders placed during the hospital encounter of 22    DX-LUMBAR SPINE-2 OR 3 VIEWS    Impression  Normal complete lumbar spine series.               Results for orders placed in visit on 11/15/21    DX-THORACIC SPINE-2 VIEWS    Impression  Negative examination.    This exam was performed [facility] an [facility]. This interpretation is performed in addition to the interpretation by the ordering provider            Diagnosis  Visit Diagnoses     ICD-10-CM   1. History of lumbar surgery  Z98.890   2. Chronic bilateral low back pain with right-sided sciatica  M54.41    G89.29   3. Risk for falls  Z91.81         ASSESSMENT AND PLAN:  Yolanda Reed ( 1960) is a female   Yolanda was seen today for new patient.    Diagnoses and all orders for this visit:    History of lumbar surgery  -     MR-LUMBAR SPINE-W/O; Future    Chronic bilateral low back pain with right-sided sciatica  -     MR-LUMBAR SPINE-W/O; Future    Risk for falls  -     Patient identified as fall risk.  Appropriate orders and counseling given.          PLAN  Physical Therapy: The patient has done  "extensive provider driven physical therapy for this issue.  Continue home exercise program     Home Exercise Program: I provided the patient with a home exercise program focusing on strengthening and stretching.     Diagnostic workup: as above and Personally reviewed at today's visit: X-ray lumbar spine 9/29/2022    Medications:  -Okay to continue oxycodone as prescribed by Dr. Werner as long as there is compliance with     Interventions:   -Pending MRI review  - the patient reports a \"joint injection\" done by Dr. Werner which helped, as well as an epidural with Dr. Rothman which did not help    Follow-up: After MRI    Orders Placed This Encounter    MR-LUMBAR SPINE-W/O    Patient identified as fall risk.  Appropriate orders and counseling given.    gabapentin (NEURONTIN) 100 MG Cap    propranolol (INDERAL) 10 MG Tab       Zenobia Ortiz MD  Interventional Pain and Spine  Physical Medicine and Rehabilitation  [facility]    Arturo Barton M.D.     The above note documents my personal evaluation of this patient. In addition, I have reviewed and confirmed with the patient and MA the supportive information documented in today's Patient Health Questionnaire and Office Note.     Please note that this dictation was created using voice recognition software. I have made every reasonable attempt to correct obvious errors, but I expect that there are errors of grammar and possibly content that I did not discover before finalizing the note.  "

## 2024-01-05 ENCOUNTER — OFFICE VISIT (OUTPATIENT)
Dept: PHYSICAL MEDICINE AND REHAB | Facility: MEDICAL CENTER | Age: 64
End: 2024-01-05
Payer: MEDICAID

## 2024-01-05 VITALS
WEIGHT: 146.16 LBS | DIASTOLIC BLOOD PRESSURE: 76 MMHG | OXYGEN SATURATION: 94 % | TEMPERATURE: 97.6 F | HEIGHT: 60 IN | SYSTOLIC BLOOD PRESSURE: 112 MMHG | HEART RATE: 104 BPM | BODY MASS INDEX: 28.7 KG/M2

## 2024-01-05 DIAGNOSIS — G89.29 CHRONIC BILATERAL LOW BACK PAIN WITH RIGHT-SIDED SCIATICA: ICD-10-CM

## 2024-01-05 DIAGNOSIS — M54.41 CHRONIC BILATERAL LOW BACK PAIN WITH RIGHT-SIDED SCIATICA: ICD-10-CM

## 2024-01-05 DIAGNOSIS — Z98.890 HISTORY OF LUMBAR SURGERY: ICD-10-CM

## 2024-01-05 DIAGNOSIS — Z91.81 RISK FOR FALLS: ICD-10-CM

## 2024-01-05 PROCEDURE — 3074F SYST BP LT 130 MM HG: CPT | Performed by: STUDENT IN AN ORGANIZED HEALTH CARE EDUCATION/TRAINING PROGRAM

## 2024-01-05 PROCEDURE — 1125F AMNT PAIN NOTED PAIN PRSNT: CPT | Performed by: STUDENT IN AN ORGANIZED HEALTH CARE EDUCATION/TRAINING PROGRAM

## 2024-01-05 PROCEDURE — 3078F DIAST BP <80 MM HG: CPT | Performed by: STUDENT IN AN ORGANIZED HEALTH CARE EDUCATION/TRAINING PROGRAM

## 2024-01-05 PROCEDURE — 99204 OFFICE O/P NEW MOD 45 MIN: CPT | Performed by: STUDENT IN AN ORGANIZED HEALTH CARE EDUCATION/TRAINING PROGRAM

## 2024-01-05 RX ORDER — PROPRANOLOL HYDROCHLORIDE 10 MG/1
10 TABLET ORAL 3 TIMES DAILY
COMMUNITY

## 2024-01-05 RX ORDER — GABAPENTIN 100 MG/1
200 CAPSULE ORAL DAILY
COMMUNITY

## 2024-01-05 ASSESSMENT — FIBROSIS 4 INDEX: FIB4 SCORE: 0.69

## 2024-01-05 ASSESSMENT — PATIENT HEALTH QUESTIONNAIRE - PHQ9
SUM OF ALL RESPONSES TO PHQ QUESTIONS 1-9: 10
5. POOR APPETITE OR OVEREATING: 0 - NOT AT ALL
CLINICAL INTERPRETATION OF PHQ2 SCORE: 2

## 2024-01-05 ASSESSMENT — PAIN SCALES - GENERAL: PAINLEVEL: 5=MODERATE PAIN

## 2024-02-09 ENCOUNTER — HOSPITAL ENCOUNTER (OUTPATIENT)
Dept: RADIOLOGY | Facility: MEDICAL CENTER | Age: 64
End: 2024-02-09
Attending: STUDENT IN AN ORGANIZED HEALTH CARE EDUCATION/TRAINING PROGRAM
Payer: MEDICAID

## 2024-02-09 DIAGNOSIS — G89.29 CHRONIC BILATERAL LOW BACK PAIN WITH RIGHT-SIDED SCIATICA: ICD-10-CM

## 2024-02-09 DIAGNOSIS — Z98.890 HISTORY OF LUMBAR SURGERY: ICD-10-CM

## 2024-02-09 DIAGNOSIS — M54.41 CHRONIC BILATERAL LOW BACK PAIN WITH RIGHT-SIDED SCIATICA: ICD-10-CM

## 2024-02-09 PROCEDURE — 72148 MRI LUMBAR SPINE W/O DYE: CPT

## 2024-02-13 NOTE — PROGRESS NOTES
"Follow-up patient Note    Interventional Pain and Spine  Physiatry (Physical Medicine and Rehabilitation)     Patient Name: Yolanda Reed  : 1960  Date of service: 2024    Chief Complaint:   Chief Complaint   Patient presents with    Follow-Up     History of lumbar surgery         HISTORY FROM INITIAL VISIT (2024):  Yolanda Reed is a 63 y.o. female who presents today with low back pain radiating to bilateral glutes.  She was referred by neurology for further evaluation of low back pain.  She reports having multiple surgeries with Dr. Werner including in her low back including multilevel fusion in 2023.  She reports \"joint injections\" with Dr. Werner which helped in the past.  She reports epidural with Dr. Rothman which did not help.  She is interested in further injections and notes that she is not planning to return to Dr. Rothman at this time.      This pain began many years ago. Pain right now is 5/10 on the numeric pain scale. Her pain at its best-worse level during the course of the day is typically 2-8/10, respectively.  Pain worsens with sitting, standing, walking, bending forward, bending backwards, side bending or twisting, walking upstairs, and walking downstairs and improves with laying down. Her pain interferes somewhat with ADLs. The patient endorses numbness and tingling in her hands and impaired sensation in her feet. Endorses cramping and muscle spasms in legs and back and feet.      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 Werner     Therapeutic modalities and interventional therapies to date include:  - epidural - last did a couple of months ago, no relief  - \"Joint injection\" - relief     Medical history includes hypertension, " hyperlipidemia.    HPI  Today's visit   Yolanda Reed ( 1960) is a female with Diagnoses of Sacroiliac joint dysfunction of both sides, History of lumbar surgery, and Chronic bilateral low back pain with right-sided sciatica were pertinent to this visit.    Presents today for MRI review. Notes ongoing severe pain at bilateral low back / gluteal area.  She notes chronic numbness and tingling at her toes that is unchanged.  She denies radiating pain or weakness in the legs.  She reports having received sacroiliac joint injections in the past which helped.    Pain severity /10 currently  Pt denies new numbness, tingling, or weakness.      ROS:   Red Flags ROS:   Fever, Chills, Sweats: Denies  Involuntary Weight Loss: Denies  Bladder Incontinence: Denies  Bowel Incontinence: denies  Saddle Anesthesia: Denies    All other systems reviewed and negative.     PMHx:   Past Medical History:   Diagnosis Date    Anesthesia     slow to wake up and PONV - no problems since per pt    Anxiety     Arthritis     shoulders    Breath shortness 2022    on exertion    Delayed emergence from general anesthesia     Per Problem List    Dental disorder     upper dentures    Frequent falls     Uses Cane    High cholesterol     Hypertension 2022    pt states well controlled on meds    Infectious disease     COVID 2020 and hx of MRSA    Low blood sugar     MRSA (methicillin resistant Staphylococcus aureus)     H/O    BRIGITTE (obstructive sleep apnea) 2023    Has not been using CPAP at this time.    Pain 2022    low back and neck    Pneumonia     2020 + COVID    PONV (postoperative nausea and vomiting)     Psychiatric problem     depression    PTSD (post-traumatic stress disorder)     Sleep apnea     cpap    Snoring     Urinary bladder disorder     bladder sling put in 21       PSHx:   Past Surgical History:   Procedure Laterality Date    LAMINOTOMY  2023    Procedure: C3 LAMINOPLASTY, C4-T1  DECOMPRESSIVE LAMINECTOMY, C4-T1 POSTERIOR SPINAL FUSION;  Surgeon: Jama Werner M.D.;  Location: [facility];  Service: Orthopedics    CERVICAL DECOMPRESSION POSTERIOR  1/19/2023    Procedure: DECOMPRESSION, SPINE, CERVICAL, POSTERIOR APPROACH;  Surgeon: Jama Werner M.D.;  Location: [facility];  Service: Orthopedics    CERVICAL LAMINECTOMY POSTERIOR  1/19/2023    Procedure: LAMINECTOMY, SPINE, CERVICAL, POSTERIOR APPROACH;  Surgeon: Jama Werner M.D.;  Location: [facility];  Service: Orthopedics    CERVICAL FUSION POSTERIOR  1/19/2023    Procedure: FUSION, SPINE, CERVICAL, POSTERIOR APPROACH;  Surgeon: Jama Werner M.D.;  Location: [facility];  Service: Orthopedics    FUSION, SPINE, LUMBAR, PLIF Bilateral 9/29/2022    Procedure: L3-L5 TRANSFORAMINAL LUMBAR INTERBODY FUSION, L2-5 DECOMPRESSION;  Surgeon: Jama Werner M.D.;  Location: [facility];  Service: Orthopedics    LUMBAR DECOMPRESSION Bilateral 9/29/2022    Procedure: DECOMPRESSION, SPINE, LUMBAR;  Surgeon: Jama Werner M.D.;  Location: [facility];  Service: Orthopedics    CORPECTOMY N/A 1/6/2022    Procedure: CORPECTOMY, SPINE - PARTIAL;  Surgeon: Jama Werner M.D.;  Location: [facility];  Service: Orthopedics    CERVICAL DISK AND FUSION ANTERIOR N/A 1/6/2022    Procedure: DISCECTOMY, SPINE, CERVICAL, ANTERIOR APPROACH, WITH FUSION - C5-T1;  Surgeon: Jama Werner M.D.;  Location: [facility];  Service: Orthopedics    BLADDER SLING FEMALE N/A 6/29/2021    Procedure: BLADDER SLING, FEMALE - SOLYX.;  Surgeon: Rocío Dewey M.D.;  Location: [facility];  Service: Gynecology    PB KNEE SCOPE,AID ANT CRUCIATE REPAIR Right 11/25/2019    Procedure: RT KNEE ARTHROSCOPY WITH ALLOGRAFT ACL RECONSTRUCTION AND PARTIAL MENISCECTOMY;  Surgeon: Onur Arriaza M.D.;  Location: [facility];  Service: Orthopedics    BREAST BIOPSY  8/20/2010    Performed by ROCÍO BARON  "SURGERY SAME DAY [facility]    GYN SURGERY  ,        OTHER ORTHOPEDIC SURGERY      acl left knee    OTHER ORTHOPEDIC SURGERY      right shoulder    TONSILLECTOMY         Family Hx:   Family History   Problem Relation Age of Onset    Alcohol/Drug Mother     Anesthesia Mother     Anxiety disorder Mother     Bipolar disorder Mother     Cancer Mother     Lung Cancer Mother     Osteoporosis Mother     Allergies Father     Kidney stones Father     Lung Cancer Father     DVT Sister     Lung Cancer Sister        Social Hx:  Social History     Socioeconomic History    Marital status:      Spouse name: Not on file    Number of children: Not on file    Years of education: Not on file    Highest education level: Not on file   Occupational History    Not on file   Tobacco Use    Smoking status: Former     Current packs/day: 0.00     Average packs/day: 0.5 packs/day for 30.0 years (15.0 ttl pk-yrs)     Types: Cigarettes     Start date: 1983     Quit date: 2013     Years since quitting: 10.2     Passive exposure: Past    Smokeless tobacco: Never    Tobacco comments:     .5 ppd, 30 yrs   Vaping Use    Vaping Use: Some days    Substances: Nicotine, Flavoring    Devices: Pre-filled or refillable cartridge   Substance and Sexual Activity    Alcohol use: Yes     Comment: rarely    Drug use: Yes     Types: Inhaled, Marijuana, Oral     Comment: \"clean since \",  THC Use    Sexual activity: Not on file   Other Topics Concern    Not on file   Social History Narrative    Not on file     Social Determinants of Health     Financial Resource Strain: Low Risk  (3/1/2022)    Overall Financial Resource Strain (CARDIA)     Difficulty of Paying Living Expenses: Not very hard   Food Insecurity: No Food Insecurity (3/1/2022)    Hunger Vital Sign     Worried About Running Out of Food in the Last Year: Never true     Ran Out of Food in the Last Year: Never true   Transportation Needs: No Transportation Needs " (3/1/2022)    PRAPARE - Transportation     Lack of Transportation (Medical): No     Lack of Transportation (Non-Medical): No   Physical Activity: Not on file   Stress: Not on file   Social Connections: Not on file   Intimate Partner Violence: Not on file   Housing Stability: Not on file       Allergies:  Allergies   Allergen Reactions    Morphine Anaphylaxis     Throat swelling  Tolerates oxycodone    Dilaudid [Hydromorphone] Anxiety       Medications: reviewed on epic.   Outpatient Medications Marked as Taking for the 2/14/24 encounter (Office Visit) with Zenobia Ortiz M.D.   Medication Sig Dispense Refill    KENALOG 10 MG/ML Suspension       ondansetron (ZOFRAN ODT) 4 MG TABLET DISPERSIBLE       MACROBID 100 MG Cap 1 capsule with food Orally every 12 hrs for 5 days      gabapentin (NEURONTIN) 100 MG Cap Take 200 mg by mouth every day.      propranolol (INDERAL) 10 MG Tab Take 10 mg by mouth 3 times a day.      oxyCODONE CR (OXYCONTIN) 10 MG Tablet Extended Release 12 hour Abuse-Deterrent Take 10 mg by mouth every 12 hours.      sulfamethoxazole-trimethoprim (BACTRIM DS) 800-160 MG tablet Take 1 Tablet by mouth at bedtime.      VITAMIN D PO Take 1 Tablet by mouth every day.      QUEtiapine (SEROQUEL) 50 MG tablet Take 50 mg by mouth every evening.      sertraline (ZOLOFT) 100 MG Tab Take 150 mg by mouth every evening.          Current Outpatient Medications on File Prior to Visit   Medication Sig Dispense Refill    KENALOG 10 MG/ML Suspension       ondansetron (ZOFRAN ODT) 4 MG TABLET DISPERSIBLE       MACROBID 100 MG Cap 1 capsule with food Orally every 12 hrs for 5 days      gabapentin (NEURONTIN) 100 MG Cap Take 200 mg by mouth every day.      propranolol (INDERAL) 10 MG Tab Take 10 mg by mouth 3 times a day.      oxyCODONE CR (OXYCONTIN) 10 MG Tablet Extended Release 12 hour Abuse-Deterrent Take 10 mg by mouth every 12 hours.      sulfamethoxazole-trimethoprim (BACTRIM DS) 800-160 MG tablet Take 1 Tablet by  mouth at bedtime.      VITAMIN D PO Take 1 Tablet by mouth every day.      QUEtiapine (SEROQUEL) 50 MG tablet Take 50 mg by mouth every evening.      sertraline (ZOLOFT) 100 MG Tab Take 150 mg by mouth every evening.      buPROPion (WELLBUTRIN XL) 150 MG XL tablet Take 150 mg by mouth every morning.      atorvastatin (LIPITOR) 10 MG Tab  (Patient not taking: Reported on 11/17/2023)      omeprazole (PRILOSEC) 20 MG delayed-release capsule        No current facility-administered medications on file prior to visit.         EXAMINATION     Physical Exam:   /84 (BP Location: Left arm, Patient Position: Sitting, BP Cuff Size: Adult)   Pulse 100   Temp 36.1 °C (97 °F) (Temporal)   Wt 65 kg (143 lb 4.8 oz)   SpO2 90%     Constitutional:   Body Habitus: Body mass index is 27.99 kg/m².  Cooperation: Fully cooperates with exam  Appearance: Well-groomed, well-nourished.    Eyes: No scleral icterus to suggest severe liver disease, no proptosis to suggest severe hyperthyroidism    ENT -no obvious auditory deficits, no noticeable facial droop     Skin -no rashes or lesions noted     Respiratory-  breathing comfortably on room air, no audible wheezing    Cardiovascular-distal extremities warm and well perfused.  No lower extremity edema is noted.     Gastrointestinal - no obvious abdominal masses, non-distended    Psychiatric- alert and oriented ×3. Normal affect.     Gait - normal gait, no use of ambulatory device, nonantalgic. Heel walking and toe walking intact.     Musculoskeletal and Neuro -      Thoracic/Lumbar Spine/Sacral Spine/Hips   Inspection: No evidence of atrophy in bilateral lower extremities throughout      There is limited active range of motion with lumbar extension     Facet loading maneuver positive bilaterally     Palpation:   Tenderness to palpation over the  midline surgical incision which is healed in her low back, and bilateral sacroiliac joints . No tenderness to palpation elsewhere in the low  back/hips including midline of lumbosacral spine, paraspinal muscles bilaterally, lumbar facets bilaterally spanning approximately L1-L5 levels, PSIS bilaterally, and greater trochanters bilaterally.     Lumbar spine /hip provocative exam maneuvers  Straight leg raise negative bilaterally  FADIR test negative bilaterally     SI joint tests  KIKI test negative bilaterally  Thigh thrust test positive bilaterally  SI joint compression positive bilaterally  SI joint distraction negative bilaterally  Sacral thrust test negative bilaterally  Gaenslens maneuver positive bilaterally  Declan finger sign positive bilaterally        Key points for the international standards for neurological classification of spinal cord injury (ISNCSCI) to light touch.   Dermatome R L   L2 2 2   L3 2 2   L4 2 2   L5 1* 1*   S1 1* 1*   S2 2 2   *in feet     Motor Exam Lower Extremities  ? Myotome R L   Hip flexion L2 5 5   Knee extension L3 5 5   Ankle dorsiflexion L4 5 5   Toe extension L5 5 5   Ankle plantarflexion S1 5 5      Previous exam  Reflexes  ?   R L   Patella   2+ 2+   Achilles    2+ 2+      Clonus of the ankle negative bilaterally          MEDICAL DECISION MAKING    Medical records review: see under HPI section.     DATA    Labs: No new labs available for review since last visit.   Lab Results   Component Value Date/Time    SODIUM 138 01/29/2023 02:39 AM    POTASSIUM 3.6 01/29/2023 02:39 AM    CHLORIDE 98 01/29/2023 02:39 AM    CO2 30 01/29/2023 02:39 AM    ANION 10.0 01/29/2023 02:39 AM    GLUCOSE 98 01/29/2023 02:39 AM    BUN 8 01/29/2023 02:39 AM    CREATININE 0.31 (L) 01/29/2023 02:39 AM    CALCIUM 8.9 01/29/2023 02:39 AM    ASTSGOT 17 01/29/2023 02:39 AM    ALTSGPT 8 01/29/2023 02:39 AM    TBILIRUBIN 0.2 01/29/2023 02:39 AM    ALBUMIN 3.5 01/29/2023 02:39 AM    TOTPROTEIN 6.5 01/29/2023 02:39 AM    GLOBULIN 3.0 01/29/2023 02:39 AM    AGRATIO 1.2 01/29/2023 02:39 AM       Lab Results   Component Value Date/Time    PROTHROMBTM  12.5 01/17/2023 02:24 PM    INR 0.94 01/17/2023 02:24 PM        Lab Results   Component Value Date/Time    WBC 12.4 (H) 01/29/2023 02:39 AM    RBC 4.23 01/29/2023 02:39 AM    HEMOGLOBIN 11.0 (L) 01/29/2023 02:39 AM    HEMATOCRIT 35.0 (L) 01/29/2023 02:39 AM    MCV 82.7 01/29/2023 02:39 AM    MCH 26.0 (L) 01/29/2023 02:39 AM    MCHC 31.4 (L) 01/29/2023 02:39 AM    MPV 9.0 01/29/2023 02:39 AM    NEUTSPOLYS 72.10 (H) 01/29/2023 02:39 AM    LYMPHOCYTES 17.00 (L) 01/29/2023 02:39 AM    MONOCYTES 7.30 01/29/2023 02:39 AM    EOSINOPHILS 2.50 01/29/2023 02:39 AM    BASOPHILS 0.30 01/29/2023 02:39 AM        Lab Results   Component Value Date/Time    HBA1C 5.5 01/17/2023 02:24 PM        Imaging:   I personally reviewed following images, these are my reads    MRI lumbar spine 2/9/2024  Posterior fusion of L3-L5 with L3-4 and L4-5 laminectomies.  Moderate central canal stenosis at L2-3.  Mild facet arthropathy at bilateral L5-S1.See formal radiology report for further details.    X-ray lumbar spine 9/29/2022  Appearance of fusion hardware at 3 levels. See formal radiology report for further details.       IMAGING radiology reads. I reviewed the following radiology reads   MRI lumbar spine 2/9/2024  FINDINGS:  There are postsurgical changes as evidenced by posterior fusion of L3, L4 and L5 with transpedicular screws, L3-4 and L4-5 laminectomies. There is posterior extradural paraspinal fluid collection noted at the levels of laminectomies. There is no mass   effect upon the thecal sac. There is mild anterolisthesis of L3 on 4. There is no pathologic marrow infiltration. There is no aggressive osseous lesion.     At the level of L5-S1,there is no spinal or foraminal stenosis. Mild bilateral facet joint arthropathy is seen.     At the level of L4-5,there is disc degeneration. There is disc prosthesis. There is no spinal or neural foraminal stenosis.     At the level of L3-4,there is disc prosthesis. There is no spinal or neural  foraminal stenosis.     At the level of the L2-3,there is disc degeneration. There is broad-based central disc protrusion. There is moderate central canal stenosis.     At the level of L1-2,there is disc degeneration. There is mild diffuse disc bulge. There is no spinal or neural foraminal stenosis.     The conus terminates at the level of L1. The visualized lower thoracic spinal cord is unremarkable.     The visualized flow voids of the vasculature is unremarkable. There is no space-occupying lesion in the visualized abdominal and pelvic organs.     IMPRESSION:     1.  Postsurgical and degenerative changes in the lumbar spine as described.  2.  Moderate to severe central canal stenosis at L2-3. This is progressed since the previous study.       Results for orders placed during the hospital encounter of 06/14/22    MR-CERVICAL SPINE-W/O    Impression  1. Postsurgical changes from ACDF of C5-T1.  2. Severe spinal canal narrowing at C5-6 and C6-C7, especially posterior to the C6 vertebral body.  3. Severe bilateral neuroforaminal narrowing at C5-6 and C6-C7      Results for orders placed during the hospital encounter of 02/09/24    MR-LUMBAR SPINE-W/O    Impression  1.  Postsurgical and degenerative changes in the lumbar spine as described.  2.  Moderate to severe central canal stenosis at L2-3. This is progressed since the previous study.        Results for orders placed during the hospital encounter of 02/09/24    MR-LUMBAR SPINE-W/O    Impression  1.  Postsurgical and degenerative changes in the lumbar spine as described.  2.  Moderate to severe central canal stenosis at L2-3. This is progressed since the previous study.                                       Results for orders placed during the hospital encounter of 01/19/23    DX-CERVICAL SPINE-2 OR 3 VIEWS    Impression  Fluoroscopic image(s) obtained during cervicothoracic instrumentation. Anterior fusion hardware projects appropriately. Posterior transpedicular  screws also project appropriately.. Please see the patient's chart for full procedural details.    Fluoroscopy time 1 minute 30 seconds.    Fluoroscopy dose(DAP): 1.6 Gy*cm^2     Results for orders placed in visit on 23    DX-CHEST-2 VIEWS    Impression  1. No active cardiopulmonary abnormalities are identified.                      Results for orders placed during the hospital encounter of 22    DX-LUMBAR SPINE-2 OR 3 VIEWS    Impression  Normal complete lumbar spine series.               Results for orders placed in visit on 11/15/21    DX-THORACIC SPINE-2 VIEWS    Impression  Negative examination.    This exam was performed in an orthopedic clinic of the [facility]. This interpretation is performed in addition to the interpretation by the ordering provider            Diagnosis  Visit Diagnoses     ICD-10-CM   1. Sacroiliac joint dysfunction of both sides  M53.3   2. History of lumbar surgery  Z98.890   3. Chronic bilateral low back pain with right-sided sciatica  M54.41    G89.29         ASSESSMENT AND PLAN:  Yolanda Reed (: 1960) is a female with      Yolanda was seen today for follow-up.    Diagnoses and all orders for this visit:    Sacroiliac joint dysfunction of both sides  -     Referral to Pain Clinic    History of lumbar surgery    Chronic bilateral low back pain with right-sided sciatica          PLAN  Physical Therapy: The patient has done extensive provider driven physical therapy for this issue.  Continue home exercise program      Home Exercise Program: I previously provided the patient with a home exercise program focusing on strengthening and stretching.      Diagnostic workup: Personally reviewed at today's visit: MRI lumbar spine 2024     Medications:  -Okay to continue oxycodone as prescribed by Dr. Werner as long as there is compliance with      Interventions:   -Bilateral sacroiliac joint injections.  The patient has pain at the sacroiliac joints that  "is reproduced with 3+ positive provocative exam maneuvers today.  The risks, benefits, and alternatives to this procedure were discussed and the patient wishes to proceed with the procedure. Risks include but are not limited to damage to surrounding structures, infection, bleeding, worsening of pain which can be permanent, and weakness which can be permanent. Benefits include pain relief and improved function. Alternatives include not doing the procedure.    - the patient reports a \"joint injection\" done by Dr. Werner which helped, as well as an epidural with Dr. Rothman which did not help    Follow-up: 4 weeks after injection    Orders Placed This Encounter    Referral to Pain Clinic    KENALOG 10 MG/ML Suspension    ondansetron (ZOFRAN ODT) 4 MG TABLET DISPERSIBLE    MACROBID 100 MG Cap       Zenobia Ortiz MD  Interventional Pain and Spine  Physical Medicine and Rehabilitation  [facility]      The above note documents my personal evaluation of this patient. In addition, I have reviewed and confirmed with the patient and MA the supportive information documented in today's Patient Health Questionnaire and Office Note.     Please note that this dictation was created using voice recognition software. I have made every reasonable attempt to correct obvious errors, but I expect that there are errors of grammar and possibly content that I did not discover before finalizing the note.  "

## 2024-02-13 NOTE — H&P (VIEW-ONLY)
"Follow-up patient Note    Interventional Pain and Spine  Physiatry (Physical Medicine and Rehabilitation)     Patient Name: Yolanda Reed  : 1960  Date of service: 2024    Chief Complaint:   Chief Complaint   Patient presents with    Follow-Up     History of lumbar surgery         HISTORY FROM INITIAL VISIT (2024):  Yolanda Reed is a 63 y.o. female who presents today with low back pain radiating to bilateral glutes.  She was referred by neurology for further evaluation of low back pain.  She reports having multiple surgeries with Dr. Werner including in her low back including multilevel fusion in 2023.  She reports \"joint injections\" with Dr. Werner which helped in the past.  She reports epidural with Dr. Rothman which did not help.  She is interested in further injections and notes that she is not planning to return to Dr. Rothman at this time.      This pain began many years ago. Pain right now is 5/10 on the numeric pain scale. Her pain at its best-worse level during the course of the day is typically 2-8/10, respectively.  Pain worsens with sitting, standing, walking, bending forward, bending backwards, side bending or twisting, walking upstairs, and walking downstairs and improves with laying down. Her pain interferes somewhat with ADLs. The patient endorses numbness and tingling in her hands and impaired sensation in her feet. Endorses cramping and muscle spasms in legs and back and feet.      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 Werner     Therapeutic modalities and interventional therapies to date include:  - epidural - last did a couple of months ago, no relief  - \"Joint injection\" - relief     Medical history includes hypertension, " hyperlipidemia.    HPI  Today's visit   Yolanda Reed ( 1960) is a female with Diagnoses of Sacroiliac joint dysfunction of both sides, History of lumbar surgery, and Chronic bilateral low back pain with right-sided sciatica were pertinent to this visit.    Presents today for MRI review. Notes ongoing severe pain at bilateral low back / gluteal area.  She notes chronic numbness and tingling at her toes that is unchanged.  She denies radiating pain or weakness in the legs.  She reports having received sacroiliac joint injections in the past which helped.    Pain severity /10 currently  Pt denies new numbness, tingling, or weakness.      ROS:   Red Flags ROS:   Fever, Chills, Sweats: Denies  Involuntary Weight Loss: Denies  Bladder Incontinence: Denies  Bowel Incontinence: denies  Saddle Anesthesia: Denies    All other systems reviewed and negative.     PMHx:   Past Medical History:   Diagnosis Date    Anesthesia     slow to wake up and PONV - no problems since per pt    Anxiety     Arthritis     shoulders    Breath shortness 2022    on exertion    Delayed emergence from general anesthesia     Per Problem List    Dental disorder     upper dentures    Frequent falls     Uses Cane    High cholesterol     Hypertension 2022    pt states well controlled on meds    Infectious disease     COVID 2020 and hx of MRSA    Low blood sugar     MRSA (methicillin resistant Staphylococcus aureus)     H/O    BRIGITTE (obstructive sleep apnea) 2023    Has not been using CPAP at this time.    Pain 2022    low back and neck    Pneumonia     2020 + COVID    PONV (postoperative nausea and vomiting)     Psychiatric problem     depression    PTSD (post-traumatic stress disorder)     Sleep apnea     cpap    Snoring     Urinary bladder disorder     bladder sling put in 21       PSHx:   Past Surgical History:   Procedure Laterality Date    LAMINOTOMY  2023    Procedure: C3 LAMINOPLASTY, C4-T1  DECOMPRESSIVE LAMINECTOMY, C4-T1 POSTERIOR SPINAL FUSION;  Surgeon: Jama Werner M.D.;  Location: [facility];  Service: Orthopedics    CERVICAL DECOMPRESSION POSTERIOR  1/19/2023    Procedure: DECOMPRESSION, SPINE, CERVICAL, POSTERIOR APPROACH;  Surgeon: Jama Werner M.D.;  Location: [facility];  Service: Orthopedics    CERVICAL LAMINECTOMY POSTERIOR  1/19/2023    Procedure: LAMINECTOMY, SPINE, CERVICAL, POSTERIOR APPROACH;  Surgeon: Jama Werner M.D.;  Location: [facility];  Service: Orthopedics    CERVICAL FUSION POSTERIOR  1/19/2023    Procedure: FUSION, SPINE, CERVICAL, POSTERIOR APPROACH;  Surgeon: Jama Werner M.D.;  Location: [facility];  Service: Orthopedics    FUSION, SPINE, LUMBAR, PLIF Bilateral 9/29/2022    Procedure: L3-L5 TRANSFORAMINAL LUMBAR INTERBODY FUSION, L2-5 DECOMPRESSION;  Surgeon: Jama Werner M.D.;  Location: [facility];  Service: Orthopedics    LUMBAR DECOMPRESSION Bilateral 9/29/2022    Procedure: DECOMPRESSION, SPINE, LUMBAR;  Surgeon: Jama Werner M.D.;  Location: [facility];  Service: Orthopedics    CORPECTOMY N/A 1/6/2022    Procedure: CORPECTOMY, SPINE - PARTIAL;  Surgeon: Jama Werner M.D.;  Location: [facility];  Service: Orthopedics    CERVICAL DISK AND FUSION ANTERIOR N/A 1/6/2022    Procedure: DISCECTOMY, SPINE, CERVICAL, ANTERIOR APPROACH, WITH FUSION - C5-T1;  Surgeon: Jama Werner M.D.;  Location: [facility];  Service: Orthopedics    BLADDER SLING FEMALE N/A 6/29/2021    Procedure: BLADDER SLING, FEMALE - SOLYX.;  Surgeon: Rocío Dewey M.D.;  Location: [facility];  Service: Gynecology    PB KNEE SCOPE,AID ANT CRUCIATE REPAIR Right 11/25/2019    Procedure: RT KNEE ARTHROSCOPY WITH ALLOGRAFT ACL RECONSTRUCTION AND PARTIAL MENISCECTOMY;  Surgeon: Onur Arriaza M.D.;  Location: [facility];  Service: Orthopedics    BREAST BIOPSY  8/20/2010    Performed by ROCÍO BARON  "SURGERY SAME DAY [facility]    GYN SURGERY  ,        OTHER ORTHOPEDIC SURGERY      acl left knee    OTHER ORTHOPEDIC SURGERY      right shoulder    TONSILLECTOMY         Family Hx:   Family History   Problem Relation Age of Onset    Alcohol/Drug Mother     Anesthesia Mother     Anxiety disorder Mother     Bipolar disorder Mother     Cancer Mother     Lung Cancer Mother     Osteoporosis Mother     Allergies Father     Kidney stones Father     Lung Cancer Father     DVT Sister     Lung Cancer Sister        Social Hx:  Social History     Socioeconomic History    Marital status:      Spouse name: Not on file    Number of children: Not on file    Years of education: Not on file    Highest education level: Not on file   Occupational History    Not on file   Tobacco Use    Smoking status: Former     Current packs/day: 0.00     Average packs/day: 0.5 packs/day for 30.0 years (15.0 ttl pk-yrs)     Types: Cigarettes     Start date: 1983     Quit date: 2013     Years since quitting: 10.2     Passive exposure: Past    Smokeless tobacco: Never    Tobacco comments:     .5 ppd, 30 yrs   Vaping Use    Vaping Use: Some days    Substances: Nicotine, Flavoring    Devices: Pre-filled or refillable cartridge   Substance and Sexual Activity    Alcohol use: Yes     Comment: rarely    Drug use: Yes     Types: Inhaled, Marijuana, Oral     Comment: \"clean since \",  THC Use    Sexual activity: Not on file   Other Topics Concern    Not on file   Social History Narrative    Not on file     Social Determinants of Health     Financial Resource Strain: Low Risk  (3/1/2022)    Overall Financial Resource Strain (CARDIA)     Difficulty of Paying Living Expenses: Not very hard   Food Insecurity: No Food Insecurity (3/1/2022)    Hunger Vital Sign     Worried About Running Out of Food in the Last Year: Never true     Ran Out of Food in the Last Year: Never true   Transportation Needs: No Transportation Needs " (3/1/2022)    PRAPARE - Transportation     Lack of Transportation (Medical): No     Lack of Transportation (Non-Medical): No   Physical Activity: Not on file   Stress: Not on file   Social Connections: Not on file   Intimate Partner Violence: Not on file   Housing Stability: Not on file       Allergies:  Allergies   Allergen Reactions    Morphine Anaphylaxis     Throat swelling  Tolerates oxycodone    Dilaudid [Hydromorphone] Anxiety       Medications: reviewed on epic.   Outpatient Medications Marked as Taking for the 2/14/24 encounter (Office Visit) with Zenobia Ortiz M.D.   Medication Sig Dispense Refill    KENALOG 10 MG/ML Suspension       ondansetron (ZOFRAN ODT) 4 MG TABLET DISPERSIBLE       MACROBID 100 MG Cap 1 capsule with food Orally every 12 hrs for 5 days      gabapentin (NEURONTIN) 100 MG Cap Take 200 mg by mouth every day.      propranolol (INDERAL) 10 MG Tab Take 10 mg by mouth 3 times a day.      oxyCODONE CR (OXYCONTIN) 10 MG Tablet Extended Release 12 hour Abuse-Deterrent Take 10 mg by mouth every 12 hours.      sulfamethoxazole-trimethoprim (BACTRIM DS) 800-160 MG tablet Take 1 Tablet by mouth at bedtime.      VITAMIN D PO Take 1 Tablet by mouth every day.      QUEtiapine (SEROQUEL) 50 MG tablet Take 50 mg by mouth every evening.      sertraline (ZOLOFT) 100 MG Tab Take 150 mg by mouth every evening.          Current Outpatient Medications on File Prior to Visit   Medication Sig Dispense Refill    KENALOG 10 MG/ML Suspension       ondansetron (ZOFRAN ODT) 4 MG TABLET DISPERSIBLE       MACROBID 100 MG Cap 1 capsule with food Orally every 12 hrs for 5 days      gabapentin (NEURONTIN) 100 MG Cap Take 200 mg by mouth every day.      propranolol (INDERAL) 10 MG Tab Take 10 mg by mouth 3 times a day.      oxyCODONE CR (OXYCONTIN) 10 MG Tablet Extended Release 12 hour Abuse-Deterrent Take 10 mg by mouth every 12 hours.      sulfamethoxazole-trimethoprim (BACTRIM DS) 800-160 MG tablet Take 1 Tablet by  mouth at bedtime.      VITAMIN D PO Take 1 Tablet by mouth every day.      QUEtiapine (SEROQUEL) 50 MG tablet Take 50 mg by mouth every evening.      sertraline (ZOLOFT) 100 MG Tab Take 150 mg by mouth every evening.      buPROPion (WELLBUTRIN XL) 150 MG XL tablet Take 150 mg by mouth every morning.      atorvastatin (LIPITOR) 10 MG Tab  (Patient not taking: Reported on 11/17/2023)      omeprazole (PRILOSEC) 20 MG delayed-release capsule        No current facility-administered medications on file prior to visit.         EXAMINATION     Physical Exam:   /84 (BP Location: Left arm, Patient Position: Sitting, BP Cuff Size: Adult)   Pulse 100   Temp 36.1 °C (97 °F) (Temporal)   Wt 65 kg (143 lb 4.8 oz)   SpO2 90%     Constitutional:   Body Habitus: Body mass index is 27.99 kg/m².  Cooperation: Fully cooperates with exam  Appearance: Well-groomed, well-nourished.    Eyes: No scleral icterus to suggest severe liver disease, no proptosis to suggest severe hyperthyroidism    ENT -no obvious auditory deficits, no noticeable facial droop     Skin -no rashes or lesions noted     Respiratory-  breathing comfortably on room air, no audible wheezing    Cardiovascular-distal extremities warm and well perfused.  No lower extremity edema is noted.     Gastrointestinal - no obvious abdominal masses, non-distended    Psychiatric- alert and oriented ×3. Normal affect.     Gait - normal gait, no use of ambulatory device, nonantalgic. Heel walking and toe walking intact.     Musculoskeletal and Neuro -      Thoracic/Lumbar Spine/Sacral Spine/Hips   Inspection: No evidence of atrophy in bilateral lower extremities throughout      There is limited active range of motion with lumbar extension     Facet loading maneuver positive bilaterally     Palpation:   Tenderness to palpation over the  midline surgical incision which is healed in her low back, and bilateral sacroiliac joints . No tenderness to palpation elsewhere in the low  back/hips including midline of lumbosacral spine, paraspinal muscles bilaterally, lumbar facets bilaterally spanning approximately L1-L5 levels, PSIS bilaterally, and greater trochanters bilaterally.     Lumbar spine /hip provocative exam maneuvers  Straight leg raise negative bilaterally  FADIR test negative bilaterally     SI joint tests  KIKI test negative bilaterally  Thigh thrust test positive bilaterally  SI joint compression positive bilaterally  SI joint distraction negative bilaterally  Sacral thrust test negative bilaterally  Gaenslens maneuver positive bilaterally  Declan finger sign positive bilaterally        Key points for the international standards for neurological classification of spinal cord injury (ISNCSCI) to light touch.   Dermatome R L   L2 2 2   L3 2 2   L4 2 2   L5 1* 1*   S1 1* 1*   S2 2 2   *in feet     Motor Exam Lower Extremities  ? Myotome R L   Hip flexion L2 5 5   Knee extension L3 5 5   Ankle dorsiflexion L4 5 5   Toe extension L5 5 5   Ankle plantarflexion S1 5 5      Previous exam  Reflexes  ?   R L   Patella   2+ 2+   Achilles    2+ 2+      Clonus of the ankle negative bilaterally          MEDICAL DECISION MAKING    Medical records review: see under HPI section.     DATA    Labs: No new labs available for review since last visit.   Lab Results   Component Value Date/Time    SODIUM 138 01/29/2023 02:39 AM    POTASSIUM 3.6 01/29/2023 02:39 AM    CHLORIDE 98 01/29/2023 02:39 AM    CO2 30 01/29/2023 02:39 AM    ANION 10.0 01/29/2023 02:39 AM    GLUCOSE 98 01/29/2023 02:39 AM    BUN 8 01/29/2023 02:39 AM    CREATININE 0.31 (L) 01/29/2023 02:39 AM    CALCIUM 8.9 01/29/2023 02:39 AM    ASTSGOT 17 01/29/2023 02:39 AM    ALTSGPT 8 01/29/2023 02:39 AM    TBILIRUBIN 0.2 01/29/2023 02:39 AM    ALBUMIN 3.5 01/29/2023 02:39 AM    TOTPROTEIN 6.5 01/29/2023 02:39 AM    GLOBULIN 3.0 01/29/2023 02:39 AM    AGRATIO 1.2 01/29/2023 02:39 AM       Lab Results   Component Value Date/Time    PROTHROMBTM  12.5 01/17/2023 02:24 PM    INR 0.94 01/17/2023 02:24 PM        Lab Results   Component Value Date/Time    WBC 12.4 (H) 01/29/2023 02:39 AM    RBC 4.23 01/29/2023 02:39 AM    HEMOGLOBIN 11.0 (L) 01/29/2023 02:39 AM    HEMATOCRIT 35.0 (L) 01/29/2023 02:39 AM    MCV 82.7 01/29/2023 02:39 AM    MCH 26.0 (L) 01/29/2023 02:39 AM    MCHC 31.4 (L) 01/29/2023 02:39 AM    MPV 9.0 01/29/2023 02:39 AM    NEUTSPOLYS 72.10 (H) 01/29/2023 02:39 AM    LYMPHOCYTES 17.00 (L) 01/29/2023 02:39 AM    MONOCYTES 7.30 01/29/2023 02:39 AM    EOSINOPHILS 2.50 01/29/2023 02:39 AM    BASOPHILS 0.30 01/29/2023 02:39 AM        Lab Results   Component Value Date/Time    HBA1C 5.5 01/17/2023 02:24 PM        Imaging:   I personally reviewed following images, these are my reads    MRI lumbar spine 2/9/2024  Posterior fusion of L3-L5 with L3-4 and L4-5 laminectomies.  Moderate central canal stenosis at L2-3.  Mild facet arthropathy at bilateral L5-S1.See formal radiology report for further details.    X-ray lumbar spine 9/29/2022  Appearance of fusion hardware at 3 levels. See formal radiology report for further details.       IMAGING radiology reads. I reviewed the following radiology reads   MRI lumbar spine 2/9/2024  FINDINGS:  There are postsurgical changes as evidenced by posterior fusion of L3, L4 and L5 with transpedicular screws, L3-4 and L4-5 laminectomies. There is posterior extradural paraspinal fluid collection noted at the levels of laminectomies. There is no mass   effect upon the thecal sac. There is mild anterolisthesis of L3 on 4. There is no pathologic marrow infiltration. There is no aggressive osseous lesion.     At the level of L5-S1,there is no spinal or foraminal stenosis. Mild bilateral facet joint arthropathy is seen.     At the level of L4-5,there is disc degeneration. There is disc prosthesis. There is no spinal or neural foraminal stenosis.     At the level of L3-4,there is disc prosthesis. There is no spinal or neural  foraminal stenosis.     At the level of the L2-3,there is disc degeneration. There is broad-based central disc protrusion. There is moderate central canal stenosis.     At the level of L1-2,there is disc degeneration. There is mild diffuse disc bulge. There is no spinal or neural foraminal stenosis.     The conus terminates at the level of L1. The visualized lower thoracic spinal cord is unremarkable.     The visualized flow voids of the vasculature is unremarkable. There is no space-occupying lesion in the visualized abdominal and pelvic organs.     IMPRESSION:     1.  Postsurgical and degenerative changes in the lumbar spine as described.  2.  Moderate to severe central canal stenosis at L2-3. This is progressed since the previous study.       Results for orders placed during the hospital encounter of 06/14/22    MR-CERVICAL SPINE-W/O    Impression  1. Postsurgical changes from ACDF of C5-T1.  2. Severe spinal canal narrowing at C5-6 and C6-C7, especially posterior to the C6 vertebral body.  3. Severe bilateral neuroforaminal narrowing at C5-6 and C6-C7      Results for orders placed during the hospital encounter of 02/09/24    MR-LUMBAR SPINE-W/O    Impression  1.  Postsurgical and degenerative changes in the lumbar spine as described.  2.  Moderate to severe central canal stenosis at L2-3. This is progressed since the previous study.        Results for orders placed during the hospital encounter of 02/09/24    MR-LUMBAR SPINE-W/O    Impression  1.  Postsurgical and degenerative changes in the lumbar spine as described.  2.  Moderate to severe central canal stenosis at L2-3. This is progressed since the previous study.                                       Results for orders placed during the hospital encounter of 01/19/23    DX-CERVICAL SPINE-2 OR 3 VIEWS    Impression  Fluoroscopic image(s) obtained during cervicothoracic instrumentation. Anterior fusion hardware projects appropriately. Posterior transpedicular  screws also project appropriately.. Please see the patient's chart for full procedural details.    Fluoroscopy time 1 minute 30 seconds.    Fluoroscopy dose(DAP): 1.6 Gy*cm^2     Results for orders placed in visit on 23    DX-CHEST-2 VIEWS    Impression  1. No active cardiopulmonary abnormalities are identified.                      Results for orders placed during the hospital encounter of 22    DX-LUMBAR SPINE-2 OR 3 VIEWS    Impression  Normal complete lumbar spine series.               Results for orders placed in visit on 11/15/21    DX-THORACIC SPINE-2 VIEWS    Impression  Negative examination.    This exam was performed in an orthopedic clinic of the [facility]. This interpretation is performed in addition to the interpretation by the ordering provider            Diagnosis  Visit Diagnoses     ICD-10-CM   1. Sacroiliac joint dysfunction of both sides  M53.3   2. History of lumbar surgery  Z98.890   3. Chronic bilateral low back pain with right-sided sciatica  M54.41    G89.29         ASSESSMENT AND PLAN:  Yolanda Reed (: 1960) is a female with      Yolanda was seen today for follow-up.    Diagnoses and all orders for this visit:    Sacroiliac joint dysfunction of both sides  -     Referral to Pain Clinic    History of lumbar surgery    Chronic bilateral low back pain with right-sided sciatica          PLAN  Physical Therapy: The patient has done extensive provider driven physical therapy for this issue.  Continue home exercise program      Home Exercise Program: I previously provided the patient with a home exercise program focusing on strengthening and stretching.      Diagnostic workup: Personally reviewed at today's visit: MRI lumbar spine 2024     Medications:  -Okay to continue oxycodone as prescribed by Dr. Werner as long as there is compliance with      Interventions:   -Bilateral sacroiliac joint injections.  The patient has pain at the sacroiliac joints that  "is reproduced with 3+ positive provocative exam maneuvers today.  The risks, benefits, and alternatives to this procedure were discussed and the patient wishes to proceed with the procedure. Risks include but are not limited to damage to surrounding structures, infection, bleeding, worsening of pain which can be permanent, and weakness which can be permanent. Benefits include pain relief and improved function. Alternatives include not doing the procedure.    - the patient reports a \"joint injection\" done by Dr. Werner which helped, as well as an epidural with Dr. Rothman which did not help    Follow-up: 4 weeks after injection    Orders Placed This Encounter    Referral to Pain Clinic    KENALOG 10 MG/ML Suspension    ondansetron (ZOFRAN ODT) 4 MG TABLET DISPERSIBLE    MACROBID 100 MG Cap       Zenobia Ortzi MD  Interventional Pain and Spine  Physical Medicine and Rehabilitation  [facility]      The above note documents my personal evaluation of this patient. In addition, I have reviewed and confirmed with the patient and MA the supportive information documented in today's Patient Health Questionnaire and Office Note.     Please note that this dictation was created using voice recognition software. I have made every reasonable attempt to correct obvious errors, but I expect that there are errors of grammar and possibly content that I did not discover before finalizing the note.  "

## 2024-02-14 ENCOUNTER — OFFICE VISIT (OUTPATIENT)
Dept: PHYSICAL MEDICINE AND REHAB | Facility: MEDICAL CENTER | Age: 64
End: 2024-02-14
Payer: MEDICAID

## 2024-02-14 VITALS
DIASTOLIC BLOOD PRESSURE: 84 MMHG | BODY MASS INDEX: 27.99 KG/M2 | HEART RATE: 100 BPM | WEIGHT: 143.3 LBS | SYSTOLIC BLOOD PRESSURE: 117 MMHG | OXYGEN SATURATION: 90 % | TEMPERATURE: 97 F

## 2024-02-14 DIAGNOSIS — Z98.890 HISTORY OF LUMBAR SURGERY: ICD-10-CM

## 2024-02-14 DIAGNOSIS — M53.3 SACROILIAC JOINT DYSFUNCTION OF BOTH SIDES: ICD-10-CM

## 2024-02-14 DIAGNOSIS — G89.29 CHRONIC BILATERAL LOW BACK PAIN WITH RIGHT-SIDED SCIATICA: ICD-10-CM

## 2024-02-14 DIAGNOSIS — M54.41 CHRONIC BILATERAL LOW BACK PAIN WITH RIGHT-SIDED SCIATICA: ICD-10-CM

## 2024-02-14 PROCEDURE — 99214 OFFICE O/P EST MOD 30 MIN: CPT | Performed by: STUDENT IN AN ORGANIZED HEALTH CARE EDUCATION/TRAINING PROGRAM

## 2024-02-14 PROCEDURE — 1125F AMNT PAIN NOTED PAIN PRSNT: CPT | Performed by: STUDENT IN AN ORGANIZED HEALTH CARE EDUCATION/TRAINING PROGRAM

## 2024-02-14 PROCEDURE — 3074F SYST BP LT 130 MM HG: CPT | Performed by: STUDENT IN AN ORGANIZED HEALTH CARE EDUCATION/TRAINING PROGRAM

## 2024-02-14 PROCEDURE — 3079F DIAST BP 80-89 MM HG: CPT | Performed by: STUDENT IN AN ORGANIZED HEALTH CARE EDUCATION/TRAINING PROGRAM

## 2024-02-14 RX ORDER — NITROFURANTOIN MONOHYDRATE/MACROCRYSTALLINE 25; 75 MG/1; MG/1
CAPSULE ORAL
COMMUNITY
Start: 2024-02-06

## 2024-02-14 RX ORDER — ONDANSETRON 4 MG/1
TABLET, ORALLY DISINTEGRATING ORAL
COMMUNITY
Start: 2024-01-19

## 2024-02-14 RX ORDER — TRIAMCINOLONE ACETONIDE 10 MG/ML
INJECTION, SUSPENSION INTRA-ARTICULAR; INTRALESIONAL
Status: ON HOLD | COMMUNITY
Start: 2024-01-17 | End: 2024-02-22

## 2024-02-14 ASSESSMENT — FIBROSIS 4 INDEX: FIB4 SCORE: 0.69

## 2024-02-14 ASSESSMENT — PAIN SCALES - GENERAL: PAINLEVEL: 9=SEVERE PAIN

## 2024-02-14 ASSESSMENT — PATIENT HEALTH QUESTIONNAIRE - PHQ9: CLINICAL INTERPRETATION OF PHQ2 SCORE: 0

## 2024-02-22 ENCOUNTER — HOSPITAL ENCOUNTER (OUTPATIENT)
Facility: REHABILITATION | Age: 64
End: 2024-02-22
Attending: STUDENT IN AN ORGANIZED HEALTH CARE EDUCATION/TRAINING PROGRAM | Admitting: STUDENT IN AN ORGANIZED HEALTH CARE EDUCATION/TRAINING PROGRAM
Payer: MEDICAID

## 2024-02-22 ENCOUNTER — APPOINTMENT (OUTPATIENT)
Dept: RADIOLOGY | Facility: REHABILITATION | Age: 64
End: 2024-02-22
Attending: STUDENT IN AN ORGANIZED HEALTH CARE EDUCATION/TRAINING PROGRAM
Payer: MEDICAID

## 2024-02-22 VITALS
TEMPERATURE: 97.7 F | WEIGHT: 144.4 LBS | BODY MASS INDEX: 28.35 KG/M2 | OXYGEN SATURATION: 96 % | HEIGHT: 60 IN | HEART RATE: 88 BPM | SYSTOLIC BLOOD PRESSURE: 115 MMHG | RESPIRATION RATE: 18 BRPM | DIASTOLIC BLOOD PRESSURE: 63 MMHG

## 2024-02-22 PROCEDURE — G0260 INJ FOR SACROILIAC JT ANESTH: HCPCS

## 2024-02-22 PROCEDURE — 77002 NEEDLE LOCALIZATION BY XRAY: CPT | Mod: XU

## 2024-02-22 PROCEDURE — 700117 HCHG RX CONTRAST REV CODE 255

## 2024-02-22 PROCEDURE — 700111 HCHG RX REV CODE 636 W/ 250 OVERRIDE (IP): Mod: JZ

## 2024-02-22 RX ORDER — DEXAMETHASONE SODIUM PHOSPHATE 10 MG/ML
INJECTION, SOLUTION INTRAMUSCULAR; INTRAVENOUS
Status: COMPLETED
Start: 2024-02-22 | End: 2024-02-22

## 2024-02-22 RX ORDER — LIDOCAINE HYDROCHLORIDE 10 MG/ML
INJECTION, SOLUTION EPIDURAL; INFILTRATION; INTRACAUDAL; PERINEURAL
Status: COMPLETED
Start: 2024-02-22 | End: 2024-02-22

## 2024-02-22 RX ADMIN — IOHEXOL 5 ML: 240 INJECTION, SOLUTION INTRATHECAL; INTRAVASCULAR; INTRAVENOUS; ORAL at 09:04

## 2024-02-22 RX ADMIN — DEXAMETHASONE SODIUM PHOSPHATE 10 MG: 10 INJECTION, SOLUTION INTRAMUSCULAR; INTRAVENOUS at 09:04

## 2024-02-22 RX ADMIN — LIDOCAINE HYDROCHLORIDE 10 ML: 10 INJECTION, SOLUTION EPIDURAL; INFILTRATION; INTRACAUDAL; PERINEURAL at 09:03

## 2024-02-22 ASSESSMENT — PAIN DESCRIPTION - PAIN TYPE: TYPE: CHRONIC PAIN

## 2024-02-22 ASSESSMENT — FIBROSIS 4 INDEX: FIB4 SCORE: 1.12

## 2024-02-22 NOTE — INTERVAL H&P NOTE
H&P reviewed. The patient was examined and there are no changes to the H&P    Zenobia Ortiz MD  Interventional Pain and Spine  Physical Medicine and Rehabilitation  [facility]

## 2024-02-22 NOTE — PROGRESS NOTES
0804 Pt arrived to pre-procedure area. Procedure & plan for recovery reviewed, site confirmed, & consent signed. Allergies & current medications verified. Appointed  waiting in car. Printed discharge instructions discussed & signed. Pt denies taking NSAIDS or anticoagulants in the last 4 days. MD to bedside prior to procedure.     0915 Pt to recovery area s/p SI joint injection & updates received from procedure RN. VSS. Pt reports relief in pain at this time. Ice pack given for home care. Pt tolerating PO fluids. Dr. Ortiz to bedside for post-procedure evaluation.      6030 Pt ambulates without difficulty & meets DC criteria. RN assisted pt off unit to appointed .

## 2024-03-04 ENCOUNTER — TELEPHONE (OUTPATIENT)
Dept: PHYSICAL MEDICINE AND REHAB | Facility: MEDICAL CENTER | Age: 64
End: 2024-03-04
Payer: MEDICAID

## 2024-03-05 NOTE — TELEPHONE ENCOUNTER
POST OP    RIGHT and LEFT sacroiliac joint injection with fluoroscopic guidance    Lm on  for pt to call if she has any concerns or questions

## 2024-03-15 NOTE — DISCHARGE SUMMARY
Department of Anesthesiology  Postprocedure Note    Patient: Nathan Hauser  MRN: [id number]  YOB: 1947  Date of evaluation: 3/15/2024    Procedure Summary       Date: 03/15/24 Room / Location: [facility] MAIN 07 / [facility] MAIN OR    Anesthesia Start: 0846 Anesthesia Stop: 1116    Procedure: RIGHT TOTAL KNEE ARTHROPLASTY (Right: Knee) Diagnosis:       Osteoarthritis of right knee, unspecified osteoarthritis type      (Osteoarthritis of right knee, unspecified osteoarthritis type [M17.11])    Surgeons: Inderjit Cobb MD Responsible Provider: Almas Aleman MD    Anesthesia Type: General, Regional ASA Status: 2            Anesthesia Type: General, Regional    Fernie Phase I: Fernie Score: 8    Fernie Phase II:      Anesthesia Post Evaluation    Patient location during evaluation: bedside  Patient participation: complete - patient participated  Airway patency: patent  Cardiovascular status: hemodynamically stable  Respiratory status: acceptable  Hydration status: stable    No notable events documented.   Discharge Summary    CHIEF COMPLAINT ON ADMISSION  Chief Complaint   Patient presents with   • Post-Op Complications     Recent neck surgery. Pt got septic and hospitalized for and just released a week ago. Patient feeling very nauseated from the pain medication, as well as can't eat at home well, weakness to the point of not being able to walk and now falling at home because of the weakness.        Reason for Admission  N/V     Admission Date  2/16/2022    CODE STATUS  Full Code    HPI & HOSPITAL COURSE  61 y.o. right hand dominant female with a past medical history of anxiety, hypertension, hyperlipidemia, COVID 2020, depression, PTSD;  who presented on 2/16 with intractable nausea, vomiting, generalized weakness and falls.  Recent history significant for C5-T1 ACDF in January 2022 for herniated nucleus pulposus, cervical myelopathy, cervical stenosis and cervical radiculopathy. Complicated by postop infection requiring IV antibiotics via PICC for serratia marcesens. Infectious disease was consulted. Patient was optimized medically. Will need to complete course of ceftriaxone thru 03/15/2022. Patient was determined satisfactory for discharge with appropriate follow up.     Therefore, she is discharged in fair and stable condition to skilled nursing facility.    The patient met 2-midnight criteria for an inpatient stay at the time of discharge.    Discharge Date  02/22/2022    FOLLOW UP ITEMS POST DISCHARGE  Please follow up with PCP in 3-5 days for post hospitalization follow up and medication reconciliation.     DISCHARGE DIAGNOSES  Principal Problem:    Wound infection after surgery POA: Yes  Active Problems:    Nausea and vomiting POA: Unknown    Falls frequently POA: Yes    Electrolyte imbalance POA: Yes  Resolved Problems:    * No resolved hospital problems. *      FOLLOW UP  No future appointments.  [address] [address]  [phone]        Nuvia Boo,  A.P.ROnielNOniel  [address]  [facility] [address] [phone]    Call  Please call your primary care provider to schedule a hospital follow up. Thank you     Doris Woods M.D.  [address]  [address]  [facility] [address]  [phone]    On 3/22/2022  infectious disease, follow up appointment      MEDICATIONS ON DISCHARGE     Medication List      START taking these medications      Instructions   cefTRIAXone 2 g/20 mL   Start taking on: February 23, 2022  Commonly known as: Rocephin   Infuse 20 mL into a venous catheter every 24 hours for 13 days.  Dose: 2 g     gabapentin 400 MG Caps  Commonly known as: NEURONTIN   Take 1 Capsule by mouth 3 times a day.  Dose: 400 mg        CONTINUE taking these medications      Instructions   atorvastatin 10 MG Tabs  Commonly known as: LIPITOR   Take 10 mg by mouth every evening.  Dose: 10 mg     oxyCODONE immediate-release 5 MG Tabs  Commonly known as: ROXICODONE   Take 5-10 mg by mouth every 6 hours as needed for Severe Pain. 7 day supply  Dose: 5-10 mg     propranolol 80 MG Tabs  Commonly known as: INDERAL   Take 80 mg by mouth at bedtime.  Dose: 80 mg     QUEtiapine 50 MG tablet  Commonly known as: Seroquel   Take 50 mg by mouth every evening.  Dose: 50 mg     sertraline 100 MG Tabs  Commonly known as: Zoloft   Take 100 mg by mouth every evening.  Dose: 100 mg     vitamin D 1000 Unit (25 mcg) Tabs  Commonly known as: cholecalciferol   Take 1,000 Units by mouth every evening.  Dose: 1,000 Units        STOP taking these medications    doxycycline 100 MG capsule  Commonly known as: MONODOX     ibuprofen 800 MG Tabs  Commonly known as: MOTRIN     potassium chloride ER 10 MEQ tablet  Commonly known as: KLOR-CON            Allergies  Allergies   Allergen Reactions   • Morphine Anaphylaxis     Throat swelling       DIET  Orders Placed This Encounter   Procedures   • Diet Order Diet: Regular     Standing Status:   Standing     Number of Occurrences:   1     Order Specific Question:   Diet:      Answer:   Regular [1]       ACTIVITY  As tolerated.  Weight bearing as tolerated    CONSULTATIONS  Infectious Disease    PROCEDURES  N/A    LABORATORY  Lab Results   Component Value Date    SODIUM 140 02/17/2022    POTASSIUM 4.4 02/17/2022    CHLORIDE 102 02/17/2022    CO2 29 02/17/2022    GLUCOSE 92 02/17/2022    BUN 5 (L) 02/17/2022    CREATININE 0.61 02/17/2022        Lab Results   Component Value Date    WBC 8.4 02/18/2022    HEMOGLOBIN 10.1 (L) 02/18/2022    HEMATOCRIT 31.7 (L) 02/18/2022    PLATELETCT 344 02/18/2022        Total time of the discharge process exceeds 39 minutes.

## 2024-03-21 NOTE — PROGRESS NOTES
"Follow-up patient Note    Interventional Pain and Spine  Physiatry (Physical Medicine and Rehabilitation)     Patient Name: Yolanda Reed  : 1960  Date of service: 3/22/2024    Chief Complaint:   Chief Complaint   Patient presents with    Follow-Up     Sacroiliac joint dysfunction of both sides       HISTORY FROM INITIAL VISIT (2024):  Yolanda Reed is a 63 y.o. female who presents today with low back pain radiating to bilateral glutes.  She was referred by neurology for further evaluation of low back pain.  She reports having multiple surgeries with Dr. Werner including in her low back including multilevel fusion in 2023.  She reports \"joint injections\" with Dr. Werner which helped in the past.  She reports epidural with Dr. Rothman which did not help.  She is interested in further injections and notes that she is not planning to return to Dr. Rothman at this time.      This pain began many years ago. Pain right now is 5/10 on the numeric pain scale. Her pain at its best-worse level during the course of the day is typically 2-8/10, respectively.  Pain worsens with sitting, standing, walking, bending forward, bending backwards, side bending or twisting, walking upstairs, and walking downstairs and improves with laying down. Her pain interferes somewhat with ADLs. The patient endorses numbness and tingling in her hands and impaired sensation in her feet. Endorses cramping and muscle spasms in legs and back and feet.      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 Werner     Therapeutic modalities and interventional therapies to date include:  - epidural - last did a couple of months ago, no relief  - \"Joint injection\" - relief     Medical history includes " hypertension, hyperlipidemia.    HPI  Today's visit   Yolanda Reed ( 1960) is a female with Diagnoses of Sacroiliac joint dysfunction of both sides, History of lumbar surgery, Chronic bilateral low back pain with right-sided sciatica, and Chronic left shoulder pain were pertinent to this visit.    Presents today after 24 Bilateral sacroiliac joint injections with significant improvement in pain. Occasionally still having sharp radiating pain at her right low back to her right glute, not past her glute. Worse with walking and sitting.  She is interested in a referral to physical therapy.    Notes ongoing numbness/tingling in her toes that is chronic, stable.    Taking oxycodone PRN as prescribed by Dr. Werner and notes that she is gradually weaning off this.    Notes pain at her left shoulder. Reports she had an MRI shoulder. Reports full thickness tear. She is hoping to avoid surgery if possible. She reports having had a shoulder injection in the past with significant relief and would like to repeat this if possible.    Pain severity 5/10 currently  Pt denies new numbness, tingling, or weakness.    Procedure history:  - 24 Bilateral sacroiliac joint injections - 50-80% improvement in pain      ROS:   Red Flags ROS:   Fever, Chills, Sweats: Denies  Involuntary Weight Loss: Denies  Bladder Incontinence: Denies  Bowel Incontinence: denies  Saddle Anesthesia: Denies    All other systems reviewed and negative.     PMHx:   Past Medical History:   Diagnosis Date    Anesthesia     slow to wake up and PONV - no problems since per pt    Anxiety     Arthritis     shoulders    Breath shortness 2022    on exertion    Delayed emergence from general anesthesia     Per Problem List    Dental disorder     upper dentures    Frequent falls     Uses Cane    High cholesterol     Hypertension 2022    pt states well controlled on meds    Infectious disease     COVID 2020 and hx of MRSA    Low  blood sugar     MRSA (methicillin resistant Staphylococcus aureus)     H/O    BRIGITTE (obstructive sleep apnea) 01/17/2023    Has not been using CPAP at this time.    Pain 09/23/2022    low back and neck    Pneumonia     Nov 2020 + COVID    PONV (postoperative nausea and vomiting)     Psychiatric problem     depression    PTSD (post-traumatic stress disorder)     Sleep apnea     cpap    Snoring     Urinary bladder disorder     bladder sling put in 6/29/21       PSHx:   Past Surgical History:   Procedure Laterality Date    NJ INJECTION,SACROILIAC JOINT Bilateral 2/22/2024    Procedure: RIGHT and LEFT sacroiliac joint injection with fluoroscopic guidance;  Surgeon: Zenobia Ortiz M.D.;  [facility];  Service: Pain Management    LAMINOTOMY  1/19/2023    Procedure: C3 LAMINOPLASTY, C4-T1 DECOMPRESSIVE LAMINECTOMY, C4-T1 POSTERIOR SPINAL FUSION;  Surgeon: Jama Werner M.D.;  Location: [facility];  Service: Orthopedics    CERVICAL DECOMPRESSION POSTERIOR  1/19/2023    Procedure: DECOMPRESSION, SPINE, CERVICAL, POSTERIOR APPROACH;  Surgeon: Jama Werner M.D.;  Location: [facility];  Service: Orthopedics    CERVICAL LAMINECTOMY POSTERIOR  1/19/2023    Procedure: LAMINECTOMY, SPINE, CERVICAL, POSTERIOR APPROACH;  Surgeon: Jama Werner M.D.;  Location: [facility];  Service: Orthopedics    CERVICAL FUSION POSTERIOR  1/19/2023    Procedure: FUSION, SPINE, CERVICAL, POSTERIOR APPROACH;  Surgeon: Jama Werner M.D.;  Location: [facility];  Service: Orthopedics    FUSION, SPINE, LUMBAR, PLIF Bilateral 9/29/2022    Procedure: L3-L5 TRANSFORAMINAL LUMBAR INTERBODY FUSION, L2-5 DECOMPRESSION;  Surgeon: Jama Werner M.D.;  Location: [facility];  Service: Orthopedics    LUMBAR DECOMPRESSION Bilateral 9/29/2022    Procedure: DECOMPRESSION, SPINE, LUMBAR;  Surgeon: Jama Werner M.D.;  Location: [facility];  Service: Orthopedics    CORPECTOMY N/A  2022    Procedure: CORPECTOMY, SPINE - PARTIAL;  Surgeon: Jama Werner M.D.;  Location: SUR[facility];  Service: Orthopedics    CERVICAL DISK AND FUSION ANTERIOR N/A 2022    Procedure: DISCECTOMY, SPINE, CERVICAL, ANTERIOR APPROACH, WITH FUSION - C5-T1;  Surgeon: Jama Werner M.D.;  Location: SUR[facility]ERY [facility];  Service: Orthopedics    BLADDER SLING FEMALE N/A 2021    Procedure: BLADDER SLING, FEMALE - SOLYX.;  Surgeon: Rocío Dewey M.D.;  Location: SUR[facility]ERY [facility];  Service: Gynecology    PB KNEE SCOPE,AID ANT CRUCIATE REPAIR Right 2019    Procedure: RT KNEE ARTHROSCOPY WITH ALLOGRAFT ACL RECONSTRUCTION AND PARTIAL MENISCECTOMY;  Surgeon: Onur Arriaza M.D.;  Location: SURGERY [facility];  Service: Orthopedics    BREAST BIOPSY  2010    Performed by ROCÍO BARON at SURGERY SAME DAY [facility]    GYN SURGERY  ,        OTHER ORTHOPEDIC SURGERY      acl left knee    OTHER ORTHOPEDIC SURGERY      right shoulder    TONSILLECTOMY         Family Hx:   Family History   Problem Relation Age of Onset    Alcohol/Drug Mother     Anesthesia Mother     Anxiety disorder Mother     Bipolar disorder Mother     Cancer Mother     Lung Cancer Mother     Osteoporosis Mother     Allergies Father     Kidney stones Father     Lung Cancer Father     DVT Sister     Lung Cancer Sister        Social Hx:  Social History     Socioeconomic History    Marital status:      Spouse name: Not on file    Number of children: Not on file    Years of education: Not on file    Highest education level: Not on file   Occupational History    Not on file   Tobacco Use    Smoking status: Former     Current packs/day: 0.00     Average packs/day: 0.5 packs/day for 30.0 years (15.0 ttl pk-yrs)     Types: Cigarettes     Start date: 1983     Quit date: 2013     Years since quitting: 10.3     Passive exposure: Past    Smokeless tobacco: Never    Tobacco comments:     .5 ppd, 30 yrs  "  Vaping Use    Vaping Use: Some days    Substances: Nicotine, Flavoring    Devices: Pre-filled or refillable cartridge   Substance and Sexual Activity    Alcohol use: Yes     Comment: rarely    Drug use: Yes     Types: Inhaled, Marijuana, Oral     Comment: \"clean since 2015\",  THC Use    Sexual activity: Not on file   Other Topics Concern    Not on file   Social History Narrative    Not on file     Social Determinants of Health     Financial Resource Strain: Low Risk  (3/1/2022)    Overall Financial Resource Strain (CARDIA)     Difficulty of Paying Living Expenses: Not very hard   Food Insecurity: No Food Insecurity (3/1/2022)    Hunger Vital Sign     Worried About Running Out of Food in the Last Year: Never true     Ran Out of Food in the Last Year: Never true   Transportation Needs: No Transportation Needs (3/1/2022)    PRAPARE - Transportation     Lack of Transportation (Medical): No     Lack of Transportation (Non-Medical): No   Physical Activity: Not on file   Stress: Not on file   Social Connections: Not on file   Intimate Partner Violence: Not on file   Housing Stability: Not on file       Allergies:  Allergies   Allergen Reactions    Morphine Anaphylaxis     Throat swelling  Tolerates oxycodone    Dilaudid [Hydromorphone] Anxiety       Medications: reviewed on epic.   Outpatient Medications Marked as Taking for the 3/22/24 encounter (Office Visit) with Zenobia Ortiz M.D.   Medication Sig Dispense Refill    ondansetron (ZOFRAN ODT) 4 MG TABLET DISPERSIBLE       MACROBID 100 MG Cap 1 capsule with food Orally every 12 hrs for 5 days      gabapentin (NEURONTIN) 100 MG Cap Take 200 mg by mouth every day.      propranolol (INDERAL) 10 MG Tab Take 10 mg by mouth 3 times a day.      oxyCODONE CR (OXYCONTIN) 10 MG Tablet Extended Release 12 hour Abuse-Deterrent Take 10 mg by mouth every 12 hours.      sulfamethoxazole-trimethoprim (BACTRIM DS) 800-160 MG tablet Take 1 Tablet by mouth at bedtime.      VITAMIN D PO " Take 1 Tablet by mouth every day.      sertraline (ZOLOFT) 100 MG Tab Take 150 mg by mouth every evening.          Current Outpatient Medications on File Prior to Visit   Medication Sig Dispense Refill    ondansetron (ZOFRAN ODT) 4 MG TABLET DISPERSIBLE       MACROBID 100 MG Cap 1 capsule with food Orally every 12 hrs for 5 days      gabapentin (NEURONTIN) 100 MG Cap Take 200 mg by mouth every day.      propranolol (INDERAL) 10 MG Tab Take 10 mg by mouth 3 times a day.      oxyCODONE CR (OXYCONTIN) 10 MG Tablet Extended Release 12 hour Abuse-Deterrent Take 10 mg by mouth every 12 hours.      sulfamethoxazole-trimethoprim (BACTRIM DS) 800-160 MG tablet Take 1 Tablet by mouth at bedtime.      VITAMIN D PO Take 1 Tablet by mouth every day.      sertraline (ZOLOFT) 100 MG Tab Take 150 mg by mouth every evening.      buPROPion (WELLBUTRIN XL) 150 MG XL tablet Take 150 mg by mouth every morning.      atorvastatin (LIPITOR) 10 MG Tab  (Patient not taking: Reported on 11/17/2023)      omeprazole (PRILOSEC) 20 MG delayed-release capsule        No current facility-administered medications on file prior to visit.         EXAMINATION     Physical Exam:   /70 (BP Location: Right arm, Patient Position: Sitting, BP Cuff Size: Adult)   Pulse 90   Temp 37 °C (98.6 °F) (Temporal)   Ht 1.524 m (5')   Wt 68.3 kg (150 lb 9.2 oz)   SpO2 94%     Constitutional:   Body Habitus: Body mass index is 29.41 kg/m².  Cooperation: Fully cooperates with exam  Appearance: Well-groomed, well-nourished.    Eyes: No scleral icterus to suggest severe liver disease, no proptosis to suggest severe hyperthyroidism    ENT -no obvious auditory deficits, no noticeable facial droop     Skin -no rashes or lesions noted     Respiratory-  breathing comfortably on room air, no audible wheezing    Cardiovascular-distal extremities warm and well perfused.  No lower extremity edema is noted.     Gastrointestinal - no obvious abdominal masses,  non-distended    Psychiatric- alert and oriented ×3. Normal affect.     Gait - normal gait, no use of ambulatory device, nonantalgic.      Musculoskeletal and Neuro -   Bilateral shoulders with slightly decreased active range of motion through flexion, abduction, internal rotation, and external rotation.  Tenderness palpation at right anterior glenohumeral joint.  Positive Linda and empty can.  Negative lift off test.  Negative drop arm test.    Strength 5/5 and sensation intact to light touch in bilateral upper extremities    Thoracic/Lumbar Spine/Sacral Spine/Hips   Inspection: No evidence of atrophy in bilateral lower extremities throughout     Palpation:   Tenderness to palpation over the  midline surgical incision which is healed in her low back, and bilateral sacroiliac joints . No tenderness to palpation elsewhere in the low back/hips including midline of lumbosacral spine, paraspinal muscles bilaterally, lumbar facets bilaterally spanning approximately L1-L5 levels, PSIS bilaterally, and greater trochanters bilaterally.     Lumbar spine /hip provocative exam maneuvers  Straight leg raise negative bilaterally  FADIR test negative bilaterally     SI joint tests  KIKI test negative bilaterally  Thigh thrust test negative bilaterally     Key points for the international standards for neurological classification of spinal cord injury (ISNCSCI) to light touch.   Dermatome R L   L2 2 2   L3 2 2   L4 2 2   L5 1* 1*   S1 1* 1*   S2 2 2   *in feet     Motor Exam Lower Extremities  ? Myotome R L   Hip flexion L2 5 5   Knee extension L3 5 5   Ankle dorsiflexion L4 5 5   Toe extension L5 5 5   Ankle plantarflexion S1 5 5      Previous exam     There is limited active range of motion with lumbar extension     Facet loading maneuver positive bilaterally      Reflexes  ?   R L   Patella   2+ 2+   Achilles    2+ 2+      Clonus of the ankle negative bilaterally          MEDICAL DECISION MAKING    Medical records review:  see under HPI section.     DATA    Labs: No new labs available for review since last visit.   Lab Results   Component Value Date/Time    SODIUM 138 01/29/2023 02:39 AM    POTASSIUM 3.6 01/29/2023 02:39 AM    CHLORIDE 98 01/29/2023 02:39 AM    CO2 30 01/29/2023 02:39 AM    ANION 10.0 01/29/2023 02:39 AM    GLUCOSE 98 01/29/2023 02:39 AM    BUN 8 01/29/2023 02:39 AM    CREATININE 0.31 (L) 01/29/2023 02:39 AM    CALCIUM 8.9 01/29/2023 02:39 AM    ASTSGOT 17 01/29/2023 02:39 AM    ALTSGPT 8 01/29/2023 02:39 AM    TBILIRUBIN 0.2 01/29/2023 02:39 AM    ALBUMIN 3.5 01/29/2023 02:39 AM    TOTPROTEIN 6.5 01/29/2023 02:39 AM    GLOBULIN 3.0 01/29/2023 02:39 AM    AGRATIO 1.2 01/29/2023 02:39 AM       Lab Results   Component Value Date/Time    PROTHROMBTM 12.5 01/17/2023 02:24 PM    INR 0.94 01/17/2023 02:24 PM        Lab Results   Component Value Date/Time    WBC 7.00 02/14/2024 04:12 PM    WBC 12.4 (H) 01/29/2023 02:39 AM    RBC 5.05 (H) 02/14/2024 04:12 PM    RBC 4.23 01/29/2023 02:39 AM    HEMOGLOBIN 14.5 02/14/2024 04:12 PM    HEMOGLOBIN 11.0 (L) 01/29/2023 02:39 AM    HEMATOCRIT 43.7 02/14/2024 04:12 PM    HEMATOCRIT 35.0 (L) 01/29/2023 02:39 AM    MCV 86.7 02/14/2024 04:12 PM    MCV 82.7 01/29/2023 02:39 AM    MCH 28.8 02/14/2024 04:12 PM    MCH 26.0 (L) 01/29/2023 02:39 AM    MCHC 33.2 (L) 02/14/2024 04:12 PM    MCHC 31.4 (L) 01/29/2023 02:39 AM    MPV 7.1 (L) 02/14/2024 04:12 PM    MPV 9.0 01/29/2023 02:39 AM    NEUTSPOLYS 55.8 02/14/2024 04:12 PM    NEUTSPOLYS 72.10 (H) 01/29/2023 02:39 AM    LYMPHOCYTES 33.7 02/14/2024 04:12 PM    LYMPHOCYTES 17.00 (L) 01/29/2023 02:39 AM    MONOCYTES 8.4 02/14/2024 04:12 PM    MONOCYTES 7.30 01/29/2023 02:39 AM    EOSINOPHILS 1.5 02/14/2024 04:12 PM    EOSINOPHILS 2.50 01/29/2023 02:39 AM    BASOPHILS 0.6 02/14/2024 04:12 PM    BASOPHILS 0.30 01/29/2023 02:39 AM        Lab Results   Component Value Date/Time    HBA1C 5.5 01/17/2023 02:24 PM        Imaging:   I personally reviewed  following images, these are my reads    MRI lumbar spine 2/9/2024  Posterior fusion of L3-L5 with L3-4 and L4-5 laminectomies.  Moderate central canal stenosis at L2-3.  Mild facet arthropathy at bilateral L5-S1.See formal radiology report for further details.    X-ray lumbar spine 9/29/2022  Appearance of fusion hardware at 3 levels. See formal radiology report for further details.       IMAGING radiology reads. I reviewed the following radiology reads   MRI lumbar spine 2/9/2024  FINDINGS:  There are postsurgical changes as evidenced by posterior fusion of L3, L4 and L5 with transpedicular screws, L3-4 and L4-5 laminectomies. There is posterior extradural paraspinal fluid collection noted at the levels of laminectomies. There is no mass   effect upon the thecal sac. There is mild anterolisthesis of L3 on 4. There is no pathologic marrow infiltration. There is no aggressive osseous lesion.     At the level of L5-S1,there is no spinal or foraminal stenosis. Mild bilateral facet joint arthropathy is seen.     At the level of L4-5,there is disc degeneration. There is disc prosthesis. There is no spinal or neural foraminal stenosis.     At the level of L3-4,there is disc prosthesis. There is no spinal or neural foraminal stenosis.     At the level of the L2-3,there is disc degeneration. There is broad-based central disc protrusion. There is moderate central canal stenosis.     At the level of L1-2,there is disc degeneration. There is mild diffuse disc bulge. There is no spinal or neural foraminal stenosis.     The conus terminates at the level of L1. The visualized lower thoracic spinal cord is unremarkable.     The visualized flow voids of the vasculature is unremarkable. There is no space-occupying lesion in the visualized abdominal and pelvic organs.     IMPRESSION:     1.  Postsurgical and degenerative changes in the lumbar spine as described.  2.  Moderate to severe central canal stenosis at L2-3. This is  progressed since the previous study.       Results for orders placed during the hospital encounter of 06/14/22    MR-CERVICAL SPINE-W/O    Impression  1. Postsurgical changes from ACDF of C5-T1.  2. Severe spinal canal narrowing at C5-6 and C6-C7, especially posterior to the C6 vertebral body.  3. Severe bilateral neuroforaminal narrowing at C5-6 and C6-C7      Results for orders placed during the hospital encounter of 02/09/24    MR-LUMBAR SPINE-W/O    Impression  1.  Postsurgical and degenerative changes in the lumbar spine as described.  2.  Moderate to severe central canal stenosis at L2-3. This is progressed since the previous study.        Results for orders placed during the hospital encounter of 02/09/24    MR-LUMBAR SPINE-W/O    Impression  1.  Postsurgical and degenerative changes in the lumbar spine as described.  2.  Moderate to severe central canal stenosis at L2-3. This is progressed since the previous study.                                       Results for orders placed during the hospital encounter of 01/19/23    DX-CERVICAL SPINE-2 OR 3 VIEWS    Impression  Fluoroscopic image(s) obtained during cervicothoracic instrumentation. Anterior fusion hardware projects appropriately. Posterior transpedicular screws also project appropriately.. Please see the patient's chart for full procedural details.    Fluoroscopy time 1 minute 30 seconds.    Fluoroscopy dose(DAP): 1.6 Gy*cm^2     Results for orders placed in visit on 01/17/23    DX-CHEST-2 VIEWS    Impression  1. No active cardiopulmonary abnormalities are identified.                      Results for orders placed during the hospital encounter of 09/29/22    DX-LUMBAR SPINE-2 OR 3 VIEWS    Impression  Normal complete lumbar spine series.               Results for orders placed in visit on 11/15/21    DX-THORACIC SPINE-2 VIEWS    Impression  Negative examination.    This exam was performed in an orthopedic clinic of the [facility]. This  interpretation is performed in addition to the interpretation by the ordering provider            Diagnosis  Visit Diagnoses     ICD-10-CM   1. Sacroiliac joint dysfunction of both sides  M53.3   2. History of lumbar surgery  Z98.890   3. Chronic bilateral low back pain with right-sided sciatica  M54.41    G89.29   4. Chronic left shoulder pain  M25.512    G89.29           ASSESSMENT AND PLAN:  Yolanda Reed (: 1960) is a female with      Yolanda was seen today for follow-up.    Diagnoses and all orders for this visit:    Sacroiliac joint dysfunction of both sides  -     Referral to Physical Therapy    History of lumbar surgery  -     Referral to Physical Therapy    Chronic bilateral low back pain with right-sided sciatica  -     Referral to Physical Therapy    Chronic left shoulder pain  -     Referral to Physical Therapy  -     Referral to Pain Clinic            PLAN  Physical Therapy: Referral to physical therapy today     Home Exercise Program: I previously provided the patient with a home exercise program focusing on strengthening and stretching.      Diagnostic workup: Personally reviewed at today's visit: MRI lumbar spine 2024.  The patient reports that she has an MRI of her left shoulder that she can share with me     Medications:  -Okay to continue oxycodone as prescribed by Dr. Werner as long as there is compliance with      Interventions:   -Bilateral sacroiliac joint injections as needed  -Left shoulder injection.  Tentatively plan for left glenohumeral joint injection under ultrasound guidance.  The patient reports that she received a left shoulder injection in the past and will share this record with me.  Discussed that we could also consider a left subacromial bursa injection based on her current presentation of pain    Follow-up: 1 month for shoulder injection and for review of shoulder imaging if available at that visit    Orders Placed This Encounter    Referral to Physical  Therapy    Referral to Pain Clinic       Zenobia Ortiz MD  Interventional Pain and Spine  Physical Medicine and Rehabilitation  [facility]      The above note documents my personal evaluation of this patient. In addition, I have reviewed and confirmed with the patient and MA the supportive information documented in today's Patient Health Questionnaire and Office Note.     Please note that this dictation was created using voice recognition software. I have made every reasonable attempt to correct obvious errors, but I expect that there are errors of grammar and possibly content that I did not discover before finalizing the note.   Advancement Flap (Single) Text: The defect edges were debeveled with a #15 scalpel blade.  Given the location of the defect and the proximity to free margins a single advancement flap was deemed most appropriate.  Using a sterile surgical marker, an appropriate advancement flap was drawn incorporating the defect and placing the expected incisions within the relaxed skin tension lines where possible.    The area thus outlined was incised deep to adipose tissue with a #15 scalpel blade.  The skin margins were undermined to an appropriate distance in all directions utilizing iris scissors.

## 2024-03-22 ENCOUNTER — OFFICE VISIT (OUTPATIENT)
Dept: PHYSICAL MEDICINE AND REHAB | Facility: MEDICAL CENTER | Age: 64
End: 2024-03-22
Payer: MEDICAID

## 2024-03-22 VITALS
HEIGHT: 60 IN | DIASTOLIC BLOOD PRESSURE: 70 MMHG | TEMPERATURE: 98.6 F | HEART RATE: 90 BPM | SYSTOLIC BLOOD PRESSURE: 110 MMHG | WEIGHT: 150.57 LBS | OXYGEN SATURATION: 94 % | BODY MASS INDEX: 29.56 KG/M2

## 2024-03-22 DIAGNOSIS — M25.512 CHRONIC LEFT SHOULDER PAIN: ICD-10-CM

## 2024-03-22 DIAGNOSIS — G89.29 CHRONIC BILATERAL LOW BACK PAIN WITH RIGHT-SIDED SCIATICA: ICD-10-CM

## 2024-03-22 DIAGNOSIS — M53.3 SACROILIAC JOINT DYSFUNCTION OF BOTH SIDES: ICD-10-CM

## 2024-03-22 DIAGNOSIS — M54.41 CHRONIC BILATERAL LOW BACK PAIN WITH RIGHT-SIDED SCIATICA: ICD-10-CM

## 2024-03-22 DIAGNOSIS — Z98.890 HISTORY OF LUMBAR SURGERY: ICD-10-CM

## 2024-03-22 DIAGNOSIS — G89.29 CHRONIC LEFT SHOULDER PAIN: ICD-10-CM

## 2024-03-22 PROCEDURE — 3078F DIAST BP <80 MM HG: CPT | Performed by: STUDENT IN AN ORGANIZED HEALTH CARE EDUCATION/TRAINING PROGRAM

## 2024-03-22 PROCEDURE — 99213 OFFICE O/P EST LOW 20 MIN: CPT | Performed by: STUDENT IN AN ORGANIZED HEALTH CARE EDUCATION/TRAINING PROGRAM

## 2024-03-22 PROCEDURE — 1125F AMNT PAIN NOTED PAIN PRSNT: CPT | Performed by: STUDENT IN AN ORGANIZED HEALTH CARE EDUCATION/TRAINING PROGRAM

## 2024-03-22 PROCEDURE — 3074F SYST BP LT 130 MM HG: CPT | Performed by: STUDENT IN AN ORGANIZED HEALTH CARE EDUCATION/TRAINING PROGRAM

## 2024-03-22 ASSESSMENT — PATIENT HEALTH QUESTIONNAIRE - PHQ9
CLINICAL INTERPRETATION OF PHQ2 SCORE: 1
5. POOR APPETITE OR OVEREATING: 2 - MORE THAN HALF THE DAYS

## 2024-03-22 ASSESSMENT — FIBROSIS 4 INDEX: FIB4 SCORE: 1.12

## 2024-03-22 ASSESSMENT — PAIN SCALES - GENERAL: PAINLEVEL: 5=MODERATE PAIN

## 2024-04-02 ENCOUNTER — TELEPHONE (OUTPATIENT)
Dept: PHYSICAL MEDICINE AND REHAB | Facility: MEDICAL CENTER | Age: 64
End: 2024-04-02
Payer: MEDICAID

## 2024-04-02 DIAGNOSIS — M54.41 CHRONIC BILATERAL LOW BACK PAIN WITH RIGHT-SIDED SCIATICA: ICD-10-CM

## 2024-04-02 DIAGNOSIS — M53.3 SACROILIAC JOINT DYSFUNCTION OF BOTH SIDES: ICD-10-CM

## 2024-04-02 DIAGNOSIS — G89.29 CHRONIC BILATERAL LOW BACK PAIN WITH RIGHT-SIDED SCIATICA: ICD-10-CM

## 2024-04-02 DIAGNOSIS — Z98.890 HISTORY OF LUMBAR SURGERY: ICD-10-CM

## 2024-04-02 NOTE — TELEPHONE ENCOUNTER
Caller Name: Yolanda  Call Back Number: [phone]     How would the patient prefer to be contacted with a response: Phone call do NOT leave a detailed message    Pt would like a new referral to [facility] because it's closer to her house Fax# [phone].

## 2024-04-17 ENCOUNTER — OFFICE VISIT (OUTPATIENT)
Dept: PHYSICAL MEDICINE AND REHAB | Facility: MEDICAL CENTER | Age: 64
End: 2024-04-17
Payer: MEDICAID

## 2024-04-17 VITALS
HEART RATE: 92 BPM | WEIGHT: 152.12 LBS | OXYGEN SATURATION: 94 % | TEMPERATURE: 97 F | DIASTOLIC BLOOD PRESSURE: 73 MMHG | SYSTOLIC BLOOD PRESSURE: 113 MMHG | BODY MASS INDEX: 29.71 KG/M2

## 2024-04-17 DIAGNOSIS — G89.29 CHRONIC LEFT SHOULDER PAIN: ICD-10-CM

## 2024-04-17 DIAGNOSIS — M25.512 CHRONIC LEFT SHOULDER PAIN: ICD-10-CM

## 2024-04-17 DIAGNOSIS — M75.52 SUBACROMIAL BURSITIS OF LEFT SHOULDER JOINT: ICD-10-CM

## 2024-04-17 PROCEDURE — 3078F DIAST BP <80 MM HG: CPT | Performed by: STUDENT IN AN ORGANIZED HEALTH CARE EDUCATION/TRAINING PROGRAM

## 2024-04-17 PROCEDURE — 20611 DRAIN/INJ JOINT/BURSA W/US: CPT | Mod: LT | Performed by: STUDENT IN AN ORGANIZED HEALTH CARE EDUCATION/TRAINING PROGRAM

## 2024-04-17 PROCEDURE — 3074F SYST BP LT 130 MM HG: CPT | Performed by: STUDENT IN AN ORGANIZED HEALTH CARE EDUCATION/TRAINING PROGRAM

## 2024-04-17 PROCEDURE — 1125F AMNT PAIN NOTED PAIN PRSNT: CPT | Performed by: STUDENT IN AN ORGANIZED HEALTH CARE EDUCATION/TRAINING PROGRAM

## 2024-04-17 RX ORDER — DEXAMETHASONE SODIUM PHOSPHATE 4 MG/ML
4 INJECTION, SOLUTION INTRA-ARTICULAR; INTRALESIONAL; INTRAMUSCULAR; INTRAVENOUS; SOFT TISSUE ONCE
Status: COMPLETED | OUTPATIENT
Start: 2024-04-17 | End: 2024-04-17

## 2024-04-17 RX ORDER — IBUPROFEN 800 MG/1
800 TABLET ORAL
COMMUNITY

## 2024-04-17 RX ORDER — OXYCODONE HYDROCHLORIDE AND ACETAMINOPHEN 5; 325 MG/1; MG/1
TABLET ORAL
COMMUNITY
Start: 2024-02-16

## 2024-04-17 RX ORDER — QUETIAPINE FUMARATE 50 MG/1
50 TABLET, FILM COATED ORAL
COMMUNITY

## 2024-04-17 RX ADMIN — DEXAMETHASONE SODIUM PHOSPHATE 4 MG: 4 INJECTION, SOLUTION INTRA-ARTICULAR; INTRALESIONAL; INTRAMUSCULAR; INTRAVENOUS; SOFT TISSUE at 14:35

## 2024-04-17 ASSESSMENT — PATIENT HEALTH QUESTIONNAIRE - PHQ9
CLINICAL INTERPRETATION OF PHQ2 SCORE: 2
5. POOR APPETITE OR OVEREATING: 2 - MORE THAN HALF THE DAYS
SUM OF ALL RESPONSES TO PHQ QUESTIONS 1-9: 11

## 2024-04-17 ASSESSMENT — PAIN SCALES - GENERAL: PAINLEVEL: 2=MINIMAL-SLIGHT

## 2024-04-17 ASSESSMENT — FIBROSIS 4 INDEX: FIB4 SCORE: 1.12

## 2024-04-17 NOTE — PROCEDURES
Patient Name: Yolanda Reed  : 1960  Date of Service: 2024    Physician/s: Zenobia Ortiz MD    Pre-operative Diagnosis: left rotator cuff syndrome, subacromial impingement, subdeltoid bursitis    Post-operative Diagnosis: left  rotator cuff syndrome, subacromial impingement, subdeltoid bursitis    Procedure: left shoulder subdeltoid bursa injection ultrasound-guided     Description of procedure:    The risks, benefits, and alternatives of the procedure were reviewed and discussed with the patient.  Written informed consent was freely obtained. A pre-procedural time-out was conducted by the physician verifying patient’s identity, procedure to be performed, procedure site and side, and allergy verification. Appropriate equipment was determined to be in place for the procedure.      The patient's shoulder pain was reproduced with a positive left Linda and empty can test.    No sedation was used for this procedure.     In the office suite the patient was placed in seated position with her LEFT shoulder exposed. The ultrasound probe was placed over the  lateral aspect of the head of the humerus, and the rotator cuff and humeral head were identified. The skin was prepped and draped in the usual sterile fashion.     Using a 27 gauge 1.5 inch needle, the subcutaneous tissues were anesthetized with 2cc of 1% lidocaine. The needle was then removed intact. Subsequently a 25g 3.5 inch needle was placed into skin and advanced under ultrasound guidance, in-plane approach, into the subdeltoid bursa. Following negative aspiration, approx 4mL of 1% lidocaine and 1mL 4 mg/mL of dexamethasone was then injected, and the needle was subsequently removed intact. The patient's shoulder was wiped with a 4x4 gauze, the area was cleansed with alcohol prep, and a bandaid was applied. There were no complication    Zenobia Ortiz MD  Interventional Pain and Spine  Physical Medicine and Rehabilitation  [facility]

## 2024-06-11 ENCOUNTER — OFFICE VISIT (OUTPATIENT)
Dept: PHYSICAL MEDICINE AND REHAB | Facility: MEDICAL CENTER | Age: 64
End: 2024-06-11
Payer: MEDICAID

## 2024-06-11 VITALS
DIASTOLIC BLOOD PRESSURE: 75 MMHG | HEART RATE: 89 BPM | SYSTOLIC BLOOD PRESSURE: 105 MMHG | BODY MASS INDEX: 30.23 KG/M2 | OXYGEN SATURATION: 91 % | WEIGHT: 154.76 LBS | TEMPERATURE: 97 F

## 2024-06-11 DIAGNOSIS — G89.29 CHRONIC LEFT SHOULDER PAIN: ICD-10-CM

## 2024-06-11 DIAGNOSIS — M79.10 MYALGIA: ICD-10-CM

## 2024-06-11 DIAGNOSIS — G89.29 CHRONIC BILATERAL LOW BACK PAIN WITHOUT SCIATICA: ICD-10-CM

## 2024-06-11 DIAGNOSIS — Z13.31 POSITIVE DEPRESSION SCREENING: ICD-10-CM

## 2024-06-11 DIAGNOSIS — M25.512 CHRONIC LEFT SHOULDER PAIN: ICD-10-CM

## 2024-06-11 DIAGNOSIS — M53.3 SACROILIAC JOINT DYSFUNCTION OF BOTH SIDES: ICD-10-CM

## 2024-06-11 DIAGNOSIS — G89.29 CHRONIC BILATERAL LOW BACK PAIN WITH RIGHT-SIDED SCIATICA: ICD-10-CM

## 2024-06-11 DIAGNOSIS — M75.52 SUBACROMIAL BURSITIS OF LEFT SHOULDER JOINT: ICD-10-CM

## 2024-06-11 DIAGNOSIS — Z98.890 HISTORY OF LUMBAR SURGERY: ICD-10-CM

## 2024-06-11 DIAGNOSIS — M54.41 CHRONIC BILATERAL LOW BACK PAIN WITH RIGHT-SIDED SCIATICA: ICD-10-CM

## 2024-06-11 DIAGNOSIS — G89.29 OTHER CHRONIC PAIN: ICD-10-CM

## 2024-06-11 DIAGNOSIS — M54.50 CHRONIC BILATERAL LOW BACK PAIN WITHOUT SCIATICA: ICD-10-CM

## 2024-06-11 PROCEDURE — 3078F DIAST BP <80 MM HG: CPT | Performed by: STUDENT IN AN ORGANIZED HEALTH CARE EDUCATION/TRAINING PROGRAM

## 2024-06-11 PROCEDURE — 99213 OFFICE O/P EST LOW 20 MIN: CPT | Mod: 25 | Performed by: STUDENT IN AN ORGANIZED HEALTH CARE EDUCATION/TRAINING PROGRAM

## 2024-06-11 PROCEDURE — 1125F AMNT PAIN NOTED PAIN PRSNT: CPT | Performed by: STUDENT IN AN ORGANIZED HEALTH CARE EDUCATION/TRAINING PROGRAM

## 2024-06-11 PROCEDURE — 3074F SYST BP LT 130 MM HG: CPT | Performed by: STUDENT IN AN ORGANIZED HEALTH CARE EDUCATION/TRAINING PROGRAM

## 2024-06-11 PROCEDURE — 20552 NJX 1/MLT TRIGGER POINT 1/2: CPT | Performed by: STUDENT IN AN ORGANIZED HEALTH CARE EDUCATION/TRAINING PROGRAM

## 2024-06-11 PROCEDURE — 76942 ECHO GUIDE FOR BIOPSY: CPT | Performed by: STUDENT IN AN ORGANIZED HEALTH CARE EDUCATION/TRAINING PROGRAM

## 2024-06-11 ASSESSMENT — PAIN SCALES - GENERAL: PAINLEVEL: 2=MINIMAL-SLIGHT

## 2024-06-11 ASSESSMENT — PATIENT HEALTH QUESTIONNAIRE - PHQ9
CLINICAL INTERPRETATION OF PHQ2 SCORE: 2
5. POOR APPETITE OR OVEREATING: 2 - MORE THAN HALF THE DAYS
SUM OF ALL RESPONSES TO PHQ QUESTIONS 1-9: 14

## 2024-06-11 ASSESSMENT — FIBROSIS 4 INDEX: FIB4 SCORE: 1.12

## 2024-06-11 NOTE — PROCEDURES
Patient Name: Yolanda Reed  : 1960  Date of Service: 2024    Physician/s: Zenobia Ortiz MD    Pre-operative Diagnosis: Myalgia (M79.1)    Post-operative Diagnosis: Myalgia (M79.1)    Procedure: trigger point injections of the following muscles:    Site R L   Splenius capitis     Splenius cervicis     Sternocleidomastoid     Rhomboids     Levator scapulae     Pectoralis minor     Pectoralis major     Serratus anterior     Teres major/minor     Quadratus lumborum     Paravertebral, cervical     Paravertebral, thoracic     Paravertebral, lumbar     Gluteus victor manuel x x   Gluteus medius     Gluteus minimus     Tensor fascia alexis     Vastus lateralis     Adductor nikolay     Adductor longus     Occipitalis     Cervical paraspinal     Trapezius, upper     Trapezius, mid     Trapezius, lower     Latissimus dorsi       Description of procedure:    The risks, benefits, and alternatives of the procedure were reviewed and discussed with the patient.  Written informed consent was freely obtained. A pre-procedural time-out was conducted by the physician verifying patient’s identity, procedure to be performed, procedure site and side, and allergy verification. Appropriate equipment was determined to be in place for the procedure.     In the office suite exam room the patient was placed in a prone position and the skin areas for injection over the above muscles were marked. A total of 4 areas of pain were identified for injection. The areas of pain were then prepped and draped in the usual sterile fashion. A solution was prepared with 5 mL of 1% lidocaine and 5 mL of 0.5% bupivacaine. Ultrasound was confirmed to view the adjacent structures for blood vessels and nerves and to confirm the needle path was not within the structures. A 27g needle was placed into each of the markings at the areas above under ultrasound guidance with an out of plane approach. After negative aspiration, approximately 2 mL of the above  solution was injected. The needle was removed intact after each trigger point injection, and the patient's back was covered with a 4x4 gauze, the area was cleansed with sterile normal saline, and a dressing was applied. There were no complications noted. The images were uploaded to our media tab for permanent storage.    Zenobia Ortiz MD  Interventional Pain and Spine  Physical Medicine and Rehabilitation  [facility]

## 2024-06-11 NOTE — PROGRESS NOTES
"Follow-up patient Note    Interventional Pain and Spine  Physiatry (Physical Medicine and Rehabilitation)     Patient Name: Yolanda Reed  : 1960  Date of service: 2024    Chief Complaint:   Chief Complaint   Patient presents with    Follow-Up     Sacroiliac joint dysfunction of both sides       HISTORY FROM INITIAL VISIT (2024):  Yolanda Reed is a 63 y.o. female who presents today with low back pain radiating to bilateral glutes.  She was referred by neurology for further evaluation of low back pain.  She reports having multiple surgeries with Dr. Werner including in her low back including multilevel fusion in 2023.  She reports \"joint injections\" with Dr. Werner which helped in the past.  She reports epidural with Dr. Rothman which did not help.  She is interested in further injections and notes that she is not planning to return to Dr. Rothman at this time.      This pain began many years ago. Pain right now is 5/10 on the numeric pain scale. Her pain at its best-worse level during the course of the day is typically 2-8/10, respectively.  Pain worsens with sitting, standing, walking, bending forward, bending backwards, side bending or twisting, walking upstairs, and walking downstairs and improves with laying down. Her pain interferes somewhat with ADLs. The patient endorses numbness and tingling in her hands and impaired sensation in her feet. Endorses cramping and muscle spasms in legs and back and feet.      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 Werner     Therapeutic modalities and interventional therapies to date include:  - epidural - last did a couple of months ago, no relief  - \"Joint injection\" - relief     Medical history includes " hypertension, hyperlipidemia.    HPI  Today's visit   Yolanda Reed ( 1960) is a female with Diagnoses of Chronic bilateral low back pain without sciatica, Subacromial bursitis of left shoulder joint, Other chronic pain, Chronic left shoulder pain, Sacroiliac joint dysfunction of both sides, History of lumbar surgery, Chronic bilateral low back pain with right-sided sciatica, and Myalgia were pertinent to this visit.    Presents today after 2024 left shoulder subdeltoid bursa injection ultrasound-guided with significant improvement.  Her shoulder pain is tolerable at this time.  Back pain has worsened. Pain worsens with walking.  Located in her bilateral glutes, lateral to her sacroiliac joints.  Does not radiate down her legs.  Denies significant numbness, tingling, or weakness in the legs.    She is doing physical therapy at [facility].    Notes ongoing numbness/tingling in her toes that is chronic, stable.    Taking oxycodone PRN as prescribed by Dr. Werner and notes that she is gradually weaning off this.    Pain severity 2/10 currently  Pt denies new numbness, tingling, or weakness.    Procedure history:  - 24 Bilateral sacroiliac joint injections - 50-80% improvement in pain  -2024 left shoulder subdeltoid bursa injection ultrasound-guided   - 24 trigger point injections       ROS:   Red Flags ROS:   Fever, Chills, Sweats: Denies  Involuntary Weight Loss: Denies  Bladder Incontinence: Denies  Bowel Incontinence: denies  Saddle Anesthesia: Denies    All other systems reviewed and negative.     PMHx:   Past Medical History:   Diagnosis Date    Anesthesia     slow to wake up and PONV - no problems since per pt    Anxiety     Arthritis     shoulders    Breath shortness 2022    on exertion    Delayed emergence from general anesthesia     Per Problem List    Dental disorder     upper dentures    Frequent falls     Uses Cane    High cholesterol     Hypertension 2022     pt states well controlled on meds    Infectious disease     COVID Nov 2020 and hx of MRSA    Low blood sugar     MRSA (methicillin resistant Staphylococcus aureus)     H/O    BRIGITTE (obstructive sleep apnea) 01/17/2023    Has not been using CPAP at this time.    Pain 09/23/2022    low back and neck    Pneumonia     Nov 2020 + COVID    PONV (postoperative nausea and vomiting)     Psychiatric problem     depression    PTSD (post-traumatic stress disorder)     Sleep apnea     cpap    Snoring     Urinary bladder disorder     bladder sling put in 6/29/21       PSHx:   Past Surgical History:   Procedure Laterality Date    NC INJECTION,SACROILIAC JOINT Bilateral 2/22/2024    Procedure: RIGHT and LEFT sacroiliac joint injection with fluoroscopic guidance;  Surgeon: Zenobia Ortiz M.D.;  [facility];  Service: Pain Management    LAMINOTOMY  1/19/2023    Procedure: C3 LAMINOPLASTY, C4-T1 DECOMPRESSIVE LAMINECTOMY, C4-T1 POSTERIOR SPINAL FUSION;  Surgeon: Jama Werner M.D.;  Location: [facility];  Service: Orthopedics    CERVICAL DECOMPRESSION POSTERIOR  1/19/2023    Procedure: DECOMPRESSION, SPINE, CERVICAL, POSTERIOR APPROACH;  Surgeon: Jama Werner M.D.;  Location: [facility];  Service: Orthopedics    CERVICAL LAMINECTOMY POSTERIOR  1/19/2023    Procedure: LAMINECTOMY, SPINE, CERVICAL, POSTERIOR APPROACH;  Surgeon: Jama Werner M.D.;  Location: [facility];  Service: Orthopedics    CERVICAL FUSION POSTERIOR  1/19/2023    Procedure: FUSION, SPINE, CERVICAL, POSTERIOR APPROACH;  Surgeon: Jama Werner M.D.;  Location: [facility];  Service: Orthopedics    FUSION, SPINE, LUMBAR, PLIF Bilateral 9/29/2022    Procedure: L3-L5 TRANSFORAMINAL LUMBAR INTERBODY FUSION, L2-5 DECOMPRESSION;  Surgeon: Jama Werner M.D.;  Location: [facility];  Service: Orthopedics    LUMBAR DECOMPRESSION Bilateral 9/29/2022    Procedure: DECOMPRESSION, SPINE, LUMBAR;  Surgeon:  Jama Werner M.D.;  Location: SUR[facility]ERY [facility];  Service: Orthopedics    CORPECTOMY N/A 2022    Procedure: CORPECTOMY, SPINE - PARTIAL;  Surgeon: Jama Werner M.D.;  Location: SUR[facility]ERY [facility];  Service: Orthopedics    CERVICAL DISK AND FUSION ANTERIOR N/A 2022    Procedure: DISCECTOMY, SPINE, CERVICAL, ANTERIOR APPROACH, WITH FUSION - C5-T1;  Surgeon: Jama Werner M.D.;  Location: SUR[facility]ERY [facility];  Service: Orthopedics    BLADDER SLING FEMALE N/A 2021    Procedure: BLADDER SLING, FEMALE - SOLYX.;  Surgeon: Rocío Dewey M.D.;  Location: SURGERY [facility];  Service: Gynecology    PB KNEE SCOPE,AID ANT CRUCIATE REPAIR Right 2019    Procedure: RT KNEE ARTHROSCOPY WITH ALLOGRAFT ACL RECONSTRUCTION AND PARTIAL MENISCECTOMY;  Surgeon: Onur Arriaza M.D.;  Location: SURGERY [facility];  Service: Orthopedics    BREAST BIOPSY  2010    Performed by ROCÍO BARON at SURGERY SAME DAY [facility]    GYN SURGERY          OTHER ORTHOPEDIC SURGERY      acl left knee    OTHER ORTHOPEDIC SURGERY      right shoulder    TONSILLECTOMY         Family Hx:   Family History   Problem Relation Age of Onset    Alcohol/Drug Mother     Anesthesia Mother     Anxiety disorder Mother     Bipolar disorder Mother     Cancer Mother     Lung Cancer Mother     Osteoporosis Mother     Allergies Father     Kidney stones Father     Lung Cancer Father     DVT Sister     Lung Cancer Sister        Social Hx:  Social History     Socioeconomic History    Marital status:      Spouse name: Not on file    Number of children: Not on file    Years of education: Not on file    Highest education level: Not on file   Occupational History    Not on file   Tobacco Use    Smoking status: Former     Current packs/day: 0.00     Average packs/day: 0.5 packs/day for 30.0 years (15.0 ttl pk-yrs)     Types: Cigarettes     Start date: 1983     Quit date: 2013     Years since quitting: 10.5  "    Passive exposure: Past    Smokeless tobacco: Never    Tobacco comments:     .5 ppd, 30 yrs   Vaping Use    Vaping status: Some Days    Substances: Nicotine, Flavoring    Devices: Pre-filled or refillable cartridge   Substance and Sexual Activity    Alcohol use: Yes     Comment: rarely    Drug use: Yes     Types: Inhaled, Marijuana, Oral     Comment: \"clean since 2015\",  THC Use    Sexual activity: Not on file   Other Topics Concern    Not on file   Social History Narrative    Not on file     Social Determinants of Health     Financial Resource Strain: Low Risk  (3/1/2022)    Overall Financial Resource Strain (CARDIA)     Difficulty of Paying Living Expenses: Not very hard   Food Insecurity: No Food Insecurity (3/1/2022)    Hunger Vital Sign     Worried About Running Out of Food in the Last Year: Never true     Ran Out of Food in the Last Year: Never true   Transportation Needs: No Transportation Needs (3/1/2022)    PRAPARE - Transportation     Lack of Transportation (Medical): No     Lack of Transportation (Non-Medical): No   Physical Activity: Not on file   Stress: Not on file   Social Connections: Not on file   Intimate Partner Violence: Not on file   Housing Stability: Not on file       Allergies:  Allergies   Allergen Reactions    Morphine Anaphylaxis     Throat swelling  Tolerates oxycodone    Dilaudid [Hydromorphone] Anxiety       Medications: reviewed on epic.   No outpatient medications have been marked as taking for the 6/11/24 encounter (Office Visit) with Zenobia Ortiz M.D..        Current Outpatient Medications on File Prior to Visit   Medication Sig Dispense Refill    ibuprofen (MOTRIN) 800 MG Tab Take 800 mg by mouth.      oxyCODONE-acetaminophen (PERCOCET) 5-325 MG Tab PLEASE SEE ATTACHED FOR DETAILED DIRECTIONS      QUEtiapine (SEROQUEL) 50 MG tablet Take 50 mg by mouth.      ondansetron (ZOFRAN ODT) 4 MG TABLET DISPERSIBLE       MACROBID 100 MG Cap 1 capsule with food Orally every 12 hrs for 5 " days      gabapentin (NEURONTIN) 100 MG Cap Take 200 mg by mouth every day.      propranolol (INDERAL) 10 MG Tab Take 10 mg by mouth 3 times a day.      oxyCODONE CR (OXYCONTIN) 10 MG Tablet Extended Release 12 hour Abuse-Deterrent Take 10 mg by mouth every 12 hours.      buPROPion (WELLBUTRIN XL) 150 MG XL tablet Take 150 mg by mouth every morning.      atorvastatin (LIPITOR) 10 MG Tab  (Patient not taking: Reported on 4/17/2024)      omeprazole (PRILOSEC) 20 MG delayed-release capsule       sulfamethoxazole-trimethoprim (BACTRIM DS) 800-160 MG tablet Take 1 Tablet by mouth at bedtime.      VITAMIN D PO Take 1 Tablet by mouth every day.      sertraline (ZOLOFT) 100 MG Tab Take 150 mg by mouth every evening.       No current facility-administered medications on file prior to visit.         EXAMINATION     Physical Exam:   /75 (BP Location: Right arm, Patient Position: Sitting, BP Cuff Size: Adult)   Pulse 89   Temp 36.1 °C (97 °F) (Temporal)   Wt 70.2 kg (154 lb 12.2 oz)   SpO2 91%     Constitutional:   Body Habitus: Body mass index is 30.23 kg/m².  Cooperation: Fully cooperates with exam  Appearance: Well-groomed, well-nourished.    Eyes: No scleral icterus to suggest severe liver disease, no proptosis to suggest severe hyperthyroidism    ENT -no obvious auditory deficits, no noticeable facial droop     Skin -no rashes or lesions noted     Respiratory-  breathing comfortably on room air, no audible wheezing    Cardiovascular-distal extremities warm and well perfused.  No lower extremity edema is noted.     Gastrointestinal - no obvious abdominal masses, non-distended    Psychiatric- alert and oriented ×3. Normal affect.     Gait - normal gait, no use of ambulatory device, nonantalgic.      Musculoskeletal and Neuro -     Thoracic/Lumbar Spine/Sacral Spine/Hips   Inspection: No evidence of atrophy in bilateral lower extremities throughout     Palpation:   Tenderness to palpation over the  midline surgical  incision which is healed in her low back, and bilateral glutes lateral to sacroiliac joints . No tenderness to palpation elsewhere in the low back/hips including midline of lumbosacral spine, paraspinal muscles bilaterally, lumbar facets bilaterally spanning approximately L1-L5 levels, PSIS bilaterally, and greater trochanters bilaterally.     Lumbar spine /hip provocative exam maneuvers  Straight leg raise negative bilaterally  FADIR test negative bilaterally  Facet loading maneuver negative bilaterally     SI joint tests  KIKI test negative bilaterally  Thigh thrust test negative bilaterally     Key points for the international standards for neurological classification of spinal cord injury (ISNCSCI) to light touch.   Dermatome R L   L2 2 2   L3 2 2   L4 2 2   L5 1* 1*   S1 1* 1*   S2 2 2   *in feet     Motor Exam Lower Extremities  ? Myotome R L   Hip flexion L2 5 5   Knee extension L3 5 5   Ankle dorsiflexion L4 5 5   Toe extension L5 5 5   Ankle plantarflexion S1 5 5      Previous exam     There is limited active range of motion with lumbar extension     Facet loading maneuver positive bilaterally      Reflexes  ?   R L   Patella   2+ 2+   Achilles    2+ 2+      Clonus of the ankle negative bilaterally          MEDICAL DECISION MAKING    Medical records review: see under HPI section.     DATA    Labs: No new labs available for review since last visit.   Lab Results   Component Value Date/Time    SODIUM 138 01/29/2023 02:39 AM    POTASSIUM 3.6 01/29/2023 02:39 AM    CHLORIDE 98 01/29/2023 02:39 AM    CO2 30 01/29/2023 02:39 AM    ANION 10.0 01/29/2023 02:39 AM    GLUCOSE 98 01/29/2023 02:39 AM    BUN 8 01/29/2023 02:39 AM    CREATININE 0.31 (L) 01/29/2023 02:39 AM    CALCIUM 8.9 01/29/2023 02:39 AM    ASTSGOT 17 01/29/2023 02:39 AM    ALTSGPT 8 01/29/2023 02:39 AM    TBILIRUBIN 0.2 01/29/2023 02:39 AM    ALBUMIN 3.5 01/29/2023 02:39 AM    TOTPROTEIN 6.5 01/29/2023 02:39 AM    GLOBULIN 3.0 01/29/2023 02:39 AM     AGRATIO 1.2 01/29/2023 02:39 AM       Lab Results   Component Value Date/Time    PROTHROMBTM 12.5 01/17/2023 02:24 PM    INR 0.94 01/17/2023 02:24 PM        Lab Results   Component Value Date/Time    WBC 7.00 02/14/2024 04:12 PM    WBC 12.4 (H) 01/29/2023 02:39 AM    RBC 5.05 (H) 02/14/2024 04:12 PM    RBC 4.23 01/29/2023 02:39 AM    HEMOGLOBIN 14.5 02/14/2024 04:12 PM    HEMOGLOBIN 11.0 (L) 01/29/2023 02:39 AM    HEMATOCRIT 43.7 02/14/2024 04:12 PM    HEMATOCRIT 35.0 (L) 01/29/2023 02:39 AM    MCV 86.7 02/14/2024 04:12 PM    MCV 82.7 01/29/2023 02:39 AM    MCH 28.8 02/14/2024 04:12 PM    MCH 26.0 (L) 01/29/2023 02:39 AM    MCHC 33.2 (L) 02/14/2024 04:12 PM    MCHC 31.4 (L) 01/29/2023 02:39 AM    MPV 7.1 (L) 02/14/2024 04:12 PM    MPV 9.0 01/29/2023 02:39 AM    NEUTSPOLYS 55.8 02/14/2024 04:12 PM    NEUTSPOLYS 72.10 (H) 01/29/2023 02:39 AM    LYMPHOCYTES 33.7 02/14/2024 04:12 PM    LYMPHOCYTES 17.00 (L) 01/29/2023 02:39 AM    MONOCYTES 8.4 02/14/2024 04:12 PM    MONOCYTES 7.30 01/29/2023 02:39 AM    EOSINOPHILS 1.5 02/14/2024 04:12 PM    EOSINOPHILS 2.50 01/29/2023 02:39 AM    BASOPHILS 0.6 02/14/2024 04:12 PM    BASOPHILS 0.30 01/29/2023 02:39 AM        Lab Results   Component Value Date/Time    HBA1C 5.5 01/17/2023 02:24 PM        Imaging:   I personally reviewed following images, these are my reads    MRI lumbar spine 2/9/2024  Posterior fusion of L3-L5 with L3-4 and L4-5 laminectomies.  Moderate central canal stenosis at L2-3.  Mild facet arthropathy at bilateral L5-S1.See formal radiology report for further details.    X-ray lumbar spine 9/29/2022  Appearance of fusion hardware at 3 levels. See formal radiology report for further details.       IMAGING radiology reads. I reviewed the following radiology reads   MRI lumbar spine 2/9/2024  FINDINGS:  There are postsurgical changes as evidenced by posterior fusion of L3, L4 and L5 with transpedicular screws, L3-4 and L4-5 laminectomies. There is posterior  extradural paraspinal fluid collection noted at the levels of laminectomies. There is no mass   effect upon the thecal sac. There is mild anterolisthesis of L3 on 4. There is no pathologic marrow infiltration. There is no aggressive osseous lesion.     At the level of L5-S1,there is no spinal or foraminal stenosis. Mild bilateral facet joint arthropathy is seen.     At the level of L4-5,there is disc degeneration. There is disc prosthesis. There is no spinal or neural foraminal stenosis.     At the level of L3-4,there is disc prosthesis. There is no spinal or neural foraminal stenosis.     At the level of the L2-3,there is disc degeneration. There is broad-based central disc protrusion. There is moderate central canal stenosis.     At the level of L1-2,there is disc degeneration. There is mild diffuse disc bulge. There is no spinal or neural foraminal stenosis.     The conus terminates at the level of L1. The visualized lower thoracic spinal cord is unremarkable.     The visualized flow voids of the vasculature is unremarkable. There is no space-occupying lesion in the visualized abdominal and pelvic organs.     IMPRESSION:     1.  Postsurgical and degenerative changes in the lumbar spine as described.  2.  Moderate to severe central canal stenosis at L2-3. This is progressed since the previous study.       Results for orders placed during the hospital encounter of 06/14/22    MR-CERVICAL SPINE-W/O    Impression  1. Postsurgical changes from ACDF of C5-T1.  2. Severe spinal canal narrowing at C5-6 and C6-C7, especially posterior to the C6 vertebral body.  3. Severe bilateral neuroforaminal narrowing at C5-6 and C6-C7      Results for orders placed during the hospital encounter of 02/09/24    MR-LUMBAR SPINE-W/O    Impression  1.  Postsurgical and degenerative changes in the lumbar spine as described.  2.  Moderate to severe central canal stenosis at L2-3. This is progressed since the previous study.        Results  for orders placed during the hospital encounter of 24    MR-LUMBAR SPINE-W/O    Impression  1.  Postsurgical and degenerative changes in the lumbar spine as described.  2.  Moderate to severe central canal stenosis at L2-3. This is progressed since the previous study.                                       Results for orders placed during the hospital encounter of 23    DX-CERVICAL SPINE-2 OR 3 VIEWS    Impression  Fluoroscopic image(s) obtained during cervicothoracic instrumentation. Anterior fusion hardware projects appropriately. Posterior transpedicular screws also project appropriately.. Please see the patient's chart for full procedural details.    Fluoroscopy time 1 minute 30 seconds.    Fluoroscopy dose(DAP): 1.6 Gy*cm^2     Results for orders placed in visit on 23    DX-CHEST-2 VIEWS    Impression  1. No active cardiopulmonary abnormalities are identified.                      Results for orders placed during the hospital encounter of 22    DX-LUMBAR SPINE-2 OR 3 VIEWS    Impression  Normal complete lumbar spine series.               Results for orders placed in visit on 11/15/21    DX-THORACIC SPINE-2 VIEWS    Impression  Negative examination.    This exam was performed in an orthopedic clinic of the [facility]. This interpretation is performed in addition to the interpretation by the ordering provider            Diagnosis  Visit Diagnoses     ICD-10-CM   1. Chronic bilateral low back pain without sciatica  M54.50    G89.29   2. Subacromial bursitis of left shoulder joint  M75.52   3. Other chronic pain  G89.29   4. Chronic left shoulder pain  M25.512    G89.29   5. Sacroiliac joint dysfunction of both sides  M53.3   6. History of lumbar surgery  Z98.890   7. Chronic bilateral low back pain with right-sided sciatica  M54.41    G89.29   8. Myalgia  M79.10             ASSESSMENT AND PLAN:  Yolanda Reed (: 1960) is a female with      Yolanda was seen today for  follow-up.    Diagnoses and all orders for this visit:    Chronic bilateral low back pain without sciatica    Subacromial bursitis of left shoulder joint    Other chronic pain    Chronic left shoulder pain    Sacroiliac joint dysfunction of both sides    History of lumbar surgery    Chronic bilateral low back pain with right-sided sciatica    Myalgia  -     Referral to Pain Clinic  -     Consent for all Surgical, Special Diagnostic or Therapeutic Procedures              PLAN  Physical Therapy: Continue physical therapy     Home Exercise Program: I previously provided the patient with a home exercise program focusing on strengthening and stretching.      Diagnostic workup: no new imaging needed at this time    Medications:  -Okay to continue oxycodone as prescribed by Dr. Werner as long as there is compliance with      Interventions:   -Bilateral sacroiliac joint injections as needed  -Left subacromial bursa steroid injection as needed  - Trigger point injections today. The risks, benefits, and alternatives to this procedure were discussed and the patient wishes to proceed with the procedure. Risks include but are not limited to damage to surrounding structures, infection, bleeding, worsening of pain which can be permanent, and weakness which can be permanent. Benefits include pain relief and improved function. Alternatives include not doing the procedure.     Follow-up: 1 month to assess progress with PT and today's injection    Orders Placed This Encounter    Referral to Pain Clinic    Consent for all Surgical, Special Diagnostic or Therapeutic Procedures       Zenobia Ortiz MD  Interventional Pain and Spine  Physical Medicine and Rehabilitation  [facility]      The above note documents my personal evaluation of this patient. In addition, I have reviewed and confirmed with the patient and MA the supportive information documented in today's Patient Health Questionnaire and Office Note.     Please note  that this dictation was created using voice recognition software. I have made every reasonable attempt to correct obvious errors, but I expect that there are errors of grammar and possibly content that I did not discover before finalizing the note.

## 2024-07-04 ENCOUNTER — APPOINTMENT (OUTPATIENT)
Dept: URGENT CARE | Facility: PHYSICIAN GROUP | Age: 64
End: 2024-07-04

## 2024-07-24 ENCOUNTER — OFFICE VISIT (OUTPATIENT)
Dept: PHYSICAL MEDICINE AND REHAB | Facility: MEDICAL CENTER | Age: 64
End: 2024-07-24
Payer: MEDICAID

## 2024-07-24 VITALS
TEMPERATURE: 97.9 F | HEART RATE: 97 BPM | HEIGHT: 60 IN | BODY MASS INDEX: 29.26 KG/M2 | WEIGHT: 149.03 LBS | SYSTOLIC BLOOD PRESSURE: 118 MMHG | DIASTOLIC BLOOD PRESSURE: 74 MMHG

## 2024-07-24 DIAGNOSIS — R20.0 NUMBNESS AND TINGLING OF RIGHT ARM: ICD-10-CM

## 2024-07-24 DIAGNOSIS — M79.10 MYALGIA: ICD-10-CM

## 2024-07-24 DIAGNOSIS — M54.12 CERVICAL RADICULOPATHY: ICD-10-CM

## 2024-07-24 DIAGNOSIS — M25.512 CHRONIC LEFT SHOULDER PAIN: ICD-10-CM

## 2024-07-24 DIAGNOSIS — G89.29 OTHER CHRONIC PAIN: ICD-10-CM

## 2024-07-24 DIAGNOSIS — M54.50 CHRONIC BILATERAL LOW BACK PAIN WITHOUT SCIATICA: ICD-10-CM

## 2024-07-24 DIAGNOSIS — M53.3 SACROILIAC JOINT DYSFUNCTION OF BOTH SIDES: ICD-10-CM

## 2024-07-24 DIAGNOSIS — R20.2 NUMBNESS AND TINGLING OF RIGHT ARM: ICD-10-CM

## 2024-07-24 DIAGNOSIS — G89.29 CHRONIC BILATERAL LOW BACK PAIN WITHOUT SCIATICA: ICD-10-CM

## 2024-07-24 DIAGNOSIS — M54.41 CHRONIC BILATERAL LOW BACK PAIN WITH RIGHT-SIDED SCIATICA: ICD-10-CM

## 2024-07-24 DIAGNOSIS — G89.29 CHRONIC LEFT SHOULDER PAIN: ICD-10-CM

## 2024-07-24 DIAGNOSIS — Z13.31 POSITIVE DEPRESSION SCREENING: ICD-10-CM

## 2024-07-24 DIAGNOSIS — G89.29 CHRONIC BILATERAL LOW BACK PAIN WITH RIGHT-SIDED SCIATICA: ICD-10-CM

## 2024-07-24 DIAGNOSIS — R20.0 NUMBNESS AND TINGLING IN LEFT ARM: ICD-10-CM

## 2024-07-24 DIAGNOSIS — Z98.890 HISTORY OF LUMBAR SURGERY: ICD-10-CM

## 2024-07-24 DIAGNOSIS — R20.2 NUMBNESS AND TINGLING IN LEFT ARM: ICD-10-CM

## 2024-07-24 DIAGNOSIS — M75.52 SUBACROMIAL BURSITIS OF LEFT SHOULDER JOINT: ICD-10-CM

## 2024-07-24 PROCEDURE — 3074F SYST BP LT 130 MM HG: CPT | Performed by: STUDENT IN AN ORGANIZED HEALTH CARE EDUCATION/TRAINING PROGRAM

## 2024-07-24 PROCEDURE — 1125F AMNT PAIN NOTED PAIN PRSNT: CPT | Performed by: STUDENT IN AN ORGANIZED HEALTH CARE EDUCATION/TRAINING PROGRAM

## 2024-07-24 PROCEDURE — 3078F DIAST BP <80 MM HG: CPT | Performed by: STUDENT IN AN ORGANIZED HEALTH CARE EDUCATION/TRAINING PROGRAM

## 2024-07-24 PROCEDURE — 99214 OFFICE O/P EST MOD 30 MIN: CPT | Performed by: STUDENT IN AN ORGANIZED HEALTH CARE EDUCATION/TRAINING PROGRAM

## 2024-07-24 ASSESSMENT — PAIN SCALES - GENERAL: PAINLEVEL: 7=MODERATE-SEVERE PAIN

## 2024-07-24 ASSESSMENT — FIBROSIS 4 INDEX: FIB4 SCORE: 1.12

## 2024-07-24 ASSESSMENT — PATIENT HEALTH QUESTIONNAIRE - PHQ9
5. POOR APPETITE OR OVEREATING: 1 - SEVERAL DAYS
SUM OF ALL RESPONSES TO PHQ QUESTIONS 1-9: 12
CLINICAL INTERPRETATION OF PHQ2 SCORE: 4

## 2024-08-19 ENCOUNTER — HOSPITAL ENCOUNTER (OUTPATIENT)
Dept: RADIOLOGY | Facility: MEDICAL CENTER | Age: 64
End: 2024-08-19
Attending: STUDENT IN AN ORGANIZED HEALTH CARE EDUCATION/TRAINING PROGRAM
Payer: MEDICAID

## 2024-08-19 DIAGNOSIS — R20.0 NUMBNESS AND TINGLING IN LEFT ARM: ICD-10-CM

## 2024-08-19 DIAGNOSIS — R20.0 NUMBNESS AND TINGLING OF RIGHT ARM: ICD-10-CM

## 2024-08-19 DIAGNOSIS — R20.2 NUMBNESS AND TINGLING IN LEFT ARM: ICD-10-CM

## 2024-08-19 DIAGNOSIS — R20.2 NUMBNESS AND TINGLING OF RIGHT ARM: ICD-10-CM

## 2024-08-19 PROCEDURE — 72141 MRI NECK SPINE W/O DYE: CPT

## 2024-08-22 ENCOUNTER — APPOINTMENT (OUTPATIENT)
Dept: ADMISSIONS | Facility: MEDICAL CENTER | Age: 64
End: 2024-08-22
Attending: ORTHOPAEDIC SURGERY
Payer: MEDICAID

## 2024-08-26 ENCOUNTER — PRE-ADMISSION TESTING (OUTPATIENT)
Dept: ADMISSIONS | Facility: MEDICAL CENTER | Age: 64
End: 2024-08-26
Attending: ORTHOPAEDIC SURGERY
Payer: MEDICAID

## 2024-08-26 NOTE — PREPROCEDURE INSTRUCTIONS
Med instructions given to patient per Guidelines for Pre-operative Medication Management. Medication list with written instructions attached to green card to be given to pt when she comes in for testing. Pt also advised to hold vitamins for 7 days prior to surgery and hold NSAIDs for 5 days prior to surgery.    Pre-procedure packet reviewed with patient. Patient to follow fasting guidelines/bathing instructions given per anesthesia/surgeon. Patient verbalized understanding of all instructions.     Patient to have labs/testing done on 08/27/2024 at 1405 per Dr. Werner.    Patient has no further questions at this time.

## 2024-08-27 ENCOUNTER — PRE-ADMISSION TESTING (OUTPATIENT)
Dept: ADMISSIONS | Facility: MEDICAL CENTER | Age: 64
End: 2024-08-27
Attending: ORTHOPAEDIC SURGERY
Payer: MEDICAID

## 2024-08-27 ENCOUNTER — HOSPITAL ENCOUNTER (OUTPATIENT)
Dept: RADIOLOGY | Facility: MEDICAL CENTER | Age: 64
End: 2024-08-27
Attending: ORTHOPAEDIC SURGERY
Payer: MEDICAID

## 2024-08-27 DIAGNOSIS — Z01.810 PRE-OPERATIVE CARDIOVASCULAR EXAMINATION: ICD-10-CM

## 2024-08-27 DIAGNOSIS — Z01.811 PRE-OPERATIVE RESPIRATORY EXAMINATION: ICD-10-CM

## 2024-08-27 DIAGNOSIS — Z01.812 PRE-OPERATIVE LABORATORY EXAMINATION: ICD-10-CM

## 2024-08-27 LAB
ANION GAP SERPL CALC-SCNC: 9 MMOL/L (ref 7–16)
APTT PPP: 27.4 SEC (ref 24.7–36)
BASOPHILS # BLD AUTO: 0.2 % (ref 0–1.8)
BASOPHILS # BLD: 0.02 K/UL (ref 0–0.12)
BUN SERPL-MCNC: 18 MG/DL (ref 8–22)
CALCIUM SERPL-MCNC: 9.2 MG/DL (ref 8.5–10.5)
CHLORIDE SERPL-SCNC: 107 MMOL/L (ref 96–112)
CO2 SERPL-SCNC: 24 MMOL/L (ref 20–33)
CREAT SERPL-MCNC: 0.71 MG/DL (ref 0.5–1.4)
EKG IMPRESSION: NORMAL
EOSINOPHIL # BLD AUTO: 0.08 K/UL (ref 0–0.51)
EOSINOPHIL NFR BLD: 0.8 % (ref 0–6.9)
ERYTHROCYTE [DISTWIDTH] IN BLOOD BY AUTOMATED COUNT: 43.5 FL (ref 35.9–50)
ERYTHROCYTE [SEDIMENTATION RATE] IN BLOOD BY WESTERGREN METHOD: 3 MM/HOUR (ref 0–25)
EST. AVERAGE GLUCOSE BLD GHB EST-MCNC: 117 MG/DL
GFR SERPLBLD CREATININE-BSD FMLA CKD-EPI: 96 ML/MIN/1.73 M 2
GLUCOSE SERPL-MCNC: 101 MG/DL (ref 65–99)
HBA1C MFR BLD: 5.7 % (ref 4–5.6)
HCT VFR BLD AUTO: 45.8 % (ref 37–47)
HGB BLD-MCNC: 15.1 G/DL (ref 12–16)
IMM GRANULOCYTES # BLD AUTO: 0.1 K/UL (ref 0–0.11)
IMM GRANULOCYTES NFR BLD AUTO: 1 % (ref 0–0.9)
INR PPP: 0.86 (ref 0.87–1.13)
LYMPHOCYTES # BLD AUTO: 2.5 K/UL (ref 1–4.8)
LYMPHOCYTES NFR BLD: 25.2 % (ref 22–41)
MCH RBC QN AUTO: 29.2 PG (ref 27–33)
MCHC RBC AUTO-ENTMCNC: 33 G/DL (ref 32.2–35.5)
MCV RBC AUTO: 88.6 FL (ref 81.4–97.8)
MONOCYTES # BLD AUTO: 0.71 K/UL (ref 0–0.85)
MONOCYTES NFR BLD AUTO: 7.2 % (ref 0–13.4)
NEUTROPHILS # BLD AUTO: 6.51 K/UL (ref 1.82–7.42)
NEUTROPHILS NFR BLD: 65.6 % (ref 44–72)
NRBC # BLD AUTO: 0 K/UL
NRBC BLD-RTO: 0 /100 WBC (ref 0–0.2)
PLATELET # BLD AUTO: 331 K/UL (ref 164–446)
PMV BLD AUTO: 9.3 FL (ref 9–12.9)
POTASSIUM SERPL-SCNC: 4.4 MMOL/L (ref 3.6–5.5)
PROTHROMBIN TIME: 11.8 SEC (ref 12–14.6)
RBC # BLD AUTO: 5.17 M/UL (ref 4.2–5.4)
SODIUM SERPL-SCNC: 140 MMOL/L (ref 135–145)
WBC # BLD AUTO: 9.9 K/UL (ref 4.8–10.8)

## 2024-08-27 PROCEDURE — 93005 ELECTROCARDIOGRAM TRACING: CPT

## 2024-08-27 PROCEDURE — 85025 COMPLETE CBC W/AUTO DIFF WBC: CPT

## 2024-08-27 PROCEDURE — 71046 X-RAY EXAM CHEST 2 VIEWS: CPT

## 2024-08-27 PROCEDURE — 85652 RBC SED RATE AUTOMATED: CPT

## 2024-08-27 PROCEDURE — 83036 HEMOGLOBIN GLYCOSYLATED A1C: CPT

## 2024-08-27 PROCEDURE — 85610 PROTHROMBIN TIME: CPT

## 2024-08-27 PROCEDURE — 36415 COLL VENOUS BLD VENIPUNCTURE: CPT

## 2024-08-27 PROCEDURE — 85730 THROMBOPLASTIN TIME PARTIAL: CPT

## 2024-08-27 PROCEDURE — 80048 BASIC METABOLIC PNL TOTAL CA: CPT

## 2024-08-27 PROCEDURE — 93010 ELECTROCARDIOGRAM REPORT: CPT | Performed by: INTERNAL MEDICINE

## 2024-08-29 ENCOUNTER — ANESTHESIA EVENT (OUTPATIENT)
Dept: SURGERY | Facility: MEDICAL CENTER | Age: 64
End: 2024-08-29
Payer: MEDICAID

## 2024-08-29 ENCOUNTER — APPOINTMENT (OUTPATIENT)
Dept: RADIOLOGY | Facility: MEDICAL CENTER | Age: 64
End: 2024-08-29
Attending: ORTHOPAEDIC SURGERY
Payer: MEDICAID

## 2024-08-29 ENCOUNTER — ANESTHESIA (OUTPATIENT)
Dept: SURGERY | Facility: MEDICAL CENTER | Age: 64
End: 2024-08-29
Payer: MEDICAID

## 2024-08-29 ENCOUNTER — HOSPITAL ENCOUNTER (OUTPATIENT)
Facility: MEDICAL CENTER | Age: 64
End: 2024-08-29
Attending: ORTHOPAEDIC SURGERY | Admitting: ORTHOPAEDIC SURGERY
Payer: MEDICAID

## 2024-08-29 VITALS
HEIGHT: 60 IN | WEIGHT: 147.71 LBS | DIASTOLIC BLOOD PRESSURE: 64 MMHG | OXYGEN SATURATION: 95 % | RESPIRATION RATE: 16 BRPM | HEART RATE: 88 BPM | SYSTOLIC BLOOD PRESSURE: 119 MMHG | BODY MASS INDEX: 29 KG/M2 | TEMPERATURE: 96.8 F

## 2024-08-29 PROCEDURE — 700111 HCHG RX REV CODE 636 W/ 250 OVERRIDE (IP): Mod: UD | Performed by: ORTHOPAEDIC SURGERY

## 2024-08-29 PROCEDURE — 502000 HCHG MISC OR IMPLANTS RC 0278: Performed by: ORTHOPAEDIC SURGERY

## 2024-08-29 PROCEDURE — 160035 HCHG PACU - 1ST 60 MINS PHASE I: Performed by: ORTHOPAEDIC SURGERY

## 2024-08-29 PROCEDURE — 700101 HCHG RX REV CODE 250: Mod: UD | Performed by: ORTHOPAEDIC SURGERY

## 2024-08-29 PROCEDURE — 700105 HCHG RX REV CODE 258: Mod: UD | Performed by: ORTHOPAEDIC SURGERY

## 2024-08-29 PROCEDURE — 160039 HCHG SURGERY MINUTES - EA ADDL 1 MIN LEVEL 3: Performed by: ORTHOPAEDIC SURGERY

## 2024-08-29 PROCEDURE — A9270 NON-COVERED ITEM OR SERVICE: HCPCS | Mod: UD | Performed by: ANESTHESIOLOGY

## 2024-08-29 PROCEDURE — 160048 HCHG OR STATISTICAL LEVEL 1-5: Performed by: ORTHOPAEDIC SURGERY

## 2024-08-29 PROCEDURE — 95940 IONM IN OPERATNG ROOM 15 MIN: CPT | Performed by: ORTHOPAEDIC SURGERY

## 2024-08-29 PROCEDURE — 700111 HCHG RX REV CODE 636 W/ 250 OVERRIDE (IP): Mod: UD | Performed by: ANESTHESIOLOGY

## 2024-08-29 PROCEDURE — 160002 HCHG RECOVERY MINUTES (STAT): Performed by: ORTHOPAEDIC SURGERY

## 2024-08-29 PROCEDURE — 160025 RECOVERY II MINUTES (STATS): Performed by: ORTHOPAEDIC SURGERY

## 2024-08-29 PROCEDURE — 700102 HCHG RX REV CODE 250 W/ 637 OVERRIDE(OP): Mod: UD | Performed by: ANESTHESIOLOGY

## 2024-08-29 PROCEDURE — 95955 EEG DURING SURGERY: CPT | Performed by: ORTHOPAEDIC SURGERY

## 2024-08-29 PROCEDURE — 160046 HCHG PACU - 1ST 60 MINS PHASE II: Performed by: ORTHOPAEDIC SURGERY

## 2024-08-29 PROCEDURE — 700101 HCHG RX REV CODE 250: Mod: UD | Performed by: ANESTHESIOLOGY

## 2024-08-29 PROCEDURE — 95938 SOMATOSENSORY TESTING: CPT | Performed by: ORTHOPAEDIC SURGERY

## 2024-08-29 PROCEDURE — 95937 NEUROMUSCULAR JUNCTION TEST: CPT | Performed by: ORTHOPAEDIC SURGERY

## 2024-08-29 PROCEDURE — C1713 ANCHOR/SCREW BN/BN,TIS/BN: HCPCS | Performed by: ORTHOPAEDIC SURGERY

## 2024-08-29 PROCEDURE — 160009 HCHG ANES TIME/MIN: Performed by: ORTHOPAEDIC SURGERY

## 2024-08-29 PROCEDURE — 95861 NEEDLE EMG 2 EXTREMITIES: CPT | Performed by: ORTHOPAEDIC SURGERY

## 2024-08-29 PROCEDURE — 160028 HCHG SURGERY MINUTES - 1ST 30 MINS LEVEL 3: Performed by: ORTHOPAEDIC SURGERY

## 2024-08-29 PROCEDURE — 502240 HCHG MISC OR SUPPLY RC 0272: Performed by: ORTHOPAEDIC SURGERY

## 2024-08-29 PROCEDURE — 72202 X-RAY EXAM SI JOINTS 3/> VWS: CPT

## 2024-08-29 DEVICE — GRAFT BONE PUTTY I-FACTOR 5.0CC (1EA): Type: IMPLANTABLE DEVICE | Site: BUTTOCKS | Status: FUNCTIONAL

## 2024-08-29 DEVICE — IMPLANTABLE DEVICE: Type: IMPLANTABLE DEVICE | Site: BUTTOCKS | Status: FUNCTIONAL

## 2024-08-29 RX ORDER — IPRATROPIUM BROMIDE AND ALBUTEROL SULFATE 2.5; .5 MG/3ML; MG/3ML
3 SOLUTION RESPIRATORY (INHALATION)
Status: DISCONTINUED | OUTPATIENT
Start: 2024-08-29 | End: 2024-08-29 | Stop reason: HOSPADM

## 2024-08-29 RX ORDER — DEXAMETHASONE SODIUM PHOSPHATE 4 MG/ML
INJECTION, SOLUTION INTRA-ARTICULAR; INTRALESIONAL; INTRAMUSCULAR; INTRAVENOUS; SOFT TISSUE PRN
Status: DISCONTINUED | OUTPATIENT
Start: 2024-08-29 | End: 2024-08-29 | Stop reason: SURG

## 2024-08-29 RX ORDER — HALOPERIDOL 5 MG/ML
1 INJECTION INTRAMUSCULAR
Status: DISCONTINUED | OUTPATIENT
Start: 2024-08-29 | End: 2024-08-29 | Stop reason: HOSPADM

## 2024-08-29 RX ORDER — HYDROMORPHONE HYDROCHLORIDE 1 MG/ML
0.1 INJECTION, SOLUTION INTRAMUSCULAR; INTRAVENOUS; SUBCUTANEOUS
Status: DISCONTINUED | OUTPATIENT
Start: 2024-08-29 | End: 2024-08-29 | Stop reason: HOSPADM

## 2024-08-29 RX ORDER — HYDROMORPHONE HYDROCHLORIDE 2 MG/ML
INJECTION, SOLUTION INTRAMUSCULAR; INTRAVENOUS; SUBCUTANEOUS PRN
Status: DISCONTINUED | OUTPATIENT
Start: 2024-08-29 | End: 2024-08-29 | Stop reason: SURG

## 2024-08-29 RX ORDER — CEFAZOLIN SODIUM 1 G/3ML
INJECTION, POWDER, FOR SOLUTION INTRAMUSCULAR; INTRAVENOUS PRN
Status: DISCONTINUED | OUTPATIENT
Start: 2024-08-29 | End: 2024-08-29 | Stop reason: SURG

## 2024-08-29 RX ORDER — BUPIVACAINE HYDROCHLORIDE AND EPINEPHRINE 5; 5 MG/ML; UG/ML
INJECTION, SOLUTION EPIDURAL; INTRACAUDAL; PERINEURAL
Status: DISCONTINUED | OUTPATIENT
Start: 2024-08-29 | End: 2024-08-29 | Stop reason: HOSPADM

## 2024-08-29 RX ORDER — GABAPENTIN 300 MG/1
300 CAPSULE ORAL ONCE
Status: COMPLETED | OUTPATIENT
Start: 2024-08-29 | End: 2024-08-29

## 2024-08-29 RX ORDER — LIDOCAINE HYDROCHLORIDE 20 MG/ML
INJECTION, SOLUTION EPIDURAL; INFILTRATION; INTRACAUDAL; PERINEURAL PRN
Status: DISCONTINUED | OUTPATIENT
Start: 2024-08-29 | End: 2024-08-29 | Stop reason: SURG

## 2024-08-29 RX ORDER — ONDANSETRON 2 MG/ML
INJECTION INTRAMUSCULAR; INTRAVENOUS PRN
Status: DISCONTINUED | OUTPATIENT
Start: 2024-08-29 | End: 2024-08-29 | Stop reason: SURG

## 2024-08-29 RX ORDER — HYDROMORPHONE HYDROCHLORIDE 1 MG/ML
0.2 INJECTION, SOLUTION INTRAMUSCULAR; INTRAVENOUS; SUBCUTANEOUS
Status: DISCONTINUED | OUTPATIENT
Start: 2024-08-29 | End: 2024-08-29 | Stop reason: HOSPADM

## 2024-08-29 RX ORDER — SODIUM CHLORIDE, SODIUM LACTATE, POTASSIUM CHLORIDE, CALCIUM CHLORIDE 600; 310; 30; 20 MG/100ML; MG/100ML; MG/100ML; MG/100ML
INJECTION, SOLUTION INTRAVENOUS CONTINUOUS
Status: DISCONTINUED | OUTPATIENT
Start: 2024-08-29 | End: 2024-08-29 | Stop reason: HOSPADM

## 2024-08-29 RX ORDER — EPHEDRINE SULFATE 50 MG/ML
INJECTION, SOLUTION INTRAVENOUS PRN
Status: DISCONTINUED | OUTPATIENT
Start: 2024-08-29 | End: 2024-08-29 | Stop reason: SURG

## 2024-08-29 RX ORDER — OXYCODONE HYDROCHLORIDE 10 MG/1
10 TABLET ORAL EVERY 8 HOURS PRN
COMMUNITY

## 2024-08-29 RX ORDER — HYDROMORPHONE HYDROCHLORIDE 1 MG/ML
0.4 INJECTION, SOLUTION INTRAMUSCULAR; INTRAVENOUS; SUBCUTANEOUS
Status: DISCONTINUED | OUTPATIENT
Start: 2024-08-29 | End: 2024-08-29 | Stop reason: HOSPADM

## 2024-08-29 RX ORDER — ONDANSETRON 2 MG/ML
4 INJECTION INTRAMUSCULAR; INTRAVENOUS
Status: DISCONTINUED | OUTPATIENT
Start: 2024-08-29 | End: 2024-08-29 | Stop reason: HOSPADM

## 2024-08-29 RX ORDER — OXYCODONE HCL 5 MG/5 ML
5 SOLUTION, ORAL ORAL
Status: DISCONTINUED | OUTPATIENT
Start: 2024-08-29 | End: 2024-08-29 | Stop reason: HOSPADM

## 2024-08-29 RX ORDER — LIDOCAINE HYDROCHLORIDE 40 MG/ML
SOLUTION TOPICAL PRN
Status: DISCONTINUED | OUTPATIENT
Start: 2024-08-29 | End: 2024-08-29 | Stop reason: SURG

## 2024-08-29 RX ORDER — ACETAMINOPHEN 500 MG
1000 TABLET ORAL ONCE
Status: COMPLETED | OUTPATIENT
Start: 2024-08-29 | End: 2024-08-29

## 2024-08-29 RX ORDER — OXYCODONE HCL 5 MG/5 ML
10 SOLUTION, ORAL ORAL
Status: DISCONTINUED | OUTPATIENT
Start: 2024-08-29 | End: 2024-08-29 | Stop reason: HOSPADM

## 2024-08-29 RX ORDER — METOPROLOL TARTRATE 1 MG/ML
1 INJECTION, SOLUTION INTRAVENOUS
Status: DISCONTINUED | OUTPATIENT
Start: 2024-08-29 | End: 2024-08-29 | Stop reason: HOSPADM

## 2024-08-29 RX ORDER — DIPHENHYDRAMINE HYDROCHLORIDE 50 MG/ML
12.5 INJECTION INTRAMUSCULAR; INTRAVENOUS
Status: DISCONTINUED | OUTPATIENT
Start: 2024-08-29 | End: 2024-08-29 | Stop reason: HOSPADM

## 2024-08-29 RX ORDER — VASOPRESSIN 20 U/ML
INJECTION PARENTERAL PRN
Status: DISCONTINUED | OUTPATIENT
Start: 2024-08-29 | End: 2024-08-29 | Stop reason: SURG

## 2024-08-29 RX ORDER — CEFAZOLIN SODIUM 1 G/3ML
2 INJECTION, POWDER, FOR SOLUTION INTRAMUSCULAR; INTRAVENOUS ONCE
Status: DISCONTINUED | OUTPATIENT
Start: 2024-08-29 | End: 2024-08-29 | Stop reason: HOSPADM

## 2024-08-29 RX ORDER — MEPERIDINE HYDROCHLORIDE 25 MG/ML
12.5 INJECTION INTRAMUSCULAR; INTRAVENOUS; SUBCUTANEOUS
Status: DISCONTINUED | OUTPATIENT
Start: 2024-08-29 | End: 2024-08-29 | Stop reason: HOSPADM

## 2024-08-29 RX ORDER — SODIUM CHLORIDE, SODIUM LACTATE, POTASSIUM CHLORIDE, CALCIUM CHLORIDE 600; 310; 30; 20 MG/100ML; MG/100ML; MG/100ML; MG/100ML
INJECTION, SOLUTION INTRAVENOUS CONTINUOUS
Status: ACTIVE | OUTPATIENT
Start: 2024-08-29 | End: 2024-08-29

## 2024-08-29 RX ORDER — SUCCINYLCHOLINE CHLORIDE 20 MG/ML
INJECTION INTRAMUSCULAR; INTRAVENOUS PRN
Status: DISCONTINUED | OUTPATIENT
Start: 2024-08-29 | End: 2024-08-29 | Stop reason: SURG

## 2024-08-29 RX ORDER — SCOLOPAMINE TRANSDERMAL SYSTEM 1 MG/1
1 PATCH, EXTENDED RELEASE TRANSDERMAL
Status: DISCONTINUED | OUTPATIENT
Start: 2024-08-29 | End: 2024-08-29 | Stop reason: HOSPADM

## 2024-08-29 RX ORDER — HYDRALAZINE HYDROCHLORIDE 20 MG/ML
5 INJECTION INTRAMUSCULAR; INTRAVENOUS
Status: DISCONTINUED | OUTPATIENT
Start: 2024-08-29 | End: 2024-08-29 | Stop reason: HOSPADM

## 2024-08-29 RX ADMIN — LIDOCAINE HYDROCHLORIDE 60 MG: 20 INJECTION, SOLUTION EPIDURAL; INFILTRATION; INTRACAUDAL at 11:55

## 2024-08-29 RX ADMIN — HYDROMORPHONE HYDROCHLORIDE 0.5 MG: 2 INJECTION INTRAMUSCULAR; INTRAVENOUS; SUBCUTANEOUS at 11:51

## 2024-08-29 RX ADMIN — SUCCINYLCHOLINE CHLORIDE 60 MG: 20 INJECTION, SOLUTION INTRAMUSCULAR; INTRAVENOUS at 11:55

## 2024-08-29 RX ADMIN — ACETAMINOPHEN 1000 MG: 500 TABLET ORAL at 09:51

## 2024-08-29 RX ADMIN — CEFAZOLIN 2 G: 1 INJECTION, POWDER, FOR SOLUTION INTRAMUSCULAR; INTRAVENOUS at 11:55

## 2024-08-29 RX ADMIN — PROPOFOL 100 MCG/KG/MIN: 10 INJECTION, EMULSION INTRAVENOUS at 12:12

## 2024-08-29 RX ADMIN — SODIUM CHLORIDE, POTASSIUM CHLORIDE, SODIUM LACTATE AND CALCIUM CHLORIDE: 600; 310; 30; 20 INJECTION, SOLUTION INTRAVENOUS at 10:30

## 2024-08-29 RX ADMIN — EPHEDRINE SULFATE 15 MG: 50 INJECTION, SOLUTION INTRAVENOUS at 12:39

## 2024-08-29 RX ADMIN — PROPOFOL 50 MG: 10 INJECTION, EMULSION INTRAVENOUS at 12:08

## 2024-08-29 RX ADMIN — SCOPOLAMINE 1 PATCH: 1.5 PATCH, EXTENDED RELEASE TRANSDERMAL at 09:50

## 2024-08-29 RX ADMIN — PROPOFOL 50 MG: 10 INJECTION, EMULSION INTRAVENOUS at 12:03

## 2024-08-29 RX ADMIN — VASOPRESSIN 2 UNITS: 20 INJECTION INTRAVENOUS at 12:42

## 2024-08-29 RX ADMIN — EPHEDRINE SULFATE 15 MG: 50 INJECTION, SOLUTION INTRAVENOUS at 12:43

## 2024-08-29 RX ADMIN — LIDOCAINE HYDROCHLORIDE 4 ML: 40 SOLUTION TOPICAL at 11:57

## 2024-08-29 RX ADMIN — GABAPENTIN 300 MG: 300 CAPSULE ORAL at 09:51

## 2024-08-29 RX ADMIN — VANCOMYCIN HYDROCHLORIDE 1000 MG: 5 INJECTION, POWDER, LYOPHILIZED, FOR SOLUTION INTRAVENOUS at 10:34

## 2024-08-29 RX ADMIN — HYDROMORPHONE HYDROCHLORIDE 0.5 MG: 2 INJECTION INTRAMUSCULAR; INTRAVENOUS; SUBCUTANEOUS at 12:20

## 2024-08-29 RX ADMIN — ONDANSETRON 4 MG: 2 INJECTION INTRAMUSCULAR; INTRAVENOUS at 12:35

## 2024-08-29 RX ADMIN — DEXAMETHASONE SODIUM PHOSPHATE 8 MG: 4 INJECTION INTRA-ARTICULAR; INTRALESIONAL; INTRAMUSCULAR; INTRAVENOUS; SOFT TISSUE at 11:55

## 2024-08-29 RX ADMIN — PROPOFOL 100 MG: 10 INJECTION, EMULSION INTRAVENOUS at 11:55

## 2024-08-29 ASSESSMENT — PAIN DESCRIPTION - PAIN TYPE
TYPE: SURGICAL PAIN
TYPE: CHRONIC PAIN
TYPE: SURGICAL PAIN

## 2024-08-29 ASSESSMENT — PAIN SCALES - GENERAL: PAIN_LEVEL: 0

## 2024-08-29 ASSESSMENT — FIBROSIS 4 INDEX: FIB4 SCORE: 1.13

## 2024-08-29 NOTE — ANESTHESIA PROCEDURE NOTES
Airway    Date/Time: 8/29/2024 11:57 AM    Performed by: Dustin Carroll M.D.  Authorized by: Dustin Carroll M.D.    Location:  OR  Urgency:  Elective  Indications for Airway Management:  Anesthesia      Spontaneous Ventilation: absent    Sedation Level:  Deep  Preoxygenated: Yes    Patient Position:  Sniffing  Final Airway Type:  Endotracheal airway  Final Endotracheal Airway:  ETT  Cuffed: Yes    Technique Used for Successful ETT Placement:  Direct laryngoscopy    Insertion Site:  Oral  Blade Type:  Halie  Laryngoscope Blade/Videolaryngoscope Blade Size:  3  ETT Size (mm):  7.0  Measured from:  Gums  ETT to Gums (cm):  20  Placement Verified by: auscultation and capnometry    Cormack-Lehane Classification:  Grade I - full view of glottis  Number of Attempts at Approach:  1

## 2024-08-29 NOTE — ANESTHESIA TIME REPORT
Anesthesia Start and Stop Event Times       Date Time Event    8/29/2024 1132 Ready for Procedure     1148 Anesthesia Start     1336 Anesthesia Stop          Responsible Staff  08/29/24      Name Role Begin End    Dustin Carroll M.D. Anesth 1148 1336          Overtime Reason:  no overtime (within assigned shift)    Comments:

## 2024-08-29 NOTE — ANESTHESIA PREPROCEDURE EVALUATION
Case: [id number] Date/Time: 08/29/24 1015    Procedure: LEFT AND RIGHT SACROILIAC JOINT FUSION    Pre-op diagnosis: LEFT AND RIGHT SACROILIAC JOINT OSTEOARTHRITIS, DEGENERATIVE JOINT DISEASE, SACROILITIS    Location: [place] [address] / SURGERY [facility]    Surgeons: Jama Werner M.D.            Past Medical History:   Diagnosis Date    Anesthesia 2006    slow to wake up and PONV - no problems since per pt    Anxiety     Arthritis     shoulders    Breath shortness 09/23/2022    on exertion    Delayed emergence from general anesthesia     Per Problem List    Dental disorder     upper dentures    Frequent falls     Uses Cane    High cholesterol     Hypertension 01/05/2022    pt states well controlled on meds    Infectious disease     COVID Nov 2020 and hx of MRSA    Low blood sugar     MRSA (methicillin resistant Staphylococcus aureus)     H/O    BRIGITTE (obstructive sleep apnea) 01/17/2023    Has not been using CPAP at this time.    Pain 09/23/2022    low back and neck    Pneumonia     Nov 2020 + COVID    PONV (postoperative nausea and vomiting)     Psychiatric problem     depression    PTSD (post-traumatic stress disorder)     triggered by any one yelling    Sleep apnea     cpap    Snoring     Urinary bladder disorder     bladder sling put in 6/29/21-now removed as of 08/26/2024       Past Surgical History:   Procedure Laterality Date    MD INJECTION,SACROILIAC JOINT Bilateral 02/22/2024    Procedure: RIGHT and LEFT sacroiliac joint injection with fluoroscopic guidance;  Surgeon: Zenobia Ortiz M.D.;  [facility];  Service: Pain Management    LAMINOTOMY  01/19/2023    Procedure: C3 LAMINOPLASTY, C4-T1 DECOMPRESSIVE LAMINECTOMY, C4-T1 POSTERIOR SPINAL FUSION;  Surgeon: Jama Werner M.D.;  Location: [facility];  Service: Orthopedics    CERVICAL DECOMPRESSION POSTERIOR  01/19/2023    Procedure: DECOMPRESSION, SPINE, CERVICAL, POSTERIOR APPROACH;  Surgeon: Jama Werner M.D.;  Location:  [facility];  Service: Orthopedics    CERVICAL LAMINECTOMY POSTERIOR  2023    Procedure: LAMINECTOMY, SPINE, CERVICAL, POSTERIOR APPROACH;  Surgeon: Jama Werner M.D.;  Location: [facility];  Service: Orthopedics    CERVICAL FUSION POSTERIOR  2023    Procedure: FUSION, SPINE, CERVICAL, POSTERIOR APPROACH;  Surgeon: Jama Werner M.D.;  Location: [facility];  Service: Orthopedics    FUSION, SPINE, LUMBAR, PLIF Bilateral 2022    Procedure: L3-L5 TRANSFORAMINAL LUMBAR INTERBODY FUSION, L2-5 DECOMPRESSION;  Surgeon: Jama Werner M.D.;  Location: [facility];  Service: Orthopedics    LUMBAR DECOMPRESSION Bilateral 2022    Procedure: DECOMPRESSION, SPINE, LUMBAR;  Surgeon: Jama Werner M.D.;  Location: [facility];  Service: Orthopedics    CORPECTOMY N/A 2022    Procedure: CORPECTOMY, SPINE - PARTIAL;  Surgeon: Jama Werner M.D.;  Location: [facility];  Service: Orthopedics    CERVICAL DISK AND FUSION ANTERIOR N/A 2022    Procedure: DISCECTOMY, SPINE, CERVICAL, ANTERIOR APPROACH, WITH FUSION - C5-T1;  Surgeon: Jama Werner M.D.;  Location: [facility];  Service: Orthopedics    BLADDER SLING FEMALE N/A 2021    Procedure: BLADDER SLING, FEMALE - SOLYX.;  Surgeon: Rocío Dewey M.D.;  Location: [facility];  Service: Gynecology    PB KNEE SCOPE,AID ANT CRUCIATE REPAIR Right 2019    Procedure: RT KNEE ARTHROSCOPY WITH ALLOGRAFT ACL RECONSTRUCTION AND PARTIAL MENISCECTOMY;  Surgeon: Onur Arriaza M.D.;  Location: [facility];  Service: Orthopedics    BREAST BIOPSY  2010    Performed by ROCÍO BARON at SURGERY SAME DAY [facility]    GYN SURGERY          OTHER ORTHOPEDIC SURGERY Bilateral     acl left knee, right knee    OTHER ORTHOPEDIC SURGERY      right shoulder    TONSILLECTOMY         Current Outpatient Medications   Medication Instructions    atorvastatin  (LIPITOR) 10 MG Tab No dose, route, or frequency recorded.    B Complex Vitamins (VITAMIN-B COMPLEX PO) Oral, DAILY, <BR><BR>MEDICATION INSTRUCTIONS FOR SURGERY/PROCEDURE SCHEDULED FOR 8/29 <BR>DO NOT TAKE 7 DAYS PRIOR TO SURGERY<BR>    ibuprofen (MOTRIN) 800 mg    MAGNESIUM PO Oral, DAILY, <BR><BR><BR><BR>MEDICATION INSTRUCTIONS FOR SURGERY/PROCEDURE SCHEDULED FOR 8/29 <BR>DO NOT TAKE 7 DAYS PRIOR TO SURGERY<BR><BR>    Multiple Vitamin (MULTIVITAMIN PO) Oral, DAILY, <BR><BR><BR><BR>MEDICATION INSTRUCTIONS FOR SURGERY/PROCEDURE SCHEDULED FOR 8/29 <BR>DO NOT TAKE 7 DAYS PRIOR TO SURGERY<BR><BR>    oxyCODONE CR (OXYCONTIN) 10 mg, EVERY 12 HOURS    oxyCODONE-acetaminophen (PERCOCET) 5-325 MG Tab PLEASE SEE ATTACHED FOR DETAILED DIRECTIONS    propranolol (INDERAL) 10 mg, Oral, 3 TIMES DAILY, <BR>FOR SURGERY/PROCEDURE ON 8/29: OK TO CONTINUE TAKING MEDICATION PRIOR TO SURGERY<BR>    QUEtiapine (SEROQUEL) 50 mg, Oral, DAILY, <BR><BR><BR>FOR SURGERY/PROCEDURE ON 8/29: OK TO CONTINUE TAKING MEDICATION PRIOR TO SURGERY<BR><BR>    sertraline (ZOLOFT) 150 mg, Oral, EVERY EVENING, <BR><BR><BR>FOR SURGERY/PROCEDURE ON 8/29: OK TO CONTINUE TAKING MEDICATION PRIOR TO SURGERY<BR><BR>    sulfamethoxazole-trimethoprim (BACTRIM DS) 800-160 MG tablet 1 Tablet, Oral, EVERY BEDTIME, From previous spine surgery, had a staph infection (has been on it for 3 years)<BR><BR><BR>FOR SURGERY ON 8/29: COORDINATE MEDICATION INSTRUCTIONS FROM PRESCRIBING PHYSICIAN OR FOLLOW INSTRUCTIONS FROM SURGEON OR SPECIALIST<BR><BR>    VITAMIN D PO 1 Tablet, Oral, DAILY, <BR><BR><BR><BR>MEDICATION INSTRUCTIONS FOR SURGERY/PROCEDURE SCHEDULED FOR 8/29 <BR>DO NOT TAKE 7 DAYS PRIOR TO SURGERY<BR><BR>    VITAMIN K PO Oral, DAILY, <BR><BR><BR><BR>MEDICATION INSTRUCTIONS FOR SURGERY/PROCEDURE SCHEDULED FOR 8/29 <BR>DO NOT TAKE 7 DAYS PRIOR TO SURGERY<BR><BR>       Social History     Tobacco Use    Smoking status: Former     Current packs/day: 0.00     Average  "packs/day: 0.5 packs/day for 30.0 years (15.0 ttl pk-yrs)     Types: Cigarettes     Start date: 11/11/1983     Quit date: 11/11/2013     Years since quitting: 10.8     Passive exposure: Past    Smokeless tobacco: Never    Tobacco comments:     .5 ppd, 30 yrs   Vaping Use    Vaping status: Some Days    Substances: Nicotine (3% nicotene), Flavoring    Devices: Pre-filled or refillable cartridge   Substance Use Topics    Alcohol use: Yes     Comment: rarely    Drug use: Yes     Types: Inhaled, Marijuana, Oral     Comment: \"clean since 2015\",  THC Use-vape, edibles as of 08/26/2024       /80   Pulse 98   Temp 36.4 °C (97.6 °F) (Temporal)   Resp 16   Ht 1.524 m (5')   Wt 67 kg (147 lb 11.3 oz)   SpO2 96%       Allergies   Allergen Reactions    Morphine Anaphylaxis     Throat swelling  Tolerates oxycodone    Dilaudid [Hydromorphone] Anxiety    Tape      Sensitive to tape-skin is really thin         Lab Results   Component Value Date/Time    SODIUM 140 08/27/2024 02:15 PM    POTASSIUM 4.4 08/27/2024 02:15 PM    CHLORIDE 107 08/27/2024 02:15 PM    GLUCOSE 101 (H) 08/27/2024 02:15 PM    BUN 18 08/27/2024 02:15 PM    CREATININE 0.71 08/27/2024 02:15 PM        Lab Results   Component Value Date/Time    WBC 9.9 08/27/2024 02:15 PM    RBC 5.17 08/27/2024 02:15 PM    HEMOGLOBIN 15.1 08/27/2024 02:15 PM    HEMATOCRIT 45.8 08/27/2024 02:15 PM    PLATELETCT 331 08/27/2024 02:15 PM          Relevant Problems   ANESTHESIA   (positive) Delayed emergence from anesthesia   (positive) Obstructive sleep apnea syndrome      CARDIAC   (positive) Essential hypertension       Physical Exam    Airway   Mallampati: II  TM distance: >3 FB  Neck ROM: full       Cardiovascular - normal exam  Rhythm: regular  Rate: normal  (-) murmur     Dental   (+) upper dentures      Very poor dentition     Pulmonary - normal exam  Breath sounds clear to auscultation     Abdominal    Neurological - normal exam                   Anesthesia Plan    ASA " 2       Plan - general       Airway plan will be ETT          Induction: intravenous    Postoperative Plan: Postoperative administration of opioids is intended.    Pertinent diagnostic labs and testing reviewed    Informed Consent:    Anesthetic plan and risks discussed with patient.    Use of blood products discussed with: patient whom consented to blood products.       Anesthetic procedure and risks discussed with patient in detail.  Risks include but are not limited to PONV, pain, sore throat, damage to teeth/lips/gums, aspiration, positioning injury, allergic reaction, vocal cord injury, prolonged intubation, stroke, and/or cardiopulmonary problems up to and including death.  Patient indicates complete understanding. All questions fully answered and they agree to proceed as planned above.

## 2024-08-29 NOTE — OR NURSING
"1333 Pt arrived from OR via gurney. Report given by anesthesia and RN. 97.2f awake, moves x4 extremities, strong. 6L mask even non labored breathing. Lungs clear airway patent. SR. Skin pink warm dry. Piv patent. Skin tear RUE from OR.  Bilateral hip dressing cdi no drainage, no hematoma, cold pack.     1342 updated Reese, s.o. via text     1348 awake clear speech, follows commands. Denies pain and nausea.    1403 sitting up drinking water. Alert, even non labored breathing.     1423 awake alert, drinking juice. Denies pain and nausea. States \"ready to go home\"    1429 report given to Tiffanie PHILLIPS, phase 2. Rm 24. Pt awake alert RA even non labored breathing. Denies pain and nausea. Bilateral hip dressing cdi, scant shadowing. No hematoma, coldpacks, BLE cms intact.   "

## 2024-08-29 NOTE — OR NURSING
Pt aquired a skin tear on RUE while log rolling pt prone. Pt has fragile skin, ecchymosis noted PTA. Underneath pt bandaid, pt had an old wound. MD aware, MD cleansed wound and placed steri strips.

## 2024-08-29 NOTE — OR NURSING
1436 Pt arrived from PACU  Pt is A&O x4, pt's VSS, denies any nausea and vomiting, stated that pain is 2/10 tolerable to go home. Surgical dressing to bilat buttocks CDI with gauze and tegaderm, no active bleeding noted    1454 Patient states ready to d/c home. Discharge instructions reviewed and provided to pt. Pt verbalizes understanding. Pt states all questions have been answered. Signed copy in chart. Pt states that all personal belongings are in possession.     1520 Pt d/c via wheelchair with family.

## 2024-08-29 NOTE — PROGRESS NOTES
Medication history reviewed with PT at bedside    Med rec is complete per PT reporting    Allergies reviewed.     PT is current on Bactrim DS daily for staph prophylaxis. Last dose was 08/28/2024.    Patient is not taking anticoagulants.    Preferred pharmacy for this visit - [facility] in [place] ([phone])

## 2024-08-29 NOTE — DISCHARGE INSTRUCTIONS
HOME CARE INSTRUCTIONS    ACTIVITY: Rest and take it easy for the first 24 hours.  A responsible adult is recommended to remain with you during that time.  It is normal to feel sleepy.  We encourage you to not do anything that requires balance, judgment or coordination.    FOR 24 HOURS DO NOT:  Drive, operate machinery or run household appliances.  Drink beer or alcoholic beverages.  Make important decisions or sign legal documents.    SPECIAL INSTRUCTIONS:     DIET: To avoid nausea, slowly advance diet as tolerated, avoiding spicy or greasy foods for the first day.  Add more substantial food to your diet according to your physician's instructions.  Babies can be fed formula or breast milk as soon as they are hungry.  INCREASE FLUIDS AND FIBER TO AVOID CONSTIPATION.    SURGICAL DRESSING/BATHING: keep dressing clean and dry. Do not submerge in water (swimming pool, hot tubs, bath tubs) until cleared by MD.    MEDICATIONS: Resume taking daily medication.  Take prescribed pain medication with food.  If no medication is prescribed, you may take non-aspirin pain medication if needed.  PAIN MEDICATION CAN BE VERY CONSTIPATING.  Take a stool softener or laxative such as senokot, pericolace, or milk of magnesia if needed.    Prescription given for --.  Last pain medication given at 9:15 (Tylenol, Gabpentin)    A follow-up appointment should be arranged with your doctor in -1-2 weeks call to schedule.    You should CALL YOUR PHYSICIAN if you develop:  Fever greater than 101 degrees F.  Pain not relieved by medication, or persistent nausea or vomiting.  Excessive bleeding (blood soaking through dressing) or unexpected drainage from the wound.  Extreme redness or swelling around the incision site, drainage of pus or foul smelling drainage.  Inability to urinate or empty your bladder within 8 hours.  Problems with breathing or chest pain.    You should call 911 if you develop problems with breathing or chest pain.  If you are  unable to contact your doctor or surgical center, you should go to the nearest emergency room or urgent care center.  Physician's telephone #: [phone]    MILD FLU-LIKE SYMPTOMS ARE NORMAL.  YOU MAY EXPERIENCE GENERALIZED MUSCLE ACHES, THROAT IRRITATION, HEADACHE AND/OR SOME NAUSEA.    If any questions arise, call your doctor.  If your doctor is not available, please feel free to call the Surgical Center at [phone].  The Center is open Monday through Friday from 7AM to 7PM.      A registered nurse may call you a few days after your surgery to see how you are doing after your procedure.    You may also receive a survey in the mail within the next two weeks and we ask that you take a few moments to complete the survey and return it to us.  Our goal is to provide you with very good care and we value your comments.     Depression / Suicide Risk    As you are discharged from this [facility] facility, it is important to learn how to keep safe from harming yourself.    Recognize the warning signs:  Abrupt changes in personality, positive or negative- including increase in energy   Giving away possessions  Change in eating patterns- significant weight changes-  positive or negative  Change in sleeping patterns- unable to sleep or sleeping all the time   Unwillingness or inability to communicate  Depression  Unusual sadness, discouragement and loneliness  Talk of wanting to die  Neglect of personal appearance   Rebelliousness- reckless behavior  Withdrawal from people/activities they love  Confusion- inability to concentrate     If you or a loved one observes any of these behaviors or has concerns about self-harm, here's what you can do:  Talk about it- your feelings and reasons for harming yourself  Remove any means that you might use to hurt yourself (examples: pills, rope, extension cords, firearm)  Get professional help from the community (Mental Health, Substance Abuse, psychological counseling)  Do not be  alone:Call your Safe Contact- someone whom you trust who will be there for you.  Call your local CRISIS HOTLINE 699-1144 or 996-995-0881  Call your local Children's Mobile Crisis Response Team [place] (562) 754-6466 or www.[employer]  Call the toll free National Suicide Prevention Hotlines   National Suicide Prevention Lifeline 664-177-ZZAY (6236)  [facility] 800-BFLTCOG (308-7450)    I acknowledge receipt and understanding of these Home Care instructions.

## 2024-08-29 NOTE — OR SURGEON
Immediate Post OP Note    PreOp Diagnosis: L and R sacroiliac joint sacroiliitis oa djd      PostOp Diagnosis: same      Procedure(s):  LEFT AND RIGHT SACROILIAC JOINT FUSION - Wound Class: Clean    Surgeon(s):  Jama Werner M.D.    Anesthesiologist/Type of Anesthesia:  Anesthesiologist: Dustin Carroll M.D./General    Surgical Staff:  Assistant: MAGDY Lino  Circulator: Nolvia Orellana R.N.  Scrub Person: Steve Clayton  Radiology Technologist: Lelia Jolly    Specimens removed if any:  * No specimens in log *    Estimated Blood Loss: 10cc    Findings: none    Complications: none        8/29/2024 1:22 PM Jama Werner M.D.

## 2024-08-29 NOTE — ANESTHESIA POSTPROCEDURE EVALUATION
Patient: Yolanda Reed    Procedure Summary       Date: 08/29/24 Room / Location: [facility] 04 / SURGERY [facility]    Anesthesia Start: 1148 Anesthesia Stop: 1336    Procedure: LEFT AND RIGHT SACROILIAC JOINT FUSION (Bilateral: Buttocks) Diagnosis: (LEFT AND RIGHT SACROILIAC JOINT OSTEOARTHRITIS, DEGENERATIVE JOINT DISEASE, SACROILITIS)    Surgeons: Jama Werner M.D. Responsible Provider: Dustin Carroll M.D.    Anesthesia Type: general ASA Status: 2            Final Anesthesia Type: general  Last vitals  BP   Blood Pressure: 119/64    Temp   36 °C (96.8 °F)    Pulse   88   Resp   16    SpO2   95 %      Anesthesia Post Evaluation    Patient location during evaluation: PACU  Patient participation: complete - patient participated  Level of consciousness: sleepy but conscious  Pain score: 0    Airway patency: patent  Anesthetic complications: no  Cardiovascular status: hemodynamically stable  Respiratory status: acceptable  Hydration status: balanced    PONV: none          There were no known notable events for this encounter.     Nurse Pain Score: 0 (NPRS)

## 2024-08-30 NOTE — OP REPORT
DATE OF SERVICE:  08/29/2024     SERVICE:  Orthopedic Spine Surgery.     PREOPERATIVE DIAGNOSES:  1.  Left and right sacroiliac joint sacroiliitis, osteoarthritis, degenerative   joint disease.  2.  Status post lumbar fusion.     POSTOPERATIVE DIAGNOSES:  1.  Left and right sacroiliac joint sacroiliitis, osteoarthritis, degenerative   joint disease.  2.  Status post lumbar fusion.     PROCEDURES:  1.  Right sacroiliac joint minimally invasive sacroiliac joint fusion using   [place] sacroiliac joint system from X-spine [place] [facility], sacroiliac joint   fusion with a size 12.5 x 40 mm screw titanium and a size 30 x 12.5 mm   sacroiliac joint screw with local autograft bone and allograft bone.  2.  Left sacroiliac joint minimally invasive fusion using [place] X-spine   titanium sacroiliac joint screw system with 2 implants with one 40 mm x 12.5   mm cannulated titanium screw and one 30 x 12.5 mm cannulated titanium screw   with both screws filled with local autograft bone and allograft bone.  3.  Greater than one hour use of operating room fluoroscopy with 2   fluoroscopic machines.  4.  Use of NMA spinal cord monitoring with special attention to the L5 and S1   nerve roots with running electromyography and somatosensory evoked potentials.     SURGEON:  Jama Werner MD     ASSISTANT SURGEON:  JOVITA Lino and SHONDA     ANESTHESIA:   General endotracheal.     ANESTHESIOLOGIST:  Dustin Carroll MD     COMPLICATIONS:  None.     BLOOD LOSS:  10 mL.     PROCEDURE:  After informed consent was obtained, the patient was brought to   the operating room and given general endotracheal anesthetic.  She was placed   prone on the operating room table and given preoperative IV Ancef and   vancomycin.  After the field was draped, a time-out was called correctly   identifying the patient and the procedure to be done.  We first did the right   sacroiliac joint fusion.  Before we prepped and draped the AP and lateral, C    arms were brought in.  A perfect lateral was achieved with superimposed sacral   ala.  We also obtained with a second fluoroscopic machine, a perfect AP   Dye view of the sacrum.  A timeout was called correctly identifying the   patient and the procedure to be done.  We then made a small incision on the   right side and advanced a guide pin into the lateral aspect of the ilium.  We   then advanced the guide pin through the lateral aspect of the ilium through   the sacroiliac joint into the body of S1.  We then used the dilating cannulas.    A drill was then used to create a path for the screw.  The hockey stick   decorticator was then used to take down the sacroiliac joint.  The screw was   measured to be a size 40 mm implant.  A 40 mm titanium screw was filled with   local autograft bone and allograft bone.  It was then advanced over the guide   pin through the lateral aspect of the ilium across the sacroiliac joint and   into S1.  Its position was checked in multiple planes, noted to be excellent.    The guide pin was then removed.     A second guide pin was then used for the second screw.  The guide pin was   advanced to the lateral aspect of the ilium just distal to the first screw.    It was advanced through the lateral aspect of the pelvis across sacroiliac   joint and lateral to the S1 foramen.  We then used the dilating cannulas.  The   drill was then used to create a path for the screw.  The screw was measured   to be a size 30.  A size 30 screw was then filled with local autograft bone   and allograft bone.  A hockey stick decorticator was then used to take down   the sacroiliac joint.  The screw was then advanced over the guide pin through   the lateral cortex across the SI joint, lateral to the S1 foramen.  Its   position was checked in multiple fluoroscopic planes, noted to be excellent.    The insertion guide pin was then removed.  The wound was irrigated and closed.    The subcutaneous layer  was closed with 2-0 Vicryl and the skin was closed   with marcelo and Prineo Dermabond mesh glue closure.  We injected 10 mL of   0.5% Marcaine with epinephrine as local anesthetic.  A wound dressing was   applied.     We then removed the drapes and reprepped and redraped the left side.  The C   arms were brought in.  A perfect lateral was achieved with superimposed sacral   ala.  An AP Dye view was then obtained.  We then made a small incision   and advanced a guide pin into the lateral aspect of the ilium.  It was then   slowly advanced across the cortex, across the sacroiliac joint and into the   body of S1.  Frequent fluoroscopic images were used to find a perfect   trajectory.     We then placed the dilating cannulas.  A drill was then used to create a path   for the screw.  The screw was measured to be a size 40.  The hockey stick   decorticator was used to take down the sacroiliac joint.  A 40 mm screw was   then advanced over the guide pin through the lateral cortex across the   sacroiliac joint into the body of S1.  Digital inspection was done of the   lateral cortex to make sure the screw was countersunk.  The guide pin was then   removed.     A second guide pin was then advanced through the same incision just distal to   the first screw.  This guide pin was advanced through the lateral aspect of   the ilium across the SI joint and lateral to the S1 foramen.  We then used the   dilating cannulas.  A drill was then used to create a path for the screw.    The screw was measured to be a size 30.  A size 30 titanium cannulated screw   was then filled with local autograft bone and allograft bone.  A hockey stick   decorticator was used to take down the sacroiliac joint.  The screw was then   advanced over the guide pin across the lateral aspect of the ilium through the   SI joint and lateral to the S1 foramen.  We then inspected the lateral cortex   digitally to make sure the screw was countersunk which it  was.  The insertion   guide pin was then removed.     We then irrigated the wound and closed it.  Hardcopy x-rays were taken off the   fluoroscopic machine including AP, lateral, and pelvic outlet view   documenting excellent positioning of all 4 screws.  After wound dressing was   applied, a 10 mL of 0.5% Marcaine with epinephrine was injected as local   anesthetic.  Procedure was then terminated and the patient was then aroused   from general anesthesia and brought in stable condition to recovery room.    Total blood loss was approximately 10 mL.  No complications were experienced.    NMA spinal cord monitoring signals were stable throughout the entire case   with no interruption of signals.  We paid special attention to L5 and S1.  The   patient was brought in stable condition to recovery room.        ______________________________  MD DREA SHAH/LOS/TERRY    DD:  08/29/2024 13:32  DT:  08/29/2024 14:51    Job#:  [id number]

## 2024-09-13 ENCOUNTER — APPOINTMENT (OUTPATIENT)
Dept: RADIOLOGY | Facility: MEDICAL CENTER | Age: 64
End: 2024-09-13
Attending: EMERGENCY MEDICINE
Payer: MEDICAID

## 2024-09-13 ENCOUNTER — HOSPITAL ENCOUNTER (EMERGENCY)
Facility: MEDICAL CENTER | Age: 64
End: 2024-09-13
Attending: EMERGENCY MEDICINE
Payer: MEDICAID

## 2024-09-13 VITALS
RESPIRATION RATE: 16 BRPM | SYSTOLIC BLOOD PRESSURE: 141 MMHG | OXYGEN SATURATION: 96 % | WEIGHT: 150 LBS | BODY MASS INDEX: 29.45 KG/M2 | HEART RATE: 81 BPM | HEIGHT: 60 IN | TEMPERATURE: 97.1 F | DIASTOLIC BLOOD PRESSURE: 93 MMHG

## 2024-09-13 DIAGNOSIS — M54.31 SCIATICA OF RIGHT SIDE: ICD-10-CM

## 2024-09-13 DIAGNOSIS — M79.604 RIGHT LEG PAIN: ICD-10-CM

## 2024-09-13 DIAGNOSIS — M53.3 COCCYX PAIN: ICD-10-CM

## 2024-09-13 LAB
ALBUMIN SERPL BCP-MCNC: 3.4 G/DL (ref 3.2–4.9)
ALBUMIN/GLOB SERPL: 1.2 G/DL
ALP SERPL-CCNC: 107 U/L (ref 30–99)
ALT SERPL-CCNC: 16 U/L (ref 2–50)
ANION GAP SERPL CALC-SCNC: 11 MMOL/L (ref 7–16)
AST SERPL-CCNC: 29 U/L (ref 12–45)
BASOPHILS # BLD AUTO: 0.1 % (ref 0–1.8)
BASOPHILS # BLD: 0.01 K/UL (ref 0–0.12)
BILIRUB SERPL-MCNC: 0.2 MG/DL (ref 0.1–1.5)
BUN SERPL-MCNC: 15 MG/DL (ref 8–22)
CALCIUM ALBUM COR SERPL-MCNC: 9.6 MG/DL (ref 8.5–10.5)
CALCIUM SERPL-MCNC: 9.1 MG/DL (ref 8.4–10.2)
CHLORIDE SERPL-SCNC: 104 MMOL/L (ref 96–112)
CO2 SERPL-SCNC: 24 MMOL/L (ref 20–33)
CREAT SERPL-MCNC: 0.58 MG/DL (ref 0.5–1.4)
EOSINOPHIL # BLD AUTO: 0.18 K/UL (ref 0–0.51)
EOSINOPHIL NFR BLD: 2.6 % (ref 0–6.9)
ERYTHROCYTE [DISTWIDTH] IN BLOOD BY AUTOMATED COUNT: 44.5 FL (ref 35.9–50)
GFR SERPLBLD CREATININE-BSD FMLA CKD-EPI: 102 ML/MIN/1.73 M 2
GLOBULIN SER CALC-MCNC: 2.8 G/DL (ref 1.9–3.5)
GLUCOSE SERPL-MCNC: 90 MG/DL (ref 65–99)
HCT VFR BLD AUTO: 40.4 % (ref 37–47)
HGB BLD-MCNC: 12.7 G/DL (ref 12–16)
IMM GRANULOCYTES # BLD AUTO: 0.05 K/UL (ref 0–0.11)
IMM GRANULOCYTES NFR BLD AUTO: 0.7 % (ref 0–0.9)
LYMPHOCYTES # BLD AUTO: 2.07 K/UL (ref 1–4.8)
LYMPHOCYTES NFR BLD: 29.8 % (ref 22–41)
MCH RBC QN AUTO: 28.2 PG (ref 27–33)
MCHC RBC AUTO-ENTMCNC: 31.4 G/DL (ref 32.2–35.5)
MCV RBC AUTO: 89.8 FL (ref 81.4–97.8)
MONOCYTES # BLD AUTO: 0.54 K/UL (ref 0–0.85)
MONOCYTES NFR BLD AUTO: 7.8 % (ref 0–13.4)
NEUTROPHILS # BLD AUTO: 4.09 K/UL (ref 1.82–7.42)
NEUTROPHILS NFR BLD: 59 % (ref 44–72)
NRBC # BLD AUTO: 0 K/UL
NRBC BLD-RTO: 0 /100 WBC (ref 0–0.2)
PLATELET # BLD AUTO: 365 K/UL (ref 164–446)
PMV BLD AUTO: 8.5 FL (ref 9–12.9)
POTASSIUM SERPL-SCNC: 5.1 MMOL/L (ref 3.6–5.5)
PROT SERPL-MCNC: 6.2 G/DL (ref 6–8.2)
RBC # BLD AUTO: 4.5 M/UL (ref 4.2–5.4)
SODIUM SERPL-SCNC: 139 MMOL/L (ref 135–145)
WBC # BLD AUTO: 6.9 K/UL (ref 4.8–10.8)

## 2024-09-13 PROCEDURE — 36415 COLL VENOUS BLD VENIPUNCTURE: CPT

## 2024-09-13 PROCEDURE — 80053 COMPREHEN METABOLIC PANEL: CPT

## 2024-09-13 PROCEDURE — 93971 EXTREMITY STUDY: CPT | Mod: RT

## 2024-09-13 PROCEDURE — 700117 HCHG RX CONTRAST REV CODE 255: Mod: UD | Performed by: EMERGENCY MEDICINE

## 2024-09-13 PROCEDURE — 85025 COMPLETE CBC W/AUTO DIFF WBC: CPT

## 2024-09-13 PROCEDURE — 72193 CT PELVIS W/DYE: CPT

## 2024-09-13 PROCEDURE — 72170 X-RAY EXAM OF PELVIS: CPT

## 2024-09-13 PROCEDURE — 99284 EMERGENCY DEPT VISIT MOD MDM: CPT

## 2024-09-13 RX ADMIN — IOHEXOL 100 ML: 350 INJECTION, SOLUTION INTRAVENOUS at 19:45

## 2024-09-13 ASSESSMENT — PAIN DESCRIPTION - PAIN TYPE: TYPE: ACUTE PAIN

## 2024-09-13 ASSESSMENT — FIBROSIS 4 INDEX: FIB4 SCORE: 1.13

## 2024-09-13 NOTE — ED TRIAGE NOTES
Chief Complaint   Patient presents with    Leg Pain     R leg pain began yesterday, begins tailbone radiates to leg, 8/10 deep dull pain, no injury, +CMS, recent sacral joint fusion x10 days prior        BIB [facility] for above complaint. Pt states took one oxycodone PTA from Rx at home, now with itching, states takes benadryl to help with itchiness       /70   Pulse 71   Temp 36.2 °C (97.1 °F) (Temporal)   Resp (!) 22   Ht 1.524 m (5')   Wt 68 kg (150 lb)   LMP 08/01/2010   SpO2 92%   BMI 29.29 kg/m²

## 2024-09-13 NOTE — ED PROVIDER NOTES
ED Provider Note    CHIEF COMPLAINT  Chief Complaint   Patient presents with    Leg Pain     R leg pain began yesterday, begins tailbone radiates to leg, 8/10 deep dull pain, no injury, +CMS, recent sacral joint fusion x10 days prior       EXTERNAL RECORDS REVIEWED  Other Hospital records reviewed: Patient had a left and right sacroiliac joint fusion with Dr. Werner on 8/29/24.      HPI/ROS  LIMITATION TO HISTORY   Select: : None  OUTSIDE HISTORIAN(S):  None    Yolanda Reed is a 63 y.o. female with history of chronic back pain s/p multilevel fusion, history of ACDF of C5-T1 who presents to the emergency department with tailbone pain for the past 24 hours.  She had a bilateral sacroiliac joint fusion with Dr. Werner on 8/29/2024.  She been doing well since surgery with improvement in her low back pain, however yesterday she developed tailbone pain.  Denies any recent back trauma or falls.  She also has an aching pain in her right lower extremity.  Denies lower extremity swelling.  No weakness or sensory changes in lower extremities.  No saddle anesthesia.  No fevers or chills.  No urinary retention or incontinence.    PAST MEDICAL HISTORY   has a past medical history of Anesthesia (2006), Anxiety, Arthritis, Breath shortness (09/23/2022), Delayed emergence from general anesthesia, Dental disorder, Frequent falls, High cholesterol, Hypertension (01/05/2022), Infectious disease, Low blood sugar, MRSA (methicillin resistant Staphylococcus aureus), BRIGITTE (obstructive sleep apnea) (01/17/2023), Pain (09/23/2022), Pneumonia, PONV (postoperative nausea and vomiting), Psychiatric problem, PTSD (post-traumatic stress disorder), Sleep apnea, Snoring, and Urinary bladder disorder.    SURGICAL HISTORY   has a past surgical history that includes tonsillectomy; breast biopsy (08/20/2010); knee scope,aid ant cruciate repair (Right, 11/25/2019); bladder sling female (N/A, 06/29/2021); gyn surgery (1981,1988); other orthopedic  "surgery (Bilateral); other orthopedic surgery; corpectomy (N/A, 01/06/2022); cervical disk and fusion anterior (N/A, 01/06/2022); fusion, spine, lumbar, plif (Bilateral, 09/29/2022); lumbar decompression (Bilateral, 09/29/2022); laminotomy (01/19/2023); cervical decompression posterior (01/19/2023); cervical laminectomy posterior (01/19/2023); cervical fusion posterior (01/19/2023); injection,sacroiliac joint (Bilateral, 02/22/2024); and s i joint fusion (Bilateral, 8/29/2024).    FAMILY HISTORY  Family History   Problem Relation Age of Onset    Alcohol/Drug Mother     Anesthesia Mother     Anxiety disorder Mother     Bipolar disorder Mother     Cancer Mother     Lung Cancer Mother     Osteoporosis Mother     Allergies Father     Kidney stones Father     Lung Cancer Father     DVT Sister     Lung Cancer Sister        SOCIAL HISTORY  Social History     Tobacco Use    Smoking status: Former     Current packs/day: 0.00     Average packs/day: 0.5 packs/day for 30.0 years (15.0 ttl pk-yrs)     Types: Cigarettes     Start date: 11/11/1983     Quit date: 11/11/2013     Years since quitting: 10.8     Passive exposure: Past    Smokeless tobacco: Never    Tobacco comments:     .5 ppd, 30 yrs   Vaping Use    Vaping status: Some Days    Substances: Nicotine (3% nicotene), THC, Flavoring    Devices: Pre-filled or refillable cartridge   Substance and Sexual Activity    Alcohol use: Yes     Comment: rarely    Drug use: Yes     Types: Inhaled, Marijuana, Oral     Comment: \"clean since 2015\",  THC Use-vape, edibles as of 08/26/2024    Sexual activity: Not on file       CURRENT MEDICATIONS  Home Medications       Reviewed by Cary Gamez R.N. (Registered Nurse) on 09/13/24 at 1602  Med List Status: Partial     Medication Last Dose Status   atorvastatin (LIPITOR) 10 MG Tab  Active   B Complex Vitamins (VITAMIN-B COMPLEX PO)  Active   MAGNESIUM PO  Active   Multiple Vitamin (MULTIVITAMIN PO)  Active   oxyCODONE immediate release " (ROXICODONE) 10 MG immediate release tablet  Active   propranolol (INDERAL) 10 MG Tab  Active   QUEtiapine (SEROQUEL) 50 MG tablet  Active   sertraline (ZOLOFT) 100 MG Tab  Active   sulfamethoxazole-trimethoprim (BACTRIM DS) 800-160 MG tablet  Active   VITAMIN D PO  Active   VITAMIN K PO  Active                    ALLERGIES  Allergies   Allergen Reactions    Morphine Anaphylaxis     Throat swelling  Tolerates oxycodone    Dilaudid [Hydromorphone] Anxiety     Previously tolerated well    Tape Unspecified     Sensitive to tape-skin is really thin         PHYSICAL EXAM  VITAL SIGNS: BP (!) 141/93   Pulse 81   Temp 36.2 °C (97.1 °F) (Temporal)   Resp 16   Ht 1.524 m (5')   Wt 68 kg (150 lb)   LMP 08/01/2010   SpO2 96%   BMI 29.29 kg/m²    General: Well-appearing, no acute distress.   Eyes: EOM grossly intact BL.  HENT: MMM.   Neck: Normal ROM. No cervical lymphadenopathy or swelling. No midline cervical spine tenderness.   Lungs: Non-labored breathing. Clear to auscultation bilaterally. No wheezing or crackles.  Cardiac: Regular rate and rhythm. No murmurs. No lower extremity swelling. Equal and symmetric distal pulses. Well-perfused.  Abdomen: Soft, non-tender, non-distended. No rebound or guarding.   Spine: No midline T or L spine tenderness. Focal pain present over the tailbone.   Pelvis: Incisions from recent SI joint fusion healing well, staples in place, no surrounding drainage or erythema.   MSK: No joint swelling or erythema. Symmetric movement of all extremities.  Skin: No rashes, lesions, bruising, or petechiae. Well-perfused.   Neuro: 5/5 strength in the bilateral lower extremities.  Normal reflexes.  No sensory changes.    EKG/LABS  CBC - no leukocytosis, anemia, or platelet abnormalities.   CMP - normal renal function, nonactionable electrolytes, normal liver enzymes without an obstructive pattern.    RADIOLOGY/PROCEDURES   I have independently interpreted the diagnostic imaging associated with this  visit and am waiting the final reading from the radiologist.   My preliminary interpretation is as follows:   Pelvic X-rays: No fractures.  CT pelvis: No pelvic hematoma or other fluid collection.    Radiologist interpretation:  CT-PELVIS WITH   Final Result      1.  No acute fracture is identified      2.  No hematoma or abscess is identified      3.  Status post bilateral sacroiliac joint fusion with scarring/inflammation in the subcutaneous tissues of the buttocks bilaterally      US-EXTREMITY VENOUS LOWER UNILAT RIGHT   Final Result      DX-PELVIS-1 OR 2 VIEWS   Final Result      1.  Postoperative change of lower lumbar spine and bilateral SI joints.   2.  Hardware appears intact.   3.  No pelvic fracture.        COURSE & MEDICAL DECISION MAKING    ASSESSMENT, COURSE AND PLAN  Care Narrative:   Yolanda Reed is a 63 y.o. female with history of chronic back pain s/p multilevel fusion, history of ACDF of C5-T1 who presents to the emergency department with tailbone pain for the past 24 hours.  She had a bilateral sacroiliac joint fusion with Dr. Werner on 8/29/2024.  On my exam, ABCs are intact.  Vital signs are within normal limits and she is afebrile.  Her incisions from the SI joint fusion are well-healing without signs of infection or dehiscence.  She has full strength in her bilateral lower extremities without any sensory changes.  She has normal reflexes.  She has focal tenderness over the coccyx.  No lower extremity swelling or calf tenderness.  She does endorse aching in her right lower extremity.  She has no red flag symptoms for cauda equina including no fevers, chills, lower extremity weakness, sensory changes, saddle anesthesia, urinary incontinence or retention.  Differential includes nerve impingement, sciatica, hardware malfunction, postoperative hematoma, postoperative infection, fracture.    Based on her exam, have low suspicion for cauda equina.  Pelvic x-rays showed intact hardware, no  fractures.  Given that she has aching/pain in her right lower extremity, venous ultrasound ordered to rule out DVT.  CT pelvis ordered to better evaluate hardware and other postoperative complications, including pelvic hematoma, abscess.  CBC and CMP without actionable abnormalities.  No leukocytosis.  Right lower extremity ultrasound negative for DVT.  CT pelvis negative for fracture, pelvic abscess, postoperative hematoma, and the hardware appeared to be intact.    I reviewed the imaging findings with the patient.  Based on her reassuring exam, I believe she is safe for discharge.  She has follow-up with her surgeon Dr. Werner next week.  I recommended Tylenol, oxycodone, and gabapentin as needed for pain.  I suspect her symptoms could be related to nerve irritation or impingement from the surgery.  She has not been taking gabapentin, but she has a prescription, and I recommended she trial a few days of gabapentin to see if this alleviates her symptoms.  I reviewed strict return precautions, all of her questions were answered, and she was discharged in stable condition.    ADDITIONAL PROBLEMS MANAGED  N/A    DISPOSITION AND DISCUSSIONS  I have discussed management of the patient with the following physicians and SUNIL's: None    Discussion of management with other QHP or appropriate source(s): None    Escalation of care considered, and ultimately not performed: N/A    Barriers to care at this time, including but not limited to: N/A.     Decision tools and prescription drugs considered including, but not limited to: N/A.    FINAL DIAGNOSIS  1. Sciatica of right side Acute   2. Right leg pain Acute   3. Coccyx pain Acute        Electronically signed by: Brissa So D.O., 9/13/2024 4:26 PM

## 2024-09-14 NOTE — ED NOTES
"Pt called out for help because her call light button was out of reach. Pt asked to make the beeping stop. Pt stated \"I don't need all this shit, my vitals are fine\".  "

## 2024-09-14 NOTE — ED NOTES
PIV removed for discharge. Written and verbal instructions provided to pt. Pt instructed to follow up with PCP and continue home medications as needed for pain. Pt instructed to return to emergency department for new or worsening symptoms. Pt verbalized understanding of discharge instructions. Pt ambulatory upon discharge with all belongings.

## 2024-09-14 NOTE — ED NOTES
"Task RN note: pt up to bathroom to void, back in bed. Pt refusing monitors stating that she cannot handle the beeping. VS rechecked, monitors disconnected per pt wish. \"I just want to sleep.\" Pt updated with POC for CT with contrast, pt agreeable. Pt denies any other needs at this time.   "

## 2024-09-14 NOTE — ED NOTES
Bedside report from ALAN Townsend. Pt on 1L NC. Pt declines vitals monitoring at this time, updated on POC, pending CT.

## 2024-09-14 NOTE — DISCHARGE INSTRUCTIONS
Today you were evaluated in the emergency department with tailbone pain after your sacroiliac fusion surgery.  CT of your pelvis did not show any abnormalities, specifically no abscess, hematoma, or hardware malfunction.  The ultrasound of your right lower extremity did not show a blood clot.  I think your symptoms may be from an impinged nerve or sciatica.  I would take oxycodone as needed, as well as the gabapentin, which may help more with nerve pain.  Please follow-up with Dr. Baxter on Tuesday as scheduled.    Return to the emergency department if you develop fever, worsening back pain, lower extremity weakness or sensory changes, urinary incontinence or retention.

## 2024-11-04 ENCOUNTER — TELEPHONE (OUTPATIENT)
Dept: OBGYN | Facility: CLINIC | Age: 64
End: 2024-11-04
Payer: MEDICAID

## 2024-11-04 ENCOUNTER — RESEARCH ENCOUNTER (OUTPATIENT)
Dept: RESEARCH | Facility: MEDICAL CENTER | Age: 64
End: 2024-11-04

## 2024-11-04 DIAGNOSIS — Z00.6 CLINICAL TRIAL PARTICIPANT: ICD-10-CM

## 2024-11-04 NOTE — TELEPHONE ENCOUNTER
Left message informing patient that we have an  opening for this Wednesday 11/06. Patient is on our waiting list. MD@10:10AM

## 2024-11-18 ENCOUNTER — APPOINTMENT (OUTPATIENT)
Dept: ADMISSIONS | Facility: MEDICAL CENTER | Age: 64
End: 2024-11-18
Attending: OBSTETRICS & GYNECOLOGY
Payer: MEDICAID

## 2024-11-22 ENCOUNTER — PRE-ADMISSION TESTING (OUTPATIENT)
Dept: ADMISSIONS | Facility: MEDICAL CENTER | Age: 64
End: 2024-11-22
Attending: OBSTETRICS & GYNECOLOGY
Payer: MEDICAID

## 2024-11-22 DIAGNOSIS — Z01.812 PRE-OPERATIVE LABORATORY EXAMINATION: ICD-10-CM

## 2024-11-22 LAB
APPEARANCE UR: ABNORMAL
BACTERIA #/AREA URNS HPF: ABNORMAL /HPF
BASOPHILS # BLD AUTO: 0.4 % (ref 0–1.8)
BASOPHILS # BLD: 0.02 K/UL (ref 0–0.12)
BILIRUB UR QL STRIP.AUTO: NEGATIVE
CASTS URNS QL MICRO: ABNORMAL /LPF (ref 0–2)
COLOR UR: YELLOW
EOSINOPHIL # BLD AUTO: 0.24 K/UL (ref 0–0.51)
EOSINOPHIL NFR BLD: 4.4 % (ref 0–6.9)
EPITHELIAL CELLS 1715: ABNORMAL /HPF (ref 0–5)
ERYTHROCYTE [DISTWIDTH] IN BLOOD BY AUTOMATED COUNT: 40.3 FL (ref 35.9–50)
GLUCOSE UR STRIP.AUTO-MCNC: NEGATIVE MG/DL
HCT VFR BLD AUTO: 44.2 % (ref 37–47)
HGB BLD-MCNC: 14.6 G/DL (ref 12–16)
IMM GRANULOCYTES # BLD AUTO: 0.02 K/UL (ref 0–0.11)
IMM GRANULOCYTES NFR BLD AUTO: 0.4 % (ref 0–0.9)
KETONES UR STRIP.AUTO-MCNC: NEGATIVE MG/DL
LEUKOCYTE ESTERASE UR QL STRIP.AUTO: ABNORMAL
LYMPHOCYTES # BLD AUTO: 2.09 K/UL (ref 1–4.8)
LYMPHOCYTES NFR BLD: 38.1 % (ref 22–41)
MCH RBC QN AUTO: 29 PG (ref 27–33)
MCHC RBC AUTO-ENTMCNC: 33 G/DL (ref 32.2–35.5)
MCV RBC AUTO: 87.9 FL (ref 81.4–97.8)
MICRO URNS: ABNORMAL
MONOCYTES # BLD AUTO: 0.55 K/UL (ref 0–0.85)
MONOCYTES NFR BLD AUTO: 10 % (ref 0–13.4)
NEUTROPHILS # BLD AUTO: 2.57 K/UL (ref 1.82–7.42)
NEUTROPHILS NFR BLD: 46.7 % (ref 44–72)
NITRITE UR QL STRIP.AUTO: POSITIVE
NRBC # BLD AUTO: 0 K/UL
NRBC BLD-RTO: 0 /100 WBC (ref 0–0.2)
PH UR STRIP.AUTO: 6 [PH] (ref 5–8)
PLATELET # BLD AUTO: 295 K/UL (ref 164–446)
PMV BLD AUTO: 9.3 FL (ref 9–12.9)
PROT UR QL STRIP: 30 MG/DL
RBC # BLD AUTO: 5.03 M/UL (ref 4.2–5.4)
RBC # URNS HPF: ABNORMAL /HPF (ref 0–2)
RBC UR QL AUTO: NEGATIVE
SP GR UR STRIP.AUTO: 1.02
UROBILINOGEN UR STRIP.AUTO-MCNC: 1 EU/DL
WBC # BLD AUTO: 5.5 K/UL (ref 4.8–10.8)
WBC #/AREA URNS HPF: ABNORMAL /HPF

## 2024-11-22 PROCEDURE — 81001 URINALYSIS AUTO W/SCOPE: CPT

## 2024-11-22 PROCEDURE — 85025 COMPLETE CBC W/AUTO DIFF WBC: CPT

## 2024-11-22 PROCEDURE — 36415 COLL VENOUS BLD VENIPUNCTURE: CPT

## 2024-11-22 NOTE — H&P
"Attending Physician: Lily Jack .M.D      CC: Scheduled for Foriegn graft removal from vagina .  here to discuss the   proposed surgery .    HPI:  vaginal bleeding and pain   Dyspareunia.    From reminant of mesh removal prior attempt.   h/o Bladder sling placed in 2020 Dr.Susan ORR post-operatively had issues had repair in office x 2. but has trouble with dyspareunia ever since and mesh erosion later had mesh removal In OR 2024 with estrogen cream postoperatively. did well for some time and then had same trouble again.  Social History     Socioeconomic History    Marital status:      Spouse name: Not on file    Number of children: Not on file    Years of education: Not on file    Highest education level: Not on file   Occupational History    Not on file   Tobacco Use    Smoking status: Former     Current packs/day: 0.00     Average packs/day: 0.5 packs/day for 30.0 years (15.0 ttl pk-yrs)     Types: Cigarettes     Start date: 1983     Quit date: 2013     Years since quittin.0     Passive exposure: Past    Smokeless tobacco: Never    Tobacco comments:     .5 ppd, 30 yrs   Vaping Use    Vaping status: Some Days    Substances: Nicotine (3% nicotene), THC, Flavoring    Devices: Pre-filled or refillable cartridge   Substance and Sexual Activity    Alcohol use: Yes     Comment: rarely    Drug use: Yes     Types: Inhaled, Marijuana, Oral     Comment: \"clean since \",  THC Use-vape, edibles as of 2024    Sexual activity: Not on file   Other Topics Concern    Not on file   Social History Narrative    Not on file     Social Drivers of Health     Financial Resource Strain: Low Risk  (3/1/2022)    Overall Financial Resource Strain (CARDIA)     Difficulty of Paying Living Expenses: Not very hard   Food Insecurity: No Food Insecurity (3/1/2022)    Hunger Vital Sign     Worried About Running Out of Food in the Last Year: Never true     Ran Out of Food in the Last Year: Never true "   Transportation Needs: No Transportation Needs (3/1/2022)    PRAPARE - Transportation     Lack of Transportation (Medical): No     Lack of Transportation (Non-Medical): No   Physical Activity: Not on file   Stress: Not on file   Social Connections: Not on file   Intimate Partner Violence: Not on file   Housing Stability: Not on file       Active Ambulatory Problems     Diagnosis Date Noted    Nausea and vomiting 06/29/2021    Delayed emergence from anesthesia 06/29/2021    Essential hypertension 06/29/2021    Obstructive sleep apnea syndrome 06/29/2021    Cervical myelopathy with cervical radiculopathy (HCC) 01/06/2022    Hyperlipidemia 01/17/2022    Pain in the chest 01/17/2022    Morbid obesity (HCC) 01/17/2022    Anxiety 01/17/2022    Chest pain, rule out acute myocardial infarction 01/17/2022    Falls frequently 02/16/2022    Electrolyte imbalance 02/16/2022    Wound infection after surgery 02/16/2022    Acute respiratory failure with hypoxia (HCC) 02/26/2022    S/P lumbar fusion 09/29/2022    S/P cervical spinal fusion 01/19/2023     Resolved Ambulatory Problems     Diagnosis Date Noted    No Resolved Ambulatory Problems     Past Medical History:   Diagnosis Date    Anesthesia 2006    Arthritis     Breath shortness 09/23/2022    Delayed emergence from general anesthesia     Dental disorder     Frequent falls     High cholesterol     Hypertension 01/05/2022    Infectious disease     Low blood sugar     MRSA (methicillin resistant Staphylococcus aureus)     BRIGITTE (obstructive sleep apnea) 01/17/2023    Pain 09/23/2022    Pneumonia     PONV (postoperative nausea and vomiting)     Psychiatric problem     PTSD (post-traumatic stress disorder)     Sleep apnea     Snoring     Urinary bladder disorder     Urinary incontinence        No current facility-administered medications on file prior to encounter.     Current Outpatient Medications on File Prior to Encounter   Medication Sig Dispense Refill    oxyCODONE immediate  release (ROXICODONE) 10 MG immediate release tablet Take 10 mg by mouth every 8 hours as needed for Severe Pain.     MEDICATION INSTRUCTIONS FOR SURGERY/PROCEDURE SCHEDULED FOR 11/27/2024   CONTINUE TAKING MED PRIOR TO SURGERY      B Complex Vitamins (VITAMIN-B COMPLEX PO) Take 1 Tablet by mouth every day.     MEDICATION INSTRUCTIONS FOR SURGERY/PROCEDURE SCHEDULED FOR 11/27/2024   DO NOT TAKE 7 DAYS PRIOR TO SURGERY      Multiple Vitamin (MULTIVITAMIN PO) Take 1 Tablet by mouth every day.   MEDICATION INSTRUCTIONS FOR SURGERY/PROCEDURE SCHEDULED FOR 11/27/2024   DO NOT TAKE 7 DAYS PRIOR TO SURGERY      VITAMIN K PO Take 1 Tablet by mouth every day.     MEDICATION INSTRUCTIONS FOR SURGERY/PROCEDURE SCHEDULED FOR 11/27/2024   DO NOT TAKE 7 DAYS PRIOR TO SURGERY      MAGNESIUM PO Take 1 Tablet by mouth every day.     MEDICATION INSTRUCTIONS FOR SURGERY/PROCEDURE SCHEDULED FOR 11/27/2024   DO NOT TAKE 7 DAYS PRIOR TO SURGERY      QUEtiapine (SEROQUEL) 50 MG tablet Take 50 mg by mouth at bedtime.     MEDICATION INSTRUCTIONS FOR SURGERY/PROCEDURE SCHEDULED FOR 11/27/2024   CONTINUE TAKING MED PRIOR TO SURGERY      propranolol (INDERAL) 10 MG Tab Take 20 mg by mouth every day. For BP    MEDICATION INSTRUCTIONS FOR SURGERY/PROCEDURE SCHEDULED FOR 11/27/2024   CONTINUE TAKING MED PRIOR TO SURGERY      atorvastatin (LIPITOR) 10 MG Tab Take 10 mg by mouth every day.     MEDICATION INSTRUCTIONS FOR SURGERY/PROCEDURE SCHEDULED FOR 11/27/2024   CONTINUE TAKING MED PRIOR TO SURGERY      sulfamethoxazole-trimethoprim (BACTRIM DS) 800-160 MG tablet Take 1 Tablet by mouth at bedtime. For staph prophylaxis    MEDICATION INSTRUCTIONS FOR SURGERY/PROCEDURE SCHEDULED FOR 11/27/2024   CONTINUE TAKING MED PRIOR TO SURGERY      VITAMIN D PO Take 1 Tablet by mouth every day.     MEDICATION INSTRUCTIONS FOR SURGERY/PROCEDURE SCHEDULED FOR 11/27/2024   DO NOT TAKE 7 DAYS PRIOR TO SURGERY      sertraline (ZOLOFT) 100 MG Tab Take 100 mg by  mouth every evening.   MEDICATION INSTRUCTIONS FOR SURGERY/PROCEDURE SCHEDULED FOR 11/27/2024   CONTINUE TAKING MED PRIOR TO SURGERY         Past Surgical History:   Procedure Laterality Date    S I JOINT FUSION Bilateral 8/29/2024    Procedure: LEFT AND RIGHT SACROILIAC JOINT FUSION;  Surgeon: Jama Werner M.D.;  Location: [facility];  Service: Orthopedics    SD INJECTION,SACROILIAC JOINT Bilateral 02/22/2024    Procedure: RIGHT and LEFT sacroiliac joint injection with fluoroscopic guidance;  Surgeon: Zenobia Ortiz M.D.;  Location: [facility];  Service: Pain Management    LAMINOTOMY  01/19/2023    Procedure: C3 LAMINOPLASTY, C4-T1 DECOMPRESSIVE LAMINECTOMY, C4-T1 POSTERIOR SPINAL FUSION;  Surgeon: Jama Werner M.D.;  Location: [facility];  Service: Orthopedics    CERVICAL DECOMPRESSION POSTERIOR  01/19/2023    Procedure: DECOMPRESSION, SPINE, CERVICAL, POSTERIOR APPROACH;  Surgeon: Jama Werner M.D.;  Location: [facility];  Service: Orthopedics    CERVICAL LAMINECTOMY POSTERIOR  01/19/2023    Procedure: LAMINECTOMY, SPINE, CERVICAL, POSTERIOR APPROACH;  Surgeon: Jama Werner M.D.;  Location: [facility];  Service: Orthopedics    CERVICAL FUSION POSTERIOR  01/19/2023    Procedure: FUSION, SPINE, CERVICAL, POSTERIOR APPROACH;  Surgeon: Jama Werner M.D.;  Location: [facility];  Service: Orthopedics    FUSION, SPINE, LUMBAR, PLIF Bilateral 09/29/2022    Procedure: L3-L5 TRANSFORAMINAL LUMBAR INTERBODY FUSION, L2-5 DECOMPRESSION;  Surgeon: Jama Werner M.D.;  Location: [facility];  Service: Orthopedics    LUMBAR DECOMPRESSION Bilateral 09/29/2022    Procedure: DECOMPRESSION, SPINE, LUMBAR;  Surgeon: Jama Werner M.D.;  Location: [facility];  Service: Orthopedics    CORPECTOMY N/A 01/06/2022    Procedure: CORPECTOMY, SPINE - PARTIAL;  Surgeon: Jama Werner M.D.;  Location: [facility];  Service: Orthopedics     CERVICAL DISK AND FUSION ANTERIOR N/A 2022    Procedure: DISCECTOMY, SPINE, CERVICAL, ANTERIOR APPROACH, WITH FUSION - C5-T1;  Surgeon: Jama Werner M.D.;  Location: SURGERY [facility];  Service: Orthopedics    BLADDER SLING FEMALE N/A 2021    Procedure: BLADDER SLING, FEMALE - SOLYX.;  Surgeon: Rocío Dewey M.D.;  Location: SURGERY [facility];  Service: Gynecology    PB KNEE SCOPE,AID ANT CRUCIATE REPAIR Right 2019    Procedure: RT KNEE ARTHROSCOPY WITH ALLOGRAFT ACL RECONSTRUCTION AND PARTIAL MENISCECTOMY;  Surgeon: Onur Arriaza M.D.;  Location: SURGERY [facility];  Service: Orthopedics    BREAST BIOPSY  2010    Performed by ROCÍO BARON at SURGERY SAME DAY [facility]    GYN SURGERY  ,        OTHER ORTHOPEDIC SURGERY Bilateral     acl left knee, right knee    OTHER ORTHOPEDIC SURGERY      right shoulder    TONSILLECTOMY         Family History   Problem Relation Age of Onset    Alcohol/Drug Mother     Anesthesia Mother     Anxiety disorder Mother     Bipolar disorder Mother     Cancer Mother     Lung Cancer Mother     Osteoporosis Mother     Allergies Father     Kidney stones Father     Lung Cancer Father     DVT Sister     Lung Cancer Sister        Allergies   Allergen Reactions    Morphine Anaphylaxis     Throat swelling  Tolerates oxycodone    Dilaudid [Hydromorphone] Anxiety     Previously tolerated well    Tape Unspecified     Sensitive to tape-skin is really thin         Review of Systems:     Constitutional: Negative for fever, chills, weight loss, malaise/fatigue and diaphoresis.   HENT: Negative for hearing loss, ear pain, and ear discharge.   Eyes: Negative for blurred vision, double vision,  and redness.   Respiratory: Negative for cough, hemoptysis, sputum production, shortness of breath, wheezing and stridor.   Cardiovascular: Negative for chest pain, palpitations, orthopnea,and PND.   Gastrointestinal: regular bowel and bladder habits    Genitourinary: Negative for dysuria, urgency, frequency, hematuria and flank pain.   Menstrual:menopausal.   Musculoskeletal: Negative for myalgias, back pain, joint pain and falls.   Skin: Negative for itching and rash.   Neurological: Negative for dizziness, speech change, focal weakness, seizures, loss of consciousness, weakness and headaches.   Endo/Heme/Allergies: Negative for environmental allergies and polydipsia. Does not bruise/bleed easily.   Psychiatric/Behavioral: Negative for depression, suicidal ideas, hallucinations, memory loss and substance abuse. The patient is not nervous/anxious and does not have insomnia.   Physical Exam:   See EMR for current vitals.    There were no vitals filed for this visit.    Constitutional: she is oriented to person, place, and time. she appears well-developed and well-nourished. No  distress.   Head: Normocephalic and atraumatic.   Neck: Normal range of motion. Neck supple. No JVD present. No tracheal deviation present. No thyromegaly present.   Cardiovascular: Normal rate, regular rhythm, normal heart sounds and intact distal pulses. Exam reveals no gallop and no friction rub. No murmur heard.   Pulmonary/Chest: Effort normal and breath sounds normal. No stridor. No respiratory distress. she has no wheezes. She has no rales.     Abdominal: Soft. There is no tenderness. There is no rebound and no guarding.     Musculoskeletal: Normal range of motion. she exhibits no edema and no tenderness.   Neurological: she is alert and oriented to person, place, and time. she has normal reflexes. No cranial nerve deficit. Coordination normal.   Skin: Skin is warm and dry. No rash noted. She is diaphoretic. No erythema. No pallor.   Psychiatric: she has a normal mood and affect. Behavior is normal.   P/e- Vaginal mucosa is pink. There is right side of lower vagina. band of tissue felt. and raw surface with mesh protruding out. no cystocele or rectocele.   The uterine body is of  small size and without masses or tenderness. not tender to palpation.      Assessment:   64 y/o    c/s x2    x2   had still born @ 28 weeks.   menopausal  for years no HRT.    pelvic pain    -h/o Bladder sling placed in 2020 Dr.Susan ORR post-operatively had issues had repair in office x 2. but has trouble with dyspareunia ever since and mesh erosion later had mesh removal In OR 2024 with estrogen cream postoperatively. did well for some time and then had same trouble again Seen PCP for pap told the mesh if protruding and needs to come out  and reffered here.      talked to the patient the complication of sling procedure and removal is more complicated and not able to get the entire mesh out as is hidden behind the bone also  if mesh is protruding inside bladder may not be able to get it out and has to see urologist, option to see a urologist was discussed.  pt wants to go ahead with removal ,risk of injury and fistula formation was reviewed with patient.      Plan- vaginal Mesh removal and cystoscopy.     Plan:      Complete discussion regarding the proposed procedure was conducted both today and throughout the entire mariann-operative period. The procedure:  Revision of vaginal graft, Vaginal foriegn body/Mesh /graft removal, possible cystoscopy.   Was specifically addressed. There is increased risk from  any surgical procedure related to  Anesthesia, increased risk of infection, both abdominal and wound infections as well as heavy bleeding, both during surgery, or delayed bleeding. These were discussed as well.     pre-operative instructions given. All questions were answered to   their satisfaction. We will follow closely.                               __________________

## 2024-11-22 NOTE — OR NURSING
In person preadmit appointment completed. Pt states understanding of instructions including increasing oral hydration the day prior to surgery.

## 2024-11-26 NOTE — OR NURSING
Preadmit: Call to patient to encourage increased oral fluid intake the day prior to procedure/surgery including intake of electrolyte drinks such as Gatorade or electrolyte water. Patient may have clear liquids until 2 hours prior to surgery.  Surgery date 11/27/24.

## 2024-11-27 ENCOUNTER — ANESTHESIA (OUTPATIENT)
Dept: SURGERY | Facility: MEDICAL CENTER | Age: 64
End: 2024-11-27
Payer: MEDICAID

## 2024-11-27 ENCOUNTER — PHARMACY VISIT (OUTPATIENT)
Dept: PHARMACY | Facility: MEDICAL CENTER | Age: 64
End: 2024-11-27
Payer: COMMERCIAL

## 2024-11-27 ENCOUNTER — HOSPITAL ENCOUNTER (OUTPATIENT)
Facility: MEDICAL CENTER | Age: 64
End: 2024-11-27
Attending: OBSTETRICS & GYNECOLOGY | Admitting: OBSTETRICS & GYNECOLOGY
Payer: MEDICAID

## 2024-11-27 ENCOUNTER — ANESTHESIA EVENT (OUTPATIENT)
Dept: SURGERY | Facility: MEDICAL CENTER | Age: 64
End: 2024-11-27
Payer: MEDICAID

## 2024-11-27 VITALS
HEART RATE: 84 BPM | WEIGHT: 147.71 LBS | BODY MASS INDEX: 29 KG/M2 | HEIGHT: 60 IN | DIASTOLIC BLOOD PRESSURE: 42 MMHG | TEMPERATURE: 97.1 F | SYSTOLIC BLOOD PRESSURE: 91 MMHG | RESPIRATION RATE: 18 BRPM | OXYGEN SATURATION: 94 %

## 2024-11-27 DIAGNOSIS — Z98.890 POST-OPERATIVE STATE: ICD-10-CM

## 2024-11-27 LAB — PATHOLOGY CONSULT NOTE: NORMAL

## 2024-11-27 PROCEDURE — 160025 RECOVERY II MINUTES (STATS): Performed by: OBSTETRICS & GYNECOLOGY

## 2024-11-27 PROCEDURE — 160028 HCHG SURGERY MINUTES - 1ST 30 MINS LEVEL 3: Performed by: OBSTETRICS & GYNECOLOGY

## 2024-11-27 PROCEDURE — 700101 HCHG RX REV CODE 250: Mod: UD | Performed by: OBSTETRICS & GYNECOLOGY

## 2024-11-27 PROCEDURE — 160035 HCHG PACU - 1ST 60 MINS PHASE I: Performed by: OBSTETRICS & GYNECOLOGY

## 2024-11-27 PROCEDURE — 160002 HCHG RECOVERY MINUTES (STAT): Performed by: OBSTETRICS & GYNECOLOGY

## 2024-11-27 PROCEDURE — 700105 HCHG RX REV CODE 258: Mod: UD | Performed by: OBSTETRICS & GYNECOLOGY

## 2024-11-27 PROCEDURE — 160039 HCHG SURGERY MINUTES - EA ADDL 1 MIN LEVEL 3: Performed by: OBSTETRICS & GYNECOLOGY

## 2024-11-27 PROCEDURE — 110454 HCHG SHELL REV 250: Performed by: OBSTETRICS & GYNECOLOGY

## 2024-11-27 PROCEDURE — 88300 SURGICAL PATH GROSS: CPT

## 2024-11-27 PROCEDURE — 160048 HCHG OR STATISTICAL LEVEL 1-5: Performed by: OBSTETRICS & GYNECOLOGY

## 2024-11-27 PROCEDURE — RXMED WILLOW AMBULATORY MEDICATION CHARGE: Performed by: OBSTETRICS & GYNECOLOGY

## 2024-11-27 PROCEDURE — 700111 HCHG RX REV CODE 636 W/ 250 OVERRIDE (IP): Mod: UD | Performed by: ANESTHESIOLOGY

## 2024-11-27 PROCEDURE — 700111 HCHG RX REV CODE 636 W/ 250 OVERRIDE (IP): Mod: JZ,UD | Performed by: ANESTHESIOLOGY

## 2024-11-27 PROCEDURE — 700101 HCHG RX REV CODE 250: Mod: UD | Performed by: ANESTHESIOLOGY

## 2024-11-27 PROCEDURE — 160047 HCHG PACU  - EA ADDL 30 MINS PHASE II: Performed by: OBSTETRICS & GYNECOLOGY

## 2024-11-27 PROCEDURE — 700102 HCHG RX REV CODE 250 W/ 637 OVERRIDE(OP): Mod: UD | Performed by: ANESTHESIOLOGY

## 2024-11-27 PROCEDURE — 160009 HCHG ANES TIME/MIN: Performed by: OBSTETRICS & GYNECOLOGY

## 2024-11-27 PROCEDURE — 160046 HCHG PACU - 1ST 60 MINS PHASE II: Performed by: OBSTETRICS & GYNECOLOGY

## 2024-11-27 PROCEDURE — A9270 NON-COVERED ITEM OR SERVICE: HCPCS | Mod: UD | Performed by: ANESTHESIOLOGY

## 2024-11-27 RX ORDER — CELECOXIB 200 MG/1
200 CAPSULE ORAL ONCE
Status: COMPLETED | OUTPATIENT
Start: 2024-11-27 | End: 2024-11-27

## 2024-11-27 RX ORDER — SODIUM CHLORIDE, SODIUM LACTATE, POTASSIUM CHLORIDE, CALCIUM CHLORIDE 600; 310; 30; 20 MG/100ML; MG/100ML; MG/100ML; MG/100ML
INJECTION, SOLUTION INTRAVENOUS CONTINUOUS
Status: DISCONTINUED | OUTPATIENT
Start: 2024-11-27 | End: 2024-11-27 | Stop reason: HOSPADM

## 2024-11-27 RX ORDER — LIDOCAINE HYDROCHLORIDE 20 MG/ML
INJECTION, SOLUTION EPIDURAL; INFILTRATION; INTRACAUDAL; PERINEURAL PRN
Status: DISCONTINUED | OUTPATIENT
Start: 2024-11-27 | End: 2024-11-27 | Stop reason: SURG

## 2024-11-27 RX ORDER — IPRATROPIUM BROMIDE AND ALBUTEROL SULFATE 2.5; .5 MG/3ML; MG/3ML
3 SOLUTION RESPIRATORY (INHALATION)
Status: DISCONTINUED | OUTPATIENT
Start: 2024-11-27 | End: 2024-11-27 | Stop reason: HOSPADM

## 2024-11-27 RX ORDER — SODIUM CHLORIDE, SODIUM LACTATE, POTASSIUM CHLORIDE, CALCIUM CHLORIDE 600; 310; 30; 20 MG/100ML; MG/100ML; MG/100ML; MG/100ML
INJECTION, SOLUTION INTRAVENOUS CONTINUOUS
Status: ACTIVE | OUTPATIENT
Start: 2024-11-27 | End: 2024-11-27

## 2024-11-27 RX ORDER — METOPROLOL TARTRATE 1 MG/ML
1 INJECTION, SOLUTION INTRAVENOUS
Status: DISCONTINUED | OUTPATIENT
Start: 2024-11-27 | End: 2024-11-27 | Stop reason: HOSPADM

## 2024-11-27 RX ORDER — ROCURONIUM BROMIDE 10 MG/ML
INJECTION, SOLUTION INTRAVENOUS PRN
Status: DISCONTINUED | OUTPATIENT
Start: 2024-11-27 | End: 2024-11-27 | Stop reason: SURG

## 2024-11-27 RX ORDER — DIPHENHYDRAMINE HYDROCHLORIDE 50 MG/ML
12.5 INJECTION INTRAMUSCULAR; INTRAVENOUS
Status: DISCONTINUED | OUTPATIENT
Start: 2024-11-27 | End: 2024-11-27 | Stop reason: HOSPADM

## 2024-11-27 RX ORDER — OXYCODONE HCL 5 MG/5 ML
5 SOLUTION, ORAL ORAL
Status: DISCONTINUED | OUTPATIENT
Start: 2024-11-27 | End: 2024-11-27 | Stop reason: HOSPADM

## 2024-11-27 RX ORDER — HYDRALAZINE HYDROCHLORIDE 20 MG/ML
5 INJECTION INTRAMUSCULAR; INTRAVENOUS
Status: DISCONTINUED | OUTPATIENT
Start: 2024-11-27 | End: 2024-11-27 | Stop reason: HOSPADM

## 2024-11-27 RX ORDER — VASOPRESSIN 20 U/ML
INJECTION PARENTERAL PRN
Status: DISCONTINUED | OUTPATIENT
Start: 2024-11-27 | End: 2024-11-27 | Stop reason: SURG

## 2024-11-27 RX ORDER — ONDANSETRON 2 MG/ML
INJECTION INTRAMUSCULAR; INTRAVENOUS PRN
Status: DISCONTINUED | OUTPATIENT
Start: 2024-11-27 | End: 2024-11-27 | Stop reason: SURG

## 2024-11-27 RX ORDER — OXYCODONE AND ACETAMINOPHEN 5; 325 MG/1; MG/1
1 TABLET ORAL EVERY 4 HOURS PRN
Qty: 15 TABLET | Refills: 0 | Status: SHIPPED | OUTPATIENT
Start: 2024-11-27 | End: 2024-12-01

## 2024-11-27 RX ORDER — IBUPROFEN 600 MG/1
600 TABLET, FILM COATED ORAL EVERY 6 HOURS PRN
Qty: 30 TABLET | Refills: 0 | Status: SHIPPED | OUTPATIENT
Start: 2024-11-27

## 2024-11-27 RX ORDER — CEFAZOLIN SODIUM 1 G/3ML
INJECTION, POWDER, FOR SOLUTION INTRAMUSCULAR; INTRAVENOUS PRN
Status: DISCONTINUED | OUTPATIENT
Start: 2024-11-27 | End: 2024-11-27 | Stop reason: SURG

## 2024-11-27 RX ORDER — ACETAMINOPHEN 500 MG
1000 TABLET ORAL ONCE
Status: COMPLETED | OUTPATIENT
Start: 2024-11-27 | End: 2024-11-27

## 2024-11-27 RX ORDER — ONDANSETRON 2 MG/ML
4 INJECTION INTRAMUSCULAR; INTRAVENOUS
Status: COMPLETED | OUTPATIENT
Start: 2024-11-27 | End: 2024-11-27

## 2024-11-27 RX ORDER — HALOPERIDOL 5 MG/ML
1 INJECTION INTRAMUSCULAR
Status: DISCONTINUED | OUTPATIENT
Start: 2024-11-27 | End: 2024-11-27 | Stop reason: HOSPADM

## 2024-11-27 RX ORDER — OXYCODONE HCL 5 MG/5 ML
10 SOLUTION, ORAL ORAL
Status: DISCONTINUED | OUTPATIENT
Start: 2024-11-27 | End: 2024-11-27 | Stop reason: HOSPADM

## 2024-11-27 RX ORDER — METHOCARBAMOL 500 MG/1
500 TABLET, FILM COATED ORAL ONCE
Status: COMPLETED | OUTPATIENT
Start: 2024-11-27 | End: 2024-11-27

## 2024-11-27 RX ORDER — DEXAMETHASONE SODIUM PHOSPHATE 4 MG/ML
INJECTION, SOLUTION INTRA-ARTICULAR; INTRALESIONAL; INTRAMUSCULAR; INTRAVENOUS; SOFT TISSUE PRN
Status: DISCONTINUED | OUTPATIENT
Start: 2024-11-27 | End: 2024-11-27 | Stop reason: SURG

## 2024-11-27 RX ORDER — HYDROMORPHONE HYDROCHLORIDE 2 MG/ML
INJECTION, SOLUTION INTRAMUSCULAR; INTRAVENOUS; SUBCUTANEOUS PRN
Status: DISCONTINUED | OUTPATIENT
Start: 2024-11-27 | End: 2024-11-27 | Stop reason: SURG

## 2024-11-27 RX ORDER — LIDOCAINE HYDROCHLORIDE 40 MG/ML
SOLUTION TOPICAL PRN
Status: DISCONTINUED | OUTPATIENT
Start: 2024-11-27 | End: 2024-11-27 | Stop reason: SURG

## 2024-11-27 RX ADMIN — ACETAMINOPHEN 1000 MG: 500 TABLET ORAL at 13:55

## 2024-11-27 RX ADMIN — CEFAZOLIN 2 G: 1 INJECTION, POWDER, FOR SOLUTION INTRAMUSCULAR; INTRAVENOUS at 14:38

## 2024-11-27 RX ADMIN — SUGAMMADEX 200 MG: 100 INJECTION, SOLUTION INTRAVENOUS at 15:17

## 2024-11-27 RX ADMIN — ONDANSETRON 4 MG: 2 INJECTION INTRAMUSCULAR; INTRAVENOUS at 15:10

## 2024-11-27 RX ADMIN — VASOPRESSIN 1.5 UNITS: 20 INJECTION INTRAVENOUS at 14:43

## 2024-11-27 RX ADMIN — HALOPERIDOL LACTATE 1 MG: 5 INJECTION, SOLUTION INTRAMUSCULAR at 15:52

## 2024-11-27 RX ADMIN — VASOPRESSIN 1 UNITS: 20 INJECTION INTRAVENOUS at 15:13

## 2024-11-27 RX ADMIN — LIDOCAINE HYDROCHLORIDE 60 MG: 20 INJECTION, SOLUTION EPIDURAL; INFILTRATION; INTRACAUDAL; PERINEURAL at 14:38

## 2024-11-27 RX ADMIN — METHOCARBAMOL TABLETS 500 MG: 500 TABLET, COATED ORAL at 13:55

## 2024-11-27 RX ADMIN — ROCURONIUM BROMIDE 50 MG: 50 INJECTION, SOLUTION INTRAVENOUS at 14:38

## 2024-11-27 RX ADMIN — SODIUM CHLORIDE, POTASSIUM CHLORIDE, SODIUM LACTATE AND CALCIUM CHLORIDE: 600; 310; 30; 20 INJECTION, SOLUTION INTRAVENOUS at 14:30

## 2024-11-27 RX ADMIN — DEXAMETHASONE SODIUM PHOSPHATE 8 MG: 4 INJECTION INTRA-ARTICULAR; INTRALESIONAL; INTRAMUSCULAR; INTRAVENOUS; SOFT TISSUE at 14:38

## 2024-11-27 RX ADMIN — VASOPRESSIN 1.5 UNITS: 20 INJECTION INTRAVENOUS at 14:52

## 2024-11-27 RX ADMIN — VASOPRESSIN 1 UNITS: 20 INJECTION INTRAVENOUS at 15:04

## 2024-11-27 RX ADMIN — LIDOCAINE HYDROCHLORIDE 4 ML: 40 SOLUTION TOPICAL at 14:39

## 2024-11-27 RX ADMIN — PROPOFOL 130 MG: 10 INJECTION, EMULSION INTRAVENOUS at 14:38

## 2024-11-27 RX ADMIN — ONDANSETRON 4 MG: 2 INJECTION INTRAMUSCULAR; INTRAVENOUS at 15:35

## 2024-11-27 RX ADMIN — CELECOXIB 200 MG: 200 CAPSULE ORAL at 13:55

## 2024-11-27 RX ADMIN — HYDROMORPHONE HYDROCHLORIDE 1 MG: 2 INJECTION INTRAMUSCULAR; INTRAVENOUS; SUBCUTANEOUS at 14:33

## 2024-11-27 ASSESSMENT — PAIN SCALES - GENERAL: PAIN_LEVEL: 8

## 2024-11-27 ASSESSMENT — FIBROSIS 4 INDEX: FIB4 SCORE: 1.55

## 2024-11-27 NOTE — OR SURGEON
Immediate Post OP Note    PreOp Diagnosis:  mesh erosion.        PostOp Diagnosis: same.       Procedure(s):  REVISION OF VAGINAL GRAFT,  REMOVAL OF PROSTHETIC GRAFT MATERIAL, CYSTOSCOPY, ALL INDICATED PROCEDURES - Wound Class: Clean Contaminated  CYSTOSCOPY    Surgeon(s):  Guero Bautista M.D.    Anesthesiologist/Type of Anesthesia:  Anesthesiologist: Dustin Carroll M.D./General    Surgical Staff:  Circulator: Mayte Amaya R.N.; Cynthia Simpson R.N.; Radha Rai R.N.  Scrub Person: Avis Alamo    Specimens removed if any:  ID Type Source Tests Collected by Time Destination   A : mesh foreign body Other Other PATHOLOGY SPECIMEN Guero Bautista M.D. 11/27/2024 1503        Estimated Blood Loss: minimal     Findings: left vaginal wall has protruding stings of mesh just underneath the urethra. Cytoscopy normal bladder bilateral jet flow noted no etones or lesions.     Complications: none.         11/27/2024 3:22 PM Guero Bautista M.D.

## 2024-11-27 NOTE — ANESTHESIA TIME REPORT
Anesthesia Start and Stop Event Times       Date Time Event    11/27/2024 1417 Ready for Procedure     1430 Anesthesia Start     1531 Anesthesia Stop          Responsible Staff  11/27/24      Name Role Begin End    Dustin Carroll M.D. Anesth 1430 1531          Overtime Reason:  no overtime (within assigned shift)    Comments:

## 2024-11-27 NOTE — ANESTHESIA PROCEDURE NOTES
Airway    Date/Time: 11/27/2024 2:39 PM    Performed by: Dustin Carroll M.D.  Authorized by: Dustin Carroll M.D.    Location:  OR  Urgency:  Elective  Indications for Airway Management:  Anesthesia      Spontaneous Ventilation: absent    Sedation Level:  Deep  Preoxygenated: Yes    Patient Position:  Sniffing  Final Airway Type:  Endotracheal airway  Final Endotracheal Airway:  ETT  Cuffed: Yes    Technique Used for Successful ETT Placement:  Direct laryngoscopy    Insertion Site:  Oral  Blade Type:  Halie  Laryngoscope Blade/Videolaryngoscope Blade Size:  3  ETT Size (mm):  7.0  Measured from:  Gums  ETT to Gums (cm):  21  Placement Verified by: auscultation and capnometry    Cormack-Lehane Classification:  Grade I - full view of glottis  Number of Attempts at Approach:  1

## 2024-11-27 NOTE — ANESTHESIA PREPROCEDURE EVALUATION
Case: [id number] Date/Time: 11/27/24 1215    Procedures:       REVISION OF VAGINAL GRAFT, POSSIBLE REMOVAL OF PROSTHETIC GRAFT MATERIAL, POSSIBLE CYSTOSCOPY, ALL INDICATED PROCEDURES      CYSTOSCOPY    Pre-op diagnosis: PELVIC PAIN, EROSION OF VAGINAL GRAFT MATERIAL    Location: [facility] ROOM 21 / SURGERY SAME DAY [facility]    Surgeons: Guero Bautista M.D.            Past Medical History:   Diagnosis Date    Anesthesia 2006    slow to wake up and PONV - no problems since per pt    Anxiety     Arthritis     shoulders    Breath shortness 09/23/2022    on exertion    Delayed emergence from general anesthesia     Per Problem List    Dental disorder     upper dentures    Frequent falls     Uses Cane    High cholesterol     Hypertension 01/05/2022    pt states well controlled on meds    Infectious disease     COVID Nov 2020 and hx of MRSA    Low blood sugar     MRSA (methicillin resistant Staphylococcus aureus)     H/O    BRIGITTE (obstructive sleep apnea) 01/17/2023    Has not been using CPAP at this time.    Pain 09/23/2022    low back and neck    Pneumonia     Nov 2020 + COVID    PONV (postoperative nausea and vomiting)     Psychiatric problem     depression    PTSD (post-traumatic stress disorder)     triggered by any one yelling    Sleep apnea     cpap    Snoring     Urinary bladder disorder     bladder sling put in 6/29/21-now removed as of 08/26/2024    Urinary incontinence        Past Surgical History:   Procedure Laterality Date    S I JOINT FUSION Bilateral 8/29/2024    Procedure: LEFT AND RIGHT SACROILIAC JOINT FUSION;  Surgeon: Jama Werner M.D.;  Location: SURGERY [facility];  Service: Orthopedics    IA INJECTION,SACROILIAC JOINT Bilateral 02/22/2024    Procedure: RIGHT and LEFT sacroiliac joint injection with fluoroscopic guidance;  Surgeon: Zenobia Ortiz M.D.;  Location: [facility];  Service: Pain Management    LAMINOTOMY  01/19/2023    Procedure: C3 LAMINOPLASTY, C4-T1 DECOMPRESSIVE  LAMINECTOMY, C4-T1 POSTERIOR SPINAL FUSION;  Surgeon: Jama Werner M.D.;  Location: [facility];  Service: Orthopedics    CERVICAL DECOMPRESSION POSTERIOR  01/19/2023    Procedure: DECOMPRESSION, SPINE, CERVICAL, POSTERIOR APPROACH;  Surgeon: Jama Werner M.D.;  Location: [facility];  Service: Orthopedics    CERVICAL LAMINECTOMY POSTERIOR  01/19/2023    Procedure: LAMINECTOMY, SPINE, CERVICAL, POSTERIOR APPROACH;  Surgeon: Jama Werner M.D.;  Location: [facility];  Service: Orthopedics    CERVICAL FUSION POSTERIOR  01/19/2023    Procedure: FUSION, SPINE, CERVICAL, POSTERIOR APPROACH;  Surgeon: Jama Werner M.D.;  Location: [facility];  Service: Orthopedics    FUSION, SPINE, LUMBAR, PLIF Bilateral 09/29/2022    Procedure: L3-L5 TRANSFORAMINAL LUMBAR INTERBODY FUSION, L2-5 DECOMPRESSION;  Surgeon: Jama Werner M.D.;  Location: [facility];  Service: Orthopedics    LUMBAR DECOMPRESSION Bilateral 09/29/2022    Procedure: DECOMPRESSION, SPINE, LUMBAR;  Surgeon: Jama Werner M.D.;  Location: [facility];  Service: Orthopedics    CORPECTOMY N/A 01/06/2022    Procedure: CORPECTOMY, SPINE - PARTIAL;  Surgeon: Jama Werner M.D.;  Location: [facility];  Service: Orthopedics    CERVICAL DISK AND FUSION ANTERIOR N/A 01/06/2022    Procedure: DISCECTOMY, SPINE, CERVICAL, ANTERIOR APPROACH, WITH FUSION - C5-T1;  Surgeon: Jama Werner M.D.;  Location: [facility];  Service: Orthopedics    BLADDER SLING FEMALE N/A 06/29/2021    Procedure: BLADDER SLING, FEMALE - SOLYX.;  Surgeon: Rocío Dewey M.D.;  Location: [facility];  Service: Gynecology    PB KNEE SCOPE,AID ANT CRUCIATE REPAIR Right 11/25/2019    Procedure: RT KNEE ARTHROSCOPY WITH ALLOGRAFT ACL RECONSTRUCTION AND PARTIAL MENISCECTOMY;  Surgeon: Onur Arriaza M.D.;  Location: [facility];  Service: Orthopedics    BREAST BIOPSY  08/20/2010    Performed by ROCÍO BARON  SURGERY SAME DAY [facility]    GYN SURGERY  ,        OTHER ORTHOPEDIC SURGERY Bilateral     acl left knee, right knee    OTHER ORTHOPEDIC SURGERY      right shoulder    TONSILLECTOMY         Current Outpatient Medications   Medication Instructions    Ascorbic Acid (VITAMIN C PO) 2 Tablets, DAILY    atorvastatin (LIPITOR) 10 mg, DAILY    B Complex Vitamins (VITAMIN-B COMPLEX PO) 1 Tablet, Oral, DAILY,     MEDICATION INSTRUCTIONS FOR SURGERY/PROCEDURE SCHEDULED FOR 2024   DO NOT TAKE 7 DAYS PRIOR TO SURGERY      MAGNESIUM PO 1 Tablet, Oral, DAILY,     MEDICATION INSTRUCTIONS FOR SURGERY/PROCEDURE SCHEDULED FOR 2024   DO NOT TAKE 7 DAYS PRIOR TO SURGERY        Multiple Vitamin (MULTIVITAMIN PO) 1 Tablet, Oral, DAILY,   MEDICATION INSTRUCTIONS FOR SURGERY/PROCEDURE SCHEDULED FOR 2024   DO NOT TAKE 7 DAYS PRIOR TO SURGERY        oxyCODONE immediate release (ROXICODONE) 10 mg, EVERY 8 HOURS PRN    propranolol (INDERAL) 20 mg, Every Day (QD)    QUEtiapine (SEROQUEL) 50 mg, EVERY BEDTIME    sertraline (ZOLOFT) 100 mg, EVERY EVENING    sulfamethoxazole-trimethoprim (BACTRIM DS) 800-160 MG tablet 1 Tablet, EVERY BEDTIME    VITAMIN D PO 1 Tablet, Oral, DAILY,     MEDICATION INSTRUCTIONS FOR SURGERY/PROCEDURE SCHEDULED FOR 2024   DO NOT TAKE 7 DAYS PRIOR TO SURGERY    VITAMIN K PO 1 Tablet, Oral, DAILY,     MEDICATION INSTRUCTIONS FOR SURGERY/PROCEDURE SCHEDULED FOR 2024   DO NOT TAKE 7 DAYS PRIOR TO SURGERY       Social History     Tobacco Use    Smoking status: Former     Current packs/day: 0.00     Average packs/day: 0.5 packs/day for 30.0 years (15.0 ttl pk-yrs)     Types: Cigarettes     Start date: 1983     Quit date: 2013     Years since quittin.0     Passive exposure: Past    Smokeless tobacco: Never    Tobacco comments:     .5 ppd, 30 yrs   Vaping Use    Vaping status: Some Days    Substances: Nicotine (3% nicotene), THC, Flavoring    Devices: Pre-filled  "or refillable cartridge   Substance Use Topics    Alcohol use: Yes     Comment: rarely    Drug use: Yes     Types: Inhaled, Marijuana, Oral     Comment: \"clean since 2015\",  THC Use-vape, edibles as of 08/26/2024       BP (!) 140/83   Pulse 93   Temp 36.1 °C (96.9 °F) (Temporal)   Resp 18   Ht 1.524 m (5')   Wt 67 kg (147 lb 11.3 oz)   SpO2 97%     Allergies   Allergen Reactions    Morphine Anaphylaxis     Throat swelling  Tolerates oxycodone    Dilaudid [Hydromorphone] Anxiety     Previously tolerated well    Tape Unspecified     Sensitive to tape-skin is really thin         Lab Results   Component Value Date/Time    SODIUM 139 09/13/2024 05:35 PM    POTASSIUM 5.1 09/13/2024 05:35 PM    CHLORIDE 104 09/13/2024 05:35 PM    GLUCOSE 90 09/13/2024 05:35 PM    BUN 15 09/13/2024 05:35 PM    CREATININE 0.58 09/13/2024 05:35 PM        Lab Results   Component Value Date/Time    WBC 5.5 11/22/2024 11:30 AM    RBC 5.03 11/22/2024 11:30 AM    HEMOGLOBIN 14.6 11/22/2024 11:30 AM    HEMATOCRIT 44.2 11/22/2024 11:30 AM    PLATELETCT 295 11/22/2024 11:30 AM        Relevant Problems   ANESTHESIA   (positive) Delayed emergence from anesthesia   (positive) Obstructive sleep apnea syndrome      CARDIAC   (positive) Essential hypertension       Physical Exam    Airway   Mallampati: II  TM distance: >3 FB  Neck ROM: full       Cardiovascular - normal exam  Rhythm: regular  Rate: normal  (-) murmur     Dental - normal exam  (+) upper dentures           Pulmonary - normal exam  Breath sounds clear to auscultation     Abdominal    Neurological - normal exam                   Anesthesia Plan    ASA 3   ASA physical status 3 criteria: hypertension - poorly controlled    Plan - general       Airway plan will be ETT          Induction: intravenous    Postoperative Plan: Postoperative administration of opioids is intended.    Pertinent diagnostic labs and testing reviewed    Informed Consent:    Anesthetic plan and risks discussed with " patient.      Anesthetic procedure and risks discussed with patient in detail.  Risks include but are not limited to PONV, pain, sore throat, damage to teeth/lips/gums, aspiration, positioning injury, allergic reaction, vocal cord injury, prolonged intubation, stroke, and/or cardiopulmonary problems up to and including death.  Patient indicates complete understanding. All questions fully answered and they agree to proceed as planned above.

## 2024-11-27 NOTE — OP REPORT
PreOp Diagnosis:  mesh erosion.        PostOp Diagnosis: same.       Procedure(s):  REVISION OF VAGINAL GRAFT,  REMOVAL OF PROSTHETIC GRAFT MATERIAL, CYSTOSCOPY, ALL INDICATED PROCEDURES - Wound Class: Clean Contaminated  CYSTOSCOPY    Surgeon(s):  Guero Bautista M.D.    Anesthesiologist/Type of Anesthesia:  Anesthesiologist: Dustin Carroll M.D./General    Surgical Staff:  Circulator: Mayte Amaya R.N.; Cynthia Simpson R.N.; Radha Rai R.N.  Scrub Person: Avis Alamo    Specimens removed if any:  ID Type Source Tests Collected by Time Destination   A : mesh foreign body Other Other PATHOLOGY SPECIMEN Guero Bautista M.D. 11/27/2024 1503        Estimated Blood Loss: minimal     Findings: right vaginal wall has protruding stings of mesh just underneath the urethra. Cytoscopy normal bladder bilateral jet flow noted no etones or lesions.     Complications: none.   Procedure.     DESCRIPTION OF OPERATION: In the operating room, the patient was placed under general anesthetic in lithotomy position. Examination revealed a widened introitus  right lateral vaginal mesh cutting through vaginal wall around 1 cm of mesh exposed.   After appropriate prep, the patient was draped in the usual manner for major vaginal surgery.  Bladder was wynn catheterised   A bradford  was placed posteriorly and the vagina was retracted  the mesh was held with hemostat and local .025%. Marcaine with epi infiltrated along the line of the vaginal mesh. A transverse incision was made over the mesh and extended laterally.The vagina was held with allis  using metzbaun the mesh was released from the vaginal wall as laterally as possible. The mesh was cut ,release noted  no part of the remaining mesh  visible , the same repeated on the opposite side , the mesh was excised as laterally as possible . The  bleeders were coagulated, angle stich taken on both end of the incision and held and the vagina was approximated transversely   in a running fashion floseal infiltrated underneath.the angle stitch tied down . Hemostatsis assured.   Cystoscopy-   Burch deflated and removed   30 Degree scope advanced under visualisation bladder distended with NS and bilateral ureteric  jet flow noted, all the walls of  bladder  visualised no foreign body noted    Instrument and sponge count correct x 2.    Patient extubated and transferred to recovery room under stable condition.

## 2024-11-27 NOTE — DISCHARGE INSTRUCTIONS
If any questions arise, call your provider.  If your provider is not available, please feel free to call the Surgical Center at [phone].    MEDICATIONS: Resume taking daily medication.  Take prescribed pain medication with food.  If no medication is prescribed, you may take non-aspirin pain medication if needed.  PAIN MEDICATION CAN BE VERY CONSTIPATING.  Take a stool softener or laxative such as senokot, pericolace, or milk of magnesia if needed.    Last pain medication given at     1:55 pm: Tylenol, Celebrex (anti-inflammatory like Ibuprofen) and Robaxin (muscle relaxer).     May take Ibuprofen next at 8:00 pm.   What to Expect Post Anesthesia    Rest and take it easy for the first 24 hours.  A responsible adult is recommended to remain with you during that time.  It is normal to feel sleepy.  We encourage you to not do anything that requires balance, judgment or coordination.    FOR 24 HOURS DO NOT:  Drive, operate machinery or run household appliances.  Drink beer or alcoholic beverages.  Make important decisions or sign legal documents.    To avoid nausea, slowly advance diet as tolerated, avoiding spicy or greasy foods for the first day.  Add more substantial food to your diet according to your provider's instructions.  Babies can be fed formula or breast milk as soon as they are hungry.  INCREASE FLUIDS AND FIBER TO AVOID CONSTIPATION.    MILD FLU-LIKE SYMPTOMS ARE NORMAL.  YOU MAY EXPERIENCE GENERALIZED MUSCLE ACHES, THROAT IRRITATION, HEADACHE AND/OR SOME NAUSEA.

## 2024-11-27 NOTE — OR NURSING
1525: Pt arrived from OR to PACU 9. Connected to monitor. Report received from anesthesia & RN. VSS. Oxygen at 8L via mask. Breaths calm, even, and unlabored.  No signs of pain. Cathy pad clean and dry at this time.    1535 Patient denies pain, reports some nausea; medicated per MAR. Patient tolerating po sips and crackers. 02 weaned to 1L nasal cannula. Sig. Other Reese updated via phone call, is on the way to hospital.     1544 Phone call to [facility] pharmacy; rx will be ready for  in about 30 minutes. Patient updated.     1600 Pt reports nausea is now barely noticeable. Resting comfortably.     1630 home rx picked up and delivered to bedside.     1645 Sig. Other arrived and brought to bedside. Patient changed into own clothing with partner's assistance. Instructed on incentive spirometry use, return demonstration. Discharge instructions reviewed with patient and sig other by Stephenie PHILLIPS, patient verbalizes understanding and all questions answered.     1701 IV and ID bands removed without difficulty. Patient meets all discharge criteria and states readiness to discharge. Escorted to car via wheelchair with all personal belongings and discharge instructions, accompanied by RN.

## 2024-11-28 NOTE — ANESTHESIA POSTPROCEDURE EVALUATION
Patient: Yolanda Reed    Procedure Summary       Date: 11/27/24 Room / Location: [facility] ROOM 21 / SURGERY SAME DAY [facility]    Anesthesia Start: 1430 Anesthesia Stop: 1531    Procedures:       REVISION OF VAGINAL GRAFT,  REMOVAL OF PROSTHETIC GRAFT MATERIAL, CYSTOSCOPY, ALL INDICATED PROCEDURES (Vagina )      CYSTOSCOPY (Bladder) Diagnosis: (PELVIC PAIN, EROSION OF VAGINAL GRAFT MATERIAL)    Surgeons: Guero Bautista M.D. Responsible Provider: Dustin Carroll M.D.    Anesthesia Type: general ASA Status: 3            Final Anesthesia Type: general  Last vitals  BP   Blood Pressure: 117/57    Temp   36.2 °C (97.1 °F)    Pulse   85   Resp   (!) 28    SpO2   94 %      Anesthesia Post Evaluation    Patient location during evaluation: PACU  Patient participation: complete - patient participated  Level of consciousness: sleepy but conscious  Pain score: 8  Patient had very significant pain with the blood pressure cuff inflation alone preoperatively.  She can be expected to report severe pain for any discomfort whatsoevere.  PACU RN treating appropriately at this time.  Airway patency: patent  Anesthetic complications: no  Cardiovascular status: hemodynamically stable  Respiratory status: acceptable  Hydration status: balanced    PONV: none          There were no known notable events for this encounter.     Nurse Pain Score: 8 (NPRS)

## 2025-03-05 ENCOUNTER — PHYSICAL THERAPY (OUTPATIENT)
Dept: PHYSICAL THERAPY | Facility: REHABILITATION | Age: 65
End: 2025-03-05
Attending: ORTHOPAEDIC SURGERY
Payer: MEDICAID

## 2025-03-05 DIAGNOSIS — M53.3 DISORDER OF SACRUM: ICD-10-CM

## 2025-03-05 PROCEDURE — 97163 PT EVAL HIGH COMPLEX 45 MIN: CPT

## 2025-03-05 NOTE — OP THERAPY EVALUATION
Outpatient Physical Therapy  INITIAL EVALUATION    [facility] Physical Therapy [address].  [address]  [place] [address]  Phone:  [phone]  Fax:  [phone]    Date of Evaluation: 03/05/2025    Patient: Yolanda Reed  YOB: 1960  MRN: [id number]     Referring Provider: Jama Werner M.D.  [address] [facility]  [address]  [place] [address]   Referring Diagnosis Sacrococcygeal disorders, not elsewhere classified [M53.3]     Time Calculation  Start time: 0846  Stop time: 0930 Time Calculation (min): 44 minutes         Chief Complaint: Back Problem    Visit Diagnoses     ICD-10-CM   1. Disorder of sacrum  M53.3       Date of onset of impairment: 11/4/2024    Subjective:   History of Present Illness:     Date of surgery:  11/4/2024    Mechanism of injury:  Pt is 63 yo female c/c s/p SI jt fusion,d/t chronic SI jt fusion.  Surgery was in Nov, it did help some, cont to have daily chronic pain.    Stabbing pain B SI jt pain, R LE, will radiate and ache, R lat hip ant thigh.    CLOF: standing 10min before pain  Aggres: Turns, intermittently bending, STS, bending, avoid lifting, occasional ache   Relief: Stabbing pain: 2min- all day(rare), will lie down with knees elevated.    Bowel/Bladder: Previous bladder sling- had for 3 years d/t incontinence, did have relief of s/s of incontinence.  Removed from provider (required 2 diff MD's).  Coughing/sneezing/fall, will have small amount of leaking.    Freq UTI's-1-2x/year, bactrum daily  Jan 2022, Cervical ACDF, spent 2 months in hospital d/t neck surgery going septic.   Cervical C8 nerve damage.  Hx of lumbar fusion and 3 cervical spine fusion.  S.O. supportive, fair supportive and son in town.   Medications:nothing new, ibuprofen, oxy-10 PRN, daily, approx 1-3  Irritability: Current: 5/10  Worst: 8/10  Best: 2-3/10  Pt goal: would like to improve core strength and households chores       Past Medical History:   Diagnosis Date     Anesthesia 2006    slow to wake up and PONV - no problems since per pt    Anxiety     Arthritis     shoulders    Breath shortness 09/23/2022    on exertion    Delayed emergence from general anesthesia     Per Problem List    Dental disorder     upper dentures    Frequent falls     Uses Cane    High cholesterol     Hypertension 01/05/2022    pt states well controlled on meds    Infectious disease     COVID Nov 2020 and hx of MRSA    Low blood sugar     MRSA (methicillin resistant Staphylococcus aureus)     H/O    BRIGITTE (obstructive sleep apnea) 01/17/2023    Has not been using CPAP at this time.    Pain 09/23/2022    low back and neck    Pneumonia     Nov 2020 + COVID    PONV (postoperative nausea and vomiting)     Psychiatric problem     depression    PTSD (post-traumatic stress disorder)     triggered by any one yelling    Sleep apnea     cpap    Snoring     Urinary bladder disorder     bladder sling put in 6/29/21-now removed as of 08/26/2024    Urinary incontinence      Past Surgical History:   Procedure Laterality Date    VA REVISION VAGINAL GRAFT, VAG APPROACH  11/27/2024    Procedure: REVISION OF VAGINAL GRAFT,  REMOVAL OF PROSTHETIC GRAFT MATERIAL, CYSTOSCOPY, ALL INDICATED PROCEDURES;  Surgeon: Guero Bautista M.D.;  Location: SURGERY SAME DAY [facility];  Service: Gynecology    VA CYSTOURETHROSCOPY  11/27/2024    Procedure: CYSTOSCOPY;  Surgeon: Guero Bautista M.D.;  Location: SURGERY SAME DAY [facility];  Service: Gynecology    S I JOINT FUSION Bilateral 8/29/2024    Procedure: LEFT AND RIGHT SACROILIAC JOINT FUSION;  Surgeon: Jama Werner M.D.;  Location: SURGERY [facility];  Service: Orthopedics    VA INJECTION,SACROILIAC JOINT Bilateral 02/22/2024    Procedure: RIGHT and LEFT sacroiliac joint injection with fluoroscopic guidance;  Surgeon: Zenobia rOtiz M.D.;  Location: [facility];  Service: Pain Management    LAMINOTOMY  01/19/2023    Procedure: C3 LAMINOPLASTY, C4-T1  DECOMPRESSIVE LAMINECTOMY, C4-T1 POSTERIOR SPINAL FUSION;  Surgeon: Jama Werner M.D.;  Location: [facility];  Service: Orthopedics    CERVICAL DECOMPRESSION POSTERIOR  01/19/2023    Procedure: DECOMPRESSION, SPINE, CERVICAL, POSTERIOR APPROACH;  Surgeon: Jama Werner M.D.;  Location: [facility];  Service: Orthopedics    CERVICAL LAMINECTOMY POSTERIOR  01/19/2023    Procedure: LAMINECTOMY, SPINE, CERVICAL, POSTERIOR APPROACH;  Surgeon: Jama Werner M.D.;  Location: [facility];  Service: Orthopedics    CERVICAL FUSION POSTERIOR  01/19/2023    Procedure: FUSION, SPINE, CERVICAL, POSTERIOR APPROACH;  Surgeon: Jama Werner M.D.;  Location: [facility];  Service: Orthopedics    FUSION, SPINE, LUMBAR, PLIF Bilateral 09/29/2022    Procedure: L3-L5 TRANSFORAMINAL LUMBAR INTERBODY FUSION, L2-5 DECOMPRESSION;  Surgeon: Jama Werner M.D.;  Location: [facility];  Service: Orthopedics    LUMBAR DECOMPRESSION Bilateral 09/29/2022    Procedure: DECOMPRESSION, SPINE, LUMBAR;  Surgeon: Jama Werner M.D.;  Location: [facility];  Service: Orthopedics    CORPECTOMY N/A 01/06/2022    Procedure: CORPECTOMY, SPINE - PARTIAL;  Surgeon: Jama Werner M.D.;  Location: [facility];  Service: Orthopedics    CERVICAL DISK AND FUSION ANTERIOR N/A 01/06/2022    Procedure: DISCECTOMY, SPINE, CERVICAL, ANTERIOR APPROACH, WITH FUSION - C5-T1;  Surgeon: Jama Werner M.D.;  Location: [facility];  Service: Orthopedics    BLADDER SLING FEMALE N/A 06/29/2021    Procedure: BLADDER SLING, FEMALE - SOLYX.;  Surgeon: Rocío Dewey M.D.;  Location: [facility];  Service: Gynecology    PB KNEE SCOPE,AID ANT CRUCIATE REPAIR Right 11/25/2019    Procedure: RT KNEE ARTHROSCOPY WITH ALLOGRAFT ACL RECONSTRUCTION AND PARTIAL MENISCECTOMY;  Surgeon: Onur Arriaaz M.D.;  Location: [facility];  Service: Orthopedics    BREAST BIOPSY  08/20/2010    Performed by LORAINE  HANS THOMAS at SURGERY SAME DAY [facility]    GYN SURGERY  ,        OTHER ORTHOPEDIC SURGERY Bilateral     acl left knee, right knee    OTHER ORTHOPEDIC SURGERY      right shoulder    TONSILLECTOMY       Social History     Tobacco Use    Smoking status: Former     Current packs/day: 0.00     Average packs/day: 0.5 packs/day for 30.0 years (15.0 ttl pk-yrs)     Types: Cigarettes     Start date: 1983     Quit date: 2013     Years since quittin.3     Passive exposure: Past    Smokeless tobacco: Never    Tobacco comments:     .5 ppd, 30 yrs   Substance Use Topics    Alcohol use: Yes     Comment: rarely     Family and Occupational History     Socioeconomic History    Marital status:      Spouse name: Not on file    Number of children: Not on file    Years of education: Not on file    Highest education level: Not on file   Occupational History    Not on file       Objective     Neurological Testing     Reflexes   Left   Patellar (L4): normal (2+)  Achilles (S1): normal (2+)  Ankle clonus reflex: negative    Right   Patellar (L4): normal (2+)  Achilles (S1): normal (2+)  Ankle clonus reflex: negative    Myotome testing   Lumbar (left)   All left lumbar myotomes within normal limits    Lumbar (right)   All right lumbar myotomes within normal limits    Additional Neurological Details  UE reflexes :WFL equal bilaterally    Active Range of Motion     Lumbar   Flexion: decreased (90%, inc kyphosis, not lumbar reversal/upper lumbar)  Extension: decreased (25%+ R si jt pain)  Left lateral flexion: decreased (no lumbar movement,)  Right lateral flexion: decreased (no lumbar movement, + R stabbing pain)    Additional Active Range of Motion Details  Prone ext x2' relieves R LE pain        Therapeutic Exercises (CPT 13144):       Therapeutic Exercise Summary: Access Code: [email or web address]  URL: [email or web address]  Date: 2025  Prepared by: María Doty    Exercises  - Lying Prone  - 1 x  daily - 7 x weekly - 1-3 sets - 10min hold      Time-based treatments/modalities:           Assessment, Response and Plan:   Impairments: abnormal gait, abnormal or restricted ROM, impaired physical strength, lacks appropriate home exercise program and limited mobility    Assessment details:  Pt is 65 yo female c/c of chronic LBP with R side sciatica to R knee, described aching ant thigh pain. Bladder stress incontinence with hx of bladder sling -2 surgeries w/ removal and power coordination deficits. Pt has complex PMHx involving multiple spinal surgeries w/ hx of cervical myleopathy, sepsis and delirium.   Pertinent clinical findings include: impaired lumbar ROM, impaired core coordination & strength. Pt presents with extension dysfunction with radicular symptoms.  Pt is limited by the above mentioned impairments and would benefit from skilled PT to address these impairments.   Barriers to therapy:  Psychosocial  Prognosis: fair    Goals:   Short Term Goals:   Pt will be compliant with HEP  Pt will be able to stand w/o inc in pain >1 hr  Pt will be able to walk >1/4mile w/o inc in pain   Short term goal time span:  2-4 weeks      Long Term Goals:    Pt will be able to demo improve core endurance >2min to be able to do household chores w/o inc in s/s.  Pt will be able to improve lumbar ROM WFL to perform self care w/o inc in s/s  Pt will be able to walk 1 mile w/o inc in s/s   Long term goal time span:  6-8 weeks    Plan:   Planned therapy interventions:  Neuromuscular Re-education (CPT 80101) and Therapeutic Exercise (CPT 60245)  Other planned therapy interventions:  97112x2, 97110x2  Frequency:  2x week  Duration in weeks:  6  Discussed with:  Patient      Functional Assessment Used  Oswestry Low Back Pain Disability Total Score: 46     Referring provider co-signature:  I have reviewed this plan of care and my co-signature certifies the need for services.    Certification Period: 03/05/2025 to   06/03/25    Physician Signature: ________________________________ Date: ______________

## 2025-03-19 ENCOUNTER — APPOINTMENT (OUTPATIENT)
Dept: PHYSICAL THERAPY | Facility: REHABILITATION | Age: 65
End: 2025-03-19
Attending: ORTHOPAEDIC SURGERY
Payer: MEDICAID

## 2025-03-19 NOTE — OP THERAPY DAILY TREATMENT
"  Outpatient Physical Therapy  DAILY TREATMENT     [facility] Physical [address].  [address]  [place] [address]  Phone:  [phone]  Fax:  [phone]    Date: 03/19/2025    Patient: Yolanda Reed  YOB: 1960  MRN: [id number]     Time Calculation                   Chief Complaint: No chief complaint on file.    Visit #: 2    SUBJECTIVE:  ***    OBJECTIVE:  Current objective measures:   Active Range of Motion     Lumbar   Flexion: decreased (90%, inc kyphosis, not lumbar reversal/upper lumbar)  Extension: decreased (25%+ R si jt pain)  Left lateral flexion: decreased (no lumbar movement,)  Right lateral flexion: decreased (no lumbar movement, + R stabbing pain)    Additional Active Range of Motion Details  Prone ext x2' relieves R LE pain        Exercises/Treatment  Time-based treatments/modalities:           Pain rating (1-10) before treatment:  {PAIN NUMBERS_1-10:07535}  Pain rating (1-10) after treatment:  {PAIN NUMBERS_1-10:39221}    ASSESSMENT:   Response to treatment: ***    PLAN/RECOMMENDATIONS:   Plan for treatment: {AMB OP THERAPY - THERAPY PLAN:761273902::\"therapy treatment to continue next visit\"}.  Planned interventions for next visit: {PT PLANNED THERAPY INTERVENTIONS:[id number]::\"continue with current treatment\"}.         "

## 2025-03-21 ENCOUNTER — PHYSICAL THERAPY (OUTPATIENT)
Dept: PHYSICAL THERAPY | Facility: REHABILITATION | Age: 65
End: 2025-03-21
Attending: ORTHOPAEDIC SURGERY
Payer: MEDICAID

## 2025-03-21 DIAGNOSIS — M53.3 DISORDER OF SACRUM: ICD-10-CM

## 2025-03-21 PROCEDURE — 97110 THERAPEUTIC EXERCISES: CPT

## 2025-03-21 PROCEDURE — 97112 NEUROMUSCULAR REEDUCATION: CPT

## 2025-03-21 NOTE — OP THERAPY DAILY TREATMENT
"  Outpatient Physical Therapy  DAILY TREATMENT     [facility] Physical Therapy [address].  [address]  [place] [address]  Phone:  [phone]  Fax:  [phone]    Date: 03/21/2025    Patient: Yolanda Reed  YOB: 1960  MRN: [id number]     Time Calculation                   Chief Complaint: No chief complaint on file.    Visit #: 2    SUBJECTIVE:  LBP feels the same.    Prone lying: daily, 10\", Last few days hasn't had R LE pain.    R LE pain at NOC  Aggres: Twisting/reaching behind self, severe pain on for seconds, severe pain relieved in seconds  Bending to ground  object, will have severe back pain for a few seconds, relieves in seconds, Rare: can last all day   TS mod pain on for second, relieved for seconds.      Relief: will lie down with knees elevated.      OBJECTIVE:  Current objective measures:   Lumbar   Flexion: decreased (90%, inc kyphosis, not lumbar reversal/upper lumbar)  Extension: decreased (25%+ R si jt pain)  Left lateral flexion: decreased (no lumbar movement,)  Right lateral flexion: decreased (no lumbar movement, + R stabbing pain)    Additional Active Range of Motion Details  Prone ext x2' relieves R LE pain          Therapeutic Exercises (CPT 27987):     1. Prone lying x 2', reviewed for home    2. prone press ups, x10, relieves back pain,    3. serratus push ups, holdx 5\" x10    4. push ups, x8 reps    5. quad spinal neutral, max cues x2 reps    6. Book opener, 5 x 2      Time-based treatments/modalities:       Pain rating (1-10) before treatment:  1  Pain rating (1-10) after treatment:  1    ASSESSMENT:   Response to treatment: Relieves back pain in prone, reinforced normalcy for inc discomfort in back as long as leg pain cont to dissipate.  Discussed mobility then strengthening progression.  Pt compliant with HEP, progressing as stating no leg cramp/spasms in 2 days.    PLAN/RECOMMENDATIONS:   Plan for treatment: therapy treatment to continue next " visit.  Planned interventions for next visit: continue with current treatment.

## 2025-03-28 ENCOUNTER — PHYSICAL THERAPY (OUTPATIENT)
Dept: PHYSICAL THERAPY | Facility: REHABILITATION | Age: 65
End: 2025-03-28
Attending: ORTHOPAEDIC SURGERY
Payer: MEDICAID

## 2025-03-28 DIAGNOSIS — M53.3 DISORDER OF SACRUM: ICD-10-CM

## 2025-03-28 PROCEDURE — 97112 NEUROMUSCULAR REEDUCATION: CPT

## 2025-03-28 PROCEDURE — 97110 THERAPEUTIC EXERCISES: CPT

## 2025-03-28 NOTE — OP THERAPY DAILY TREATMENT
Outpatient Physical Therapy  DAILY TREATMENT     [facility] Physical Therapy [address].  [address]  [place] [address]  Phone:  [phone]  Fax:  [phone]    Date: 03/28/2025    Patient: Yolanda Reed  YOB: 1960  MRN: [id number]     Time Calculation    Start time: 1245  Stop time: 1328 Time Calculation (min): 43 minutes         Chief Complaint: Back Problem    Visit #: 3    SUBJECTIVE:  Reports having inc R hip pain/sciatic pain that has worsened and kept her from sleeping.    Fell yesterday while walking, caught R LE on self, end on R ant shin with bruising, denies any other contact with other body part. Does report inc in neck pain.    After last session had increased R LE pain, on /off in evenings, which kept her from sleeping  Denies R LE pain today.  LBP: 2-3/10    OBJECTIVE:  Current objective measures:   Lumbar   Flexion: decreased (90%, inc kyphosis, not lumbar reversal/upper lumbar)  Extension: decreased (25%+ R si jt pain)  Left lateral flexion: decreased (no lumbar movement,)  Right lateral flexion: decreased (no lumbar movement, + R stabbing pain)    Additional Active Range of Motion Details  Prone ext x2' relieves R LE pain          Therapeutic Exercises (CPT 71467):     1. Prone lying x 2', relives back pain    2. prone press ups, NT    3. serratus push ups, NT    4. push ups, NT    5. quad spinal neutral, NT    6. Book opener, NT    7. Standing wall push ups, NT    8. AD breathing, x5'    9. TA with iso & march, x5', x2reps ea, very diff    10. lumbar ext on wall, x10, relived leg pain    11. prone on elbows, pre/post MT improved upright    Therapeutic Treatments and Modalities:     1. Neuromuscular Re-education (CPT 19520), see below    Therapeutic Treatment and Modalities Summary: NMR: Uni PA jt mobs L4-5 grade 1-2 on L4, ,grade 2-3  Lumbar ext on wall following x10 with relief R LE pain/glut pain.  Prone on elbows for thor ext    Time-based  treatments/modalities:    Physical Therapy Timed Treatment Charges  Neuromusc re-ed, balance, coor, post minutes (CPT 11245): 13 minutes  Therapeutic exercise minutes (CPT 13388): 30 minutes  Pain rating (1-10) before treatment:  3-4  Pain rating (1-10) after treatment:  3    ASSESSMENT:   Response to treatment: Relieved leg pain with standing lumbar extension.  Demo improvement in thor and cervial movement following core stab and scar mobilization, encouraging prone on elbows positioning, avoiding propping in bed into kyphosis and flexion    PLAN/RECOMMENDATIONS:   Plan for treatment: therapy treatment to continue next visit.  Planned interventions for next visit: continue with current treatment.

## 2025-04-02 ENCOUNTER — APPOINTMENT (OUTPATIENT)
Dept: PHYSICAL THERAPY | Facility: REHABILITATION | Age: 65
End: 2025-04-02
Attending: ORTHOPAEDIC SURGERY
Payer: MEDICAID

## 2025-04-04 ENCOUNTER — PHYSICAL THERAPY (OUTPATIENT)
Dept: PHYSICAL THERAPY | Facility: REHABILITATION | Age: 65
End: 2025-04-04
Attending: ORTHOPAEDIC SURGERY
Payer: MEDICAID

## 2025-04-04 DIAGNOSIS — M53.3 DISORDER OF SACRUM: ICD-10-CM

## 2025-04-04 PROCEDURE — 97110 THERAPEUTIC EXERCISES: CPT

## 2025-04-04 PROCEDURE — 97112 NEUROMUSCULAR REEDUCATION: CPT

## 2025-04-04 NOTE — OP THERAPY DAILY TREATMENT
Outpatient Physical Therapy  DAILY TREATMENT     [facility] Physical [address].  [address]  [place] [address]  Phone:  [phone]  Fax:  [phone]    Date: 04/04/2025    Patient: Yolanda Reed  YOB: 1960  MRN: [id number]     Time Calculation    Start time: 1245  Stop time: 1330 Time Calculation (min): 45 minutes         Chief Complaint: Back Problem    Visit #: 4    SUBJECTIVE:  Reports having a fall(4-5days ago), caught edge of step with toe, fell forward and caught herself L UE.  Now has constant pain, diff lifting L UE, int sharp pain; pain: 8/10  Denies R LE pain today.  LBP: 2-3/10, R LE pain: at noc 3/10, current: 0/10  Neck popping/audible decreased.     OBJECTIVE:  Current objective measures:  See PN      Therapeutic Exercises (CPT 36710):     1. Prone lying x 2', NT    2. prone press ups, NT    3. serratus push ups, NT    4. push ups, NT    5. quad spinal neutral, NT    6. Book opener, NT    7. Standing wall push ups, NT    8. AD breathing, x5'    9. TA with iso & march, x10, mod diff, improved within session, able ot talk while stabilizing    10. lumbar ext on wall, NT    11. prone on elbows, NT    Therapeutic Treatments and Modalities:     1. Neuromuscular Re-education (CPT 98691), see below    Therapeutic Treatment and Modalities Summary: NMR:   Edu on pain relief with passsive/AAROM movement L UE-overhead pulleys performed x 5' with decreased pain 3/10.  PN: goals and progress reviewed with pt.     Time-based treatments/modalities:    Physical Therapy Timed Treatment Charges  Neuromusc re-ed, balance, coor, post minutes (CPT 39050): 15 minutes  Therapeutic exercise minutes (CPT 28008): 30 minutes  Pain rating (1-10) before treatment:  2-3  Pain rating (1-10) after treatment:  3    ASSESSMENT:   Response to treatment:  see PN    PLAN/RECOMMENDATIONS:   Plan for treatment: therapy treatment to continue next visit.  Planned interventions for next visit:  continue with current treatment.

## 2025-04-04 NOTE — OP THERAPY PROGRESS SUMMARY
Outpatient Physical Therapy  PROGRESS SUMMARY NOTE      [facility] Physical Therapy [address]  [address]  [place] [address]  Phone:  [phone]  Fax:  [phone]    Date of Visit: 04/04/2025    Patient: Yolanda Reed  YOB: 1960  MRN: [id number]     Referring Provider: Jama Werner M.D.  [address] [facility]  [address]  [place] [address]   Referring Diagnosis Sacrococcygeal disorders, not elsewhere classified [M53.3]     Visit Diagnoses     ICD-10-CM   1. Disorder of sacrum  M53.3       Rehab Potential: good    Progress Report Period: 3/5/25-4/3/25    Functional Assessment Used          Objective Findings and Assessment:   Patient progression towards goals: Yolanda has participated in 4 visits since IE on 3/5/25 and has met 1/3 STG's.  She has demonstrated improvements with TA motor control, functional tolerance, improved posture, and decreased pain c/o and intensity.  She cont to demonstrate impaired ROM, R LE radicular pain, impaired core strength/coordination which impact her daily function d/t safety & pain control.  She would benefit from ongoing skilled PT to address these impairments.     Objective findings and assessment details: Active Range of Motion     Lumbar   Flexion: decreased (95%, lumbar reversal fair)  Extension: decreased (40%+ R si jt pain)  Left lateral flexion: decreased (no lumbar movement,)  Right lateral flexion: decreased (no lumbar movement, + R SI jt pain upon return to standing        Goals:   Short Term Goals:   Pt will be compliant with HEP-MET  Pt will be able to stand w/o inc in pain >1 hr-not met, reports inc in 10'  Pt will be able to walk >1/4mile w/o inc in pain -not met, walks grocery stores , pain will go to R glut pain, pain is less intense and less frequent          Short term goal time span:  2-4 weeks      Long Term Goals:    Pt will be able to demo improve core endurance >2min to be able to do household chores w/o inc in s/s.  Pt  will be able to improve lumbar ROM WFL to perform self care w/o inc in s/s-pain is less intense and less frequent, still has pain.  Pt will be able to walk 1 mile w/o inc in s/s     Long term goal time span:  6-8 weeks    Plan:   Frequency:  2x week  Duration in weeks:  12      Referring provider co-signature:  I have reviewed this plan of care and my co-signature certifies the need for services.     Certification Period: 04/04/2025 to 07/03/25    Physician Signature: ________________________________ Date: ______________

## 2025-04-09 ENCOUNTER — PHYSICAL THERAPY (OUTPATIENT)
Dept: PHYSICAL THERAPY | Facility: REHABILITATION | Age: 65
End: 2025-04-09
Attending: ORTHOPAEDIC SURGERY
Payer: MEDICAID

## 2025-04-09 DIAGNOSIS — M53.3 DISORDER OF SACRUM: ICD-10-CM

## 2025-04-09 PROCEDURE — 97110 THERAPEUTIC EXERCISES: CPT

## 2025-04-09 NOTE — OP THERAPY DAILY TREATMENT
Outpatient Physical Therapy  DAILY TREATMENT     [facility] Physical [address].  [address]  [place] [address]  Phone:  [phone]  Fax:  [phone]    Date: 04/09/2025    Patient: Yolanda Reed  YOB: 1960  MRN: [id number]     Time Calculation    Start time: 1125  Stop time: 1200 Time Calculation (min): 35 minutes         Chief Complaint: Back Problem    Visit #: 5    SUBJECTIVE:  Reports having no back pain or R LE pain until yesterday.  Did have R toe catch/fell to ground, when walking on fake carpet/ grass outside  LBP: 3/10, R LE pain:3/10      OBJECTIVE:  Current objective measures:  Active Range of Motion     Lumbar   Flexion: decreased (95%, lumbar reversal fair)  Extension: decreased (40%+ R si jt pain)  Left lateral flexion: decreased (no lumbar movement,)  Right lateral flexion: decreased (no lumbar movement, + R SI jt pain upon return to standing      Therapeutic Exercises (CPT 55012):     1. Prone lying x 2', NT    2. prone press ups, unable d/t R L shoudler    3. serratus push ups, NT    4. push ups, NT    5. quad spinal neutral, NT    6. Book opener, NT    7. Side glide to wall, unable to det better/worse, ROM /midilne improved, x5 to R, x5 L side    8. AD breathing, NT    9. TA with iso & march, NT, mod diff, improved within session, able ot talk while stabilizing    10. lumbar ext on wall, 10 x 2, decr R LE pain    11. prone on elbows, unable d/t L shoulder      Therapeutic Exercise Summary: Edu on self progression/awareness with R LE pain    Therapeutic Treatments and Modalities:     2. E Stim Unattended (CPT 24585), NO charge, Russain 10/10 lumbar L5-L2 region wtih MHP in supine    Time-based treatments/modalities:    Physical Therapy Timed Treatment Charges  Therapeutic exercise minutes (CPT 74121): 23 minutes  Pain rating (1-10) before treatment:  3  Pain rating (1-10) after treatment:  1, trace R LE, LBP: 2    ASSESSMENT:   Response to treatment:   Demo progress with R LE radic with ext and improved lasting relief following last week TA and breathing HEP.  Limted carryover to managing radic s/s and falls.     PLAN/RECOMMENDATIONS:   Plan for treatment: therapy treatment to continue next visit.  Planned interventions for next visit: miniBEST continue with current treatment.

## 2025-04-11 ENCOUNTER — APPOINTMENT (OUTPATIENT)
Dept: PHYSICAL THERAPY | Facility: REHABILITATION | Age: 65
End: 2025-04-11
Attending: ORTHOPAEDIC SURGERY
Payer: MEDICAID

## 2025-04-16 ENCOUNTER — APPOINTMENT (OUTPATIENT)
Dept: PHYSICAL THERAPY | Facility: REHABILITATION | Age: 65
End: 2025-04-16
Attending: ORTHOPAEDIC SURGERY
Payer: MEDICAID

## 2025-04-18 ENCOUNTER — PHYSICAL THERAPY (OUTPATIENT)
Dept: PHYSICAL THERAPY | Facility: REHABILITATION | Age: 65
End: 2025-04-18
Attending: ORTHOPAEDIC SURGERY
Payer: MEDICAID

## 2025-04-18 DIAGNOSIS — M53.3 DISORDER OF SACRUM: ICD-10-CM

## 2025-04-18 PROCEDURE — 97110 THERAPEUTIC EXERCISES: CPT

## 2025-04-18 PROCEDURE — 97112 NEUROMUSCULAR REEDUCATION: CPT

## 2025-04-18 NOTE — OP THERAPY DAILY TREATMENT
Outpatient Physical Therapy  DAILY TREATMENT     [facility] [address]  [address]  [place] [address]  Phone:  [phone]  Fax:  [phone]    Date: 04/18/2025    Patient: Yolanda Reed  YOB: 1960  MRN: [id number]     Time Calculation    Start time: 1100  Stop time: 1145 Time Calculation (min): 45 minutes         Chief Complaint: Shoulder Problem    Visit #: 6    SUBJECTIVE:  Patient reports that her left shoulder has been very painful recently but finally was able to schedule an appointment next week with ortho. She does feel like she has been focusing more on the pain of her shoulder and has not noticed as much back pain. Does report decreased frequency and intensity of SI pain.     Complete MIniBEST   Decreased intensity and frequency     OBJECTIVE:  Current objective measures:   Mini-BESTest  SIT TO STAND: 2  RISE ON TOES: 1  STANDING ON ONE LEG: Left: 1  Right: 1  COMPENSATORY STEPPING CORRECTION - FORWARD: 2  COMPENSATORY STEPPING CORRECTION - BACKWARD: 0  COMPENSATORY STEPPING CORRECTION - LATERAL: Left:  1Right: 1  STANCE (FEET TOGETHER); EYES OPEN, FIRM SURFACE: 2  STANCE (FEET TOGETHER); EYES CLOSED, FOAM SURFACE: 1  INCLINE, EYES CLOSED: 1  CHANGE IN GAIT SPEED: 2  WALK WITH HEAD TURNS - HORIZONTAL: 1  WALK WITH PIVOT TURNS: 2  STEP OVER OBSTACLES: 1  TIMED UP (9.28) & GO WITH DUAL TASK(12.25):1    Total: 18/28 (less than 21 indicates increased risk for falling)             Therapeutic Exercises (CPT 85934):     1. Prone lying x 2', NT    2. prone press ups, unable d/t R L shoudler    3. serratus push ups, NT    4. push ups, NT    5. quad spinal neutral, NT    6. Book opener, NT    7. Side glide to wall, unable to det better/worse, ROM /midilne improved, x5 to R, x5 L side    8. AD breathing, NT    9. TA with iso & march, NT, mod diff, improved within session, able ot talk while stabilizing    10. lumbar ext on wall, 10 x 2, Reviewed and discussed  pain frequency and intensity.    11. prone on elbows, unable d/t L shoulder      Therapeutic Exercise Summary: Edu on self progression/awareness with R LE pain    Therapeutic Treatments and Modalities:     1. Neuromuscular Re-education (CPT 71520)    Therapeutic Treatment and Modalities Summary: NMR:    -Completion of Mini-BESTest to assess balance. See above for details  -Educated on balance systems and results from MiniBEST  -Romberg -> staggered stance with horizontal and vertical head turns. Added to HEP.     Time-based treatments/modalities:    Physical Therapy Timed Treatment Charges  Neuromusc re-ed, balance, coor, post minutes (CPT 78943): 45 minutes      ASSESSMENT:   Response to treatment: Due to high frequency of falling, assessed static and dynamic balance today with patient scoring as a fall risk based on Mini-BESTest. Impaired balance could impact care due to increasing frequency of falls. Therefore added balance to HEP to decrease risk. Will continue with trunk stabilization next session.     PLAN/RECOMMENDATIONS:   Plan for treatment: therapy treatment to continue next visit.  Planned interventions for next visit: continue with current treatment.

## 2025-05-05 NOTE — Clinical Note
REFERRAL APPROVAL NOTICE         Sent on May 5, 2025                   Yolanda Reed  [address]  [place] [address]                   Dear Ms. Reed,    After a careful review of the medical information and benefit coverage, [facility] has processed your referral. See below for additional details.    If applicable, you must be actively enrolled with your insurance for coverage of the authorized service. If you have any questions regarding your coverage, please contact your insurance directly.    REFERRAL INFORMATION   Referral #:  [id number]  Referred-To Department    Referred-By Provider:  Physical Therapy    Onur Arriaza M.D.   Phys Therapy [address]      [facility] [address]  [phone] [address]  [address]  [place] [id number] [phone]    Referral Start Date:  05/05/2025  Referral End Date:   05/05/2026             SCHEDULING  If you do not already have an appointment, please call [phone] to make an appointment.     MORE INFORMATION  If you do not already have a [employer] account, sign up at: [email or web address]  You can access your medical information, make appointments, see lab results, billing information, and more.  If you have questions regarding this referral, please contact  the [facility] Referrals department at:             [phone]. Monday - Friday 8:00AM - 5:00PM.     Sincerely,    [facility]

## 2025-06-13 ENCOUNTER — APPOINTMENT (OUTPATIENT)
Dept: PHYSICAL THERAPY | Facility: REHABILITATION | Age: 65
End: 2025-06-13
Attending: ORTHOPAEDIC SURGERY
Payer: MEDICAID

## 2025-07-02 ENCOUNTER — APPOINTMENT (OUTPATIENT)
Dept: PHYSICAL THERAPY | Facility: REHABILITATION | Age: 65
End: 2025-07-02
Attending: ORTHOPAEDIC SURGERY
Payer: MEDICAID

## 2025-07-07 ENCOUNTER — APPOINTMENT (OUTPATIENT)
Dept: PHYSICAL THERAPY | Facility: REHABILITATION | Age: 65
End: 2025-07-07
Attending: ORTHOPAEDIC SURGERY
Payer: MEDICAID

## 2025-07-10 ENCOUNTER — APPOINTMENT (OUTPATIENT)
Dept: PHYSICAL THERAPY | Facility: REHABILITATION | Age: 65
End: 2025-07-10
Attending: ORTHOPAEDIC SURGERY
Payer: MEDICAID

## 2025-07-15 ENCOUNTER — APPOINTMENT (OUTPATIENT)
Dept: PHYSICAL THERAPY | Facility: REHABILITATION | Age: 65
End: 2025-07-15
Attending: ORTHOPAEDIC SURGERY
Payer: MEDICAID

## 2025-07-17 ENCOUNTER — APPOINTMENT (OUTPATIENT)
Dept: PHYSICAL THERAPY | Facility: REHABILITATION | Age: 65
End: 2025-07-17
Attending: ORTHOPAEDIC SURGERY
Payer: MEDICAID

## 2025-07-22 ENCOUNTER — APPOINTMENT (OUTPATIENT)
Dept: PHYSICAL THERAPY | Facility: REHABILITATION | Age: 65
End: 2025-07-22
Attending: ORTHOPAEDIC SURGERY
Payer: MEDICAID

## 2025-07-24 ENCOUNTER — APPOINTMENT (OUTPATIENT)
Dept: PHYSICAL THERAPY | Facility: REHABILITATION | Age: 65
End: 2025-07-24
Attending: ORTHOPAEDIC SURGERY
Payer: MEDICAID

## 2025-07-29 ENCOUNTER — APPOINTMENT (OUTPATIENT)
Dept: PHYSICAL THERAPY | Facility: REHABILITATION | Age: 65
End: 2025-07-29
Attending: ORTHOPAEDIC SURGERY
Payer: MEDICAID

## 2025-07-31 ENCOUNTER — APPOINTMENT (OUTPATIENT)
Dept: PHYSICAL THERAPY | Facility: REHABILITATION | Age: 65
End: 2025-07-31
Attending: ORTHOPAEDIC SURGERY
Payer: MEDICAID

## (undated) DEVICE — LENS/HOOD FOR SPACESUIT - (32/PK) PEEL AWAY FACE

## (undated) DEVICE — BOVIE BLADE COATED - (50/PK)

## (undated) DEVICE — NEEDLE SPINAL NON-SAFETY 18 GA X 3 IN (25EA/BX)

## (undated) DEVICE — INTRAOP NEURO IN OR 1:1 PER 15 MIN

## (undated) DEVICE — BAG SPONGE COUNT 10.25 X 32 - BLUE (250/CA)

## (undated) DEVICE — SLEEVE, VASO, THIGH, MED

## (undated) DEVICE — SET EXTENSION WITH 2 PORTS (48EA/CA) ***PART #2C8610 IS A SUBSTITUTE*****

## (undated) DEVICE — MIDAS LUBRICATOR DIFFUSER PACK (4EA/CA)

## (undated) DEVICE — DRESSING SURGICAL NUKNIT 6X9 (10EA/BX)

## (undated) DEVICE — TRAY SRGPRP PVP IOD WT PRP - (20/CA)

## (undated) DEVICE — GLOVE BIOGEL SZ 7.5 SURGICAL PF LTX - (50PR/BX 4BX/CA)

## (undated) DEVICE — SODIUM CHL. INJ. 0.9% 500ML (24EA/CA 50CA/PF)

## (undated) DEVICE — TOWEL STOP TIMEOUT SAFETY FLAG (40EA/CA)

## (undated) DEVICE — Device

## (undated) DEVICE — DRAPE LARGE 3 QUARTER - (20/CA)

## (undated) DEVICE — SUTURE 2-0 VICRYL PLUS CT-2 - 27 INCH (36/BX)

## (undated) DEVICE — GLOVE BIOGEL SZ 8.5 SURGICAL PF LTX - (50PR/BX 4BX/CA)

## (undated) DEVICE — TUBE CONNECT SUCTION CLEAR 120 X 1/4" (50EA/CA)"

## (undated) DEVICE — TUBING C&T SET FLYING LEADS DRAIN TUBING (10EA/BX)

## (undated) DEVICE — TRAY CATHETER FOLEY URINE METER W/STATLOCK 350ML (10EA/CA)

## (undated) DEVICE — DISSECT TOOL MIDAS REX AM-8D

## (undated) DEVICE — GLOVE BIOGEL INDICATOR SZ 8.5 SURGICAL PF LTX - (50/BX 4BX/CA)

## (undated) DEVICE — SURGIFOAM (SIZE 100) - (6EA/CA)

## (undated) DEVICE — GLOVE SZ 7 BIOGEL PI MICRO - PF LF (50PR/BX 4BX/CA)

## (undated) DEVICE — ELECTRODE 850 FOAM ADHESIVE - HYDROGEL RADIOTRNSPRNT (50/PK)

## (undated) DEVICE — GOWN WARMING X-LARGE FLEX - (20/CA)

## (undated) DEVICE — ARMREST CRADLE FOAM - (2PR/PK 12PR/CA)

## (undated) DEVICE — KIT  I.V. START (100EA/CA)

## (undated) DEVICE — CANISTER SUCTION 3000ML MECHANICAL FILTER AUTO SHUTOFF MEDI-VAC NONSTERILE LF DISP  (40EA/CA)

## (undated) DEVICE — SUTURE GENERAL

## (undated) DEVICE — CHLORAPREP 26 ML APPLICATOR - ORANGE TINT(25/CA)

## (undated) DEVICE — PACK JACKSON TABLE KIT W/OUT - HR (6EA/CA)

## (undated) DEVICE — ELECTRODE DUAL RETURN W/ CORD - (50/PK)

## (undated) DEVICE — GOWN SURGEONS X-LARGE - DISP. (30/CA)

## (undated) DEVICE — CLOSURE PRINEO SKIN - (2EA/BX)

## (undated) DEVICE — HEAD HOLDER JUNIOR/ADULT

## (undated) DEVICE — DRAPE UNDER BUTTOCK LRG (40/CA)

## (undated) DEVICE — DRAPE C ARMOR (12EA/CA)

## (undated) DEVICE — SPONGE GAUZESTER 4 X 4 4PLY - (128PK/CA)

## (undated) DEVICE — SET LEADWIRE 5 LEAD BEDSIDE DISPOSABLE ECG (1SET OF 5/EA)

## (undated) DEVICE — GOWN SURGICAL XX-LARGE - (28EA/CA) SIRUS NON REINFORCED

## (undated) DEVICE — CANISTER SUCTION 3000ML MECHANICAL FILTER AUTO SHUTOFF MEDI-VAC NONSTERILE LF DISP (40EA/CA)

## (undated) DEVICE — DRAPE C-ARM LARGE 41IN X 74 IN - (10/BX 2BX/CA)

## (undated) DEVICE — MASK ANESTHESIA ADULT  - (100/CA)

## (undated) DEVICE — DRAPE STRLE REG TOWEL 18X24 - (10/BX 4BX/CA)"

## (undated) DEVICE — TIP EXTENDED DURAL SEALANT AUTOSPRAY ADHERUS (5EA/PK)

## (undated) DEVICE — TUBE E-T HI-LO CUFF 7.0MM (10EA/PK)

## (undated) DEVICE — SCREW DISTRACTION 14MM YELLOW - STERILE (10EA/BX) (5TX4=20)

## (undated) DEVICE — TOOL MR8 14CM MATCH HD SYM-TRI 3MM DIAMETER (1/EA)

## (undated) DEVICE — WATER IRRIGATION STERILE 1000ML (12EA/CA)

## (undated) DEVICE — PIN HEAD MAYFIELD DISP. (3EA/PK 12PK/BX)

## (undated) DEVICE — CANNULA O2 COMFORT SOFT EAR ADULT 7 FT TUBING (50/CA)

## (undated) DEVICE — GOWN ULTRA SURGICAL LARGE (32/CA)

## (undated) DEVICE — COVER LIGHT HANDLE ALC PLUS DISP (18EA/BX)

## (undated) DEVICE — GLOVE SZ 7.5 BIOGEL PI MICRO - PF LF (50PR/BX)

## (undated) DEVICE — SENSOR SPO2 NEO LNCS ADHESIVE (20/BX) SEE USER NOTES

## (undated) DEVICE — GLOVE BIOGEL ECLIPSE PF LATEX SIZE 8.0 (50PR/BX)

## (undated) DEVICE — BOVIE BLADE COATED &INSULATED - 25/PK

## (undated) DEVICE — BONE MILL BM210

## (undated) DEVICE — SODIUM CHL IRRIGATION 0.9% 1000ML (12EA/CA)

## (undated) DEVICE — PACK MINOR BASIN - (2EA/CA)

## (undated) DEVICE — TUBING CLEARLINK DUO-VENT - C-FLO (48EA/CA)

## (undated) DEVICE — GLOVE BIOGEL PI INDICATOR SZ 8.0 SURGICAL PF LF -(50/BX 4BX/CA)

## (undated) DEVICE — SENSOR OXIMETER ADULT SPO2 RD SET (20EA/BX)

## (undated) DEVICE — TRAY FOLEY CATHETER STATLOCK 16FR SURESTEP  (10EA/CA)

## (undated) DEVICE — PAD SANITARY 11IN MAXI IND WRAPPED (12EA/PK 24PK/CA)

## (undated) DEVICE — MASK OXYGEN VNYL ADLT MED CONC WITH 7 FOOT TUBING - (50EA/CA)

## (undated) DEVICE — DRAPE 36X28IN RAD CARM BND BG - (25/CA) O

## (undated) DEVICE — SUCTION INSTRUMENT YANKAUER BULBOUS TIP W/O VENT (50EA/CA)

## (undated) DEVICE — CLIP MED INTNL HRZN TI ESCP - (25/BX)

## (undated) DEVICE — SUTURE 3-0 ETHILON FS-1 - (36/BX) 30 INCH

## (undated) DEVICE — GLOVE BIOGEL PI ORTHO SZ 6 1/2 SURGICAL PF LF (40PR/BX)

## (undated) DEVICE — SUTURE 2-0 VICRYL PLUS TP-1 - (24/BX)

## (undated) DEVICE — BLADE ELECTRODE EDGE INSULATED 4 IN (50EA/BX)

## (undated) DEVICE — GLOVE BIOGEL PI INDICATOR SZ 6.5 SURGICAL PF LF - (50/BX 4BX/CA)

## (undated) DEVICE — TOOL DISSECT MATCH HEAD

## (undated) DEVICE — GOWN WARMING STANDARD FLEX - (30/CA)

## (undated) DEVICE — DRAPE LAPAROTOMY T SHEET - (12EA/CA)

## (undated) DEVICE — DRAPE SURG STERI-DRAPE 7X11OD - (40EA/CA)

## (undated) DEVICE — TOWELS CLOTH SURGICAL - (4/PK 20PK/CA)

## (undated) DEVICE — JELLY SURGILUBE STERILE TUBE 4.25 OZ (1/EA)

## (undated) DEVICE — STAPLER SKIN DISP - (6/BX 10BX/CA) VISISTAT

## (undated) DEVICE — SHEET PEDIATRIC LAPAROTOMY - (10/CA)

## (undated) DEVICE — BLADE SURGICAL #15 - (50/BX 3BX/CA)

## (undated) DEVICE — SUTURE 4-0 VICRYL PLUS FS-2 - 27 INCH (36/BX)

## (undated) DEVICE — LACTATED RINGERS INJ 1000 ML - (14EA/CA 60CA/PF)

## (undated) DEVICE — GLOVE BIOGEL PI INDICATOR SZ 7.0 SURGICAL PF LF - (50/BX 4BX/CA)

## (undated) DEVICE — BOVIE  BLADE 6 EXTENDED - (50/PK)

## (undated) DEVICE — LACTATED RINGERS INJ. 500 ML - (24EA/CA)

## (undated) DEVICE — PATTIES SURG NEURO X-RAY 1X1 (10EA/PK 20PK/CA)

## (undated) DEVICE — NEPTUNE 4 PORT MANIFOLD - (20/PK)

## (undated) DEVICE — SPONGE GAUZESTER. 2X2 4-PL - (2/PK 50PK/BX 30BX/CS)

## (undated) DEVICE — GLOVE BIOGEL PI INDICATOR SZ 7.5 SURGICAL PF LF -(50/BX 4BX/CA)

## (undated) DEVICE — CLIP SM INTNL HRZN TI ESCP LGT - (24EA/PK 25PK/BX)

## (undated) DEVICE — PAD LAP STERILE 18 X 18 - (5/PK 40PK/CA)

## (undated) DEVICE — SURGIFOAM (12X7) - (12EA/CA)

## (undated) DEVICE — GLOVE SZ 6.5 BIOGEL PI MICRO - PF LF (50PR/BX)

## (undated) DEVICE — KIT SURGIFLO W/OUT THROMBIN - (6EA/CA)

## (undated) DEVICE — SEALER BIPOLAR 2.3 AQUAMANTYS

## (undated) DEVICE — DRAPE IOBAN II INCISE 23X17 - (10EA/BX 4BX/CA)

## (undated) DEVICE — DRILL BIT

## (undated) DEVICE — KIT EVACUATER 3 SPRING PVC LF 1/8 DRAIN SIZE (10EA/CA)"

## (undated) DEVICE — SYSTEM PLATELET CONCENTRATING BONE MARROW PUREBMC

## (undated) DEVICE — GLOVE BIOGEL ECLIPSE PF LATEX SIZE 8.5 (50PR/BX)

## (undated) DEVICE — SYRINGE NON SAFETY 10 CC 20 GA X 1-1/2 IN (100/BX 4BX/CA)

## (undated) DEVICE — GLOVE BIOGEL INDICATOR SZ 6.5 SURGICAL PF LTX - (50PR/BX 4BX/CA)

## (undated) DEVICE — GLOVE BIOGEL SZ 6.5 SURGICAL PF LTX (50PR/BX 4BX/CA)

## (undated) DEVICE — GLOVE BIOGEL SZ 6 PF LATEX - (50EA/BX 4BX/CA)

## (undated) DEVICE — SET IRRIGATION CYSTOSCOPY TUBE L80 IN (20EA/CA)

## (undated) DEVICE — SLEEVE VASO DVT COMPRESSION CALF MED - (10PR/CA)

## (undated) DEVICE — PACK NEURO - (2EA/CA)

## (undated) DEVICE — CANISTER SUCTION RIGID RED 1500CC (40EA/CA)

## (undated) DEVICE — SUTURE 1 VICRYL PLUS CTX - 36 INCH (36/BX)

## (undated) DEVICE — GLOVE BIOGEL INDICATOR SZ 7SURGICAL PF LTX - (50/BX 4BX/CA)

## (undated) DEVICE — PAD SANITARY 11IN MAXI IND WRAPPED  (12EA/PK 24PK/CA)

## (undated) DEVICE — SUCTION TIP STRAIGHT ARGYLE - 50EA/CA

## (undated) DEVICE — HEADREST PRONEVIEW LARGE - (10/CA)

## (undated) DEVICE — BLADE SURGICAL CLIPPER - (50EA/CA)

## (undated) DEVICE — DRESSING TRANSPARENT FILM TEGADERM 4 X 4.75" (50EA/BX)"

## (undated) DEVICE — PROTECTOR ULNA NERVE - (36PR/CA)

## (undated) DEVICE — RESTRAINTS LIMB DISP. - (12/BX 4BX/CA)

## (undated) DEVICE — DRESSING PETROLEUM GAUZE 5 X 9" (50EA/BX 4BX/CA)"

## (undated) DEVICE — KIT ANESTHESIA W/CIRCUIT & 3/LT BAG W/FILTER (20EA/CA)

## (undated) DEVICE — SUTURE 2-0 VICRYL PLUS SH - 27 INCH (36/BX)

## (undated) DEVICE — SYRINGE NON SAFETY 5 CC 20 GA X 1-1/2 IN (100/BX 4BX/CA)

## (undated) DEVICE — CUP MEDICINE PLASTIC 1 OZ. - (100/PK 50PK/CA)

## (undated) DEVICE — SPONGE PEANUT - (5/PK 50PK/CA)

## (undated) DEVICE — PATTIES SURG NEURO X-RAY 1/2X1/2 (10EA/PK 20PK/CS)

## (undated) DEVICE — PEDIGUARD CURVED (1EA)

## (undated) DEVICE — SUTURE 2-0 VICRYL PLUS CT-1 36 (36PK/BX)"

## (undated) DEVICE — COVER MAYO STAND X-LG - (22EA/CA)

## (undated) DEVICE — KIT SURGIFLO W/OUT THROMBIN - (6EA/BX)

## (undated) DEVICE — BLANKET WARMING LOWER BODY (10EA/CA)

## (undated) DEVICE — TUBE CONNECTING SUCTION - CLEAR PLASTIC STERILE 72 IN (50EA/CA)

## (undated) DEVICE — PEN SKIN MARKER W/RULER - (50EA/BX)

## (undated) DEVICE — GLOVE SZ 8 BIOGEL PI MICRO - PF LF (50PR/BX)